# Patient Record
Sex: FEMALE | Race: WHITE | NOT HISPANIC OR LATINO | Employment: OTHER | URBAN - METROPOLITAN AREA
[De-identification: names, ages, dates, MRNs, and addresses within clinical notes are randomized per-mention and may not be internally consistent; named-entity substitution may affect disease eponyms.]

---

## 2016-12-30 PROCEDURE — 82274 ASSAY TEST FOR BLOOD FECAL: CPT | Performed by: INTERNAL MEDICINE

## 2016-12-30 PROCEDURE — 99000 SPECIMEN HANDLING OFFICE-LAB: CPT | Performed by: INTERNAL MEDICINE

## 2017-01-03 DIAGNOSIS — R19.5 FECAL OCCULT BLOOD TEST POSITIVE: Primary | ICD-10-CM

## 2017-01-03 DIAGNOSIS — Z12.11 COLON CANCER SCREENING: ICD-10-CM

## 2017-01-03 LAB — HEMOCCULT STL QL IA: POSITIVE

## 2017-01-11 ENCOUNTER — MYC MEDICAL ADVICE (OUTPATIENT)
Dept: INTERNAL MEDICINE | Facility: CLINIC | Age: 72
End: 2017-01-11

## 2017-01-13 ENCOUNTER — MYC MEDICAL ADVICE (OUTPATIENT)
Dept: INTERNAL MEDICINE | Facility: CLINIC | Age: 72
End: 2017-01-13

## 2017-01-14 ENCOUNTER — MYC MEDICAL ADVICE (OUTPATIENT)
Dept: INTERNAL MEDICINE | Facility: CLINIC | Age: 72
End: 2017-01-14

## 2017-01-18 ENCOUNTER — MYC MEDICAL ADVICE (OUTPATIENT)
Dept: INTERNAL MEDICINE | Facility: CLINIC | Age: 72
End: 2017-01-18

## 2017-01-18 NOTE — TELEPHONE ENCOUNTER
I spoke with Saravanan (Reference Call # [id number]) @ 's [facility] insurance, who indicates that no prior authorization is required for CPT Code 82149/97371  for this procedure since this is a Medicare replacement insurance Product.

## 2017-02-01 ENCOUNTER — MYC MEDICAL ADVICE (OUTPATIENT)
Dept: INTERNAL MEDICINE | Facility: CLINIC | Age: 72
End: 2017-02-01

## 2017-02-10 ENCOUNTER — TRANSFERRED RECORDS (OUTPATIENT)
Dept: HEALTH INFORMATION MANAGEMENT | Facility: CLINIC | Age: 72
End: 2017-02-10

## 2017-02-10 ENCOUNTER — TELEPHONE (OUTPATIENT)
Dept: INTERNAL MEDICINE | Facility: CLINIC | Age: 72
End: 2017-02-10

## 2017-02-10 NOTE — TELEPHONE ENCOUNTER
Dr. Finney---  [facility], Dr. Peters is calling to give you an update on this pt. A colonoscopy was done this morning , and there was a mass in the colon, most likely cancerous. Still waiting for biopsy results.  Pt is being set up with  cancer service to help triage this pt, and help her to get to right place when needed.  Please call Dr. Peters with any questions or concerns, phone [phone].    Marleni Cardona RN

## 2017-03-01 PROCEDURE — 00000346 ZZHCL STATISTIC REVIEW OUTSIDE SLIDES TC 88321: Performed by: INTERNAL MEDICINE

## 2017-03-02 ENCOUNTER — HOSPITAL ENCOUNTER (OUTPATIENT)
Dept: CT IMAGING | Facility: CLINIC | Age: 72
Discharge: HOME | End: 2017-03-02
Attending: INTERNAL MEDICINE | Admitting: INTERNAL MEDICINE
Payer: MEDICARE

## 2017-03-02 DIAGNOSIS — C18.5 MALIGNANT NEOPLASM OF SPLENIC FLEXURE (H): ICD-10-CM

## 2017-03-02 LAB
COPATH REPORT: NORMAL
CREAT BLD-MCNC: 0.7 MG/DL (ref 0.52–1.04)
GFR SERPL CREATININE-BSD FRML MDRD: 83 ML/MIN/1.7M2

## 2017-03-02 PROCEDURE — 25500064 ZZH RX 255 OP 636: Performed by: INTERNAL MEDICINE

## 2017-03-02 PROCEDURE — 82565 ASSAY OF CREATININE: CPT

## 2017-03-02 PROCEDURE — 71260 CT THORAX DX C+: CPT

## 2017-03-02 PROCEDURE — 74177 CT ABD & PELVIS W/CONTRAST: CPT

## 2017-03-02 PROCEDURE — 25000125 ZZHC RX 250: Performed by: INTERNAL MEDICINE

## 2017-03-02 RX ORDER — IOPAMIDOL 755 MG/ML
62 INJECTION, SOLUTION INTRAVASCULAR ONCE
Status: COMPLETED | OUTPATIENT
Start: 2017-03-02 | End: 2017-03-02

## 2017-03-02 RX ADMIN — SODIUM CHLORIDE 58 ML: 9 INJECTION, SOLUTION INTRAVENOUS at 14:52

## 2017-03-02 RX ADMIN — IOPAMIDOL 62 ML: 755 INJECTION, SOLUTION INTRAVENOUS at 14:51

## 2017-03-06 ENCOUNTER — OFFICE VISIT (OUTPATIENT)
Dept: SURGERY | Facility: CLINIC | Age: 72
End: 2017-03-06
Payer: COMMERCIAL

## 2017-03-06 VITALS
DIASTOLIC BLOOD PRESSURE: 88 MMHG | HEART RATE: 96 BPM | BODY MASS INDEX: 23.6 KG/M2 | SYSTOLIC BLOOD PRESSURE: 155 MMHG | WEIGHT: 125 LBS | HEIGHT: 61 IN

## 2017-03-06 DIAGNOSIS — C18.6 MALIGNANT NEOPLASM OF DESCENDING COLON (H): ICD-10-CM

## 2017-03-06 DIAGNOSIS — R16.0 LIVER MASS: Primary | ICD-10-CM

## 2017-03-06 PROCEDURE — 99203 OFFICE O/P NEW LOW 30 MIN: CPT | Performed by: SURGERY

## 2017-03-06 NOTE — LETTER
"  2017    [facility]  Surgery Consultation    RE:  Anna Dyer-:  [birth year]     Primary care physician: Tello Finney [phone]  Consultation requested by: Valdo Peters M.D.     HPI: This patient is a 71-year-old female who is referred for evaluation of a newly diagnosed colon cancer. After many years and urging by primary care she ultimately recently underwent screening colonoscopy. She was found to have a mass just below the splenic flexure that was biopsied and came back as invasive adenocarcinoma. She has subsequently undergone a CT of chest abdomen and pelvis for staging purposes. This unfortunately shows multiple lesions within her liver consistent with metastatic disease. The patient otherwise reports that she is asymptomatic. She denies abdominal pain. She denies weight loss. She denies any significant difficulties with her stools. She denies melena or hematochezia.     PMH:  has no past medical history on file.  PSH:  has no past surgical history on file.  Social History:  reports that she has been smoking Cigarettes. She has never used smokeless tobacco. She reports that she does not drink alcohol or use illicit drugs.  Family History: family history includes Family History Negative in her father, mother, and sister; Hypothyroidism in her son; Psoriasis in her brother.  Medications/Allergies: Home medications and allergies reviewed.     ROS: The 10 point Review of Systems is negative other than noted in the HPI.     Physical Exam:  /88 Pulse 96 Ht 5' 1\" (1.549 m) Wt 125 lb (56.7 kg) BMI 23.62 kg/m2  GENERAL: Generally appears well.  Psych: Alert and Oriented. Normal affect  Eyes: Sclera clear  Respiratory: Lungs clear to ausculation bilaterally with good air excursion  Cardiovascular: Regular Rate and Rhythm with no murmurs gallops or rubs, normal peripheral pulses  GI: Abdomen Non Distended Non-Tender No hernias palpated..  Lymphatic/Hematologic/Immune: " No femoral or cervical lymphadenopathy.  Integumentary: No rashes  Neurological: grossly intact         All new lab and imaging data was reviewed.      Impression and Plan:  Patient is a 71 year old female with new diagnosis of colon cancer likely with liver metastases     PLAN: I discussed with her the need to establish a diagnosis of the masses in her liver. As such she is being sent for CT-guided biopsy of these.. I'm also going to have her meet with oncology to discuss these findings. She likely will at some point require colon resection for removal of the mass. I believe that she would be a good candidate for laparoscopic surgery. This was discussed in detail. I'm going to have her come back to see me in the next several weeks after she has undergone these additional tests and had her oncology visit. All of her questions were answered thoroughly.     Salas Calderon M.D.

## 2017-03-06 NOTE — MR AVS SNAPSHOT
After Visit Summary   3/6/2017    Anna Dyer    MRN: [id number]           Patient Information     Date Of Birth          1945        Visit Information        Provider Department      3/6/2017 1:45 PM Salas Calderon MD [facility] General Surgery      Today's Diagnoses     Liver mass    -  1    Colon cancer (H)           Follow-ups after your visit        Your next 10 appointments already scheduled     Mar 07, 2017  1:40 PM CST   Pre-Op physical with Ashtyn Donis PA-C   [facility] ([facility]    [address] [address]   [phone]            Mar 08, 2017 12:45 PM CST   US BIOPSY LIVER with SHUS4   [facility] ([facility])    [address] [place] [id number] [phone]           Tell us in advance if there s any chance you may be pregnant.  Bring a list of your medicines to the exam. Include vitamins, minerals and over-the-counter drugs.  No eating or drinking for 4 hours prior to exam.  If you take blood thinners, you may need to stop taking them a few days before treatment. Talk to your doctor before stopping these medicines. You will need a blood test the morning of your exam.   Stop taking Coumadin (warfarin) 3 days before your exam. Restart the day after your exam.   If you take aspirin, you may need to stop taking it 3 days before your scan.   If you take Plavix, Ticlid, Pletal or Persantine, you may need to stop taking them 5 days before your scan. Please talk to your doctor before stopping these medicines.  If you will receive sedation for this test (medicine to help you relax):   See your family doctor for an exam within 30 days of treatment.   Plan for an adult to drive you home and stay with you for at least 6 hours.   Follow the eating and drinking guidelines checked below:   No eating or  drinking for 4 hours before your test. You may take medicine with small sips of water.   If you have diabetes:If you take insulin, call your diabetes care team. Do not take diabetes pills on the morning of your test. If you take metformin (Avandamet, Glucophage, Glucovance, Metaglip) and received contrast, wait 48 hours before re-starting this medicine.  Please call the Imaging Department at your exam site with any questions.            Mar 08, 2017  1:00 PM CST   CT LIVER BIOPSY with SHCT1, SH BODY RAD   [facility] ([facility])    [address] [place] [id number] [phone]           Plan for an adult to drive you home and stay with you until morning.  Tell your doctor in advance:   If you have any allergies.   If you are breastfeeding or there s any chance you are pregnant.  Please bring any scans or X-rays taken at other hospitals, if they may be helpful. Also bring a list of your medicines, including vitamins, minerals and over-the-counter drugs. It is safest to leave valuables at home.  If you take blood thinners, you may need to stop taking them a few days before treatment. Talk to your doctor before stopping these medicines. You will need a blood test the morning of your exam.   Stop taking Coumadin (warfarin) 5 days before treatment. Restart the day after treatment.   If you take aspirin, you may need to stop taking it 3 days before treatment.   If you take Plavix, Ticlid, Pletal or Persantine, you may need to stop taking them 5 days before your scan.  If you have diabetes:   If you take insulin, call your diabetes care team. Ask if you should adjust your insulin before this test.   If your kidney function is normal, continue taking your metformin (Avandamet, Glucophage, Glucovance, Metaglip) on the day of your exam.   If your kidney function is abnormal, wait 48 hours before restarting this medicine.  If you have any questions, please call the imaging department  "where you will have your exam.  The day before your exam: Drink extra fluids at least six 8-ounce glasses (unless your doctor tells you to restrict fluids). The day of your exam: No eating or drinking for 4 hours before your test. You may take medicine with small sips of water.              Future tests that were ordered for you today     Open Future Orders        Priority Expected Expires Ordered    US Biopsy Liver Routine  3/6/2018 3/6/2017    CT Liver Biopsy Routine 3/6/2017 3/6/2018 3/6/2017            Who to contact     If you have questions or need follow up information about today's clinic visit or your schedule please contact [facility] directly at [phone].  Normal or non-critical lab and imaging results will be communicated to you by PublicBetahart, letter or phone within 4 business days after the clinic has received the results. If you do not hear from us within 7 days, please contact the clinic through [facility] or phone. If you have a critical or abnormal lab result, we will notify you by phone as soon as possible.  Submit refill requests through My[employer]mik or call your pharmacy and they will forward the refill request to us. Please allow 3 business days for your refill to be completed.          Additional Information About Your Visit        My[employer] Information     [employer] gives you secure access to your electronic health record. If you see a primary care provider, you can also send messages to your care team and make appointments. If you have questions, please call your primary care clinic.  If you do not have a primary care provider, please call [phone] and they will assist you.        Care EveryWhere ID     This is your Care EveryWhere ID. This could be used by other organizations to access your [facility] medical records  [id number]        Your Vitals Were     Pulse Height BMI (Body Mass Index)             96 5' 1\" (1.549 m) 23.62 kg/m2          Blood Pressure from Last 3 Encounters: "   03/06/17 155/88   12/29/16 118/76   12/07/16 130/90    Weight from Last 3 Encounters:   03/06/17 125 lb (56.7 kg)   12/29/16 125 lb 6.4 oz (56.9 kg)   12/07/16 131 lb (59.4 kg)               Primary Care Provider Office Phone # Fax #    Tello Finney [phone]       [facility] [address] [place] [phone]        Thank you!     Thank you for choosing [facility]  for your care. Our goal is always to provide you with excellent care. Hearing back from our patients is one way we can continue to improve our services. Please take a few minutes to complete the written survey that you may receive in the mail after your visit with us. Thank you!             Your Updated Medication List - Protect others around you: Learn how to safely use, store and throw away your medicines at www.disposemymeds.org.          This list is accurate as of: 3/6/17  2:46 PM.  Always use your most recent med list.                   Brand Name Dispense Instructions for use    gabapentin 400 MG capsule    NEURONTIN    270 capsule    Take 2-3 capsules (800-1,200 mg) by mouth At Bedtime       hydrochlorothiazide 25 MG tablet    HYDRODIURIL    90 tablet    Take 1 tablet (25 mg) by mouth daily       losartan 100 MG tablet    COZAAR    90 tablet    Take 1 tablet (100 mg) by mouth daily

## 2017-03-07 ENCOUNTER — OFFICE VISIT (OUTPATIENT)
Dept: INTERNAL MEDICINE | Facility: CLINIC | Age: 72
End: 2017-03-07
Payer: COMMERCIAL

## 2017-03-07 VITALS
TEMPERATURE: 98.4 F | HEIGHT: 61 IN | DIASTOLIC BLOOD PRESSURE: 84 MMHG | SYSTOLIC BLOOD PRESSURE: 126 MMHG | HEART RATE: 93 BPM | OXYGEN SATURATION: 92 % | WEIGHT: 127.2 LBS | BODY MASS INDEX: 24.02 KG/M2

## 2017-03-07 DIAGNOSIS — F17.210 CIGARETTE NICOTINE DEPENDENCE WITHOUT COMPLICATION: ICD-10-CM

## 2017-03-07 DIAGNOSIS — I10 HYPERTENSION GOAL BP (BLOOD PRESSURE) < 140/90: ICD-10-CM

## 2017-03-07 DIAGNOSIS — C18.5 MALIGNANT NEOPLASM OF SPLENIC FLEXURE (H): ICD-10-CM

## 2017-03-07 DIAGNOSIS — R16.0 LIVER MASS: ICD-10-CM

## 2017-03-07 DIAGNOSIS — Z01.818 PREOP GENERAL PHYSICAL EXAM: Primary | ICD-10-CM

## 2017-03-07 DIAGNOSIS — K76.89 OTHER SPECIFIED DISEASES OF LIVER: ICD-10-CM

## 2017-03-07 LAB
ALBUMIN SERPL-MCNC: 3.6 G/DL (ref 3.4–5)
ALP SERPL-CCNC: 122 U/L (ref 40–150)
ALT SERPL W P-5'-P-CCNC: 16 U/L (ref 0–50)
ANION GAP SERPL CALCULATED.3IONS-SCNC: 8 MMOL/L (ref 3–14)
APTT PPP: 28 SEC (ref 22–37)
AST SERPL W P-5'-P-CCNC: 19 U/L (ref 0–45)
BILIRUB SERPL-MCNC: 0.4 MG/DL (ref 0.2–1.3)
BUN SERPL-MCNC: 20 MG/DL (ref 7–30)
CALCIUM SERPL-MCNC: 10 MG/DL (ref 8.5–10.1)
CHLORIDE SERPL-SCNC: 101 MMOL/L (ref 94–109)
CO2 SERPL-SCNC: 27 MMOL/L (ref 20–32)
CREAT SERPL-MCNC: 0.81 MG/DL (ref 0.52–1.04)
GFR SERPL CREATININE-BSD FRML MDRD: 70 ML/MIN/1.7M2
GLUCOSE SERPL-MCNC: 93 MG/DL (ref 70–99)
HGB BLD-MCNC: 13 G/DL (ref 11.7–15.7)
INR PPP: 0.98 (ref 0.86–1.14)
POTASSIUM SERPL-SCNC: 4 MMOL/L (ref 3.4–5.3)
PROT SERPL-MCNC: 8.3 G/DL (ref 6.8–8.8)
SODIUM SERPL-SCNC: 136 MMOL/L (ref 133–144)

## 2017-03-07 PROCEDURE — 99214 OFFICE O/P EST MOD 30 MIN: CPT | Performed by: PHYSICIAN ASSISTANT

## 2017-03-07 PROCEDURE — 85730 THROMBOPLASTIN TIME PARTIAL: CPT | Performed by: PHYSICIAN ASSISTANT

## 2017-03-07 PROCEDURE — 36415 COLL VENOUS BLD VENIPUNCTURE: CPT | Performed by: PHYSICIAN ASSISTANT

## 2017-03-07 PROCEDURE — 80053 COMPREHEN METABOLIC PANEL: CPT | Performed by: PHYSICIAN ASSISTANT

## 2017-03-07 PROCEDURE — 85018 HEMOGLOBIN: CPT | Performed by: PHYSICIAN ASSISTANT

## 2017-03-07 PROCEDURE — 85610 PROTHROMBIN TIME: CPT | Performed by: PHYSICIAN ASSISTANT

## 2017-03-07 NOTE — NURSING NOTE
"Chief Complaint   Patient presents with     Pre-Op Exam     colon-[facility]       Initial /86 (BP Location: Left arm, Patient Position: Chair, Cuff Size: Adult Regular)  Pulse 93  Temp 98.4  F (36.9  C) (Oral)  Ht 5' 1\" (1.549 m)  Wt 127 lb 3.2 oz (57.7 kg)  SpO2 92%  BMI 24.03 kg/m2 Estimated body mass index is 24.03 kg/(m^2) as calculated from the following:    Height as of this encounter: 5' 1\" (1.549 m).    Weight as of this encounter: 127 lb 3.2 oz (57.7 kg).  Medication Reconciliation: complete    "

## 2017-03-07 NOTE — PROGRESS NOTES
[address] [address]  [phone]  Dept: [phone]    PRE-OP EVALUATION:  Today's date: 3/7/2017    Anna Dyer (: 1945) presents for pre-operative evaluation assessment as requested by Dr. Calderon.  She requires evaluation and anesthesia risk assessment prior to undergoing surgery/procedure for diagnosis of liver mass .  Proposed procedure: CT and ultrasound guided bx     Date of Surgery/ Procedure: 3/8/2017  Time of Surgery/ Procedure:Unknown  Hospital[facility]Surgical [facility]    Primary Physician: Tello Finney  Type of Anesthesia Anticipated: local/MAC    Patient has a Health Care Directive or Living Will:  YES     1. NO - Do you have a history of heart attack, stroke, stent, bypass or surgery on an artery in the head, neck, heart or legs?  2. NO - Do you ever have any pain or discomfort in your chest?  3. NO - Do you have a history of  Heart Failure?  4. NO - Are you troubled by shortness of breath when: walking on the level, up a slight hill or at night?  5. NO - Do you currently have a cold, bronchitis or other respiratory infection?  6. NO - Do you have a cough, shortness of breath or wheezing?  7. NO - Do you sometimes get pains in the calves of your legs when you walk?  8. NO - Do you or anyone in your family have previous history of blood clots?  9. NO - Do you or does anyone in your family have a serious bleeding problem such as prolonged bleeding following surgeries or cuts?  10. NO - Have you ever had problems with anemia or been told to take iron pills?  11. NO - Have you had any abnormal blood loss such as black, tarry or bloody stools, or abnormal vaginal bleeding?  12. NO - Have you ever had a blood transfusion?  13. NO - Have you or any of your relatives ever had problems with anesthesia?  14. NO - Do you have sleep apnea, excessive snoring or daytime drowsiness?  15. NO - Do you have any prosthetic heart  valves?  16. NO - Do you have prosthetic joints?  17. NO - Is there any chance that you may be pregnant?      HPI:                                                      Brief HPI related to upcoming procedure: patient recently has + guiac FIT testing and then referred for colonoscopy.  Colonoscopy revealed adenocarcinoma at the splenic flexure. Further imaging notes liver mass      See problem list for active medical problems.  Problems all longstanding and stable, except as noted/documented.  See ROS for pertinent symptoms related to these conditions.                                                                                                  .  HYPERTENSION - Patient has longstanding history of mod-severe HTN , currently denies any symptoms referable to elevated blood pressure. Specifically denies chest pain, palpitations, dyspnea, orthopnea, PND or peripheral edema. Blood pressure readings have been in normal range. Current medication regimen is as listed below. Patient denies any side effects of medication.                                                                                                                                                                                          .    MEDICAL HISTORY:                                                      Patient Active Problem List    Diagnosis Date Noted     Malignant neoplasm of splenic flexure (H) 03/07/2017     Priority: Medium     CARDIOVASCULAR SCREENING; LDL GOAL LESS THAN 130 02/24/2014     Priority: Medium     Advanced directives, counseling/discussion 09/15/2014     Advance Care Planning:   Receipt of ACP document:  Received: Health Care Directive which was witnessed or notarized on 08/20/2013.  Document not previously scanned.  Validation form completed and sent with document to be scanned.  .  Confirmed/documented designated decision maker(s). See permanent comments section of demographics in clinical tab. View document(s) and details by  "clicking on code status.   Added by Rach Olson on 1/19/2015.               Nicotine dependence 02/25/2014     Hypertension goal BP (blood pressure) < 140/90 02/24/2014     Restless leg syndrome 02/24/2014      No past medical history on file.  No past surgical history on file.  Current Outpatient Prescriptions   Medication Sig Dispense Refill     hydrochlorothiazide (HYDRODIURIL) 25 MG tablet Take 1 tablet (25 mg) by mouth daily 90 tablet 1     losartan (COZAAR) 100 MG tablet Take 1 tablet (100 mg) by mouth daily 90 tablet 1     gabapentin (NEURONTIN) 400 MG capsule Take 2-3 capsules (800-1,200 mg) by mouth At Bedtime 270 capsule 3     OTC products: no recent use of OTC ASA, NSAIDS or Steroids    Allergies   Allergen Reactions     Sulfa Drugs Hives      Latex Allergy: NO    Social History   Substance Use Topics     Smoking status: Current Every Day Smoker     Types: Cigarettes     Smokeless tobacco: Never Used     Alcohol use No     History   Drug Use No       REVIEW OF SYSTEMS:                                                    C: NEGATIVE for fever, chills, change in weight  INTEGUMENTARY/SKIN: NEGATIVE for worrisome rashes, moles or lesions  E/M: NEGATIVE for ear, mouth and throat problems  R: NEGATIVE for significant cough or SOB  CV: NEGATIVE for chest pain, palpitations or peripheral edema  GI: NEGATIVE for nausea, abdominal pain, heartburn, or change in bowel habits  MUSCULOSKELETAL: NEGATIVE for significant arthralgias or myalgia  NEURO: NEGATIVE for weakness, dizziness or paresthesias  ENDOCRINE: NEGATIVE for temperature intolerance, skin/hair changes  HEME/ALLERGY/IMMUNE: NEGATIVE for bleeding problems  PSYCHIATRIC: NEGATIVE for changes in mood or affect    EXAM:                                                    /84  Pulse 93  Temp 98.4  F (36.9  C) (Oral)  Ht 5' 1\" (1.549 m)  Wt 127 lb 3.2 oz (57.7 kg)  SpO2 92%  BMI 24.03 kg/m2  GENERAL APPEARANCE: healthy, alert and no distress  HENT: ear " canals and TM's normal and nose and mouth without ulcers or lesions  RESP: lungs clear to auscultation - no rales, rhonchi or wheezes  CV: regular rate and rhythm, normal S1 S2, no S3 or S4 and no murmur, click or rub   ABDOMEN: soft, nontender, no HSM or masses and bowel sounds normal  MS: extremities normal- no gross deformities noted  SKIN: no suspicious lesions or rashes  NEURO: Normal strength and tone, sensory exam grossly normal, mentation intact and speech normal  PSYCH: mentation appears normal and affect normal/bright    DIAGNOSTICS:                                                      Labs Drawn and in Process:   Unresulted Labs Ordered in the Past 30 Days of this Admission     No orders found from 1/6/2017 to 3/8/2017.          Recent Labs   Lab Test  12/29/16   1546  02/15/16   1542   02/24/14   1505   05/08/09   2137   HGB   --    --    --   16.6*   --   13.4   PLT   --    --    --    --    --   92*   NA  131*  137   < >  139   < >  137   POTASSIUM  4.0  4.2   < >  4.4   < >  3.8   CR  0.91  0.80   < >  1.01   < >  2.50*    < > = values in this interval not displayed.        IMPRESSION:                                                    Reason for surgery/procedure: liver mass, colon cancer  Diagnosis/reason for consult: Hypertension, obacco abuse     The proposed surgical procedure is considered LOW risk.    REVISED CARDIAC RISK INDEX  The patient has the following serious cardiovascular risks for perioperative complications such as (MI, PE, VFib and 3  AV Block):  No serious cardiac risks  INTERPRETATION: 0 risks: Class I (very low risk - 0.4% complication rate)    The patient has the following additional risks for perioperative complications:  No identified additional risks      ICD-10-CM    1. Preop general physical exam Z01.818 Hemoglobin     INR     Partial thromboplastin time     Comprehensive metabolic panel (BMP + Alb, Alk Phos, ALT, AST, Total. Bili, TP)   2. Liver mass R16.0 Hemoglobin      INR     Partial thromboplastin time     Comprehensive metabolic panel (BMP + Alb, Alk Phos, ALT, AST, Total. Bili, TP)   3. Malignant neoplasm of splenic flexure (H) C18.5 Hemoglobin     INR     Partial thromboplastin time     Comprehensive metabolic panel (BMP + Alb, Alk Phos, ALT, AST, Total. Bili, TP)   4. Hypertension goal BP (blood pressure) < 140/90 I10 Comprehensive metabolic panel (BMP + Alb, Alk Phos, ALT, AST, Total. Bili, TP)   5. Other specified diseases of liver  K76.89 Partial thromboplastin time   6. Cigarette nicotine dependence without complication F17.210        RECOMMENDATIONS:                                                          --Patient is to take all scheduled medications on the day of surgery     APPROVAL GIVEN to proceed with proposed procedure, without further diagnostic evaluation       Signed Electronically by: Ashtyn Donis PA-C    Copy of this evaluation report is provided to requesting physician.    [facility] Preop Guidelines

## 2017-03-07 NOTE — MR AVS SNAPSHOT
After Visit Summary   3/7/2017    Anna Dyer    MRN: [id number]           Patient Information     Date Of Birth          1945        Visit Information        Provider Department      3/7/2017 1:40 PM Ashtyn Donis PA-C [facility]        Today's Diagnoses     Preop general physical exam    -  1    Liver mass        Malignant neoplasm of splenic flexure (H)        Hypertension goal BP (blood pressure) < 140/90        Other specified diseases of liver         Cigarette nicotine dependence without complication          Care Instructions      Before Your Surgery      Call your surgeon if there is any change in your health. This includes signs of a cold or flu (such as a sore throat, runny nose, cough, rash or fever).    Do not smoke, drink alcohol or take over the counter medicine (unless your surgeon or primary care doctor tells you to) for the 24 hours before and after surgery.    If you take prescribed drugs: Follow your doctor s orders about which medicines to take and which to stop until after surgery.    Eating and drinking prior to surgery: follow the instructions from your surgeon    Take a shower or bath the night before surgery. Use the soap your surgeon gave you to gently clean your skin. If you do not have soap from your surgeon, use your regular soap. Do not shave or scrub the surgery site.  Wear clean pajamas and have clean sheets on your bed.         Follow-ups after your visit        Your next 10 appointments already scheduled     Mar 08, 2017 12:45 PM CST   US BIOPSY LIVER with SHUS4   [facility] Ultrasound ([facility])    [address] [place] [id number] [phone]           Tell us in advance if there s any chance you may be pregnant.  Bring a list of your medicines to the exam. Include vitamins, minerals and over-the-counter drugs.  No eating or drinking for 4 hours prior to exam.  If you take blood  thinners, you may need to stop taking them a few days before treatment. Talk to your doctor before stopping these medicines. You will need a blood test the morning of your exam.   Stop taking Coumadin (warfarin) 3 days before your exam. Restart the day after your exam.   If you take aspirin, you may need to stop taking it 3 days before your scan.   If you take Plavix, Ticlid, Pletal or Persantine, you may need to stop taking them 5 days before your scan. Please talk to your doctor before stopping these medicines.  If you will receive sedation for this test (medicine to help you relax):   See your family doctor for an exam within 30 days of treatment.   Plan for an adult to drive you home and stay with you for at least 6 hours.   Follow the eating and drinking guidelines checked below:   No eating or drinking for 4 hours before your test. You may take medicine with small sips of water.   If you have diabetes:If you take insulin, call your diabetes care team. Do not take diabetes pills on the morning of your test. If you take metformin (Avandamet, Glucophage, Glucovance, Metaglip) and received contrast, wait 48 hours before re-starting this medicine.  Please call the Imaging Department at your exam site with any questions.            Mar 08, 2017  1:00 PM CST   CT LIVER BIOPSY with SHCT1, SH BODY RAD   [facility] CT ([facility])    [address] [place] [id number] [phone]           Plan for an adult to drive you home and stay with you until morning.  Tell your doctor in advance:   If you have any allergies.   If you are breastfeeding or there s any chance you are pregnant.  Please bring any scans or X-rays taken at other hospitals, if they may be helpful. Also bring a list of your medicines, including vitamins, minerals and over-the-counter drugs. It is safest to leave valuables at home.  If you take blood thinners, you may need to stop taking them a few days before  treatment. Talk to your doctor before stopping these medicines. You will need a blood test the morning of your exam.   Stop taking Coumadin (warfarin) 5 days before treatment. Restart the day after treatment.   If you take aspirin, you may need to stop taking it 3 days before treatment.   If you take Plavix, Ticlid, Pletal or Persantine, you may need to stop taking them 5 days before your scan.  If you have diabetes:   If you take insulin, call your diabetes care team. Ask if you should adjust your insulin before this test.   If your kidney function is normal, continue taking your metformin (Avandamet, Glucophage, Glucovance, Metaglip) on the day of your exam.   If your kidney function is abnormal, wait 48 hours before restarting this medicine.  If you have any questions, please call the imaging department where you will have your exam.  The day before your exam: Drink extra fluids at least six 8-ounce glasses (unless your doctor tells you to restrict fluids). The day of your exam: No eating or drinking for 4 hours before your test. You may take medicine with small sips of water.              Future tests that were ordered for you today     Open Future Orders        Priority Expected Expires Ordered    US Biopsy Liver Routine  3/6/2018 3/6/2017    CT Liver Biopsy Routine 3/6/2017 3/6/2018 3/6/2017            Who to contact     If you have questions or need follow up information about today's clinic visit or your schedule please contact [facility] directly at [phone].  Normal or non-critical lab and imaging results will be communicated to you by MyChart, letter or phone within 4 business days after the clinic has received the results. If you do not hear from us within 7 days, please contact the clinic through MyChart or phone. If you have a critical or abnormal lab result, we will notify you by phone as soon as possible.  Submit refill requests through [employer] or call your pharmacy and  "they will forward the refill request to us. Please allow 3 business days for your refill to be completed.          Additional Information About Your Visit        [employer] Information     [employer] gives you secure access to your electronic health record. If you see a primary care provider, you can also send messages to your care team and make appointments. If you have questions, please call your primary care clinic.  If you do not have a primary care provider, please call [phone] and they will assist you.        Care EveryWhere ID     This is your Care EveryWhere ID. This could be used by other organizations to access your [place] [facility] medical records  [id number]        Your Vitals Were     Pulse Temperature Height Pulse Oximetry BMI (Body Mass Index)       93 98.4  F (36.9  C) (Oral) 5' 1\" (1.549 m) 92% 24.03 kg/m2        Blood Pressure from Last 3 Encounters:   03/07/17 126/84   03/06/17 155/88   12/29/16 118/76    Weight from Last 3 Encounters:   03/07/17 127 lb 3.2 oz (57.7 kg)   03/06/17 125 lb (56.7 kg)   12/29/16 125 lb 6.4 oz (56.9 kg)              We Performed the Following     Comprehensive metabolic panel (BMP + Alb, Alk Phos, ALT, AST, Total. Bili, TP)     Hemoglobin     INR     Partial thromboplastin time        Primary Care Provider Office Phone # Fax #    Tello Finney [phone]       [facility] [address] [facility] [address]        Thank you!     Thank you for choosing [facility]  for your care. Our goal is always to provide you with excellent care. Hearing back from our patients is one way we can continue to improve our services. Please take a few minutes to complete the written survey that you may receive in the mail after your visit with us. Thank you!             Your Updated Medication List - Protect others around you: Learn how to safely use, store and throw away your medicines at www.disposemymeds.org.          This list is " accurate as of: 3/7/17  2:22 PM.  Always use your most recent med list.                   Brand Name Dispense Instructions for use    gabapentin 400 MG capsule    NEURONTIN    270 capsule    Take 2-3 capsules (800-1,200 mg) by mouth At Bedtime       hydrochlorothiazide 25 MG tablet    HYDRODIURIL    90 tablet    Take 1 tablet (25 mg) by mouth daily       losartan 100 MG tablet    COZAAR    90 tablet    Take 1 tablet (100 mg) by mouth daily

## 2017-03-08 ENCOUNTER — HOSPITAL ENCOUNTER (OUTPATIENT)
Dept: ULTRASOUND IMAGING | Facility: CLINIC | Age: 72
End: 2017-03-08
Attending: SURGERY | Admitting: SURGERY
Payer: MEDICARE

## 2017-03-08 ENCOUNTER — HOSPITAL ENCOUNTER (OUTPATIENT)
Facility: CLINIC | Age: 72
Discharge: HOME | End: 2017-03-08
Attending: SURGERY | Admitting: SURGERY
Payer: MEDICARE

## 2017-03-08 VITALS
HEART RATE: 84 BPM | OXYGEN SATURATION: 94 % | TEMPERATURE: 96 F | DIASTOLIC BLOOD PRESSURE: 56 MMHG | SYSTOLIC BLOOD PRESSURE: 110 MMHG | RESPIRATION RATE: 16 BRPM

## 2017-03-08 DIAGNOSIS — C18.9 COLON CANCER (H): ICD-10-CM

## 2017-03-08 DIAGNOSIS — C19 COLORECTAL CANCER (H): Primary | ICD-10-CM

## 2017-03-08 DIAGNOSIS — R16.0 LIVER MASS: ICD-10-CM

## 2017-03-08 LAB — PLATELET # BLD AUTO: 323 10E9/L (ref 150–450)

## 2017-03-08 PROCEDURE — 25000125 ZZHC RX 250: Performed by: RADIOLOGY

## 2017-03-08 PROCEDURE — 88307 TISSUE EXAM BY PATHOLOGIST: CPT | Mod: 26 | Performed by: RADIOLOGY

## 2017-03-08 PROCEDURE — 88173 CYTOPATH EVAL FNA REPORT: CPT | Mod: 26 | Performed by: RADIOLOGY

## 2017-03-08 PROCEDURE — 88173 CYTOPATH EVAL FNA REPORT: CPT | Performed by: RADIOLOGY

## 2017-03-08 PROCEDURE — 88172 CYTP DX EVAL FNA 1ST EA SITE: CPT | Performed by: RADIOLOGY

## 2017-03-08 PROCEDURE — 81445 SO NEO GSAP 5-50DNA/DNA&RNA: CPT | Performed by: PATHOLOGY

## 2017-03-08 PROCEDURE — 88342 IMHCHEM/IMCYTCHM 1ST ANTB: CPT | Mod: 26 | Performed by: RADIOLOGY

## 2017-03-08 PROCEDURE — 85049 AUTOMATED PLATELET COUNT: CPT | Performed by: RADIOLOGY

## 2017-03-08 PROCEDURE — 88342 IMHCHEM/IMCYTCHM 1ST ANTB: CPT | Performed by: RADIOLOGY

## 2017-03-08 PROCEDURE — 00000159 ZZHCL STATISTIC H-SEND OUTS PREP: Performed by: RADIOLOGY

## 2017-03-08 PROCEDURE — 40000863 ZZH STATISTIC RADIOLOGY XRAY, US, CT, MAR, NM

## 2017-03-08 PROCEDURE — 88341 IMHCHEM/IMCYTCHM EA ADD ANTB: CPT | Performed by: RADIOLOGY

## 2017-03-08 PROCEDURE — 88341 IMHCHEM/IMCYTCHM EA ADD ANTB: CPT | Mod: 26 | Performed by: RADIOLOGY

## 2017-03-08 PROCEDURE — 76942 ECHO GUIDE FOR BIOPSY: CPT

## 2017-03-08 PROCEDURE — 88172 CYTP DX EVAL FNA 1ST EA SITE: CPT | Mod: 26 | Performed by: RADIOLOGY

## 2017-03-08 PROCEDURE — 88307 TISSUE EXAM BY PATHOLOGIST: CPT | Performed by: RADIOLOGY

## 2017-03-08 PROCEDURE — 25000128 H RX IP 250 OP 636: Performed by: RADIOLOGY

## 2017-03-08 RX ORDER — LIDOCAINE HYDROCHLORIDE 10 MG/ML
7 INJECTION, SOLUTION EPIDURAL; INFILTRATION; INTRACAUDAL; PERINEURAL ONCE
Status: COMPLETED | OUTPATIENT
Start: 2017-03-08 | End: 2017-03-08

## 2017-03-08 RX ORDER — FENTANYL CITRATE 50 UG/ML
25-50 INJECTION, SOLUTION INTRAMUSCULAR; INTRAVENOUS EVERY 5 MIN PRN
Status: DISCONTINUED | OUTPATIENT
Start: 2017-03-08 | End: 2017-03-08 | Stop reason: HOSPADM

## 2017-03-08 RX ORDER — NALOXONE HYDROCHLORIDE 0.4 MG/ML
.1-.4 INJECTION, SOLUTION INTRAMUSCULAR; INTRAVENOUS; SUBCUTANEOUS
Status: DISCONTINUED | OUTPATIENT
Start: 2017-03-08 | End: 2017-03-08 | Stop reason: HOSPADM

## 2017-03-08 RX ORDER — LIDOCAINE 40 MG/G
CREAM TOPICAL
Status: DISCONTINUED | OUTPATIENT
Start: 2017-03-08 | End: 2017-03-08 | Stop reason: HOSPADM

## 2017-03-08 RX ORDER — FLUMAZENIL 0.1 MG/ML
0.2 INJECTION, SOLUTION INTRAVENOUS
Status: DISCONTINUED | OUTPATIENT
Start: 2017-03-08 | End: 2017-03-08 | Stop reason: HOSPADM

## 2017-03-08 RX ADMIN — FENTANYL CITRATE 50 MCG: 50 INJECTION, SOLUTION INTRAMUSCULAR; INTRAVENOUS at 13:59

## 2017-03-08 RX ADMIN — LIDOCAINE HYDROCHLORIDE 70 MG: 10 INJECTION, SOLUTION EPIDURAL; INFILTRATION; INTRACAUDAL; PERINEURAL at 14:10

## 2017-03-08 RX ADMIN — MIDAZOLAM HYDROCHLORIDE 1 MG: 1 INJECTION, SOLUTION INTRAMUSCULAR; INTRAVENOUS at 13:59

## 2017-03-08 NOTE — PROGRESS NOTES
"[facility]  Surgery Consultation    Primary care physician:  Tello Finney YONY [phone]  Consultation requested by: Valdo Peters M.D.    HPI: This patient is a 71-year-old female who is referred for evaluation of a newly diagnosed colon cancer.  After many years and urging by primary care she ultimately recently underwent screening colonoscopy.  She was found to have a mass just below the splenic flexure that was biopsied and came back as invasive adenocarcinoma.   She has subsequently undergone a CT of chest abdomen and pelvis for staging purposes.  This unfortunately shows multiple lesions within her liver consistent with metastatic disease.  The patient otherwise reports that she is asymptomatic.  She denies abdominal pain.  She denies weight loss.  She denies any significant difficulties with her stools.  She denies melena or hematochezia.    PMH:   has no past medical history on file.  PSH:    has no past surgical history on file.  Social History:   reports that she has been smoking Cigarettes.  She has never used smokeless tobacco. She reports that she does not drink alcohol or use illicit drugs.  Family History:   family history includes Family History Negative in her father, mother, and sister; Hypothyroidism in her son; Psoriasis in her brother.  Medications/Allergies: Home medications and allergies reviewed.    ROS:  The 10 point Review of Systems is negative other than noted in the HPI.    Physical Exam:  /88  Pulse 96  Ht 5' 1\" (1.549 m)  Wt 125 lb (56.7 kg)  BMI 23.62 kg/m2  GENERAL: Generally appears well.  Psych: Alert and Oriented.  Normal affect  Eyes: Sclera clear  Respiratory:  Lungs clear to ausculation bilaterally with good air excursion  Cardiovascular:  Regular Rate and Rhythm with no murmurs gallops or rubs, normal peripheral pulses  GI: Abdomen Non Distended Non-Tender  No hernias palpated..  Lymphatic/Hematologic/Immune:  No femoral or cervical " lymphadenopathy.  Integumentary:  No rashes  Neurological: grossly intact       All new lab and imaging data was reviewed.     Impression and Plan:  Patient is a 71 year old female with new diagnosis of colon cancer likely with liver metastases    PLAN: I discussed with her the need to establish a diagnosis of the masses in her liver.  As such she is being sent for CT-guided biopsy of these..  I'm also going to have her meet with oncology to discuss these findings.   She likely will at some point require colon resection for removal of the mass.  I believe that she would be a good candidate for laparoscopic surgery.  This was discussed in detail.  I'm going to have her come back to see me in the next several weeks after she has undergone these additional tests and had her oncology visit. All of her questions were answered thoroughly.    Salas Calderon M.D.    Please route or send letter to:  Primary Care Provider (PCP) and Referring Provider

## 2017-03-08 NOTE — PROGRESS NOTES
RADIOLOGY PROCEDURE NOTE  Patient name: Anna Dyer  MRN: [id number]  : 1945    Pre-procedure diagnosis: Liver lesion  Post-procedure diagnosis: Same    Procedure Date/Time: 2017  2:07 PM  Procedure: Biopsy.  Estimated blood loss: None  Specimen(s) collected with description: Core biopsy samples.  The patient tolerated the procedure well with no immediate complications.    See imaging dictation for procedural details and findings.    Provider name: Kemal Spicer  Assistant(s):None

## 2017-03-08 NOTE — IP AVS SNAPSHOT
[place] [address]    [place] [address]    Phone:  [phone]                                       After Visit Summary   3/8/2017    Anna Dyer    MRN: [id number]           After Visit Summary Signature Page     I have received my discharge instructions, and my questions have been answered. I have discussed any challenges I see with this plan with the nurse or doctor.    ..........................................................................................................................................  Patient/Patient Representative Signature      ..........................................................................................................................................  Patient Representative Print Name and Relationship to Patient    ..................................................               ................................................  Date                                            Time    ..........................................................................................................................................  Reviewed by Signature/Title    ...................................................              ..............................................  Date                                                            Time

## 2017-03-08 NOTE — PROGRESS NOTES
Care Suites Arrival    Reason for Visit: Liver Biopsy    A/O. Denies pain. D/C instructions reviewed with pt with verbal understanding received. Copy given to pt.  All questions & concerns addressed. Pt resting on cart, denies additional needs at this time, call light within reach. No family present at this time. Friend will pick pt up at discharge. Will cont to monitor.    Pt was instructed by clinic nurse that she could eat/drink up until 1000. Pt reports that she drank a milkshake but was done by 1000. Dr Spicer informed. Will delay procedure until 1330. Pt also was not informed that she needed someone to stay with her post sedation. Friend, Linnea, called - she is able to stay with pt for 5 hrs once pt is home.

## 2017-03-08 NOTE — IP AVS SNAPSHOT
MRN:[id number]                      After Visit Summary   3/8/2017    Anna Dyer    MRN: [id number]           Visit Information        Department      3/8/2017 11:05 AM [facility]          Review of your medicines      UNREVIEWED medicines. Ask your doctor about these medicines        Dose / Directions    gabapentin 400 MG capsule   Commonly known as:  NEURONTIN   Used for:  Restless leg syndrome        Dose:  800-1200 mg   Take 2-3 capsules (800-1,200 mg) by mouth At Bedtime   Quantity:  270 capsule   Refills:  3       hydrochlorothiazide 25 MG tablet   Commonly known as:  HYDRODIURIL   Used for:  Essential hypertension with goal blood pressure less than 140/90        Dose:  25 mg   Take 1 tablet (25 mg) by mouth daily   Quantity:  90 tablet   Refills:  1       losartan 100 MG tablet   Commonly known as:  COZAAR   Used for:  Essential hypertension with goal blood pressure less than 140/90        Dose:  100 mg   Take 1 tablet (100 mg) by mouth daily   Quantity:  90 tablet   Refills:  1                Protect others around you: Learn how to safely use, store and throw away your medicines at www.disposemymeds.org.         Follow-ups after your visit        Your next 10 appointments already scheduled     Mar 08, 2017 12:45 PM CST   US BIOPSY LIVER with SHUS4   [facility] Ultrasound ([facility])    [address] [place] [id number] [phone]           Tell us in advance if there s any chance you may be pregnant.  Bring a list of your medicines to the exam. Include vitamins, minerals and over-the-counter drugs.  No eating or drinking for 4 hours prior to exam.  If you take blood thinners, you may need to stop taking them a few days before treatment. Talk to your doctor before stopping these medicines. You will need a blood test the morning of your exam.   Stop taking Coumadin (warfarin) 3 days before your exam. Restart the day after  your exam.   If you take aspirin, you may need to stop taking it 3 days before your scan.   If you take Plavix, Ticlid, Pletal or Persantine, you may need to stop taking them 5 days before your scan. Please talk to your doctor before stopping these medicines.  If you will receive sedation for this test (medicine to help you relax):   See your family doctor for an exam within 30 days of treatment.   Plan for an adult to drive you home and stay with you for at least 6 hours.   Follow the eating and drinking guidelines checked below:   No eating or drinking for 4 hours before your test. You may take medicine with small sips of water.   If you have diabetes:If you take insulin, call your diabetes care team. Do not take diabetes pills on the morning of your test. If you take metformin (Avandamet, Glucophage, Glucovance, Metaglip) and received contrast, wait 48 hours before re-starting this medicine.  Please call the Imaging Department at your exam site with any questions.               Care Instructions        Further instructions from your care team       Liver Biopsy Discharge Instructions     After you go home:      You may resume your normal diet    Have an adult stay with you for 6 hours if you received sedation       For 24 hours - due to the sedation you received:    Relax and take it easy    Do NOT make any important or legal decisions    Do NOT drive or operate machines at home or at work    Do NOT drink alcohol    Care of Puncture Site:      For the first 48 hrs, check your puncture site every couple hours while you are awake     You may remove/change the bandaid tomorrow    You may shower tomorrow    No tub baths, whirlpools or swimming until your puncture site has fully healed    Activity       You may go back to normal activity in 24 hours     Wait 48 hours before lifting, straining, exercise or other strenuous activity    Medicines:      You may resume all medications    Resume your Warfarin/Coumadin at  your regular dose today. Follow up with your provider to have your INR rechecked    Resume your Platelet Inhibitors and Aspirin tomorrow at your regular dose    For minor pain, you may take Acetaminophen (Tylenol) or Ibuprofen (Advil)            Call the provider who ordered this test if:      Increased pain or a large or growing hard lump around the site    Blood or fluid is draining from the site    The site is red, swollen, hot or tender    Chills or a fever greater than 101 F (38 C)    Pain that is getting worse    Any questions or concerns    Call  911 or go to the Emergency Room if:      Severe pain or trouble breathing    Bleeding that you cannot control        If you have questions call:          [facility] Radiology Dept @ [phone]      The provider who performed your procedure was _________________.             Additional Information About Your Visit        MyChart Information     [employer] gives you secure access to your electronic health record. If you see a primary care provider, you can also send messages to your care team and make appointments. If you have questions, please call your primary care clinic.  If you do not have a primary care provider, please call [phone] and they will assist you.        Care EveryWhere ID     This is your Care EveryWhere ID. This could be used by other organizations to access your [place] medical records  [id number]        Your Vitals Were     Blood Pressure Pulse Temperature Respirations Pulse Oximetry       167/71 (BP Location: Right arm) 101 96  F (35.6  C) (Oral) 18 98%        Primary Care Provider Office Phone # Fax #    Tello Finney [phone]      Thank you!     Thank you for choosing [facility] for your care. Our goal is always to provide you with excellent care. Hearing back from our patients is one way we can continue to improve our services. Please take a few minutes to complete the written survey that you may receive in  the mail after you visit with us. Thank you!             Medication List: This is a list of all your medications and when to take them. Check marks below indicate your daily home schedule. Keep this list as a reference.      Medications           Morning Afternoon Evening Bedtime As Needed    gabapentin 400 MG capsule   Commonly known as:  NEURONTIN   Take 2-3 capsules (800-1,200 mg) by mouth At Bedtime                                hydrochlorothiazide 25 MG tablet   Commonly known as:  HYDRODIURIL   Take 1 tablet (25 mg) by mouth daily                                losartan 100 MG tablet   Commonly known as:  COZAAR   Take 1 tablet (100 mg) by mouth daily

## 2017-03-08 NOTE — PROGRESS NOTES
1330 Pt transported via cart accompanied by writer to ultrasound department. Pt scanned et liver lesions seen. Dr. Spicer notified et he will come et assess ultrasound picture.   1356 Dr. Spicer in room et assessed pt et explained the procedure et risks of procedure. Consent signed. Time Out taken.  1359 Sedation initiated; Versed 1 mg et Fentanyl 50 mcg given with relief of anxiety. 5 core biopsies taken et are adequate.   1415 Band-aide on liver site CDI; no bleeding or hematoma noted.  1445 Ate sandwich et drank coffee et tolerated. Denies pain. Live biopsy site CDI.  1520 Pt taken via wheelchair to car accompanied by CNA et neighbor will drive her home. Liver biopsy site CDI; no bleeding or hematoma noted.

## 2017-03-09 LAB — COPATH REPORT: NORMAL

## 2017-03-13 ENCOUNTER — MYC MEDICAL ADVICE (OUTPATIENT)
Dept: INTERNAL MEDICINE | Facility: CLINIC | Age: 72
End: 2017-03-13

## 2017-03-13 DIAGNOSIS — C78.7 COLON CANCER METASTASIZED TO LIVER (H): Primary | ICD-10-CM

## 2017-03-13 DIAGNOSIS — I10 ESSENTIAL HYPERTENSION WITH GOAL BLOOD PRESSURE LESS THAN 140/90: ICD-10-CM

## 2017-03-13 DIAGNOSIS — C18.9 COLON CANCER METASTASIZED TO LIVER (H): Primary | ICD-10-CM

## 2017-03-14 RX ORDER — HYDROCHLOROTHIAZIDE 25 MG/1
25 TABLET ORAL DAILY
Qty: 90 TABLET | Refills: 1 | Status: CANCELLED | OUTPATIENT
Start: 2017-03-14

## 2017-03-14 RX ORDER — LOSARTAN POTASSIUM 100 MG/1
100 TABLET ORAL DAILY
Qty: 90 TABLET | Refills: 1 | Status: CANCELLED | OUTPATIENT
Start: 2017-03-14

## 2017-03-15 ENCOUNTER — TRANSFERRED RECORDS (OUTPATIENT)
Dept: HEALTH INFORMATION MANAGEMENT | Facility: CLINIC | Age: 72
End: 2017-03-15

## 2017-03-21 RX ORDER — LOSARTAN POTASSIUM AND HYDROCHLOROTHIAZIDE 25; 100 MG/1; MG/1
1 TABLET ORAL DAILY
Qty: 90 TABLET | Refills: 1 | Status: SHIPPED | OUTPATIENT
Start: 2017-03-21 | End: 2017-10-16

## 2017-03-22 LAB — COPATH REPORT: NORMAL

## 2017-03-24 ENCOUNTER — ANESTHESIA EVENT (OUTPATIENT)
Dept: SURGERY | Facility: CLINIC | Age: 72
End: 2017-03-24
Payer: MEDICARE

## 2017-03-24 ENCOUNTER — APPOINTMENT (OUTPATIENT)
Dept: GENERAL RADIOLOGY | Facility: CLINIC | Age: 72
End: 2017-03-24
Attending: THORACIC SURGERY (CARDIOTHORACIC VASCULAR SURGERY)
Payer: MEDICARE

## 2017-03-24 ENCOUNTER — HOSPITAL ENCOUNTER (OUTPATIENT)
Facility: CLINIC | Age: 72
Discharge: HOME | End: 2017-03-24
Attending: THORACIC SURGERY (CARDIOTHORACIC VASCULAR SURGERY) | Admitting: THORACIC SURGERY (CARDIOTHORACIC VASCULAR SURGERY)
Payer: MEDICARE

## 2017-03-24 ENCOUNTER — ANESTHESIA (OUTPATIENT)
Dept: SURGERY | Facility: CLINIC | Age: 72
End: 2017-03-24
Payer: MEDICARE

## 2017-03-24 ENCOUNTER — SURGERY (OUTPATIENT)
Age: 72
End: 2017-03-24

## 2017-03-24 VITALS
OXYGEN SATURATION: 97 % | HEIGHT: 61 IN | RESPIRATION RATE: 18 BRPM | SYSTOLIC BLOOD PRESSURE: 115 MMHG | TEMPERATURE: 96.8 F | BODY MASS INDEX: 23.79 KG/M2 | WEIGHT: 126 LBS | HEART RATE: 95 BPM | DIASTOLIC BLOOD PRESSURE: 64 MMHG

## 2017-03-24 DIAGNOSIS — C18.9 COLON CANCER METASTASIZED TO LIVER (H): Primary | ICD-10-CM

## 2017-03-24 DIAGNOSIS — C78.7 COLON CANCER METASTASIZED TO LIVER (H): Primary | ICD-10-CM

## 2017-03-24 PROCEDURE — C1788 PORT, INDWELLING, IMP: HCPCS | Performed by: THORACIC SURGERY (CARDIOTHORACIC VASCULAR SURGERY)

## 2017-03-24 PROCEDURE — 27210995 ZZH RX 272: Performed by: THORACIC SURGERY (CARDIOTHORACIC VASCULAR SURGERY)

## 2017-03-24 PROCEDURE — 36000052 ZZH SURGERY LEVEL 2 EA 15 ADDTL MIN: Performed by: THORACIC SURGERY (CARDIOTHORACIC VASCULAR SURGERY)

## 2017-03-24 PROCEDURE — 25000128 H RX IP 250 OP 636: Performed by: NURSE ANESTHETIST, CERTIFIED REGISTERED

## 2017-03-24 PROCEDURE — 25800025 ZZH RX 258: Performed by: NURSE ANESTHETIST, CERTIFIED REGISTERED

## 2017-03-24 PROCEDURE — 71000012 ZZH RECOVERY PHASE 1 LEVEL 1 FIRST HR: Performed by: THORACIC SURGERY (CARDIOTHORACIC VASCULAR SURGERY)

## 2017-03-24 PROCEDURE — 40000170 ZZH STATISTIC PRE-PROCEDURE ASSESSMENT II: Performed by: THORACIC SURGERY (CARDIOTHORACIC VASCULAR SURGERY)

## 2017-03-24 PROCEDURE — 25000128 H RX IP 250 OP 636: Performed by: THORACIC SURGERY (CARDIOTHORACIC VASCULAR SURGERY)

## 2017-03-24 PROCEDURE — 40000940 XR CHEST PORT 1 VW

## 2017-03-24 PROCEDURE — 25000125 ZZHC RX 250: Performed by: NURSE ANESTHETIST, CERTIFIED REGISTERED

## 2017-03-24 PROCEDURE — 25000132 ZZH RX MED GY IP 250 OP 250 PS 637: Mod: GY | Performed by: PHYSICIAN ASSISTANT

## 2017-03-24 PROCEDURE — 71000027 ZZH RECOVERY PHASE 2 EACH 15 MINS: Performed by: THORACIC SURGERY (CARDIOTHORACIC VASCULAR SURGERY)

## 2017-03-24 PROCEDURE — 37000008 ZZH ANESTHESIA TECHNICAL FEE, 1ST 30 MIN: Performed by: THORACIC SURGERY (CARDIOTHORACIC VASCULAR SURGERY)

## 2017-03-24 PROCEDURE — 25000125 ZZHC RX 250: Performed by: THORACIC SURGERY (CARDIOTHORACIC VASCULAR SURGERY)

## 2017-03-24 PROCEDURE — 36000050 ZZH SURGERY LEVEL 2 1ST 30 MIN: Performed by: THORACIC SURGERY (CARDIOTHORACIC VASCULAR SURGERY)

## 2017-03-24 PROCEDURE — 27210794 ZZH OR GENERAL SUPPLY STERILE: Performed by: THORACIC SURGERY (CARDIOTHORACIC VASCULAR SURGERY)

## 2017-03-24 PROCEDURE — A9270 NON-COVERED ITEM OR SERVICE: HCPCS | Mod: GY | Performed by: PHYSICIAN ASSISTANT

## 2017-03-24 PROCEDURE — 37000009 ZZH ANESTHESIA TECHNICAL FEE, EACH ADDTL 15 MIN: Performed by: THORACIC SURGERY (CARDIOTHORACIC VASCULAR SURGERY)

## 2017-03-24 DEVICE — CATH PORT POWERPORT CLEARVUE ISP 8FR 5608062: Type: IMPLANTABLE DEVICE | Site: SUBCLAVIAN | Status: FUNCTIONAL

## 2017-03-24 RX ORDER — CEFAZOLIN SODIUM 1 G/3ML
INJECTION, POWDER, FOR SOLUTION INTRAMUSCULAR; INTRAVENOUS PRN
Status: DISCONTINUED | OUTPATIENT
Start: 2017-03-24 | End: 2017-03-24

## 2017-03-24 RX ORDER — FENTANYL CITRATE 50 UG/ML
INJECTION, SOLUTION INTRAMUSCULAR; INTRAVENOUS PRN
Status: DISCONTINUED | OUTPATIENT
Start: 2017-03-24 | End: 2017-03-24

## 2017-03-24 RX ORDER — PROPOFOL 10 MG/ML
INJECTION, EMULSION INTRAVENOUS CONTINUOUS PRN
Status: DISCONTINUED | OUTPATIENT
Start: 2017-03-24 | End: 2017-03-24

## 2017-03-24 RX ORDER — IBUPROFEN 600 MG/1
600 TABLET, FILM COATED ORAL
Status: DISCONTINUED | OUTPATIENT
Start: 2017-03-24 | End: 2017-03-24 | Stop reason: HOSPADM

## 2017-03-24 RX ORDER — HYDROCODONE BITARTRATE AND ACETAMINOPHEN 5; 325 MG/1; MG/1
1-2 TABLET ORAL
Status: COMPLETED | OUTPATIENT
Start: 2017-03-24 | End: 2017-03-24

## 2017-03-24 RX ORDER — LIDOCAINE HYDROCHLORIDE 10 MG/ML
INJECTION, SOLUTION INFILTRATION; PERINEURAL PRN
Status: DISCONTINUED | OUTPATIENT
Start: 2017-03-24 | End: 2017-03-24 | Stop reason: HOSPADM

## 2017-03-24 RX ORDER — SODIUM CHLORIDE, SODIUM LACTATE, POTASSIUM CHLORIDE, CALCIUM CHLORIDE 600; 310; 30; 20 MG/100ML; MG/100ML; MG/100ML; MG/100ML
INJECTION, SOLUTION INTRAVENOUS CONTINUOUS PRN
Status: DISCONTINUED | OUTPATIENT
Start: 2017-03-24 | End: 2017-03-24

## 2017-03-24 RX ORDER — HEPARIN SODIUM (PORCINE) LOCK FLUSH IV SOLN 100 UNIT/ML 100 UNIT/ML
SOLUTION INTRAVENOUS PRN
Status: DISCONTINUED | OUTPATIENT
Start: 2017-03-24 | End: 2017-03-24 | Stop reason: HOSPADM

## 2017-03-24 RX ORDER — HYDROCODONE BITARTRATE AND ACETAMINOPHEN 5; 325 MG/1; MG/1
1 TABLET ORAL EVERY 6 HOURS PRN
Qty: 8 TABLET | Refills: 0 | Status: SHIPPED | OUTPATIENT
Start: 2017-03-24 | End: 2018-01-03

## 2017-03-24 RX ORDER — ONDANSETRON 2 MG/ML
INJECTION INTRAMUSCULAR; INTRAVENOUS PRN
Status: DISCONTINUED | OUTPATIENT
Start: 2017-03-24 | End: 2017-03-24

## 2017-03-24 RX ADMIN — HYDROCODONE BITARTRATE AND ACETAMINOPHEN 1 TABLET: 5; 325 TABLET ORAL at 13:14

## 2017-03-24 RX ADMIN — MIDAZOLAM HYDROCHLORIDE 2 MG: 1 INJECTION, SOLUTION INTRAMUSCULAR; INTRAVENOUS at 11:00

## 2017-03-24 RX ADMIN — HEPARIN SODIUM 20 ML: 1000 INJECTION, SOLUTION INTRAVENOUS; SUBCUTANEOUS at 11:16

## 2017-03-24 RX ADMIN — HEPARIN SODIUM (PORCINE) LOCK FLUSH IV SOLN 100 UNIT/ML 10 ML: 100 SOLUTION at 11:16

## 2017-03-24 RX ADMIN — PROPOFOL 100 MCG/KG/MIN: 10 INJECTION, EMULSION INTRAVENOUS at 11:00

## 2017-03-24 RX ADMIN — ONDANSETRON 4 MG: 2 INJECTION INTRAMUSCULAR; INTRAVENOUS at 11:23

## 2017-03-24 RX ADMIN — CEFAZOLIN 2 G: 1 INJECTION, POWDER, FOR SOLUTION INTRAMUSCULAR; INTRAVENOUS at 11:05

## 2017-03-24 RX ADMIN — LIDOCAINE HYDROCHLORIDE 10 ML: 10 INJECTION, SOLUTION INFILTRATION; PERINEURAL at 11:20

## 2017-03-24 RX ADMIN — Medication 1 APPLICATOR: at 11:24

## 2017-03-24 RX ADMIN — FENTANYL CITRATE 50 MCG: 50 INJECTION, SOLUTION INTRAMUSCULAR; INTRAVENOUS at 11:00

## 2017-03-24 RX ADMIN — SODIUM CHLORIDE, POTASSIUM CHLORIDE, SODIUM LACTATE AND CALCIUM CHLORIDE: 600; 310; 30; 20 INJECTION, SOLUTION INTRAVENOUS at 11:03

## 2017-03-24 ASSESSMENT — LIFESTYLE VARIABLES: TOBACCO_USE: 1

## 2017-03-24 NOTE — ANESTHESIA PREPROCEDURE EVALUATION
Anesthesia Evaluation     .             ROS/MED HX    ENT/Pulmonary:     (+)tobacco use, Current use , . .   (-) sleep apnea   Neurologic:     (+)other neuro RLS    Cardiovascular:     (+) hypertension----. : . . . :. .       METS/Exercise Tolerance:     Hematologic:         Musculoskeletal:         GI/Hepatic:     (+) Other GI/Hepatic Metastatic COlon CA     (-) GERD   Renal/Genitourinary:  - ROS Renal section negative       Endo:  - neg endo ROS       Psychiatric:  - neg psychiatric ROS       Infectious Disease:         Malignancy:   (+) Malignancy History of GI  GI CA Active status post,         Other:                     Physical Exam  Normal systems: cardiovascular, pulmonary and dental    Airway   Mallampati: II  TM distance: >3 FB  Neck ROM: full    Dental     Cardiovascular       Pulmonary                     Anesthesia Plan      History & Physical Review  History and physical reviewed and following examination; no interval change.    ASA Status:  2 .    NPO Status:  > 8 hours    Plan for MAC with Intravenous induction. Reason for MAC:  Deep or markedly invasive procedure (G8) and Procedure to face, neck, head or breast  PONV prophylaxis:  Ondansetron (or other 5HT-3)       Postoperative Care  Postoperative pain management:  IV analgesics.      Consents  Anesthetic plan, risks, benefits and alternatives discussed with:  Patient..                Procedure: Procedure(s):  INSERT PORT VASCULAR ACCESS  Preop diagnosis: COLON CANCER     Allergies   Allergen Reactions     Sulfa Drugs Hives     Past Medical History:   Diagnosis Date     Cancer (H)     metastatic colorectal adenocarcinoma     RLS (restless legs syndrome)      Past Surgical History:   Procedure Laterality Date     BIOPSY      liver     BREAST SURGERY      breast implants     COSMETIC SURGERY      facelift and tummy tuck     GYN SURGERY      tubal     Prior to Admission medications    Medication Sig Start Date End Date Taking? Authorizing Provider    hydrochlorothiazide (HYDRODIURIL) 25 MG tablet Take 1 tablet (25 mg) by mouth daily 12/29/16  Yes Tello Finney MD   losartan (COZAAR) 100 MG tablet Take 1 tablet (100 mg) by mouth daily 12/29/16  Yes Tello Finney MD   gabapentin (NEURONTIN) 400 MG capsule Take 2-3 capsules (800-1,200 mg) by mouth At Bedtime 12/29/16  Yes Tello Finney MD   losartan-hydrochlorothiazide (HYZAAR) 100-25 MG per tablet Take 1 tablet by mouth daily 3/21/17   Tello Finney MD     No current Epic-ordered facility-administered medications on file.      No current [facility]-ordered outpatient prescriptions on file.     Wt Readings from Last 1 Encounters:   03/24/17 57.2 kg (126 lb)     Temp Readings from Last 1 Encounters:   03/24/17 35.6  C (96  F) (Oral)     BP Readings from Last 6 Encounters:   03/24/17 150/64   03/08/17 110/56   03/07/17 126/84   03/06/17 155/88   12/29/16 118/76   12/07/16 130/90     Pulse Readings from Last 4 Encounters:   03/24/17 95   03/08/17 84   03/07/17 93   03/06/17 96     Resp Readings from Last 1 Encounters:   03/24/17 18     SpO2 Readings from Last 1 Encounters:   03/24/17 95%     Recent Labs   Lab Test  03/07/17   1419  12/29/16   1546   NA  136  131*   POTASSIUM  4.0  4.0   CHLORIDE  101  96   CO2  27  24   ANIONGAP  8  11   GLC  93  83   BUN  20  24   CR  0.81  0.91   CAITY  10.0  9.5     Recent Labs   Lab Test  03/08/17   1135  03/07/17   1419  02/24/14   1505  05/08/09   2137   WBC   --    --    --   6.2   HGB   --   13.0  16.6*  13.4   PLT  323   --    --   92*     Recent Labs   Lab Test  03/07/17   1419   INR  0.98      RECENT LABS:   ECG:   ECHO:   CXR:              .

## 2017-03-24 NOTE — DISCHARGE INSTRUCTIONS
Same Day Surgery Discharge Instructions for  Sedation and General Anesthesia       It's not unusual to feel dizzy, light-headed or faint for up to 24 hours after surgery or while taking pain medication.  If you have these symptoms: sit for a few minutes before standing and have someone assist you when you get up to walk or use the bathroom.      You should rest and relax for the next 24 hours. We recommend you make arrangements to have an adult stay with you for at least 24 hours after your discharge.  Avoid hazardous and strenuous activity.      DO NOT DRIVE any vehicle or operate mechanical equipment for 24 hours following the end of your surgery.  Even though you may feel normal, your reactions may be affected by the medication you have received.      Do not drink alcoholic beverages for 24 hours following surgery.       Slowly progress to your regular diet as you feel able. It's not unusual to feel nauseated and/or vomit after receiving anesthesia.  If you develop these symptoms, drink clear liquids (apple juice, ginger ale, broth, 7-up, etc. ) until you feel better.  If your nausea and vomiting persists for 24 hours, please notify your surgeon.        All narcotic pain medications, along with inactivity and anesthesia, can cause constipation. Drinking plenty of liquids and increasing fiber intake will help.      For any questions of a medical nature, call your surgeon.      Do not make important decisions for 24 hours.      If you had general anesthesia, you may have a sore throat for a couple of days related to the breathing tube used during surgery.  You may use Cepacol lozenges to help with this discomfort.  If it worsens or if you develop a fever, contact your surgeon.       If you feel your pain is not well managed with the pain medications prescribed by your surgeon, please contact your surgeon's office to let them know so they can address your concerns.       Discharge Instructions for Power Port  Placement      General Instructions:    You will be given a card with information about your port.  You should carry this with you in your wallet/billfold. It provides information to clinicians about your port.  You should also carry the card in case your port triggers any security devices.     Activity:    Limit your activity for the first few days after your port is placed    Incision Care:     Unless otherwise directed by your surgeon, the dressing may removed tomorrow.      If a topical skin adhesive was used to close incision, you may shower tomorrow. Do not use soaps, lotions, or ointments on the wound area. Do not scrub the wound. After bathing, pat the wound dry with a soft towel.  Do not scratch, rub, or pick at the strips or film. Do not place tape directly over the strips or film.  Do not apply liquids (such as peroxide), ointments, or creams to the wound while the strips or film are in place.    Call your surgeon if you have:     Redness, swelling or drainage from incision     A fever of 101 F or greater.      Nausea or vomiting.     Pain that is not controlled by medications and/or rest.     Questions or concerns.

## 2017-03-24 NOTE — IP AVS SNAPSHOT
MRN:[id number]                      After Visit Summary   3/24/2017    Anna Dyer    MRN: [id number]           Thank you!     Thank you for choosing [facility] for your care. Our goal is always to provide you with excellent care. Hearing back from our patients is one way we can continue to improve our services. Please take a few minutes to complete the written survey that you may receive in the mail after you visit with us. Thank you!        Patient Information     Date Of Birth          1945        About your hospital stay     You were admitted on:  March 24, 2017 You last received care in the:  [facility] PACU    You were discharged on:  March 24, 2017       Who to Call     For medical emergencies, please call 911.  For non-urgent questions about your medical care, please call your primary care provider or clinic, [phone]  For questions related to your surgery, please call your surgery clinic        Attending Provider     Provider Specialty    Yemi Ordaz MD Thoracic Diseases       Primary Care Provider Office Phone # Fax #    Tello Finney [phone]       [facility] [address] [place] [id number]        After Care Instructions     Diet Instructions       Resume pre-procedure diet            Discharge Instructions       Follow up appointment as instructed by Surgeon and or RN            Do not - immerse incision in water until sutures removed       Do not immerse incision in water/swim for one week.            Dressing       Keep clear Dermabond glue in place.  It will peel off on its own after about 7-10 days.            Shower       No shower for 24 hours post procedure. May shower Postoperative Day (POD)  one                  Further instructions from your care team       Same Day Surgery Discharge Instructions for  Sedation and General Anesthesia       It's not unusual to feel dizzy, light-headed or faint for up  to 24 hours after surgery or while taking pain medication.  If you have these symptoms: sit for a few minutes before standing and have someone assist you when you get up to walk or use the bathroom.      You should rest and relax for the next 24 hours. We recommend you make arrangements to have an adult stay with you for at least 24 hours after your discharge.  Avoid hazardous and strenuous activity.      DO NOT DRIVE any vehicle or operate mechanical equipment for 24 hours following the end of your surgery.  Even though you may feel normal, your reactions may be affected by the medication you have received.      Do not drink alcoholic beverages for 24 hours following surgery.       Slowly progress to your regular diet as you feel able. It's not unusual to feel nauseated and/or vomit after receiving anesthesia.  If you develop these symptoms, drink clear liquids (apple juice, ginger ale, broth, 7-up, etc. ) until you feel better.  If your nausea and vomiting persists for 24 hours, please notify your surgeon.        All narcotic pain medications, along with inactivity and anesthesia, can cause constipation. Drinking plenty of liquids and increasing fiber intake will help.      For any questions of a medical nature, call your surgeon.      Do not make important decisions for 24 hours.      If you had general anesthesia, you may have a sore throat for a couple of days related to the breathing tube used during surgery.  You may use Cepacol lozenges to help with this discomfort.  If it worsens or if you develop a fever, contact your surgeon.       If you feel your pain is not well managed with the pain medications prescribed by your surgeon, please contact your surgeon's office to let them know so they can address your concerns.       Discharge Instructions for Power Port Placement      General Instructions:    You will be given a card with information about your port.  You should carry this with you in your  "wallet/billfold. It provides information to clinicians about your port.  You should also carry the card in case your port triggers any security devices.     Activity:    Limit your activity for the first few days after your port is placed    Incision Care:     Unless otherwise directed by your surgeon, the dressing may removed tomorrow.      If a topical skin adhesive was used to close incision, you may shower tomorrow. Do not use soaps, lotions, or ointments on the wound area. Do not scrub the wound. After bathing, pat the wound dry with a soft towel.  Do not scratch, rub, or pick at the strips or film. Do not place tape directly over the strips or film.  Do not apply liquids (such as peroxide), ointments, or creams to the wound while the strips or film are in place.    Call your surgeon if you have:     Redness, swelling or drainage from incision     A fever of 101 F or greater.      Nausea or vomiting.     Pain that is not controlled by medications and/or rest.     Questions or concerns.                Pending Results     Date and Time Order Name Status Description    3/24/2017 1138 XR Chest Port 1 View Preliminary             Admission Information     Date & Time Provider Department Dept. Phone    3/24/2017 Yemi Ordaz MD [facility] PACU [phone]      Your Vitals Were     Blood Pressure Pulse Temperature Respirations Height Weight    103/54 95 97  F (36.1  C) (Temporal) 16 1.549 m (5' 1\") 57.2 kg (126 lb)    Pulse Oximetry BMI (Body Mass Index)                93% 23.81 kg/m2          MyChart Information     [employer] gives you secure access to your electronic health record. If you see a primary care provider, you can also send messages to your care team and make appointments. If you have questions, please call your primary care clinic.  If you do not have a primary care provider, please call [phone] and they will assist you.        Care EveryWhere ID     This is your Care EveryWhere " ID. This could be used by other organizations to access your [employer] medical records  [id number]           Review of your medicines      START taking        Dose / Directions    HYDROcodone-acetaminophen 5-325 MG per tablet   Commonly known as:  NORCO   Used for:  Colon cancer metastasized to liver (H)        Dose:  1 tablet   Take 1 tablet by mouth every 6 hours as needed for other (Moderate to Severe Pain)   Quantity:  8 tablet   Refills:  0         CONTINUE these medicines which have NOT CHANGED        Dose / Directions    gabapentin 400 MG capsule   Commonly known as:  NEURONTIN   Used for:  Restless leg syndrome        Dose:  800-1200 mg   Take 2-3 capsules (800-1,200 mg) by mouth At Bedtime   Quantity:  270 capsule   Refills:  3       hydrochlorothiazide 25 MG tablet   Commonly known as:  HYDRODIURIL   Used for:  Essential hypertension with goal blood pressure less than 140/90        Dose:  25 mg   Take 1 tablet (25 mg) by mouth daily   Quantity:  90 tablet   Refills:  1       losartan 100 MG tablet   Commonly known as:  COZAAR   Used for:  Essential hypertension with goal blood pressure less than 140/90        Dose:  100 mg   Take 1 tablet (100 mg) by mouth daily   Quantity:  90 tablet   Refills:  1       losartan-hydrochlorothiazide 100-25 MG per tablet   Commonly known as:  HYZAAR   Used for:  Essential hypertension with goal blood pressure less than 140/90        Dose:  1 tablet   Take 1 tablet by mouth daily   Quantity:  90 tablet   Refills:  1            Where to get your medicines      Some of these will need a paper prescription and others can be bought over the counter. Ask your nurse if you have questions.     Bring a paper prescription for each of these medications     HYDROcodone-acetaminophen 5-325 MG per tablet                Protect others around you: Learn how to safely use, store and throw away your medicines at www.disposemymeds.org.             Medication List: This is a list of all your  medications and when to take them. Check marks below indicate your daily home schedule. Keep this list as a reference.      Medications           Morning Afternoon Evening Bedtime As Needed    gabapentin 400 MG capsule   Commonly known as:  NEURONTIN   Take 2-3 capsules (800-1,200 mg) by mouth At Bedtime                                hydrochlorothiazide 25 MG tablet   Commonly known as:  HYDRODIURIL   Take 1 tablet (25 mg) by mouth daily                                HYDROcodone-acetaminophen 5-325 MG per tablet   Commonly known as:  NORCO   Take 1 tablet by mouth every 6 hours as needed for other (Moderate to Severe Pain)                                losartan 100 MG tablet   Commonly known as:  COZAAR   Take 1 tablet (100 mg) by mouth daily                                losartan-hydrochlorothiazide 100-25 MG per tablet   Commonly known as:  HYZAAR   Take 1 tablet by mouth daily

## 2017-03-24 NOTE — ANESTHESIA POSTPROCEDURE EVALUATION
Patient: Anna Dyer    Procedure(s):  POWER PORT PLACEMENT LEFT SUBCLAVIAN VEIN - Wound Class: I-Clean    Diagnosis:COLON CANCER   Diagnosis Additional Information: No value filed.    Anesthesia Type:  MAC    Note:  Anesthesia Post Evaluation    Patient location during evaluation: bedside  Patient participation: Able to fully participate in evaluation  Level of consciousness: awake  Pain management: adequate  Airway patency: patent  Cardiovascular status: acceptable  Respiratory status: acceptable  Hydration status: acceptable  PONV: none     Anesthetic complications: None          Last vitals:  Vitals:    03/24/17 1200 03/24/17 1215 03/24/17 1310   BP: 101/52 103/54 115/64   Pulse:      Resp: 19 16 18   Temp:  36  C (96.8  F)    SpO2: 93% 93% 97%         Electronically Signed By: Olaf Wesley DO, DO  March 24, 2017  4:23 PM

## 2017-03-24 NOTE — BRIEF OP NOTE
[facility] Brief Operative Note    Pre-operative diagnosis: COLON CANCER    Post-operative diagnosis Colon cancer   Procedure: Procedure(s):  POWER PORT PLACEMENT - Wound Class: I-Clean   Surgeon(s): Surgeon(s) and Role:     * Yemi Ordaz MD - Primary     * Ofelia Lockwood PA-C - Assisting   Estimated blood loss: 1 mL    Specimens: * No specimens in log *   Findings: NA

## 2017-03-24 NOTE — ANESTHESIA CARE TRANSFER NOTE
Patient: Anna Dyer    Procedure(s):  POWER PORT PLACEMENT - Wound Class: I-Clean    Diagnosis: COLON CANCER   Diagnosis Additional Information: No value filed.    Anesthesia Type:   MAC     Note:  Airway :Room Air  Patient transferred to:PACU        Vitals: (Last set prior to Anesthesia Care Transfer)    CRNA VITALS  3/24/2017 1106 - 3/24/2017 1141      3/24/2017             Pulse: 82    SpO2: 100 %    Resp Rate (set): 10                Electronically Signed By: ILENE Mireles CRNA  March 24, 2017  11:41 AM

## 2017-03-24 NOTE — IP AVS SNAPSHOT
[address]    [place] [address]    Phone:  [phone]                                       After Visit Summary   3/24/2017    Anna Dyer    MRN: [id number]           After Visit Summary Signature Page     I have received my discharge instructions, and my questions have been answered. I have discussed any challenges I see with this plan with the nurse or doctor.    ..........................................................................................................................................  Patient/Patient Representative Signature      ..........................................................................................................................................  Patient Representative Print Name and Relationship to Patient    ..................................................               ................................................  Date                                            Time    ..........................................................................................................................................  Reviewed by Signature/Title    ...................................................              ..............................................  Date                                                            Time

## 2017-03-24 NOTE — OR NURSING
Post Power Port placement Chest X-ray done.  Results reviewed per Dr Ordaz.  Proceed with discharge when criteria met.

## 2017-03-25 NOTE — OP NOTE
DATE OF PROCEDURE:  03/24/2017      SURGEON:  Yemi Ordaz MD      ASSISTANT:  Ofelia Lockwood PA-C      PREOPERATIVE DIAGNOSIS:  Colon  cancer.      POSTOPERATIVE DIAGNOSIS:  Colon cancer.      PROCEDURE:  Placement of a [place] PowerPort implantable port, left subclavian vein, with catheter  system device.      ANESTHESIA:  Local with lidocaine 1% without epinephrine and sedation.      INDICATIONS:  Anna Dyer is a 71-year-old woman with colon cancer.  She will undergo chemotherapy and a PowerPort is needed for venous access.        PROCEDURE:  The patient was brought to the operating room and placed in a supine position.  IV sedation was given.  The patient was placed in a Trendelenburg position.  The neck and upper chest were prepared and draped in the usual fashion using DuraPrep.  Local anesthetic was done to the left infraclavicular area using lidocaine 1% without epinephrine.  Using a #16 gauge needle, the left subclavian vein was punctured.  There was excellent blood return.  A guide wire was advanced without any resistance.  The needle was removed.  An incision was made transversely including the guide wire.  Then the subcutaneous pocket was made inferior to this incision.  Hemostasis was carefully done with electrocautery and was excellent.       An introducer with a peel-away sheath was introduced into the vein over the wire.  The wire and introducer were removed.  The catheter was advanced without any resistance.  The peel-away sheath was removed.  With the Cath-Track, the tip was located midsternum pointing downwards to the level of the third rib which corresponded to the distal SVC.  The Sensor stylet was removed.  There was excellent blood return and the catheter could be flushed easily.  The catheter was cut to the appropriate length and connected to the port which had been previously filled up with heparinized saline.  The port was placed in the subcutaneous pocket.  The  subcutaneous tissue was approximated with interrupted inverted 3-0 Vicryl suture.  The port was accessed with a non-coring needle.  There was excellent blood return.  The port could be flushed easily with heparinized saline and finally flushed 6 cc of a solution of heparin at 100 units per cc.  The subcutaneous tissue was then closed with subcuticular 3-0 Vicryl suture.  A dressing was applied.  A chest x-ray will be performed in the recovery room.  The patient tolerated the procedure well.      Chest x-ray showed the catheter was in excellent position and PowerPort is ready to be used.         KHARI GUERRA MD             D: 2017 11:39   T: 2017 21:21   MT: [id number]      Name:     ANTONY HALEY   MRN:      [id number]        Account:        [id number]   :      1945           Procedure Date: 2017      Document: [id number]

## 2017-03-27 ENCOUNTER — TRANSFERRED RECORDS (OUTPATIENT)
Dept: HEALTH INFORMATION MANAGEMENT | Facility: CLINIC | Age: 72
End: 2017-03-27

## 2017-04-18 ENCOUNTER — TRANSFERRED RECORDS (OUTPATIENT)
Dept: HEALTH INFORMATION MANAGEMENT | Facility: CLINIC | Age: 72
End: 2017-04-18

## 2017-05-02 ENCOUNTER — TRANSFERRED RECORDS (OUTPATIENT)
Dept: HEALTH INFORMATION MANAGEMENT | Facility: CLINIC | Age: 72
End: 2017-05-02

## 2017-05-16 ENCOUNTER — TRANSFERRED RECORDS (OUTPATIENT)
Dept: HEALTH INFORMATION MANAGEMENT | Facility: CLINIC | Age: 72
End: 2017-05-16

## 2017-05-30 ENCOUNTER — TRANSFERRED RECORDS (OUTPATIENT)
Dept: HEALTH INFORMATION MANAGEMENT | Facility: CLINIC | Age: 72
End: 2017-05-30

## 2017-06-13 ENCOUNTER — TRANSFERRED RECORDS (OUTPATIENT)
Dept: HEALTH INFORMATION MANAGEMENT | Facility: CLINIC | Age: 72
End: 2017-06-13

## 2017-06-21 ENCOUNTER — TRANSFERRED RECORDS (OUTPATIENT)
Dept: HEALTH INFORMATION MANAGEMENT | Facility: CLINIC | Age: 72
End: 2017-06-21

## 2017-06-22 ENCOUNTER — HOSPITAL ENCOUNTER (EMERGENCY)
Facility: CLINIC | Age: 72
Discharge: HOME | End: 2017-06-22
Attending: EMERGENCY MEDICINE | Admitting: EMERGENCY MEDICINE
Payer: MEDICARE

## 2017-06-22 VITALS
WEIGHT: 125 LBS | OXYGEN SATURATION: 96 % | DIASTOLIC BLOOD PRESSURE: 73 MMHG | SYSTOLIC BLOOD PRESSURE: 171 MMHG | BODY MASS INDEX: 23.6 KG/M2 | HEIGHT: 61 IN | TEMPERATURE: 97.5 F

## 2017-06-22 DIAGNOSIS — T82.9XXA CENTRAL LINE COMPLICATION, INITIAL ENCOUNTER: ICD-10-CM

## 2017-06-22 PROCEDURE — 25000128 H RX IP 250 OP 636: Performed by: EMERGENCY MEDICINE

## 2017-06-22 PROCEDURE — 99282 EMERGENCY DEPT VISIT SF MDM: CPT

## 2017-06-22 RX ORDER — HEPARIN SODIUM (PORCINE) LOCK FLUSH IV SOLN 100 UNIT/ML 100 UNIT/ML
100 SOLUTION INTRAVENOUS ONCE
Status: COMPLETED | OUTPATIENT
Start: 2017-06-22 | End: 2017-06-22

## 2017-06-22 RX ADMIN — SODIUM CHLORIDE, PRESERVATIVE FREE 500 UNITS: 5 INJECTION INTRAVENOUS at 22:14

## 2017-06-22 NOTE — ED AVS SNAPSHOT
Emergency Department    [address] [facility] [address]    Phone:  [phone]    Fax:  [phone]                                       Anna Dyer   MRN: [id number]    Department:   Emergency Department   Date of Visit:  6/22/2017           After Visit Summary Signature Page     I have received my discharge instructions, and my questions have been answered. I have discussed any challenges I see with this plan with the nurse or doctor.    ..........................................................................................................................................  Patient/Patient Representative Signature      ..........................................................................................................................................  Patient Representative Print Name and Relationship to Patient    ..................................................               ................................................  Date                                            Time    ..........................................................................................................................................  Reviewed by Signature/Title    ...................................................              ..............................................  Date                                                            Time

## 2017-06-22 NOTE — ED AVS SNAPSHOT
Emergency Department    [address] [facility] [address]    Phone:  [phone]    Fax:  [phone]                                       Anna Dyer   MRN: [id number]    Department:   Emergency Department   Date of Visit:  6/22/2017           Patient Information     Date Of Birth          1945        Your diagnoses for this visit were:     Central line complication, initial encounter        You were seen by Stanley Morgan MD.      Follow-up Information     Follow up with Lesa Tim MD In 1 day.    Specialty:  Hematology & Oncology    Contact information:    [facility]  [address] [facility] [address]  [facility] [id number] [phone]          Discharge Instructions       Call Oncology in morning to discuss next chemotherapy dose    Future Appointments        Provider Department Dept Phone Center    6/23/2017  2:00 PM [facility] ROOM 1 [facility] [phone] [facility]      24 Hour Appointment Hotline       To make an appointment at any [facility], call [phone] ([phone]). If you don't have a family doctor or clinic, we will help you find one. [facility] clinics are conveniently located to serve the needs of you and your family.             Review of your medicines      Our records show that you are taking the medicines listed below. If these are incorrect, please call your family doctor or clinic.        Dose / Directions Last dose taken    gabapentin 400 MG capsule   Commonly known as:  NEURONTIN   Dose:  800-1200 mg   Quantity:  270 capsule        Take 2-3 capsules (800-1,200 mg) by mouth At Bedtime   Refills:  3        hydrochlorothiazide 25 MG tablet   Commonly known as:  HYDRODIURIL   Dose:  25 mg   Quantity:  90 tablet        Take 1 tablet (25 mg) by mouth daily   Refills:  1        HYDROcodone-acetaminophen 5-325 MG per tablet   Commonly known as:  NORCO   Dose:  1 tablet   Quantity:  8 tablet        Take 1 tablet by mouth  every 6 hours as needed for other (Moderate to Severe Pain)   Refills:  0        losartan 100 MG tablet   Commonly known as:  COZAAR   Dose:  100 mg   Quantity:  90 tablet        Take 1 tablet (100 mg) by mouth daily   Refills:  1        losartan-hydrochlorothiazide 100-25 MG per tablet   Commonly known as:  HYZAAR   Dose:  1 tablet   Quantity:  90 tablet        Take 1 tablet by mouth daily   Refills:  1                Orders Needing Specimen Collection     None      Pending Results     No orders found from 6/20/2017 to 6/23/2017.            Pending Culture Results     No orders found from 6/20/2017 to 6/23/2017.            Pending Results Instructions     If you had any lab results that were not finalized at the time of your Discharge, you can call the ED Lab Result RN at [phone]. You will be contacted by this team for any positive Lab results or changes in treatment. The nurses are available 7 days a week from 10A to 6:30P.  You can leave a message 24 hours per day and they will return your call.        Test Results From Your Hospital Stay               Clinical Quality Measure: Blood Pressure Screening     Your blood pressure was checked while you were in the emergency department today. The last reading we obtained was  BP: 171/73 . Please read the guidelines below about what these numbers mean and what you should do about them.  If your systolic blood pressure (the top number) is less than 120 and your diastolic blood pressure (the bottom number) is less than 80, then your blood pressure is normal. There is nothing more that you need to do about it.  If your systolic blood pressure (the top number) is 120-139 or your diastolic blood pressure (the bottom number) is 80-89, your blood pressure may be higher than it should be. You should have your blood pressure rechecked within a year by a primary care provider.  If your systolic blood pressure (the top number) is 140 or greater or your diastolic blood  pressure (the bottom number) is 90 or greater, you may have high blood pressure. High blood pressure is treatable, but if left untreated over time it can put you at risk for heart attack, stroke, or kidney failure. You should have your blood pressure rechecked by a primary care provider within the next 4 weeks.  If your provider in the emergency department today gave you specific instructions to follow-up with your doctor or provider even sooner than that, you should follow that instruction and not wait for up to 4 weeks for your follow-up visit.        Thank you for choosing [facility]       Thank you for choosing [facility] for your care. Our goal is always to provide you with excellent care. Hearing back from our patients is one way we can continue to improve our services. Please take a few minutes to complete the written survey that you may receive in the mail after you visit with us. Thank you!        MedAwarehart Information     [id number] gives you secure access to your electronic health record. If you see a primary care provider, you can also send messages to your care team and make appointments. If you have questions, please call your primary care clinic.  If you do not have a primary care provider, please call [phone] and they will assist you.        Care EveryWhere ID     This is your Care EveryWhere ID. This could be used by other organizations to access your [facility] medical records  [id number]        Equal Access to Services     ALPHONSO AGRAWAL : Emi Torres, waaxda luqadaha, qaybta kaalmada ademaury, johanna york. So [facility] [phone].    ATENCIÓN: Si habla español, tiene a curtis disposición servicios gratuitos de asistencia lingüística. Llame al [phone].    We comply with applicable federal civil rights laws and [place] laws. We do not discriminate on the basis of race, color, national origin, age, disability sex, sexual orientation or gender  identity.            After Visit Summary       This is your record. Keep this with you and show to your community pharmacist(s) and doctor(s) at your next visit.

## 2017-06-23 ENCOUNTER — HOSPITAL ENCOUNTER (OUTPATIENT)
Dept: CT IMAGING | Facility: CLINIC | Age: 72
End: 2017-06-23
Attending: INTERNAL MEDICINE | Admitting: INTERNAL MEDICINE
Payer: MEDICARE

## 2017-06-23 ENCOUNTER — HOSPITAL ENCOUNTER (OUTPATIENT)
Facility: CLINIC | Age: 72
Discharge: HOME | End: 2017-06-23
Attending: INTERNAL MEDICINE | Admitting: INTERNAL MEDICINE
Payer: MEDICARE

## 2017-06-23 DIAGNOSIS — C18.9 MALIGNANT NEOPLASM OF COLON, UNSPECIFIED PART OF COLON (H): ICD-10-CM

## 2017-06-23 LAB
CREAT BLD-MCNC: 0.8 MG/DL (ref 0.52–1.04)
GFR SERPL CREATININE-BSD FRML MDRD: 71 ML/MIN/1.7M2

## 2017-06-23 PROCEDURE — 25000128 H RX IP 250 OP 636: Performed by: INTERNAL MEDICINE

## 2017-06-23 PROCEDURE — 82565 ASSAY OF CREATININE: CPT

## 2017-06-23 PROCEDURE — 25000125 ZZHC RX 250: Performed by: INTERNAL MEDICINE

## 2017-06-23 PROCEDURE — 74177 CT ABD & PELVIS W/CONTRAST: CPT

## 2017-06-23 PROCEDURE — 71260 CT THORAX DX C+: CPT

## 2017-06-23 PROCEDURE — 40000863 ZZH STATISTIC RADIOLOGY XRAY, US, CT, MAR, NM

## 2017-06-23 RX ORDER — IOPAMIDOL 755 MG/ML
62 INJECTION, SOLUTION INTRAVASCULAR ONCE
Status: COMPLETED | OUTPATIENT
Start: 2017-06-23 | End: 2017-06-23

## 2017-06-23 RX ORDER — HEPARIN SODIUM (PORCINE) LOCK FLUSH IV SOLN 100 UNIT/ML 100 UNIT/ML
5 SOLUTION INTRAVENOUS
Status: DISCONTINUED | OUTPATIENT
Start: 2017-06-23 | End: 2017-06-23 | Stop reason: HOSPADM

## 2017-06-23 RX ADMIN — IOPAMIDOL 62 ML: 755 INJECTION, SOLUTION INTRAVENOUS at 14:35

## 2017-06-23 RX ADMIN — SODIUM CHLORIDE 58 ML: 9 INJECTION, SOLUTION INTRAVENOUS at 14:33

## 2017-06-23 RX ADMIN — SODIUM CHLORIDE, PRESERVATIVE FREE 5 ML: 5 INJECTION INTRAVENOUS at 14:33

## 2017-06-23 NOTE — PROGRESS NOTES
Called to CT to access power port for CT scan. Confirmed that it was a power port. Accessed site with 20 gauge 3/4 inch power port needle with good blood return. Creatinine was done. Port was flushed with normal saline via the CT scanner both before and after the CT dye was injected. Then power port was flushed with heparin per protocol and de-accessed. Pt experienced no problems and was discharged to home.

## 2017-06-23 NOTE — ED PROVIDER NOTES
"  History     Chief Complaint:  Central Line complication    HPI   Anna Dyer is a 72 year old female with a history of metastatic colon cancer who presents with a complication with her chemotherapy bag. The patient has a port in the left upper chest and is supposed to have an infusion running of her chemotherapy medication 5FU. The patient states that her machine started beeping at her and she noticed some leakage from the bag while she was ironing. She notes that the infusion is not supposed to finish until tomorrow around 1:00 PM.  She has no other complaints.      Allergies:  Sulfa Drugs-Hives     Medications:    Norco   Hyzaar  Hydrodiuril  Cozaar  Neurontin     Past Medical History:    Colon cancer metastasized to liver  Hypertension    Past Surgical History:    Liver biopsy   Breast implants  Facelift and tummy tuck   Tubal ligation    Family History:    Hypothyroidism-Son    Social History:  Marital Status:   Presents to the ED alone  Tobacco Use: Current daily smoker  Alcohol Use: No  PCP: Tello Finney      Review of Systems   All other systems reviewed and are negative.      Physical Exam   First Vitals:  BP: 171/73  Heart Rate: 80  Temp: 97.5  F (36.4  C)  Height: 154.9 cm (5' 1\")  Weight: 56.7 kg (125 lb)  SpO2: 96 %    Physical Exam  Constitutional: The patient is oriented to person, place, and time.   HENT:   Head: Atraumatic  Right Ear: Normal  Left Ear: Normal  Nose: Nose normal.   Mouth/Throat: Oropharynx is clear and moist. No erythema or exudate.   Eyes: Conjunctivae and EOM are normal. Pupils are equal, round, and reactive to light. No discharge  Neck: Normal range of motion. Neck supple.   Cardiovascular: Normal rate, regular rhythm, no murmur gallops or rubs. Intact distal pulses.    Pulmonary/Chest: CTA bilaterally. No wheezes rale or rhonchi. Powerport in left chest, no inflammation  Abdominal: Soft. Non tender.  No masses   Musculoskeletal: No edema. No bony deformity. " Normal range of motion  Lymphadenopathy:     The patient has no cervical adenopathy.   Neurological: The patient is alert and oriented to person, place, and time. The patient has normal strength and normal reflexes. No cranial nerve deficit. Coordination normal.   Skin: Skin is warm and dry. No rash noted. The patient is not diaphoretic.   Psychiatric: The patient has a normal mood and affect.      Emergency Department Course   Interventions:  (7850)  Heparin, 500 units, Intracatheter    Emergency Department Course:  Nursing notes and vitals reviewed.  I performed an exam of the patient as documented above.   Patient port was deaccessed.   Findings and plan explained to the patient. Patient discharged home with instructions regarding supportive care, medications, and reasons to return. The importance of close follow-up was reviewed.     Impression & Plan      Medical Decision Making:  Anna Dyer is a 72 year old female who presents with concerns about her central line which she is currently using for chemotherapy. On examination, the port itself appears fine but when I examined the bag of chemotherapy and the pump the tubing appears to have been broken and has leaked inside the bag. Unclear how much of the dose she got. Her port was deaccessed. The chemotherapy bag and pump were handled per chemotherapy precautions. She will follow up with her oncology office tomorrow to consider further dosing.     Diagnosis:    ICD-10-CM    1. Central line complication, initial encounter T82.9XXA        Disposition:  discharged to home        Gregg AGUIRRE, am serving as a scribe on 6/22/2017 at 11:49 PM to personally document services performed by Dr. Morgan based on my observations and the provider's statements to me.     EMERGENCY DEPARTMENT       Stanley Morgan MD  06/23/17 0035

## 2017-07-05 ENCOUNTER — TRANSFERRED RECORDS (OUTPATIENT)
Dept: HEALTH INFORMATION MANAGEMENT | Facility: CLINIC | Age: 72
End: 2017-07-05

## 2017-07-19 ENCOUNTER — TRANSFERRED RECORDS (OUTPATIENT)
Dept: HEALTH INFORMATION MANAGEMENT | Facility: CLINIC | Age: 72
End: 2017-07-19

## 2017-08-16 ENCOUNTER — TRANSFERRED RECORDS (OUTPATIENT)
Dept: HEALTH INFORMATION MANAGEMENT | Facility: CLINIC | Age: 72
End: 2017-08-16

## 2017-09-19 ENCOUNTER — HOSPITAL ENCOUNTER (OUTPATIENT)
Dept: GENERAL RADIOLOGY | Facility: CLINIC | Age: 72
Discharge: HOME | End: 2017-09-19
Attending: INTERNAL MEDICINE | Admitting: INTERNAL MEDICINE
Payer: MEDICARE

## 2017-09-19 DIAGNOSIS — C18.9 COLON CANCER (H): ICD-10-CM

## 2017-09-19 DIAGNOSIS — R14.0 ABDOMINAL BLOATING: ICD-10-CM

## 2017-09-19 PROCEDURE — 74020 XR ABDOMEN 2 VW: CPT

## 2017-09-20 ENCOUNTER — TRANSFERRED RECORDS (OUTPATIENT)
Dept: HEALTH INFORMATION MANAGEMENT | Facility: CLINIC | Age: 72
End: 2017-09-20

## 2017-10-02 ENCOUNTER — HOSPITAL ENCOUNTER (OUTPATIENT)
Facility: CLINIC | Age: 72
Discharge: HOME | End: 2017-10-02
Attending: INTERNAL MEDICINE | Admitting: INTERNAL MEDICINE
Payer: MEDICARE

## 2017-10-02 ENCOUNTER — HOSPITAL ENCOUNTER (OUTPATIENT)
Dept: CT IMAGING | Facility: CLINIC | Age: 72
End: 2017-10-02
Attending: INTERNAL MEDICINE | Admitting: INTERNAL MEDICINE
Payer: MEDICARE

## 2017-10-02 DIAGNOSIS — C18.9 METASTATIC COLON CANCER IN FEMALE: ICD-10-CM

## 2017-10-02 LAB
CREAT BLD-MCNC: 0.7 MG/DL (ref 0.52–1.04)
GFR SERPL CREATININE-BSD FRML MDRD: 82 ML/MIN/1.7M2

## 2017-10-02 PROCEDURE — 74177 CT ABD & PELVIS W/CONTRAST: CPT

## 2017-10-02 PROCEDURE — 25000128 H RX IP 250 OP 636: Performed by: RADIOLOGY

## 2017-10-02 PROCEDURE — 71260 CT THORAX DX C+: CPT

## 2017-10-02 PROCEDURE — 25000128 H RX IP 250 OP 636: Performed by: INTERNAL MEDICINE

## 2017-10-02 PROCEDURE — 25000125 ZZHC RX 250: Performed by: INTERNAL MEDICINE

## 2017-10-02 PROCEDURE — 82565 ASSAY OF CREATININE: CPT

## 2017-10-02 RX ORDER — IOPAMIDOL 755 MG/ML
62 INJECTION, SOLUTION INTRAVASCULAR ONCE
Status: COMPLETED | OUTPATIENT
Start: 2017-10-02 | End: 2017-10-02

## 2017-10-02 RX ORDER — HEPARIN SODIUM (PORCINE) LOCK FLUSH IV SOLN 100 UNIT/ML 100 UNIT/ML
5 SOLUTION INTRAVENOUS
Status: DISCONTINUED | OUTPATIENT
Start: 2017-10-02 | End: 2017-10-02 | Stop reason: HOSPADM

## 2017-10-02 RX ORDER — LIDOCAINE 40 MG/G
CREAM TOPICAL
Status: DISCONTINUED | OUTPATIENT
Start: 2017-10-02 | End: 2017-10-02 | Stop reason: HOSPADM

## 2017-10-02 RX ADMIN — SODIUM CHLORIDE 58 ML: 9 INJECTION, SOLUTION INTRAVENOUS at 13:13

## 2017-10-02 RX ADMIN — SODIUM CHLORIDE, PRESERVATIVE FREE 5 ML: 5 INJECTION INTRAVENOUS at 13:16

## 2017-10-02 RX ADMIN — IOPAMIDOL 62 ML: 755 INJECTION, SOLUTION INTRAVENOUS at 13:13

## 2017-10-04 ENCOUNTER — TRANSFERRED RECORDS (OUTPATIENT)
Dept: HEALTH INFORMATION MANAGEMENT | Facility: CLINIC | Age: 72
End: 2017-10-04

## 2017-10-16 DIAGNOSIS — I10 ESSENTIAL HYPERTENSION WITH GOAL BLOOD PRESSURE LESS THAN 140/90: ICD-10-CM

## 2017-10-18 RX ORDER — LOSARTAN POTASSIUM AND HYDROCHLOROTHIAZIDE 25; 100 MG/1; MG/1
TABLET ORAL
Qty: 90 TABLET | Refills: 0 | Status: SHIPPED | OUTPATIENT
Start: 2017-10-18 | End: 2018-01-14

## 2017-11-08 ENCOUNTER — TRANSFERRED RECORDS (OUTPATIENT)
Dept: HEALTH INFORMATION MANAGEMENT | Facility: CLINIC | Age: 72
End: 2017-11-08

## 2017-11-22 ENCOUNTER — TRANSFERRED RECORDS (OUTPATIENT)
Dept: HEALTH INFORMATION MANAGEMENT | Facility: CLINIC | Age: 72
End: 2017-11-22

## 2017-12-01 ENCOUNTER — NURSE TRIAGE (OUTPATIENT)
Dept: NURSING | Facility: CLINIC | Age: 72
End: 2017-12-01

## 2017-12-01 NOTE — TELEPHONE ENCOUNTER
Pt states she is a cancer pt and has an IV port that requires changing every 2 wks. Asks if she can come to [facility] if she misses an appt to get port changed and have them change it there. She is not currently in this situation and denies any sx. She states she is just trying to formulate a plan in case of emergency.  Advised pt it is best for her to discuss this w/ her Oncology caregivers when clinic is open. They know the most about her and the port she has. Advised pt in general, we would direct a pt to ED, not UC, if a port needs emergent attention when clinic is closed. Pt voiced understanding.   Nataly Rendon RN/FNA

## 2017-12-06 ENCOUNTER — TRANSFERRED RECORDS (OUTPATIENT)
Dept: HEALTH INFORMATION MANAGEMENT | Facility: CLINIC | Age: 72
End: 2017-12-06

## 2017-12-20 ENCOUNTER — TRANSFERRED RECORDS (OUTPATIENT)
Dept: HEALTH INFORMATION MANAGEMENT | Facility: CLINIC | Age: 72
End: 2017-12-20

## 2018-01-02 ENCOUNTER — MYC MEDICAL ADVICE (OUTPATIENT)
Dept: INTERNAL MEDICINE | Facility: CLINIC | Age: 73
End: 2018-01-02

## 2018-01-03 ENCOUNTER — OFFICE VISIT (OUTPATIENT)
Dept: INTERNAL MEDICINE | Facility: CLINIC | Age: 73
End: 2018-01-03
Payer: COMMERCIAL

## 2018-01-03 ENCOUNTER — TRANSFERRED RECORDS (OUTPATIENT)
Dept: HEALTH INFORMATION MANAGEMENT | Facility: CLINIC | Age: 73
End: 2018-01-03

## 2018-01-03 VITALS
HEART RATE: 91 BPM | OXYGEN SATURATION: 95 % | DIASTOLIC BLOOD PRESSURE: 90 MMHG | SYSTOLIC BLOOD PRESSURE: 148 MMHG | TEMPERATURE: 97.3 F

## 2018-01-03 DIAGNOSIS — H61.23 BILATERAL IMPACTED CERUMEN: Primary | ICD-10-CM

## 2018-01-03 LAB
ALT SERPL-CCNC: 7 IU/L (ref 7–52)
AST SERPL-CCNC: 21 U/L (ref 13–39)
CREAT SERPL-MCNC: 0.76 MG/DL (ref 0.6–1.3)
GFR SERPL CREATININE-BSD FRML MDRD: 78.5 ML/MIN/1.73M2
GLUCOSE SERPL-MCNC: 79 MG/DL (ref 70–110)
POTASSIUM SERPL-SCNC: 3.5 MMOL/L (ref 3.5–5.1)

## 2018-01-03 PROCEDURE — 69209 REMOVE IMPACTED EAR WAX UNI: CPT | Mod: 50 | Performed by: PHYSICIAN ASSISTANT

## 2018-01-03 NOTE — MR AVS SNAPSHOT
After Visit Summary   1/3/2018    Anna Dyer    MRN: [id number]           Patient Information     Date Of Birth          1945        Visit Information        Provider Department      1/3/2018 11:40 AM Annita Peña PA-C [facility]        Today's Diagnoses     Bilateral impacted cerumen    -  1       Follow-ups after your visit        Who to contact     If you have questions or need follow up information about today's clinic visit or your schedule please contact [facility] directly at [phone].  Normal or non-critical lab and imaging results will be communicated to you by Wintegrahart, letter or phone within 4 business days after the clinic has received the results. If you do not hear from us within 7 days, please contact the clinic through Wintegrahart or phone. If you have a critical or abnormal lab result, we will notify you by phone as soon as possible.  Submit refill requests through [employer] or call your pharmacy and they will forward the refill request to us. Please allow 3 business days for your refill to be completed.          Additional Information About Your Visit        MyChart Information     [employer] gives you secure access to your electronic health record. If you see a primary care provider, you can also send messages to your care team and make appointments. If you have questions, please call your primary care clinic.  If you do not have a primary care provider, please call [phone] and they will assist you.        Care EveryWhere ID     This is your Care EveryWhere ID. This could be used by other organizations to access your [facility] medical records  [id number]        Your Vitals Were     Pulse Temperature Pulse Oximetry             91 97.3  F (36.3  C) (Oral) 95%          Blood Pressure from Last 3 Encounters:   01/03/18 148/90   06/22/17 171/73   03/24/17 115/64    Weight from Last 3 Encounters:   06/22/17 125 lb (56.7 kg)    03/24/17 126 lb (57.2 kg)   03/07/17 127 lb 3.2 oz (57.7 kg)              We Performed the Following     REMOVE IMPACTED LELA        Primary Care Provider Office Phone # Fax #    Tello Finney [phone]       [address] [facility] [address]        Equal Access to Services     [facility] Hospital[facility]TE : Hadii aad ku hadasho Soomaali, waaxda luqadaha, qaybta kaalmada adeegyada, waxay idiin hayaan adeeg kharash la'aan ah. So [facility] [phone].    ATENCIÓN: Si habla español, tiene a curtis disposición servicios gratuitos de asistencia lingüística. Llame al [phone].    We comply with applicable federal civil rights laws and [place] laws. We do not discriminate on the basis of race, color, national origin, age, disability, sex, sexual orientation, or gender identity.            Thank you!     Thank you for choosing [facility]  for your care. Our goal is always to provide you with excellent care. Hearing back from our patients is one way we can continue to improve our services. Please take a few minutes to complete the written survey that you may receive in the mail after your visit with us. Thank you!             Your Updated Medication List - Protect others around you: Learn how to safely use, store and throw away your medicines at www.disposemymeds.org.          This list is accurate as of: 1/3/18 12:55 PM.  Always use your most recent med list.                   Brand Name Dispense Instructions for use Diagnosis    gabapentin 400 MG capsule    NEURONTIN    270 capsule    Take 2-3 capsules (800-1,200 mg) by mouth At Bedtime    Restless leg syndrome       hydrochlorothiazide 25 MG tablet    HYDRODIURIL    90 tablet    Take 1 tablet (25 mg) by mouth daily    Essential hypertension with goal blood pressure less than 140/90       losartan 100 MG tablet    COZAAR    90 tablet    Take 1 tablet (100 mg) by mouth daily    Essential hypertension with goal blood pressure less  than 140/90       losartan-hydrochlorothiazide 100-25 MG per tablet    HYZAAR    90 tablet    TAKE 1 TABLET DAILY    Essential hypertension with goal blood pressure less than 140/90

## 2018-01-03 NOTE — PROGRESS NOTES
SUBJECTIVE:   Anna Dyer is a 72 year old female who presents to clinic today for the following health issues:    Ear Problem      Duration: 8 months    Description (location/character/radiation): L ear feels plugged.    Intensity:  mild    Accompanying signs and symptoms: muffed hearing in L ear    History (similar episodes/previous evaluation): similar problem in the past    Precipitating or alleviating factors: None    Therapies tried and outcome: hot water and q tips but no rleief     Patient denies pain.         Problem list and histories reviewed & adjusted, as indicated.  Additional history: as documented      Reviewed and updated as needed this visit by clinical staffTobacco  Allergies  Meds  Problems       Reviewed and updated as needed this visit by Provider  Meds  Problems           OBJECTIVE:     /90  Pulse 91  Temp 97.3  F (36.3  C) (Oral)  SpO2 95%  There is no height or weight on file to calculate BMI.  GENERAL: healthy, alert and no distress  HENT: both ears: occluded with wax    Diagnostic Test Results:  none     ASSESSMENT/PLAN:       1. Bilateral impacted cerumen  - REMOVE IMPACTED CERUMEN  - ear lavage bilaterally   - left canal and TM normal after procedure, right side has some residual wax, pt notes it is not bothersome and does not want further removal  - follow up if symptoms return or worsen       Annita Peña PA-C  [facility]

## 2018-01-08 ENCOUNTER — TRANSFERRED RECORDS (OUTPATIENT)
Dept: HEALTH INFORMATION MANAGEMENT | Facility: CLINIC | Age: 73
End: 2018-01-08

## 2018-01-14 DIAGNOSIS — I10 ESSENTIAL HYPERTENSION WITH GOAL BLOOD PRESSURE LESS THAN 140/90: ICD-10-CM

## 2018-01-14 NOTE — LETTER
[facility]  [address] [address] [phone]            Anna Dyer  [address] [facility] [address]        January 17, 2018    Dear Anna,    While refilling your prescription today, we noticed that you are due for an appointment with your provider.  We will refill your prescription for 30 days, but a follow-up appointment must be made before any additional refills can be approved.     Taking care of your health is important to us and we look forward to seeing you in the near future.  Please call us at [phone] or [phone] (or use [employer]) to schedule an appointment.     Please disregard this notice if you have already made an appointment.    Sincerely,        [facility]

## 2018-01-15 ENCOUNTER — HOSPITAL ENCOUNTER (OUTPATIENT)
Dept: GENERAL RADIOLOGY | Facility: CLINIC | Age: 73
Discharge: HOME | End: 2018-01-15
Attending: COLON & RECTAL SURGERY | Admitting: COLON & RECTAL SURGERY
Payer: MEDICARE

## 2018-01-15 DIAGNOSIS — C18.9 MALIGNANT NEOPLASM OF COLON, UNSPECIFIED PART OF COLON (H): ICD-10-CM

## 2018-01-15 PROCEDURE — 74283 THER NMA RDCTJ INTUS/OBSTRCJ: CPT

## 2018-01-15 RX ADMIN — DIATRIZOATE MEGLUMINE AND DIATRIZOATE SODIUM 480 ML: 660; 100 SOLUTION ORAL; RECTAL at 14:47

## 2018-01-15 NOTE — TELEPHONE ENCOUNTER
"Requested Prescriptions   Pending Prescriptions Disp Refills     losartan-hydrochlorothiazide (HYZAAR) 100-25 MG per tablet [Pharmacy Med Name: LOSARTAN/HCT -25]  Last Written Prescription Date:  10/18/2017  Last Fill Quantity: 90,  # refills: 0   Last Office Visit with [facility], [facility] or [facility] prescribing provider:  1/03/2018   Future Office Visit:      90 tablet 0     Sig: TAKE 1 TABLET DAILY    Angiotensin-II Receptors Failed    1/14/2018  9:11 PM       Failed - Blood pressure under 140/90 in past 12 months.    BP Readings from Last 3 Encounters:   01/03/18 148/90   06/22/17 171/73   03/24/17 115/64                Passed - Recent or future visit with authorizing provider's specialty    Patient had office visit in the last year or has a visit in the next 30 days with authorizing provider.  See \"Patient Info\" tab in inbasket, or \"Choose Columns\" in Meds & Orders section of the refill encounter.              Passed - Patient is age 18 or older       Passed - No active pregnancy on record       Passed - Normal serum creatinine on file in past 12 months    Recent Labs   Lab Test  03/07/17   1419   CR  0.81            Passed - Normal serum potassium on file in past 12 months    Recent Labs   Lab Test  03/07/17   1419   POTASSIUM  4.0                   Passed - No positive pregnancy test in past 12 months          "

## 2018-01-16 RX ORDER — LOSARTAN POTASSIUM AND HYDROCHLOROTHIAZIDE 25; 100 MG/1; MG/1
TABLET ORAL
Qty: 30 TABLET | Refills: 0 | Status: SHIPPED | OUTPATIENT
Start: 2018-01-16 | End: 2018-02-22

## 2018-01-18 RX ORDER — NAPROXEN SODIUM 220 MG
220 TABLET ORAL 2 TIMES DAILY WITH MEALS
COMMUNITY
End: 2018-01-19

## 2018-01-19 ENCOUNTER — OFFICE VISIT (OUTPATIENT)
Dept: INTERNAL MEDICINE | Facility: CLINIC | Age: 73
End: 2018-01-19
Payer: COMMERCIAL

## 2018-01-19 VITALS
WEIGHT: 118.3 LBS | DIASTOLIC BLOOD PRESSURE: 70 MMHG | HEART RATE: 78 BPM | TEMPERATURE: 97.7 F | SYSTOLIC BLOOD PRESSURE: 118 MMHG | OXYGEN SATURATION: 96 % | BODY MASS INDEX: 22.34 KG/M2 | HEIGHT: 61 IN

## 2018-01-19 DIAGNOSIS — R06.2 WHEEZING: ICD-10-CM

## 2018-01-19 DIAGNOSIS — C78.7 COLON CANCER METASTASIZED TO LIVER (H): ICD-10-CM

## 2018-01-19 DIAGNOSIS — C18.9 COLON CANCER METASTASIZED TO LIVER (H): ICD-10-CM

## 2018-01-19 DIAGNOSIS — Z01.818 PRE-OP EXAM: Primary | ICD-10-CM

## 2018-01-19 DIAGNOSIS — I10 HYPERTENSION GOAL BP (BLOOD PRESSURE) < 140/90: ICD-10-CM

## 2018-01-19 DIAGNOSIS — J43.9 PULMONARY EMPHYSEMA, UNSPECIFIED EMPHYSEMA TYPE (H): ICD-10-CM

## 2018-01-19 DIAGNOSIS — Z72.0 TOBACCO ABUSE: ICD-10-CM

## 2018-01-19 LAB
FEF 25/75: NORMAL
FEV-1: NORMAL
FEV1/FVC: NORMAL
FVC: NORMAL

## 2018-01-19 PROCEDURE — 99215 OFFICE O/P EST HI 40 MIN: CPT | Mod: 25 | Performed by: INTERNAL MEDICINE

## 2018-01-19 PROCEDURE — 90732 PPSV23 VACC 2 YRS+ SUBQ/IM: CPT | Performed by: INTERNAL MEDICINE

## 2018-01-19 PROCEDURE — 93000 ELECTROCARDIOGRAM COMPLETE: CPT | Performed by: INTERNAL MEDICINE

## 2018-01-19 PROCEDURE — 94010 BREATHING CAPACITY TEST: CPT | Mod: 59 | Performed by: INTERNAL MEDICINE

## 2018-01-19 PROCEDURE — G0009 ADMIN PNEUMOCOCCAL VACCINE: HCPCS | Performed by: INTERNAL MEDICINE

## 2018-01-19 RX ORDER — METRONIDAZOLE 500 MG/1
500 TABLET ORAL DAILY
COMMUNITY
Start: 2018-01-18 | End: 2018-01-19

## 2018-01-19 RX ORDER — ALBUTEROL SULFATE 90 UG/1
2 AEROSOL, METERED RESPIRATORY (INHALATION) EVERY 6 HOURS PRN
Qty: 1 INHALER | Refills: 1 | Status: SHIPPED | OUTPATIENT
Start: 2018-01-19 | End: 2018-01-19

## 2018-01-19 RX ORDER — ALBUTEROL SULFATE 90 UG/1
2 AEROSOL, METERED RESPIRATORY (INHALATION) 2 TIMES DAILY
Qty: 1 INHALER | Refills: 1 | Status: SHIPPED | OUTPATIENT
Start: 2018-01-19 | End: 2019-03-11

## 2018-01-19 NOTE — PATIENT INSTRUCTIONS
Before Your Surgery      Call your surgeon if there is any change in your health. This includes signs of a cold or flu (such as a sore throat, runny nose, cough, rash or fever).    Do not smoke, drink alcohol or take over the counter medicine (unless your surgeon or primary care doctor tells you to) for the 24 hours before and after surgery.    If you take prescribed drugs: Follow your doctor s orders about which medicines to take and which to stop until after surgery.    Eating and drinking prior to surgery: follow the instructions from your surgeon  Take a shower or bath the night before surgery. Use the soap your surgeon gave you to gently clean your skin. If you do not have soap from your surgeon, use your regular soap. Do not shave or scrub the surgery site.  Wear clean pajamas and have clean sheets on your bed.     - use the inhaler twice per day up until your surgery  - hold your losartan-hctz 24 hours prior to surgery  Inhaler Use  The inhaler that you were prescribed contains a potent medicine. It should only be used as directed. The medicine in your inhaler must be breathed deeply into your lungs for it to work. It will not work at all if it only reaches your mouth and throat. Follow the instructions below for best results. And remember to follow your asthma action plan as given to you by your doctor.  Keep your inhaler at room temperature.  Hold the inhaler so that the part that goes into your mouth is at the bottom.  Shake the inhaler well and remove the cap.  Breathe out through your mouth to fully empty your lungs.  Place the inhaler in your mouth and close your lips tightly around it. (Or hold the inhaler 1 to 2 inches from your open mouth if told to do so by your healthcare provider.)  Squeeze the inhaler as you breathe in slowly through your mouth until your lungs are full of air, drawing the medicine deep into your lungs.  Hold your breath for 10 seconds, or as long as you can comfortably hold  your breath. Then breathe out slowly.  If you have been advised to take 2 puffs, wait 5 minutes, then repeat steps 3 to 7 above. Waiting 5 minutes between puffs will allow the medicine to open up your lungs so the second puff can get deeper into the lungs. Replace the cap when done.  If you were prescribed both a steroid inhaler and a bronchodilator inhaler, use the bronchodilator first to open the air passages. Wait 5 minutes, then use the steroid inhaler.  Rinse your mouth with water and spit it out (especially after using a steroid inhaler). This is very important if you are using a steroid inhaler to prevent thrush, a mild yeast infection of the mouth and back of the throat.  A special chamber ( spacer ) may be prescribed that attaches to your inhaler. This increases the amount of medicine that goes to your lungs. It also improves how well each treatment works. Ask your doctor about this if you did not receive one.    Keep it clean  Remove the metal canister and do not immerse it in water. Then clean the plastic mouthpiece, cap, and spacer if you have one, by rinsing them well in warm running water for 30 to 60 seconds. Shake off excess water and allow the mouthpiece to dry completely (overnight is recommended). This should be done once a week. If you need the inhaler before the mouthpiece is dry, shake off excess water, replace the canister, and test spray 2 times (away from the face).  Warning  A steroid inhaler is used to prevent an asthma attack. Do not use this to treat an acute wheezing episode. Use only bronchodilator inhalers (quick relief) to treat an acute asthma attack.  If you find that your medicine is not working and you need to use it more often than prescribed, this could be a sign that your asthma is getting worse. Go to the emergency room or urgent care right away. An asthma attack is easiest to treat in the early stages before it becomes severe.  When to seek medical advice  Get prompt medical  attention if any of the following occur:  Increased wheezing or shortness of breath  Need to use your inhalers more often than usual without relief  Fever of 100.4 F (38 C) or higher, or as directed by your healthcare provider  Coughing up lots of dark-colored or bloody sputum (mucus)  Chest pain with each breath  Blue lips or fingernails  Peak flow reading less than 50% of your normal best  Date Last Reviewed: 5/1/2017 2000-2017 The "[employer] [address] [place] [address]. All rights reserved. This information is not intended as a substitute for professional medical care. Always follow your healthcare professional's instructions.

## 2018-01-19 NOTE — PROGRESS NOTES
[address] [address]  [phone]  Dept: [phone]    PRE-OP EVALUATION:  Today's date: 2018    Anna Dyer (: 1945) presents for pre-operative evaluation assessment as requested by Maddie Aviles MD.  She requires evaluation and anesthesia risk assessment prior to undergoing surgery/procedure for treatment of obstruction from Colon cancer .  Proposed procedure: LAPAROSCOPIC ASSISTED COLECTOMY LEFT (DESCENDING), POSSIBLE COLECTOMY, ILEOSTOMY/COLOSTOMY    Date of Surgery/ Procedure: 18  Time of Surgery/ Procedure: 940 am  Hospital/Surgical Facility: [facility]    Primary Physician: Tello Finney  Type of Anesthesia Anticipated: General    Patient has a Health Care Directive or Living Will:  YES     1. NO - Do you have a history of heart attack, stroke, stent, bypass or surgery on an artery in the head, neck, heart or legs?  2. NO - Do you ever have any pain or discomfort in your chest?  3. NO - Do you have a history of  Heart Failure?  4. NO - Are you troubled by shortness of breath when: walking on the level, up a slight hill or at night?  5. NO - Do you currently have a cold, bronchitis or other respiratory infection?  6. NO - Do you have a cough, shortness of breath or wheezing?  7. NO - Do you sometimes get pains in the calves of your legs when you walk?  8. NO - Do you or anyone in your family have previous history of blood clots?  9. NO - Do you or does anyone in your family have a serious bleeding problem such as prolonged bleeding following surgeries or cuts?  10. YES - HAVE YOU EVER HAD PROBLEMS WITH ANEMIA OR BEEN TOLD TO TAKE IRON PILLS? During chemo  11. NO - Have you had any abnormal blood loss such as black, tarry or bloody stools, or abnormal vaginal bleeding?  12. NO - Have you ever had a blood transfusion?  13. NO - Have you or any of your relatives ever had problems with anesthesia?  14. NO - Do you  have sleep apnea, excessive snoring or daytime drowsiness?  15. NO - Do you have any prosthetic heart valves?  16. NO - Do you have prosthetic joints?  17. NO - Is there any chance that you may be pregnant?        HPI:                                                      Pt has stage 4 metastatic colon cancer.  She has a significant obstructing tumor lesion in the descending colon that is causing significant obstruction. Due to this surgery is required to relieve the obstruction.     MEDICAL HISTORY:                                                    Patient Active Problem List    Diagnosis Date Noted     Colon cancer metastasized to liver (H) 03/17/2017     Priority: Medium     Malignant neoplasm of splenic flexure (H) 03/07/2017     Priority: Medium     Advanced directives, counseling/discussion 09/15/2014     Priority: Medium     Advance Care Planning:   Receipt of ACP document:  Received: Health Care Directive which was witnessed or notarized on 08/20/2013.  Document not previously scanned.  Validation form completed and sent with document to be scanned.  .  Confirmed/documented designated decision maker(s). See permanent comments section of demographics in clinical tab. View document(s) and details by clicking on code status.   Added by Rach Olson on 1/19/2015.               Nicotine dependence 02/25/2014     Priority: Medium     Hypertension goal BP (blood pressure) < 140/90 02/24/2014     Priority: Medium     Restless leg syndrome 02/24/2014     Priority: Medium     CARDIOVASCULAR SCREENING; LDL GOAL LESS THAN 130 02/24/2014     Priority: Medium      Past Medical History:   Diagnosis Date     Cancer (H)     metastatic colorectal adenocarcinoma     Hypertension     No cardiologist     RLS (restless legs syndrome)      Past Surgical History:   Procedure Laterality Date     BIOPSY      liver     BREAST SURGERY      breast implants     COSMETIC SURGERY      facelift and tummy tuck     GYN SURGERY      tubal  "    INSERT PORT VASCULAR ACCESS N/A 3/24/2017    Procedure: INSERT PORT VASCULAR ACCESS;  Surgeon: Yemi Ordaz MD;  Location:  OR     Current Outpatient Prescriptions   Medication Sig Dispense Refill     GABAPENTIN PO Take 800 mg by mouth daily AM       GABAPENTIN PO Take 400 mg by mouth daily PM       AMLODIPINE BESYLATE PO Take 5 mg by mouth daily PM       DiphenhydrAMINE HCl (ANTI-HIST ALLERGY PO) Take 50 mg by mouth daily as needed       naproxen sodium (ALEVE) 220 MG tablet Take 220 mg by mouth 2 times daily (with meals)       losartan-hydrochlorothiazide (HYZAAR) 100-25 MG per tablet TAKE 1 TABLET DAILY 30 tablet 0     metroNIDAZOLE (FLAGYL) 500 MG tablet Take 500 mg by mouth daily       OTC products: None, except as noted above    Allergies   Allergen Reactions     Lactose GI Disturbance     Sulfa Drugs Hives      Latex Allergy: NO    Social History   Substance Use Topics     Smoking status: Current Every Day Smoker     Packs/day: 1.00     Years: 50.00     Types: Cigarettes     Smokeless tobacco: Never Used     Alcohol use No     History   Drug Use No       REVIEW OF SYSTEMS:                                                    Constitutional, neuro, ENT, endocrine, pulmonary, cardiac, gastrointestinal, genitourinary, musculoskeletal, integument and psychiatric systems are negative, except as otherwise noted.      EXAM:                                                    /70 (BP Location: Right arm, Patient Position: Chair, Cuff Size: Adult Regular)  Pulse 78  Temp 97.7  F (36.5  C) (Oral)  Ht 5' 1.1\" (1.552 m)  Wt 118 lb 4.8 oz (53.7 kg)  SpO2 96%  BMI 22.28 kg/m2    GENERAL APPEARANCE: alert, active and no distress     EYES: EOMI, PERRL     HENT: nose and mouth without ulcers or lesions     NECK: no adenopathy, no asymmetry, masses, or scars and thyroid normal to palpation     RESP: decreased breath sounds bilat and intermittent wheezing noted superior fields     CV: regular rates " and rhythm, normal S1 S2, no S3 or S4 and no murmur, click or rub     ABDOMEN:  soft, nontender, no HSM or masses and bowel sounds normal     MS: extremities normal- no gross deformities noted, no evidence of inflammation in joints, FROM in all extremities.     SKIN: no suspicious lesions or rashes     NEURO: Normal strength and tone, sensory exam grossly normal, mentation intact and speech normal     PSYCH: mentation appears normal. and affect normal/bright     LYMPHATICS: No axillary, cervical, or supraclavicular nodes    DIAGNOSTICS:                                                    EKG: Normal Sinus Rhythm, normal axis, normal intervals, no acute ST/T changes c/w ischemia, no LVH by voltage criteria, there are no prior tracings available    Labs (1/3/18):    Hgb 13.5  Cr 0.76  BUN 21  GFR 78    Spirometry:  FEV1/FVC 71%- FEV1 61%- borderline obstructive physiology    IMPRESSION:                                                    Reason for surgery/procedure: Obstructive colon cancer  Diagnosis/reason for consult: HTN,   Borderline COPD, ongoing tobacco abuse    The proposed surgical procedure is considered INTERMEDIATE risk.    REVISED CARDIAC RISK INDEX  The patient has the following serious cardiovascular risks for perioperative complications such as (MI, PE, VFib and 3  AV Block):  No serious cardiac risks  INTERPRETATION: 0 risks: Class I (very low risk - 0.4% complication rate)    The patient has the following additional risks for perioperative complications:  No identified additional risks      ICD-10-CM    1. Pre-op exam Z01.818    2. Hypertension goal BP (blood pressure) < 140/90 I10 EKG 12-lead complete w/read - Clinics       RECOMMENDATIONS:                                                      --Consult hospital rounder / IM to assist post-op medical management    Pulmonary Risk  Advised smoking cessation.   Albuterol 2 puffs bid until surgery , post op prn     --Patient is to take all scheduled  medications on the day of surgery EXCEPT for modifications listed below.    ACE Inhibitor or Angiotensin Receptor Blocker (ARB) Use  Ace inhibitor or Angiotensin Receptor Blocker (ARB) and should HOLD this medication for the 24 hours prior to surgery.      APPROVAL GIVEN to proceed with proposed procedure, without further diagnostic evaluation       Signed Electronically by: Tello Finney MD    Copy of this evaluation report is provided to requesting physician.    [facility] Preop Guidelines

## 2018-01-19 NOTE — MR AVS SNAPSHOT
After Visit Summary   1/19/2018    Anna Dyer    MRN: [id number]           Patient Information     Date Of Birth          1945        Visit Information        Provider Department      1/19/2018 2:00 PM Tello Finney MD [facility]        Today's Diagnoses     Pre-op exam    -  1    Colon cancer metastasized to liver (H)        Hypertension goal BP (blood pressure) < 140/90        Tobacco abuse        Wheezing          Care Instructions      Before Your Surgery      Call your surgeon if there is any change in your health. This includes signs of a cold or flu (such as a sore throat, runny nose, cough, rash or fever).    Do not smoke, drink alcohol or take over the counter medicine (unless your surgeon or primary care doctor tells you to) for the 24 hours before and after surgery.    If you take prescribed drugs: Follow your doctor s orders about which medicines to take and which to stop until after surgery.    Eating and drinking prior to surgery: follow the instructions from your surgeon  Take a shower or bath the night before surgery. Use the soap your surgeon gave you to gently clean your skin. If you do not have soap from your surgeon, use your regular soap. Do not shave or scrub the surgery site.  Wear clean pajamas and have clean sheets on your bed.     - use the inhaler twice per day up until your surgery  - hold your losartan-hctz 24 hours prior to surgery  Inhaler Use  The inhaler that you were prescribed contains a potent medicine. It should only be used as directed. The medicine in your inhaler must be breathed deeply into your lungs for it to work. It will not work at all if it only reaches your mouth and throat. Follow the instructions below for best results. And remember to follow your asthma action plan as given to you by your doctor.  Keep your inhaler at room temperature.  Hold the inhaler so that the part that goes into your mouth is at the  bottom.  Shake the inhaler well and remove the cap.  Breathe out through your mouth to fully empty your lungs.  Place the inhaler in your mouth and close your lips tightly around it. (Or hold the inhaler 1 to 2 inches from your open mouth if told to do so by your healthcare provider.)  Squeeze the inhaler as you breathe in slowly through your mouth until your lungs are full of air, drawing the medicine deep into your lungs.  Hold your breath for 10 seconds, or as long as you can comfortably hold your breath. Then breathe out slowly.  If you have been advised to take 2 puffs, wait 5 minutes, then repeat steps 3 to 7 above. Waiting 5 minutes between puffs will allow the medicine to open up your lungs so the second puff can get deeper into the lungs. Replace the cap when done.  If you were prescribed both a steroid inhaler and a bronchodilator inhaler, use the bronchodilator first to open the air passages. Wait 5 minutes, then use the steroid inhaler.  Rinse your mouth with water and spit it out (especially after using a steroid inhaler). This is very important if you are using a steroid inhaler to prevent thrush, a mild yeast infection of the mouth and back of the throat.  A special chamber ( spacer ) may be prescribed that attaches to your inhaler. This increases the amount of medicine that goes to your lungs. It also improves how well each treatment works. Ask your doctor about this if you did not receive one.    Keep it clean  Remove the metal canister and do not immerse it in water. Then clean the plastic mouthpiece, cap, and spacer if you have one, by rinsing them well in warm running water for 30 to 60 seconds. Shake off excess water and allow the mouthpiece to dry completely (overnight is recommended). This should be done once a week. If you need the inhaler before the mouthpiece is dry, shake off excess water, replace the canister, and test spray 2 times (away from the face).  Warning  A steroid inhaler is  used to prevent an asthma attack. Do not use this to treat an acute wheezing episode. Use only bronchodilator inhalers (quick relief) to treat an acute asthma attack.  If you find that your medicine is not working and you need to use it more often than prescribed, this could be a sign that your asthma is getting worse. Go to the emergency room or urgent care right away. An asthma attack is easiest to treat in the early stages before it becomes severe.  When to seek medical advice  Get prompt medical attention if any of the following occur:  Increased wheezing or shortness of breath  Need to use your inhalers more often than usual without relief  Fever of 100.4 F (38 C) or higher, or as directed by your healthcare provider  Coughing up lots of dark-colored or bloody sputum (mucus)  Chest pain with each breath  Blue lips or fingernails  Peak flow reading less than 50% of your normal best  Date Last Reviewed: 5/1/2017 2000-2017 [employer]. [address] [place] [address]. All rights reserved. This information is not intended as a substitute for professional medical care. Always follow your healthcare professional's instructions.                  Follow-ups after your visit        Your next 10 appointments already scheduled     Jan 25, 2018   Procedure with Maddie Baron MD   [facility] (--)    [address] [place] [address] [phone]              Who to contact     If you have questions or need follow up information about today's clinic visit or your schedule please contact [facility] directly at [phone].  Normal or non-critical lab and imaging results will be communicated to you by MyChart, letter or phone within 4 business days after the clinic has received the results. If you do not hear from us within 7 days, please contact the clinic through MyChart or phone. If you have a critical or abnormal lab result, we  "will notify you by phone as soon as possible.  Submit refill requests through [employer] or call your pharmacy and they will forward the refill request to us. Please allow 3 business days for your refill to be completed.          Additional Information About Your Visit        [facility] Information     [employer] gives you secure access to your electronic health record. If you see a primary care provider, you can also send messages to your care team and make appointments. If you have questions, please call your primary care clinic.  If you do not have a primary care provider, please call [phone] and they will assist you.        Care EveryWhere ID     This is your Care EveryWhere ID. This could be used by other organizations to access your [place] medical records  [id number]        Your Vitals Were     Pulse Temperature Height Pulse Oximetry BMI (Body Mass Index)       78 97.7  F (36.5  C) (Oral) 5' 1.1\" (1.552 m) 96% 22.28 kg/m2        Blood Pressure from Last 3 Encounters:   01/19/18 118/70   01/03/18 148/90   06/22/17 171/73    Weight from Last 3 Encounters:   01/19/18 118 lb 4.8 oz (53.7 kg)   01/18/18 120 lb (54.4 kg)   06/22/17 125 lb (56.7 kg)              We Performed the Following     EKG 12-lead complete w/read - Clinics     Spirometry, Breathing Capacity: Normal Order, Clinic Performed          Today's Medication Changes          These changes are accurate as of: 1/19/18  3:27 PM.  If you have any questions, ask your nurse or doctor.               Start taking these medicines.        Dose/Directions    albuterol 108 (90 BASE) MCG/ACT Inhaler   Commonly known as:  PROAIR HFA/PROVENTIL HFA/VENTOLIN HFA   Used for:  Wheezing   Started by:  Tello Finney MD        Dose:  2 puff   Inhale 2 puffs into the lungs every 6 hours as needed for shortness of breath / dyspnea or wheezing And 2 puffs twice daily until surgery date   Quantity:  1 Inhaler   Refills:  1         Stop taking these medicines if you " haven't already. Please contact your care team if you have questions.     ALEVE 220 MG tablet   Generic drug:  naproxen sodium   Stopped by:  Tello Finney MD           ANTI-HIST ALLERGY PO   Stopped by:  Tello Finney MD           hydrochlorothiazide 25 MG tablet   Commonly known as:  HYDRODIURIL   Stopped by:  Tello Finney MD           metroNIDAZOLE 500 MG tablet   Commonly known as:  FLAGYL   Stopped by:  Tello Finney MD                Where to get your medicines      Some of these will need a paper prescription and others can be bought over the counter.  Ask your nurse if you have questions.     Bring a paper prescription for each of these medications     albuterol 108 (90 BASE) MCG/ACT Inhaler                Primary Care Provider Office Phone # Fax #    Tello Finney [phone]       [address] [facility] [address]        Equal Access to Services     [facility]: Hadii mauricio ku hadasho Solucius, waaxda luqadaha, qaybta kaalmada adeegyada, johanna quijano haydeloresn izabel eden . So [facility] [phone].    ATENCIÓN: Si habla español, tiene a curtis disposición servicios gratuitos de asistencia lingüística. Kelly al [phone].    We comply with applicable federal civil rights laws and [place] laws. We do not discriminate on the basis of race, color, national origin, age, disability, sex, sexual orientation, or gender identity.            Thank you!     Thank you for choosing [facility]  for your care. Our goal is always to provide you with excellent care. Hearing back from our patients is one way we can continue to improve our services. Please take a few minutes to complete the written survey that you may receive in the mail after your visit with us. Thank you!             Your Updated Medication List - Protect others around you: Learn how to safely use, store and throw away your medicines at www.disposemymeds.org.          This  list is accurate as of: 1/19/18  3:27 PM.  Always use your most recent med list.                   Brand Name Dispense Instructions for use Diagnosis    albuterol 108 (90 BASE) MCG/ACT Inhaler    PROAIR HFA/PROVENTIL HFA/VENTOLIN HFA    1 Inhaler    Inhale 2 puffs into the lungs every 6 hours as needed for shortness of breath / dyspnea or wheezing And 2 puffs twice daily until surgery date    Wheezing       AMLODIPINE BESYLATE PO      Take 5 mg by mouth daily PM        * GABAPENTIN PO      Take 800 mg by mouth daily AM        * GABAPENTIN PO      Take 400 mg by mouth daily PM        losartan-hydrochlorothiazide 100-25 MG per tablet    HYZAAR    30 tablet    TAKE 1 TABLET DAILY    Essential hypertension with goal blood pressure less than 140/90       * Notice:  This list has 2 medication(s) that are the same as other medications prescribed for you. Read the directions carefully, and ask your doctor or other care provider to review them with you.

## 2018-01-22 NOTE — H&P (VIEW-ONLY)
[address] [address]  [phone]  Dept: [phone]    PRE-OP EVALUATION:  Today's date: 2018    Anna Dyer (: 1945) presents for pre-operative evaluation assessment as requested by Maddie Aviles MD.  She requires evaluation and anesthesia risk assessment prior to undergoing surgery/procedure for treatment of obstruction from Colon cancer .  Proposed procedure: LAPAROSCOPIC ASSISTED COLECTOMY LEFT (DESCENDING), POSSIBLE COLECTOMY, ILEOSTOMY/COLOSTOMY    Date of Surgery/ Procedure: 18  Time of Surgery/ Procedure: 940 am  Hospital/Surgical Facility: [facility]    Primary Physician: Tello Finney  Type of Anesthesia Anticipated: General    Patient has a Health Care Directive or Living Will:  YES     1. NO - Do you have a history of heart attack, stroke, stent, bypass or surgery on an artery in the head, neck, heart or legs?  2. NO - Do you ever have any pain or discomfort in your chest?  3. NO - Do you have a history of  Heart Failure?  4. NO - Are you troubled by shortness of breath when: walking on the level, up a slight hill or at night?  5. NO - Do you currently have a cold, bronchitis or other respiratory infection?  6. NO - Do you have a cough, shortness of breath or wheezing?  7. NO - Do you sometimes get pains in the calves of your legs when you walk?  8. NO - Do you or anyone in your family have previous history of blood clots?  9. NO - Do you or does anyone in your family have a serious bleeding problem such as prolonged bleeding following surgeries or cuts?  10. YES - HAVE YOU EVER HAD PROBLEMS WITH ANEMIA OR BEEN TOLD TO TAKE IRON PILLS? During chemo  11. NO - Have you had any abnormal blood loss such as black, tarry or bloody stools, or abnormal vaginal bleeding?  12. NO - Have you ever had a blood transfusion?  13. NO - Have you or any of your relatives ever had problems with anesthesia?  14. NO - Do you  have sleep apnea, excessive snoring or daytime drowsiness?  15. NO - Do you have any prosthetic heart valves?  16. NO - Do you have prosthetic joints?  17. NO - Is there any chance that you may be pregnant?        HPI:                                                      Pt has stage 4 metastatic colon cancer.  She has a significant obstructing tumor lesion in the descending colon that is causing significant obstruction. Due to this surgery is required to relieve the obstruction.     MEDICAL HISTORY:                                                    Patient Active Problem List    Diagnosis Date Noted     Colon cancer metastasized to liver (H) 03/17/2017     Priority: Medium     Malignant neoplasm of splenic flexure (H) 03/07/2017     Priority: Medium     Advanced directives, counseling/discussion 09/15/2014     Priority: Medium     Advance Care Planning:   Receipt of ACP document:  Received: Health Care Directive which was witnessed or notarized on 08/20/2013.  Document not previously scanned.  Validation form completed and sent with document to be scanned.  .  Confirmed/documented designated decision maker(s). See permanent comments section of demographics in clinical tab. View document(s) and details by clicking on code status.   Added by Rach Olson on 1/19/2015.               Nicotine dependence 02/25/2014     Priority: Medium     Hypertension goal BP (blood pressure) < 140/90 02/24/2014     Priority: Medium     Restless leg syndrome 02/24/2014     Priority: Medium     CARDIOVASCULAR SCREENING; LDL GOAL LESS THAN 130 02/24/2014     Priority: Medium      Past Medical History:   Diagnosis Date     Cancer (H)     metastatic colorectal adenocarcinoma     Hypertension     No cardiologist     RLS (restless legs syndrome)      Past Surgical History:   Procedure Laterality Date     BIOPSY      liver     BREAST SURGERY      breast implants     COSMETIC SURGERY      facelift and tummy tuck     GYN SURGERY      tubal  "    INSERT PORT VASCULAR ACCESS N/A 3/24/2017    Procedure: INSERT PORT VASCULAR ACCESS;  Surgeon: Yemi Ordaz MD;  Location:  OR     Current Outpatient Prescriptions   Medication Sig Dispense Refill     GABAPENTIN PO Take 800 mg by mouth daily AM       GABAPENTIN PO Take 400 mg by mouth daily PM       AMLODIPINE BESYLATE PO Take 5 mg by mouth daily PM       DiphenhydrAMINE HCl (ANTI-HIST ALLERGY PO) Take 50 mg by mouth daily as needed       naproxen sodium (ALEVE) 220 MG tablet Take 220 mg by mouth 2 times daily (with meals)       losartan-hydrochlorothiazide (HYZAAR) 100-25 MG per tablet TAKE 1 TABLET DAILY 30 tablet 0     metroNIDAZOLE (FLAGYL) 500 MG tablet Take 500 mg by mouth daily       OTC products: None, except as noted above    Allergies   Allergen Reactions     Lactose GI Disturbance     Sulfa Drugs Hives      Latex Allergy: NO    Social History   Substance Use Topics     Smoking status: Current Every Day Smoker     Packs/day: 1.00     Years: 50.00     Types: Cigarettes     Smokeless tobacco: Never Used     Alcohol use No     History   Drug Use No       REVIEW OF SYSTEMS:                                                    Constitutional, neuro, ENT, endocrine, pulmonary, cardiac, gastrointestinal, genitourinary, musculoskeletal, integument and psychiatric systems are negative, except as otherwise noted.      EXAM:                                                    /70 (BP Location: Right arm, Patient Position: Chair, Cuff Size: Adult Regular)  Pulse 78  Temp 97.7  F (36.5  C) (Oral)  Ht 5' 1.1\" (1.552 m)  Wt 118 lb 4.8 oz (53.7 kg)  SpO2 96%  BMI 22.28 kg/m2    GENERAL APPEARANCE: alert, active and no distress     EYES: EOMI, PERRL     HENT: nose and mouth without ulcers or lesions     NECK: no adenopathy, no asymmetry, masses, or scars and thyroid normal to palpation     RESP: decreased breath sounds bilat and intermittent wheezing noted superior fields     CV: regular rates " and rhythm, normal S1 S2, no S3 or S4 and no murmur, click or rub     ABDOMEN:  soft, nontender, no HSM or masses and bowel sounds normal     MS: extremities normal- no gross deformities noted, no evidence of inflammation in joints, FROM in all extremities.     SKIN: no suspicious lesions or rashes     NEURO: Normal strength and tone, sensory exam grossly normal, mentation intact and speech normal     PSYCH: mentation appears normal. and affect normal/bright     LYMPHATICS: No axillary, cervical, or supraclavicular nodes    DIAGNOSTICS:                                                    EKG: Normal Sinus Rhythm, normal axis, normal intervals, no acute ST/T changes c/w ischemia, no LVH by voltage criteria, there are no prior tracings available    Labs (1/3/18):    Hgb 13.5  Cr 0.76  BUN 21  GFR 78    Spirometry:  FEV1/FVC 71%- FEV1 61%- borderline obstructive physiology    IMPRESSION:                                                    Reason for surgery/procedure: Obstructive colon cancer  Diagnosis/reason for consult: HTN,   Borderline COPD, ongoing tobacco abuse    The proposed surgical procedure is considered INTERMEDIATE risk.    REVISED CARDIAC RISK INDEX  The patient has the following serious cardiovascular risks for perioperative complications such as (MI, PE, VFib and 3  AV Block):  No serious cardiac risks  INTERPRETATION: 0 risks: Class I (very low risk - 0.4% complication rate)    The patient has the following additional risks for perioperative complications:  No identified additional risks      ICD-10-CM    1. Pre-op exam Z01.818    2. Hypertension goal BP (blood pressure) < 140/90 I10 EKG 12-lead complete w/read - Clinics       RECOMMENDATIONS:                                                      --Consult hospital rounder / IM to assist post-op medical management    Pulmonary Risk  Advised smoking cessation.   Albuterol 2 puffs bid until surgery , post op prn     --Patient is to take all scheduled  medications on the day of surgery EXCEPT for modifications listed below.    ACE Inhibitor or Angiotensin Receptor Blocker (ARB) Use  Ace inhibitor or Angiotensin Receptor Blocker (ARB) and should HOLD this medication for the 24 hours prior to surgery.      APPROVAL GIVEN to proceed with proposed procedure, without further diagnostic evaluation       Signed Electronically by: Tello Finney MD    Copy of this evaluation report is provided to requesting physician.    [facility] Preop Guidelines

## 2018-01-23 NOTE — PHARMACY-ADMISSION MEDICATION HISTORY
Pharmacy reviewed prior to admission med list from pre-admitting rn, NEL Rivera        Prior to Admission medications    Medication Sig Last Dose Taking? Auth Provider   GABAPENTIN PO Take 800 mg by mouth every morning   Yes Reported, Patient   GABAPENTIN PO Take 400 mg by mouth every evening   Yes Reported, Patient   AMLODIPINE BESYLATE PO Take 5 mg by mouth every evening   Yes Reported, Patient   losartan-hydrochlorothiazide (HYZAAR) 100-25 MG per tablet TAKE 1 TABLET DAILY  Yes Tello Finney MD   albuterol (PROAIR HFA/PROVENTIL HFA/VENTOLIN HFA) 108 (90 BASE) MCG/ACT Inhaler Inhale 2 puffs into the lungs 2 times daily And every 4 hours as needed   Tello Finney MD

## 2018-01-25 ENCOUNTER — ANESTHESIA EVENT (OUTPATIENT)
Dept: SURGERY | Facility: CLINIC | Age: 73
DRG: 330 | End: 2018-01-25
Payer: MEDICARE

## 2018-01-25 ENCOUNTER — ANESTHESIA (OUTPATIENT)
Dept: SURGERY | Facility: CLINIC | Age: 73
DRG: 330 | End: 2018-01-25
Payer: MEDICARE

## 2018-01-25 ENCOUNTER — HOSPITAL ENCOUNTER (INPATIENT)
Facility: CLINIC | Age: 73
LOS: 3 days | Discharge: HOME HEALTH | DRG: 330 | End: 2018-01-28
Attending: COLON & RECTAL SURGERY | Admitting: COLON & RECTAL SURGERY
Payer: MEDICARE

## 2018-01-25 DIAGNOSIS — C18.9 COLON CANCER METASTASIZED TO LIVER (H): Primary | ICD-10-CM

## 2018-01-25 DIAGNOSIS — C78.7 COLON CANCER METASTASIZED TO LIVER (H): Primary | ICD-10-CM

## 2018-01-25 LAB
CREAT SERPL-MCNC: 0.63 MG/DL (ref 0.52–1.04)
GFR SERPL CREATININE-BSD FRML MDRD: >90 ML/MIN/1.7M2
PLATELET # BLD AUTO: 126 10E9/L (ref 150–450)
POTASSIUM SERPL-SCNC: 3.6 MMOL/L (ref 3.4–5.3)

## 2018-01-25 PROCEDURE — 94640 AIRWAY INHALATION TREATMENT: CPT

## 2018-01-25 PROCEDURE — 40000306 ZZH STATISTIC PRE PROC ASSESS II: Performed by: COLON & RECTAL SURGERY

## 2018-01-25 PROCEDURE — 71000012 ZZH RECOVERY PHASE 1 LEVEL 1 FIRST HR: Performed by: COLON & RECTAL SURGERY

## 2018-01-25 PROCEDURE — 25000128 H RX IP 250 OP 636: Performed by: COLON & RECTAL SURGERY

## 2018-01-25 PROCEDURE — 25000125 ZZHC RX 250: Performed by: COLON & RECTAL SURGERY

## 2018-01-25 PROCEDURE — 25000132 ZZH RX MED GY IP 250 OP 250 PS 637: Mod: GY | Performed by: PHYSICIAN ASSISTANT

## 2018-01-25 PROCEDURE — 71000013 ZZH RECOVERY PHASE 1 LEVEL 1 EA ADDTL HR: Performed by: COLON & RECTAL SURGERY

## 2018-01-25 PROCEDURE — 40000902 ZZH STATISTIC WOC PT EDUCATION, 16-30 MIN

## 2018-01-25 PROCEDURE — 25000128 H RX IP 250 OP 636: Performed by: ANESTHESIOLOGY

## 2018-01-25 PROCEDURE — 12000000 ZZH R&B MED SURG/OB

## 2018-01-25 PROCEDURE — 84132 ASSAY OF SERUM POTASSIUM: CPT | Performed by: ANESTHESIOLOGY

## 2018-01-25 PROCEDURE — 0D1L4Z4 BYPASS TRANSVERSE COLON TO CUTANEOUS, PERCUTANEOUS ENDOSCOPIC APPROACH: ICD-10-PCS | Performed by: COLON & RECTAL SURGERY

## 2018-01-25 PROCEDURE — 36000093 ZZH SURGERY LEVEL 4 1ST 30 MIN: Performed by: COLON & RECTAL SURGERY

## 2018-01-25 PROCEDURE — 36415 COLL VENOUS BLD VENIPUNCTURE: CPT | Performed by: ANESTHESIOLOGY

## 2018-01-25 PROCEDURE — 37000008 ZZH ANESTHESIA TECHNICAL FEE, 1ST 30 MIN: Performed by: COLON & RECTAL SURGERY

## 2018-01-25 PROCEDURE — 88309 TISSUE EXAM BY PATHOLOGIST: CPT | Mod: 26 | Performed by: COLON & RECTAL SURGERY

## 2018-01-25 PROCEDURE — 25000566 ZZH SEVOFLURANE, EA 15 MIN: Performed by: COLON & RECTAL SURGERY

## 2018-01-25 PROCEDURE — A9270 NON-COVERED ITEM OR SERVICE: HCPCS | Mod: GY | Performed by: PHYSICIAN ASSISTANT

## 2018-01-25 PROCEDURE — 25000125 ZZHC RX 250: Performed by: NURSE ANESTHETIST, CERTIFIED REGISTERED

## 2018-01-25 PROCEDURE — 37000009 ZZH ANESTHESIA TECHNICAL FEE, EACH ADDTL 15 MIN: Performed by: COLON & RECTAL SURGERY

## 2018-01-25 PROCEDURE — 36000063 ZZH SURGERY LEVEL 4 EA 15 ADDTL MIN: Performed by: COLON & RECTAL SURGERY

## 2018-01-25 PROCEDURE — 85049 AUTOMATED PLATELET COUNT: CPT | Performed by: COLON & RECTAL SURGERY

## 2018-01-25 PROCEDURE — 88309 TISSUE EXAM BY PATHOLOGIST: CPT | Performed by: COLON & RECTAL SURGERY

## 2018-01-25 PROCEDURE — 82565 ASSAY OF CREATININE: CPT | Performed by: COLON & RECTAL SURGERY

## 2018-01-25 PROCEDURE — 25000128 H RX IP 250 OP 636: Performed by: NURSE ANESTHETIST, CERTIFIED REGISTERED

## 2018-01-25 PROCEDURE — 0DTG4ZZ RESECTION OF LEFT LARGE INTESTINE, PERCUTANEOUS ENDOSCOPIC APPROACH: ICD-10-PCS | Performed by: COLON & RECTAL SURGERY

## 2018-01-25 PROCEDURE — 27210794 ZZH OR GENERAL SUPPLY STERILE: Performed by: COLON & RECTAL SURGERY

## 2018-01-25 PROCEDURE — 40000275 ZZH STATISTIC RCP TIME EA 10 MIN

## 2018-01-25 RX ORDER — ONDANSETRON 4 MG/1
4 TABLET, ORALLY DISINTEGRATING ORAL EVERY 30 MIN PRN
Status: DISCONTINUED | OUTPATIENT
Start: 2018-01-25 | End: 2018-01-25 | Stop reason: HOSPADM

## 2018-01-25 RX ORDER — GABAPENTIN 400 MG/1
400 CAPSULE ORAL EVERY EVENING
Status: DISCONTINUED | OUTPATIENT
Start: 2018-01-25 | End: 2018-01-28 | Stop reason: HOSPADM

## 2018-01-25 RX ORDER — FENTANYL CITRATE 50 UG/ML
25-50 INJECTION, SOLUTION INTRAMUSCULAR; INTRAVENOUS EVERY 5 MIN PRN
Status: DISCONTINUED | OUTPATIENT
Start: 2018-01-25 | End: 2018-01-25 | Stop reason: HOSPADM

## 2018-01-25 RX ORDER — MEPERIDINE HYDROCHLORIDE 25 MG/ML
12.5 INJECTION INTRAMUSCULAR; INTRAVENOUS; SUBCUTANEOUS
Status: DISCONTINUED | OUTPATIENT
Start: 2018-01-25 | End: 2018-01-25 | Stop reason: HOSPADM

## 2018-01-25 RX ORDER — CIPROFLOXACIN 2 MG/ML
400 INJECTION, SOLUTION INTRAVENOUS EVERY 12 HOURS
Status: COMPLETED | OUTPATIENT
Start: 2018-01-25 | End: 2018-01-26

## 2018-01-25 RX ORDER — GLYCOPYRROLATE 0.2 MG/ML
INJECTION, SOLUTION INTRAMUSCULAR; INTRAVENOUS PRN
Status: DISCONTINUED | OUTPATIENT
Start: 2018-01-25 | End: 2018-01-25

## 2018-01-25 RX ORDER — FENTANYL CITRATE 50 UG/ML
25-50 INJECTION, SOLUTION INTRAMUSCULAR; INTRAVENOUS
Status: DISCONTINUED | OUTPATIENT
Start: 2018-01-25 | End: 2018-01-25 | Stop reason: CLARIF

## 2018-01-25 RX ORDER — SODIUM CHLORIDE, SODIUM LACTATE, POTASSIUM CHLORIDE, CALCIUM CHLORIDE 600; 310; 30; 20 MG/100ML; MG/100ML; MG/100ML; MG/100ML
INJECTION, SOLUTION INTRAVENOUS CONTINUOUS
Status: DISCONTINUED | OUTPATIENT
Start: 2018-01-25 | End: 2018-01-26

## 2018-01-25 RX ORDER — CIPROFLOXACIN 2 MG/ML
400 INJECTION, SOLUTION INTRAVENOUS
Status: COMPLETED | OUTPATIENT
Start: 2018-01-25 | End: 2018-01-25

## 2018-01-25 RX ORDER — AMLODIPINE BESYLATE 5 MG/1
5 TABLET ORAL EVERY EVENING
Status: DISCONTINUED | OUTPATIENT
Start: 2018-01-26 | End: 2018-01-28 | Stop reason: HOSPADM

## 2018-01-25 RX ORDER — NALOXONE HYDROCHLORIDE 0.4 MG/ML
.1-.4 INJECTION, SOLUTION INTRAMUSCULAR; INTRAVENOUS; SUBCUTANEOUS
Status: DISCONTINUED | OUTPATIENT
Start: 2018-01-25 | End: 2018-01-28 | Stop reason: HOSPADM

## 2018-01-25 RX ORDER — SODIUM CHLORIDE, SODIUM LACTATE, POTASSIUM CHLORIDE, CALCIUM CHLORIDE 600; 310; 30; 20 MG/100ML; MG/100ML; MG/100ML; MG/100ML
INJECTION, SOLUTION INTRAVENOUS CONTINUOUS
Status: DISCONTINUED | OUTPATIENT
Start: 2018-01-25 | End: 2018-01-25 | Stop reason: HOSPADM

## 2018-01-25 RX ORDER — EPHEDRINE SULFATE 50 MG/ML
INJECTION, SOLUTION INTRAMUSCULAR; INTRAVENOUS; SUBCUTANEOUS PRN
Status: DISCONTINUED | OUTPATIENT
Start: 2018-01-25 | End: 2018-01-25

## 2018-01-25 RX ORDER — NALOXONE HYDROCHLORIDE 0.4 MG/ML
.1-.4 INJECTION, SOLUTION INTRAMUSCULAR; INTRAVENOUS; SUBCUTANEOUS
Status: DISCONTINUED | OUTPATIENT
Start: 2018-01-25 | End: 2018-01-25 | Stop reason: HOSPADM

## 2018-01-25 RX ORDER — LIDOCAINE 40 MG/G
CREAM TOPICAL
Status: DISCONTINUED | OUTPATIENT
Start: 2018-01-25 | End: 2018-01-25 | Stop reason: HOSPADM

## 2018-01-25 RX ORDER — HYDROMORPHONE HYDROCHLORIDE 1 MG/ML
.3-.5 INJECTION, SOLUTION INTRAMUSCULAR; INTRAVENOUS; SUBCUTANEOUS EVERY 10 MIN PRN
Status: DISCONTINUED | OUTPATIENT
Start: 2018-01-25 | End: 2018-01-25 | Stop reason: HOSPADM

## 2018-01-25 RX ORDER — GABAPENTIN 400 MG/1
800 CAPSULE ORAL EVERY MORNING
Status: DISCONTINUED | OUTPATIENT
Start: 2018-01-26 | End: 2018-01-28 | Stop reason: HOSPADM

## 2018-01-25 RX ORDER — ALBUTEROL SULFATE 0.83 MG/ML
2.5 SOLUTION RESPIRATORY (INHALATION) EVERY 4 HOURS PRN
Status: DISCONTINUED | OUTPATIENT
Start: 2018-01-25 | End: 2018-01-25 | Stop reason: HOSPADM

## 2018-01-25 RX ORDER — ONDANSETRON 2 MG/ML
4 INJECTION INTRAMUSCULAR; INTRAVENOUS EVERY 30 MIN PRN
Status: DISCONTINUED | OUTPATIENT
Start: 2018-01-25 | End: 2018-01-25 | Stop reason: HOSPADM

## 2018-01-25 RX ORDER — BUPIVACAINE HYDROCHLORIDE AND EPINEPHRINE 2.5; 5 MG/ML; UG/ML
INJECTION, SOLUTION EPIDURAL; INFILTRATION; INTRACAUDAL; PERINEURAL PRN
Status: DISCONTINUED | OUTPATIENT
Start: 2018-01-25 | End: 2018-01-25 | Stop reason: HOSPADM

## 2018-01-25 RX ORDER — OXYCODONE HYDROCHLORIDE 5 MG/1
5 TABLET ORAL EVERY 4 HOURS PRN
Status: DISCONTINUED | OUTPATIENT
Start: 2018-01-25 | End: 2018-01-25 | Stop reason: CLARIF

## 2018-01-25 RX ORDER — NEOSTIGMINE METHYLSULFATE 1 MG/ML
VIAL (ML) INJECTION PRN
Status: DISCONTINUED | OUTPATIENT
Start: 2018-01-25 | End: 2018-01-25

## 2018-01-25 RX ORDER — LIDOCAINE 40 MG/G
CREAM TOPICAL
Status: DISCONTINUED | OUTPATIENT
Start: 2018-01-25 | End: 2018-01-25

## 2018-01-25 RX ORDER — HYDRALAZINE HYDROCHLORIDE 20 MG/ML
2.5-5 INJECTION INTRAMUSCULAR; INTRAVENOUS EVERY 10 MIN PRN
Status: DISCONTINUED | OUTPATIENT
Start: 2018-01-25 | End: 2018-01-25 | Stop reason: HOSPADM

## 2018-01-25 RX ORDER — METOPROLOL TARTRATE 1 MG/ML
1-2 INJECTION, SOLUTION INTRAVENOUS EVERY 5 MIN PRN
Status: DISCONTINUED | OUTPATIENT
Start: 2018-01-25 | End: 2018-01-25 | Stop reason: HOSPADM

## 2018-01-25 RX ORDER — PROPOFOL 10 MG/ML
INJECTION, EMULSION INTRAVENOUS PRN
Status: DISCONTINUED | OUTPATIENT
Start: 2018-01-25 | End: 2018-01-25

## 2018-01-25 RX ORDER — BUPIVACAINE HYDROCHLORIDE AND EPINEPHRINE 2.5; 5 MG/ML; UG/ML
60 INJECTION, SOLUTION EPIDURAL; INFILTRATION; INTRACAUDAL; PERINEURAL ONCE
Status: DISCONTINUED | OUTPATIENT
Start: 2018-01-25 | End: 2018-01-25 | Stop reason: HOSPADM

## 2018-01-25 RX ORDER — HEPARIN SODIUM 5000 [USP'U]/.5ML
5000 INJECTION, SOLUTION INTRAVENOUS; SUBCUTANEOUS
Status: COMPLETED | OUTPATIENT
Start: 2018-01-25 | End: 2018-01-25

## 2018-01-25 RX ORDER — ONDANSETRON 2 MG/ML
4 INJECTION INTRAMUSCULAR; INTRAVENOUS EVERY 6 HOURS PRN
Status: DISCONTINUED | OUTPATIENT
Start: 2018-01-25 | End: 2018-01-28 | Stop reason: HOSPADM

## 2018-01-25 RX ORDER — NICOTINE 21 MG/24HR
1 PATCH, TRANSDERMAL 24 HOURS TRANSDERMAL EVERY 24 HOURS
Status: DISCONTINUED | OUTPATIENT
Start: 2018-01-26 | End: 2018-01-28 | Stop reason: HOSPADM

## 2018-01-25 RX ORDER — LOSARTAN POTASSIUM AND HYDROCHLOROTHIAZIDE 12.5; 5 MG/1; MG/1
2 TABLET ORAL DAILY
Status: DISCONTINUED | OUTPATIENT
Start: 2018-01-25 | End: 2018-01-28 | Stop reason: HOSPADM

## 2018-01-25 RX ORDER — LIDOCAINE HYDROCHLORIDE 10 MG/ML
INJECTION, SOLUTION INFILTRATION; PERINEURAL PRN
Status: DISCONTINUED | OUTPATIENT
Start: 2018-01-25 | End: 2018-01-25

## 2018-01-25 RX ORDER — CIPROFLOXACIN 2 MG/ML
400 INJECTION, SOLUTION INTRAVENOUS SEE ADMIN INSTRUCTIONS
Status: DISCONTINUED | OUTPATIENT
Start: 2018-01-25 | End: 2018-01-25 | Stop reason: HOSPADM

## 2018-01-25 RX ORDER — ACETAMINOPHEN 10 MG/ML
1000 INJECTION, SOLUTION INTRAVENOUS EVERY 6 HOURS
Status: DISCONTINUED | OUTPATIENT
Start: 2018-01-25 | End: 2018-01-26

## 2018-01-25 RX ORDER — DEXAMETHASONE SODIUM PHOSPHATE 4 MG/ML
INJECTION, SOLUTION INTRA-ARTICULAR; INTRALESIONAL; INTRAMUSCULAR; INTRAVENOUS; SOFT TISSUE PRN
Status: DISCONTINUED | OUTPATIENT
Start: 2018-01-25 | End: 2018-01-25

## 2018-01-25 RX ORDER — NICOTINE 21 MG/24HR
1 PATCH, TRANSDERMAL 24 HOURS TRANSDERMAL EVERY 24 HOURS
COMMUNITY
End: 2018-02-05

## 2018-01-25 RX ORDER — ALBUTEROL SULFATE 90 UG/1
2 AEROSOL, METERED RESPIRATORY (INHALATION) 2 TIMES DAILY
Status: DISCONTINUED | OUTPATIENT
Start: 2018-01-25 | End: 2018-01-26

## 2018-01-25 RX ORDER — FENTANYL CITRATE 50 UG/ML
INJECTION, SOLUTION INTRAMUSCULAR; INTRAVENOUS PRN
Status: DISCONTINUED | OUTPATIENT
Start: 2018-01-25 | End: 2018-01-25

## 2018-01-25 RX ORDER — ONDANSETRON 2 MG/ML
INJECTION INTRAMUSCULAR; INTRAVENOUS PRN
Status: DISCONTINUED | OUTPATIENT
Start: 2018-01-25 | End: 2018-01-25

## 2018-01-25 RX ADMIN — SODIUM CHLORIDE, POTASSIUM CHLORIDE, SODIUM LACTATE AND CALCIUM CHLORIDE: 600; 310; 30; 20 INJECTION, SOLUTION INTRAVENOUS at 13:20

## 2018-01-25 RX ADMIN — LIDOCAINE HYDROCHLORIDE 50 MG: 10 INJECTION, SOLUTION INFILTRATION; PERINEURAL at 10:21

## 2018-01-25 RX ADMIN — FENTANYL CITRATE 50 MCG: 50 INJECTION INTRAMUSCULAR; INTRAVENOUS at 15:10

## 2018-01-25 RX ADMIN — Medication 4 MG: at 13:24

## 2018-01-25 RX ADMIN — FENTANYL CITRATE 50 MCG: 50 INJECTION, SOLUTION INTRAMUSCULAR; INTRAVENOUS at 11:00

## 2018-01-25 RX ADMIN — FENTANYL CITRATE 50 MCG: 50 INJECTION, SOLUTION INTRAMUSCULAR; INTRAVENOUS at 11:15

## 2018-01-25 RX ADMIN — ALBUTEROL SULFATE 2 PUFF: 90 INHALANT RESPIRATORY (INHALATION) at 21:22

## 2018-01-25 RX ADMIN — HEPARIN SODIUM 5000 UNITS: 5000 INJECTION, SOLUTION INTRAVENOUS; SUBCUTANEOUS at 09:07

## 2018-01-25 RX ADMIN — HYDROMORPHONE HYDROCHLORIDE 1 MG: 1 INJECTION, SOLUTION INTRAMUSCULAR; INTRAVENOUS; SUBCUTANEOUS at 11:30

## 2018-01-25 RX ADMIN — HYDROMORPHONE HYDROCHLORIDE: 10 INJECTION, SOLUTION INTRAMUSCULAR; INTRAVENOUS; SUBCUTANEOUS at 14:54

## 2018-01-25 RX ADMIN — PHENYLEPHRINE HYDROCHLORIDE 200 MCG: 10 INJECTION, SOLUTION INTRAMUSCULAR; INTRAVENOUS; SUBCUTANEOUS at 10:38

## 2018-01-25 RX ADMIN — PHENYLEPHRINE HYDROCHLORIDE 150 MCG: 10 INJECTION, SOLUTION INTRAMUSCULAR; INTRAVENOUS; SUBCUTANEOUS at 10:24

## 2018-01-25 RX ADMIN — ROCURONIUM BROMIDE 10 MG: 10 INJECTION INTRAVENOUS at 12:32

## 2018-01-25 RX ADMIN — SODIUM CHLORIDE, POTASSIUM CHLORIDE, SODIUM LACTATE AND CALCIUM CHLORIDE: 600; 310; 30; 20 INJECTION, SOLUTION INTRAVENOUS at 17:52

## 2018-01-25 RX ADMIN — FENTANYL CITRATE 50 MCG: 50 INJECTION INTRAMUSCULAR; INTRAVENOUS at 14:36

## 2018-01-25 RX ADMIN — METRONIDAZOLE 500 MG: 500 INJECTION, SOLUTION INTRAVENOUS at 18:58

## 2018-01-25 RX ADMIN — SODIUM CHLORIDE, POTASSIUM CHLORIDE, SODIUM LACTATE AND CALCIUM CHLORIDE: 600; 310; 30; 20 INJECTION, SOLUTION INTRAVENOUS at 11:26

## 2018-01-25 RX ADMIN — PROCHLORPERAZINE EDISYLATE 5 MG: 5 INJECTION INTRAMUSCULAR; INTRAVENOUS at 14:53

## 2018-01-25 RX ADMIN — SODIUM CHLORIDE, POTASSIUM CHLORIDE, SODIUM LACTATE AND CALCIUM CHLORIDE: 600; 310; 30; 20 INJECTION, SOLUTION INTRAVENOUS at 10:18

## 2018-01-25 RX ADMIN — ONDANSETRON 4 MG: 2 INJECTION INTRAMUSCULAR; INTRAVENOUS at 14:18

## 2018-01-25 RX ADMIN — ACETAMINOPHEN 1000 MG: 10 INJECTION, SOLUTION INTRAVENOUS at 17:53

## 2018-01-25 RX ADMIN — ROCURONIUM BROMIDE 40 MG: 10 INJECTION INTRAVENOUS at 10:21

## 2018-01-25 RX ADMIN — ROCURONIUM BROMIDE 10 MG: 10 INJECTION INTRAVENOUS at 10:53

## 2018-01-25 RX ADMIN — CIPROFLOXACIN 400 MG: 2 INJECTION, SOLUTION INTRAVENOUS at 21:04

## 2018-01-25 RX ADMIN — ONDANSETRON 4 MG: 2 INJECTION INTRAMUSCULAR; INTRAVENOUS at 13:12

## 2018-01-25 RX ADMIN — MIDAZOLAM 2 MG: 1 INJECTION INTRAMUSCULAR; INTRAVENOUS at 10:18

## 2018-01-25 RX ADMIN — GLYCOPYRROLATE 0.6 MG: 0.2 INJECTION, SOLUTION INTRAMUSCULAR; INTRAVENOUS at 13:24

## 2018-01-25 RX ADMIN — Medication 5 MG: at 10:45

## 2018-01-25 RX ADMIN — FENTANYL CITRATE 150 MCG: 50 INJECTION, SOLUTION INTRAMUSCULAR; INTRAVENOUS at 10:21

## 2018-01-25 RX ADMIN — GABAPENTIN 400 MG: 400 CAPSULE ORAL at 19:02

## 2018-01-25 RX ADMIN — PROPOFOL 150 MG: 10 INJECTION, EMULSION INTRAVENOUS at 10:21

## 2018-01-25 RX ADMIN — PHENYLEPHRINE HYDROCHLORIDE 100 MCG: 10 INJECTION, SOLUTION INTRAMUSCULAR; INTRAVENOUS; SUBCUTANEOUS at 13:12

## 2018-01-25 RX ADMIN — METRONIDAZOLE 500 MG: 500 INJECTION, SOLUTION INTRAVENOUS at 10:42

## 2018-01-25 RX ADMIN — PHENYLEPHRINE HYDROCHLORIDE 100 MCG: 10 INJECTION, SOLUTION INTRAMUSCULAR; INTRAVENOUS; SUBCUTANEOUS at 13:06

## 2018-01-25 RX ADMIN — DEXAMETHASONE SODIUM PHOSPHATE 4 MG: 4 INJECTION, SOLUTION INTRA-ARTICULAR; INTRALESIONAL; INTRAMUSCULAR; INTRAVENOUS; SOFT TISSUE at 10:21

## 2018-01-25 RX ADMIN — CIPROFLOXACIN 400 MG: 2 INJECTION, SOLUTION INTRAVENOUS at 10:18

## 2018-01-25 ASSESSMENT — LIFESTYLE VARIABLES: TOBACCO_USE: 1

## 2018-01-25 ASSESSMENT — ACTIVITIES OF DAILY LIVING (ADL): ADLS_ACUITY_SCORE: 12

## 2018-01-25 NOTE — ANESTHESIA PREPROCEDURE EVALUATION
Anesthesia Evaluation     . Pt has had prior anesthetic. Type: General    No history of anesthetic complications          ROS/MED HX    ENT/Pulmonary:     (+)tobacco use, Past use , . .    Neurologic:       Cardiovascular:     (+) hypertension----. : . . . :. .       METS/Exercise Tolerance:     Hematologic:         Musculoskeletal:         GI/Hepatic:         Renal/Genitourinary:         Endo:         Psychiatric:         Infectious Disease:         Malignancy:   (+) Malignancy History of GI  GI CA  Active status post Chemo, Stage 4 colon ca        Other:                     Physical Exam      Airway   Mallampati: II  TM distance: >3 FB  Neck ROM: full    Dental     Cardiovascular   Rhythm and rate: regular and normal      Pulmonary    breath sounds clear to auscultation                    Anesthesia Plan      History & Physical Review  History and physical reviewed and following examination; no interval change.    ASA Status:  4 .    NPO Status:  > 8 hours    Plan for General and ETT with Intravenous and Propofol induction. Maintenance will be Balanced.    PONV prophylaxis:  Ondansetron (or other 5HT-3) and Dexamethasone or Solumedrol       Postoperative Care  Postoperative pain management:  IV analgesics, Oral pain medications and Multi-modal analgesia.      Consents  Anesthetic plan, risks, benefits and alternatives discussed with:  Patient..                          .

## 2018-01-25 NOTE — IP AVS SNAPSHOT
[place] [id number] [facility]    [address]    [facility] [address]    Phone:  [phone]    Fax:  [phone]                                       After Visit Summary   1/25/2018    Anna Dyer    MRN: [id number]           After Visit Summary Signature Page     I have received my discharge instructions, and my questions have been answered. I have discussed any challenges I see with this plan with the nurse or doctor.    ..........................................................................................................................................  Patient/Patient Representative Signature      ..........................................................................................................................................  Patient Representative Print Name and Relationship to Patient    ..................................................               ................................................  Date                                            Time    ..........................................................................................................................................  Reviewed by Signature/Title    ...................................................              ..............................................  Date                                                            Time

## 2018-01-25 NOTE — ANESTHESIA CARE TRANSFER NOTE
Patient: Anna Dyer    Procedure(s):  exploratory laparoscopy, possible hemicolectomy or subtotal colectomy, possible colostomy or ileostomy - Wound Class: II-Clean Contaminated   - Wound Class: II-Clean Contaminated    Diagnosis: obstructing descending colon cancer  Diagnosis Additional Information: No value filed.    Anesthesia Type:   General, ETT     Note:    Patient transferred to:PACU  Comments: PT spont resps oral airway suctioned ETT removed to PACU VSS Report to RNHandoff Report: Identifed the Patient, Identified the Reponsible Provider, Reviewed the pertinent medical history, Discussed the surgical course, Reviewed Intra-OP anesthesia mangement and issues during anesthesia, Set expectations for post-procedure period and Allowed opportunity for questions and acknowledgement of understanding      Vitals: (Last set prior to Anesthesia Care Transfer)    CRNA VITALS  1/25/2018 1311 - 1/25/2018 1346      1/25/2018             Pulse: 91    SpO2: 100 %                Electronically Signed By: ILENE Roach CRNA  January 25, 2018  1:46 PM

## 2018-01-25 NOTE — IP AVS SNAPSHOT
MRN:[id number]                      After Visit Summary   1/25/2018    Anna Dyer    MRN: [id number]           Thank you!     Thank you for choosing [facility] for your care. Our goal is always to provide you with excellent care. Hearing back from our patients is one way we can continue to improve our services. Please take a few minutes to complete the written survey that you may receive in the mail after you visit. If you would like to speak to someone directly about your visit please contact Patient Relations at [phone]. Thank you!          Patient Information     Date Of Birth          1945        Designated Caregiver       Most Recent Value    Caregiver    Will someone help with your care after discharge? no      About your hospital stay     You were admitted on:  January 25, 2018 You last received care in the:  [place] [address] [facility]    You were discharged on:  January 28, 2018        Reason for your hospital stay       You were in the hospital to have surgery                  Who to Call     For medical emergencies, please call 911.  For non-urgent questions about your medical care, please call your primary care provider or clinic, [phone]  For questions related to your surgery, please call your surgery clinic        Attending Provider     Provider Specialty    Maddie Baron MD Colon and Rectal Surgery       Primary Care Provider Office Phone # Fax #    Tello Finney [phone]      After Care Instructions     Activity       Your activity upon discharge: No heavy lifting or straining over 15-20 lbs for 6 weeks. No driving while taking narcotic pain medications.            Diet       Follow this diet upon discharge:   Continue a low residue diet for 6 weeks or at least until your follow up clinic visit.  Avoid nuts, seeds, popcorn, raw fruits and veggies with peels.            Monitor and record       Measure and  record stool output from stoma.  If more than 1200 mL in a 24 hour period, please contact our office at [phone].                  Follow-up Appointments     Follow-up and recommended labs and tests        Follow up in the office in 4 weeks with Dr. Baron or at your already scheduled post op visit. Call [phone] to schedule your appointment. Call the office at [phone] if you develop fever >101, uncontrolled pain, bleeding, nausea, vomiting, or constipation (no stool for 4-5 days).            Follow-up and recommended labs and tests        Follow up with primary care provider, Tello Finney, within 3-5 days for a voiding trial and bladder scan.  This may be able to just be a nurse-only visit.                  Additional Services     Home care nursing referral       Home WOC referral for ostomy cares.                  Further instructions from your care team       Documentation of Face to Face and Certification for Home Health Services    I certify that patient: Anna Dyer is under my care and that I, or a nurse practitioner or physician's assistant working with me, had a face-to-face encounter that meets the physician face-to-face encounter requirements with this patient on: 1/28/2018.    This encounter with the patient was in whole, or in part, for the following medical condition, which is the primary reason for home health care: new colostomy.    I certify that, based on my findings, the following services are medically necessary home health services: Nursing.    My clinical findings support the need for the above services because: Nurse is needed: To provide assessment and oversight required in the home to assure adherence to the medical plan due to: new colostomy care. and To provide caregiver training to assist with: new colostomy care..    Based on the above findings. I certify that this patient is confined to the home and needs intermittent skilled nursing care, physical therapy and/or  "speech therapy.  The patient is under my care, and I have initiated the establishment of the plan of care.  This patient will be followed by a physician who will periodically review the plan of care.  Physician/Provider to provide follow up care: Tello Finney    Attending hospital physician (the Medicare certified PECOS provider): Maddie Baron MD  Physician Signature: See electronic signature associated with these discharge orders.  Date: 1/28/2018    Pending Results     No orders found from 1/23/2018 to 1/26/2018.            Statement of Approval     Ordered          01/28/18 0757  I have reviewed and agree with all the recommendations and orders detailed in this document.  EFFECTIVE NOW     Approved and electronically signed by:  Adryan Martin MD             Admission Information     Date & Time Provider Department Dept. Phone    1/25/2018 aMddie Baron MD [place] [address] [facility] [phone]      Your Vitals Were     Blood Pressure Pulse Temperature Respirations Height Weight    143/74 (BP Location: Left arm) 66 97.7  F (36.5  C) (Oral) 16 1.549 m (5' 1\") 52.6 kg (116 lb)    Pulse Oximetry BMI (Body Mass Index)                91% 21.92 kg/m2          MyChart Information     [employer] gives you secure access to your electronic health record. If you see a primary care provider, you can also send messages to your care team and make appointments. If you have questions, please call your primary care clinic.  If you do not have a primary care provider, please call [phone] and they will assist you.        Care EveryWhere ID     This is your Care EveryWhere ID. This could be used by other organizations to access your [facility] medical records  [id number]        Equal Access to Services     [facility] : Emi Torres, nona lebron, qajohanna givens. So [facility] [phone].    ATENCIÓN: Si laureen sewell a curtis " disposición servicios gratuitos de asistencia lingüística. Kelly bailey [phone].    We comply with applicable federal civil rights laws and [place] laws. We do not discriminate on the basis of race, color, national origin, age, disability, sex, sexual orientation, or gender identity.               Review of your medicines      START taking        Dose / Directions    enoxaparin 40 MG/0.4ML injection   Commonly known as:  LOVENOX        Dose:  40 mg   Inject 0.4 mLs (40 mg) Subcutaneous every 24 hours   Quantity:  25 Syringe   Refills:  0       ondansetron 4 MG tablet   Commonly known as:  ZOFRAN        Dose:  4 mg   Take 1 tablet (4 mg) by mouth every 8 hours as needed for nausea   Quantity:  18 tablet   Refills:  0       oxyCODONE IR 5 MG tablet   Commonly known as:  ROXICODONE        Dose:  5-10 mg   Take 1-2 tablets (5-10 mg) by mouth every 4 hours as needed for moderate to severe pain   Quantity:  18 tablet   Refills:  0         CONTINUE these medicines which have NOT CHANGED        Dose / Directions    albuterol 108 (90 BASE) MCG/ACT Inhaler   Commonly known as:  PROAIR HFA/PROVENTIL HFA/VENTOLIN HFA   Used for:  Wheezing        Dose:  2 puff   Inhale 2 puffs into the lungs 2 times daily And every 4 hours as needed   Quantity:  1 Inhaler   Refills:  1       AMLODIPINE BESYLATE PO        Dose:  5 mg   Take 5 mg by mouth every evening   Refills:  0       * GABAPENTIN PO        Dose:  800 mg   Take 800 mg by mouth every morning   Refills:  0       * GABAPENTIN PO        Dose:  400 mg   Take 400 mg by mouth every evening   Refills:  0       losartan-hydrochlorothiazide 100-25 MG per tablet   Commonly known as:  HYZAAR   Used for:  Essential hypertension with goal blood pressure less than 140/90        TAKE 1 TABLET DAILY   Quantity:  30 tablet   Refills:  0       nicotine 21 MG/24HR 24 hr patch   Commonly known as:  NICODERM CQ        Dose:  1 patch   Place 1 patch onto the skin every 24 hours   Refills:  0        * Notice:  This list has 2 medication(s) that are the same as other medications prescribed for you. Read the directions carefully, and ask your doctor or other care provider to review them with you.         Where to get your medicines      These medications were sent to [place], [place] - [address] [facility]  [address] [facility] [address]     Phone:  [phone]     enoxaparin 40 MG/0.4ML injection    ondansetron 4 MG tablet         Some of these will need a paper prescription and others can be bought over the counter. Ask your nurse if you have questions.     Bring a paper prescription for each of these medications     oxyCODONE IR 5 MG tablet                Protect others around you: Learn how to safely use, store and throw away your medicines at www.disposemymeds.org.        Information about OPIOIDS     PRESCRIPTION OPIOIDS: WHAT YOU NEED TO KNOW    Prescription opioids can be used to help relieve moderate to severe pain and are often prescribed following a surgery or injury, or for certain health conditions. These medications can be an important part of treatment but also come with serious risks. It is important to work with your health care provider to make sure you are getting the safest, most effective care.    WHAT ARE THE RISKS AND SIDE EFFECTS OF OPIOID USE?  Prescription opioids carry serious risks of addiction and overdose, especially with prolonged use. An opioid overdose, often marked by slowed breathing can cause sudden death. The use of prescription opioids can have a number of side effects as well, even when taken as directed:      Tolerance - meaning you might need to take more of a medication for the same pain relief    Physical dependence - meaning you have symptoms of withdrawal when a medication is stopped    Increased sensitivity to pain    Constipation    Nausea, vomiting, and dry mouth    Sleepiness and  dizziness    Confusion    Depression    Low levels of testosterone that can result in lower sex drive, energy, and strength    Itching and sweating    RISKS ARE GREATER WITH:    History of drug misuse, substance use disorder, or overdose    Mental health conditions (such as depression or anxiety)    Sleep apnea    Older age (65 years or older)    Pregnancy    Avoid alcohol while taking prescription opioids.   Also, unless specifically advised by your health care provider, medications to avoid include:    Benzodiazepines (such as Xanax or Valium)    Muscle relaxants (such as Soma or Flexeril)    Hypnotics (such as Ambien or Lunesta)    Other prescription opioids    KNOW YOUR OPTIONS:  Talk to your health care provider about ways to manage your pain that do not involve prescription opioids. Some of these options may actually work better and have fewer risks and side effects:    Pain relievers such as acetaminophen, ibuprofen, and naproxen    Some medications that are also used for depression or seizures    Physical therapy and exercise    Cognitive behavioral therapy, a psychological, goal-directed approach, in which patients learn how to modify physical, behavioral, and emotional triggers of pain and stress    IF YOU ARE PRESCRIBED OPIOIDS FOR PAIN:    Never take opioids in greater amounts or more often than prescribed    Follow up with your primary health care provider and work together to create a plan on how to manage your pain.    Talk about ways to help manage your pain that do not involve prescription opioids    Talk about all concerns and side effects    Help prevent misuse and abuse    Never sell or share prescription opioids    Never use another person's prescription opioids    Store prescription opioids in a secure place and out of reach of others (this may include visitors, children, friends, and family)    Visit www.cdc.gov/drugoverdose to learn about risks of opioid abuse and overdose    If you believe  you may be struggling with addiction, tell your health care provider and ask for guidance or call [facility]'s National Helpline at 2-034-401-HELP    LEARN MORE / www.cdc.gov/drugoverdose/prescribing/guideline.html    Safely dispose of unused prescription opioids: Find your local drug take-back programs and more information about the importance of safe disposal at www.doseofreality.[place].gov             Medication List: This is a list of all your medications and when to take them. Check marks below indicate your daily home schedule. Keep this list as a reference.      Medications           Morning Afternoon Evening Bedtime As Needed    albuterol 108 (90 BASE) MCG/ACT Inhaler   Commonly known as:  PROAIR HFA/PROVENTIL HFA/VENTOLIN HFA   Inhale 2 puffs into the lungs 2 times daily And every 4 hours as needed   Last time this was given:  2 puffs on 1/28/2018  8:43 AM                                   AMLODIPINE BESYLATE PO   Take 5 mg by mouth every evening   Last time this was given:  5 mg on 1/27/2018  7:59 PM                                   enoxaparin 40 MG/0.4ML injection   Commonly known as:  LOVENOX   Inject 0.4 mLs (40 mg) Subcutaneous every 24 hours   Last time this was given:  40 mg on 1/28/2018  8:49 AM                                   * GABAPENTIN PO   Take 800 mg by mouth every morning   Last time this was given:  800 mg on 1/28/2018  8:44 AM                                   * GABAPENTIN PO   Take 400 mg by mouth every evening   Last time this was given:  800 mg on 1/28/2018  8:44 AM                                   losartan-hydrochlorothiazide 100-25 MG per tablet   Commonly known as:  HYZAAR   TAKE 1 TABLET DAILY                                   nicotine 21 MG/24HR 24 hr patch   Commonly known as:  NICODERM CQ   Place 1 patch onto the skin every 24 hours   Last time this was given:  1 patch on 1/28/2018  8:44 AM                                   ondansetron 4 MG tablet   Commonly known as:  ZOFRAN   Take 1  tablet (4 mg) by mouth every 8 hours as needed for nausea                                   oxyCODONE IR 5 MG tablet   Commonly known as:  ROXICODONE   Take 1-2 tablets (5-10 mg) by mouth every 4 hours as needed for moderate to severe pain   Last time this was given:  5 mg on 1/27/2018  2:04 PM                                   * Notice:  This list has 2 medication(s) that are the same as other medications prescribed for you. Read the directions carefully, and ask your doctor or other care provider to review them with you.              More Information        Staging Colorectal Cancer  Colorectal cancer can grow beyond the colon or rectum. In time, it can grow into nearby organs or spread to nearby called lymph nodes. Cancer cells can also travel to other parts of the body. This is known as metastasis. Staging cancer determines whether it has spread, and if so, how far. Knowing the cancer stage helps the healthcare provider make the best treatment plan. Colorectal cancer has four main stages, based on the location of the tumor. Staging may be done before or after surgery.       Stage I: Cancer found only in the colon or rectum lining.      Stage II: Cancer has spread outside the colon or rectum (sometimes to nearby tissue or organs), but not to lymph nodes.        Stage III: Cancer has spread to nearby lymph nodes, but not to other parts of the body.      Stage IV: Cancer cells have traveled to distant organs such as the lungs and liver.   Date Last Reviewed: 11/1/2015 2000-2017 [employer]. [address] [place] [address]. All rights reserved. This information is not intended as a substitute for professional medical care. Always follow your healthcare professional's instructions.                Enoxaparin injection  Brand Name: Lovenox  What is this medicine?  ENOXAPARIN (ee nox a PA rin) is used after knee, hip, or abdominal surgeries to prevent blood clotting. It is also used to treat  existing blood clots in the lungs or in the veins.  How should I use this medicine?  This medicine is for injection under the skin. It is usually given by a health-care professional. You or a family member may be trained on how to give the injections. If you are to give yourself injections, make sure you understand how to use the syringe, measure the dose if necessary, and give the injection. To avoid bruising, do not rub the site where this medicine has been injected. Do not take your medicine more often than directed. Do not stop taking except on the advice of your doctor or health care professional.  Make sure you receive a puncture-resistant container to dispose of the needles and syringes once you have finished with them. Do not reuse these items. Return the container to your doctor or health care professional for proper disposal.  Talk to your pediatrician regarding the use of this medicine in children. Special care may be needed.  What side effects may I notice from receiving this medicine?  Side effects that you should report to your doctor or health care professional as soon as possible:    allergic reactions like skin rash, itching or hives, swelling of the face, lips, or tongue    feeling faint or lightheaded, falls    signs and symptoms of bleeding such as bloody or black, tarry stools; red or dark-brown urine; spitting up blood or brown material that looks like coffee grounds; red spots on the skin; unusual bruising or bleeding from the eye, gums, or nose  Side effects that usually do not require medical attention (report to your doctor or health care professional if they continue or are bothersome):    pain, redness, or irritation at site where injected  What may interact with this medicine?    aspirin and aspirin-like medicines    certain medicines that treat or prevent blood clots    dipyridamole    NSAIDs, medicines for pain and inflammation, like ibuprofen or naproxen  What if I miss a dose?  If  you miss a dose, take it as soon as you can. If it is almost time for your next dose, take only that dose. Do not take double or extra doses.  Where should I keep my medicine?  Keep out of the reach of children.  Store at room temperature between 15 and 30 degrees C (59 and 86 degrees F). Do not freeze. If your injections have been specially prepared, you may need to store them in the refrigerator. Ask your pharmacist. Throw away any unused medicine after the expiration date.  What should I tell my health care provider before I take this medicine?  They need to know if you have any of these conditions:    bleeding disorders, hemorrhage, or hemophilia    infection of the heart or heart valves    kidney or liver disease    previous stroke    prosthetic heart valve    recent surgery or delivery of a baby    ulcer in the stomach or intestine, diverticulitis, or other bowel disease    an unusual or allergic reaction to enoxaparin, heparin, pork or pork products, other medicines, foods, dyes, or preservatives    pregnant or trying to get pregnant    breast-feeding  What should I watch for while using this medicine?  Visit your doctor or health care professional for regular checks on your progress. Your condition will be monitored carefully while you are receiving this medicine.  Notify your doctor or health care professional and seek emergency treatment if you develop breathing problems; changes in vision; chest pain; severe, sudden headache; pain, swelling, warmth in the leg; trouble speaking; sudden numbness or weakness of the face, arm, or leg. These can be signs that your condition has gotten worse.  If you are going to have surgery, tell your doctor or health care professional that you are taking this medicine.  Do not stop taking this medicine without first talking to your doctor. Be sure to refill your prescription before you run out of medicine.  Avoid sports and activities that might cause injury while you are  using this medicine. Severe falls or injuries can cause unseen bleeding. Be careful when using sharp tools or knives. Consider using an electric razor. Take special care brushing or flossing your teeth. Report any injuries, bruising, or red spots on the skin to your doctor or health care professional.  NOTE:This sheet is a summary. It may not cover all possible information. If you have questions about this medicine, talk to your doctor, pharmacist, or health care provider. Copyright  2017 [employer]                Ondansetron oral dissolving tablet  Brand Name: Zofran ODT  What is this medicine?  ONDANSETRON (on DAN se beata) is used to treat nausea and vomiting caused by chemotherapy. It is also used to prevent or treat nausea and vomiting after surgery.  How should I use this medicine?  These tablets are made to dissolve in the mouth. Do not try to push the tablet through the foil backing. With dry hands, peel away the foil backing and gently remove the tablet. Place the tablet in the mouth and allow it to dissolve, then swallow. While you may take these tablets with water, it is not necessary to do so.  Talk to your pediatrician regarding the use of this medicine in children. Special care may be needed.  What side effects may I notice from receiving this medicine?  Side effects that you should report to your doctor or health care professional as soon as possible:    allergic reactions like skin rash, itching or hives, swelling of the face, lips, or tongue    breathing problems    confusion    dizziness    fast or irregular heartbeat    feeling faint or lightheaded, falls    fever and chills    loss of balance or coordination    seizures    sweating    swelling of the hands and feet    tightness in the chest    tremors    unusually weak or tired  Side effects that usually do not require medical attention (report to your doctor or health care professional if they continue or are bothersome):    constipation or  diarrhea    headache  What may interact with this medicine?  Do not take this medicine with any of the following medications:    apomorphine    certain medicines for fungal infections like fluconazole, itraconazole, ketoconazole, posaconazole, voriconazole    cisapride    dofetilide    dronedarone    pimozide    thioridazine    ziprasidone  This medicine may also interact with the following medications:    carbamazepine    certain medicines for depression, anxiety, or psychotic disturbances    fentanyl    linezolid    MAOIs like Carbex, Eldepryl, Marplan, Nardil, and Parnate    methylene blue (injected into a vein)    other medicines that prolong the QT interval (cause an abnormal heart rhythm)    phenytoin    rifampicin    tramadol  What if I miss a dose?  If you miss a dose, take it as soon as you can. If it is almost time for your next dose, take only that dose. Do not take double or extra doses.  Where should I keep my medicine?  Keep out of the reach of children.  Store between 2 and 30 degrees C (36 and 86 degrees F). Throw away any unused medicine after the expiration date.  What should I tell my health care provider before I take this medicine?  They need to know if you have any of these conditions:    heart disease    history of irregular heartbeat    liver disease    low levels of magnesium or potassium in the blood    an unusual or allergic reaction to ondansetron, granisetron, other medicines, foods, dyes, or preservatives    pregnant or trying to get pregnant    breast-feeding  What should I watch for while using this medicine?  Check with your doctor or health care professional as soon as you can if you have any sign of an allergic reaction.  NOTE:This sheet is a summary. It may not cover all possible information. If you have questions about this medicine, talk to your doctor, pharmacist, or health care provider. Copyright  2017 [employer]                Oxycodone tablets or capsules  Brand Names:  Dazidox, Endocodone, Oxaydo, OxyIR, Percolone, Roxicodone, ROXYBOND  What is this medicine?  OXYCODONE (ox i KOMICHELLE done) is a pain reliever. It is used to treat moderate to severe pain.  How should I use this medicine?  Take this medicine by mouth with a glass of water. Follow the directions on the prescription label. You can take it with or without food. If it upsets your stomach, take it with food. Take your medicine at regular intervals. Do not take it more often than directed. Do not stop taking except on your doctor's advice.  Some brands of this medicine, like Oxecta, have special instructions. Ask your doctor or pharmacist if these directions are for you: Do not cut, crush or chew this medicine. Swallow only one tablet at a time. Do not wet, soak, or lick the tablet before you take it.  A special MedGuide will be given to you by the pharmacist with each prescription and refill. Be sure to read this information carefully each time.  Talk to your pediatrician regarding the use of this medicine in children. Special care may be needed.  What side effects may I notice from receiving this medicine?  Side effects that you should report to your doctor or health care professional as soon as possible:    allergic reactions like skin rash, itching or hives, swelling of the face, lips, or tongue    breathing problems    confusion    signs and symptoms of low blood pressure like dizziness; feeling faint or lightheaded, falls; unusually weak or tired    trouble passing urine or change in the amount of urine    trouble swallowing  Side effects that usually do not require medical attention (report to your doctor or health care professional if they continue or are bothersome):    constipation    dry mouth    nausea, vomiting    tiredness  What may interact with this medicine?  This medicine may interact with the following medications:    alcohol    antihistamines for allergy, cough and cold    antiviral medicines for HIV or  AIDS    atropine    certain antibiotics like clarithromycin, erythromycin, linezolid, rifampin    certain medicines for anxiety or sleep    certain medicines for bladder problems like oxybutynin, tolterodine    certain medicines for depression like amitriptyline, fluoxetine, sertraline    certain medicines for fungal infections like ketoconazole, itraconazole, voriconazole    certain medicines for migraine headache like almotriptan, eletriptan, frovatriptan, naratriptan, rizatriptan, sumatriptan, zolmitriptan    certain medicines for nausea or vomiting like dolasetron, ondansetron, palonosetron    certain medicines for Parkinson's disease like benztropine, trihexyphenidyl    certain medicines for seizures like phenobarbital, phenytoin, primidone    certain medicines for stomach problems like dicyclomine, hyoscyamine    certain medicines for travel sickness like scopolamine    diuretics    general anesthetics like halothane, isoflurane, methoxyflurane, propofol    ipratropium    local anesthetics like lidocaine, pramoxine, tetracaine    MAOIs like Carbex, Eldepryl, Marplan, Nardil, and Parnate    medicines that relax muscles for surgery    methylene blue    nilotinib    other narcotic medicines for pain or cough    phenothiazines like chlorpromazine, mesoridazine, prochlorperazine, thioridazine  What if I miss a dose?  If you miss a dose, take it as soon as you can. If it is almost time for your next dose, take only that dose. Do not take double or extra doses.  Where should I keep my medicine?  Keep out of the reach of children. This medicine can be abused. Keep your medicine in a safe place to protect it from theft. Do not share this medicine with anyone. Selling or giving away this medicine is dangerous and against the law.  Store at room temperature between 15 and 30 degrees C (59 and 86 degrees F). Protect from light. Keep container tightly closed.  This medicine may cause accidental overdose and death if it is  taken by other adults, children, or pets. Flush any unused medicine down the toilet to reduce the chance of harm. Do not use the medicine after the expiration date.  What should I tell my health care provider before I take this medicine?  They need to know if you have any of these conditions:    Carlos's disease    brain tumor    head injury    heart disease    history of drug or alcohol abuse problem    if you often drink alcohol    kidney disease    liver disease    lung or breathing disease, like asthma    mental illness    pancreatic disease    seizures    thyroid disease    an unusual or allergic reaction to oxycodone, codeine, hydrocodone, morphine, other medicines, foods, dyes, or preservatives    pregnant or trying to get pregnant    breast-feeding  What should I watch for while using this medicine?  Tell your doctor or health care professional if your pain does not go away, if it gets worse, or if you have new or a different type of pain. You may develop tolerance to the medicine. Tolerance means that you will need a higher dose of the medicine for pain relief. Tolerance is normal and is expected if you take this medicine for a long time.  Do not suddenly stop taking your medicine because you may develop a severe reaction. Your body becomes used to the medicine. This does NOT mean you are addicted. Addiction is a behavior related to getting and using a drug for a non-medical reason. If you have pain, you have a medical reason to take pain medicine. Your doctor will tell you how much medicine to take. If your doctor wants you to stop the medicine, the dose will be slowly lowered over time to avoid any side effects.  There are different types of narcotic medicines (opiates). If you take more than one type at the same time or if you are taking another medicine that also causes drowsiness, you may have more side effects. Give your health care provider a list of all medicines you use. Your doctor will tell you  how much medicine to take. Do not take more medicine than directed. Call emergency for help if you have problems breathing or unusual sleepiness.  You may get drowsy or dizzy. Do not drive, use machinery, or do anything that needs mental alertness until you know how the medicine affects you. Do not stand or sit up quickly, especially if you are an older patient. This reduces the risk of dizzy or fainting spells. Alcohol may interfere with the effect of this medicine. Avoid alcoholic drinks.  This medicine will cause constipation. Try to have a bowel movement at least every 2 to 3 days. If you do not have a bowel movement for 3 days, call your doctor or health care professional.  Your mouth may get dry. Chewing sugarless gum or sucking hard candy, and drinking plenty of water may help. Contact your doctor if the problem does not go away or is severe.  NOTE:This sheet is a summary. It may not cover all possible information. If you have questions about this medicine, talk to your doctor, pharmacist, or health care provider. Copyright  2017 [employer]

## 2018-01-25 NOTE — PROGRESS NOTES
"[facility] Nurse Ostomy Marking and Education         Data:   History:    Pt has stage 4 metastatic colon cancer.  She has a significant obstructing tumor lesion in the descending colon that is causing significant obstruction. Due to this surgery is required to relieve the obstruction.      Pt referred by Dr. Baron  Who is present for marking and education: pt and son  Type of ostomy surgery:  Likely ileostomy vs colostomy  Abdomen:  Small, flat, no scars, lower half covered in stretch marks, skin is wrinkly and saggy.  Pt states she wears belt line just under her ribs, there is an interrupted small crease here, 1-2\" above umbilicus.           Intervention:     Patient's chart evaluated.      Assessments done today:  Pt observed lying, sitting and standing.     Education: Reviewed upcoming surgery, stoma construction,post-op expectations, general ostomy cares/concerns including differences between colostomy/ileostomy.        Patient marked with \"x\" using surgical marker and sealed with 3M No Sting Skin Prep. Pt marked bilaterally.           Assessment:       Learning needs/ comprehension: Pt calm and states she has basic understanding of an ostomy.  Pt and son deny further questions at this time.           Plan:     Plan:   ? Surgery scheduled for:  This morning    Will plan to follow patient post operatively if ostomy created.               "

## 2018-01-25 NOTE — BRIEF OP NOTE
[facility] Brief Operative Note    Pre-operative diagnosis: obstructing descending colon cancer   Post-operative diagnosis splenic flexure cancer     Procedure: Procedure(s):  exploratory laparoscopy,  Left hemicolectomy and end colostomy  Wound Class: II-Clean Contaminated   - Wound Class: II-Clean Contaminated   Surgeon(s): Surgeon(s) and Role:     * Maddie Baron MD - Primary  Dr. Martin, [facility] CRS Fellow   Estimated blood loss: 27 mL    Specimens:   ID Type Source Tests Collected by Time Destination   A : Left colon Tissue Large Intestine, Left/Descending SURGICAL PATHOLOGY EXAM Maddie Baron MD 1/25/2018  1:12 PM       Findings: Invasion of the colon tumor to the left upper quadrant. Small liver lesion in the right lateral liver.     Condition on discharge from OR: Satisfactory    Yeimy Benavides PA-C   [facility]   [phone].        ADDENDUM:    PATIENT DATA  Indicate Y or N:  Home O2 No  Hemodialysis  No  Transplant patient  No  Cirrhosis  No  Steroids in last 30 days  No  Immunomodulators in last 30 days  Yes  Anticoagulation at time of surgery  No   List medication   Prior abdominal surgery  Yes  Pelvic irradiation  No    Hgb within 30 days if known    Hemoglobin   Date Value Ref Range Status   01/26/2018 10.8 (L) 11.7 - 15.7 g/dL Final   ]  Cr within 30 days if known    Creatinine   Date Value Ref Range Status   01/26/2018 0.70 0.52 - 1.04 mg/dL Final   ]  Body mass index is 21.92 kg/(m^2).      OR DATA  Emergent  No   <24 hours  No   <1 week  No  Bowel Prep Yes  Antibiotics  Yes  DVT prophylaxis    Heparin     SCD  Yes   None  No  Drain  No  ASA (1,2,3,4) 4  OR time (min) 183  Stents  No  Transfuse >/= 2U  No  Anastomosis   Stapled  No   Handsewn  No  Leak Test    Positive  No   Negative  No   Not done  No    FOR CANCER ALSO COMPLETE:  Preoperative treatment (Y or N)   Chemo  Yes   Radiation No   Diversion  No  If rectal cancer   Distance from anal  verge (cm)    Location    Anterior     Posterior     Lateral     Circumferential   Preoperative Stage   CT  Yes   MRI No   US  No   Clinical  Yes   Unknown  Yes   T stage (1,2,3,4)    N stage (0,1,2)    M stage (0,1)   CEA   Metastatic disease at time of operation  No

## 2018-01-25 NOTE — ANESTHESIA POSTPROCEDURE EVALUATION
Patient: Anna Dyer    Procedure(s):  exploratory laparoscopy, left hemicolectomy, transverse colostomy - Wound Class: II-Clean Contaminated   - Wound Class: II-Clean Contaminated    Diagnosis:obstructing descending colon cancer  Diagnosis Additional Information: Pre-operative diagnosis: obstructing descending colon cancer  Post-operative diagnosis splenic flexure cancer    Procedure: Procedure(s):  exploratory laparoscopy,  Left hemicolectomy and end colostomy  Wound Class: II-Clean Contaminated        Anesthesia Type:  General, ETT    Note:  Anesthesia Post Evaluation    Patient location during evaluation: PACU  Patient participation: Able to fully participate in evaluation  Level of consciousness: awake and alert  Pain management: adequate  Airway patency: patent  Cardiovascular status: acceptable  Respiratory status: acceptable  Hydration status: acceptable  PONV: none     Anesthetic complications: None          Last vitals:  Vitals:    01/25/18 1515 01/25/18 1530 01/25/18 1545   BP: 104/56 104/55 110/57   Pulse:      Resp: (!) 7 9 12   Temp:   96.9  F (36.1  C)   SpO2: 97% 99% 100%         Electronically Signed By: Tello Cronin MD  January 25, 2018  4:15 PM

## 2018-01-26 LAB
ANION GAP SERPL CALCULATED.3IONS-SCNC: 6 MMOL/L (ref 3–14)
BUN SERPL-MCNC: 7 MG/DL (ref 7–30)
CALCIUM SERPL-MCNC: 8.4 MG/DL (ref 8.5–10.1)
CHLORIDE SERPL-SCNC: 102 MMOL/L (ref 94–109)
CO2 SERPL-SCNC: 27 MMOL/L (ref 20–32)
COPATH REPORT: NORMAL
CREAT SERPL-MCNC: 0.7 MG/DL (ref 0.52–1.04)
GFR SERPL CREATININE-BSD FRML MDRD: 83 ML/MIN/1.7M2
GLUCOSE SERPL-MCNC: 100 MG/DL (ref 70–99)
HGB BLD-MCNC: 10.8 G/DL (ref 11.7–15.7)
POTASSIUM SERPL-SCNC: 3.4 MMOL/L (ref 3.4–5.3)
SODIUM SERPL-SCNC: 135 MMOL/L (ref 133–144)

## 2018-01-26 PROCEDURE — 99207 ZZC CONSULT E&M CHANGED TO INITIAL LEVEL: CPT | Performed by: INTERNAL MEDICINE

## 2018-01-26 PROCEDURE — 40000275 ZZH STATISTIC RCP TIME EA 10 MIN

## 2018-01-26 PROCEDURE — A9270 NON-COVERED ITEM OR SERVICE: HCPCS | Mod: GY | Performed by: COLON & RECTAL SURGERY

## 2018-01-26 PROCEDURE — 99222 1ST HOSP IP/OBS MODERATE 55: CPT | Performed by: INTERNAL MEDICINE

## 2018-01-26 PROCEDURE — 25000125 ZZHC RX 250: Performed by: COLON & RECTAL SURGERY

## 2018-01-26 PROCEDURE — 25000128 H RX IP 250 OP 636: Performed by: INTERNAL MEDICINE

## 2018-01-26 PROCEDURE — 25000132 ZZH RX MED GY IP 250 OP 250 PS 637: Mod: GY | Performed by: PHYSICIAN ASSISTANT

## 2018-01-26 PROCEDURE — 94640 AIRWAY INHALATION TREATMENT: CPT

## 2018-01-26 PROCEDURE — 25000128 H RX IP 250 OP 636: Performed by: COLON & RECTAL SURGERY

## 2018-01-26 PROCEDURE — 25000132 ZZH RX MED GY IP 250 OP 250 PS 637: Mod: GY | Performed by: INTERNAL MEDICINE

## 2018-01-26 PROCEDURE — 12000000 ZZH R&B MED SURG/OB

## 2018-01-26 PROCEDURE — G0463 HOSPITAL OUTPT CLINIC VISIT: HCPCS

## 2018-01-26 PROCEDURE — 25000132 ZZH RX MED GY IP 250 OP 250 PS 637: Mod: GY | Performed by: COLON & RECTAL SURGERY

## 2018-01-26 PROCEDURE — 25000128 H RX IP 250 OP 636: Performed by: PHYSICIAN ASSISTANT

## 2018-01-26 PROCEDURE — A9270 NON-COVERED ITEM OR SERVICE: HCPCS | Mod: GY | Performed by: PHYSICIAN ASSISTANT

## 2018-01-26 PROCEDURE — 80048 BASIC METABOLIC PNL TOTAL CA: CPT | Performed by: COLON & RECTAL SURGERY

## 2018-01-26 PROCEDURE — 94640 AIRWAY INHALATION TREATMENT: CPT | Mod: 76

## 2018-01-26 PROCEDURE — 85018 HEMOGLOBIN: CPT | Performed by: COLON & RECTAL SURGERY

## 2018-01-26 RX ORDER — HEPARIN SODIUM,PORCINE 10 UNIT/ML
5-10 VIAL (ML) INTRAVENOUS
Status: DISCONTINUED | OUTPATIENT
Start: 2018-01-26 | End: 2018-01-28 | Stop reason: HOSPADM

## 2018-01-26 RX ORDER — ACETAMINOPHEN 500 MG
1000 TABLET ORAL EVERY 6 HOURS
Status: DISCONTINUED | OUTPATIENT
Start: 2018-01-26 | End: 2018-01-28 | Stop reason: HOSPADM

## 2018-01-26 RX ORDER — ALBUTEROL SULFATE 90 UG/1
2 AEROSOL, METERED RESPIRATORY (INHALATION) EVERY 4 HOURS PRN
Status: DISCONTINUED | OUTPATIENT
Start: 2018-01-26 | End: 2018-01-28 | Stop reason: HOSPADM

## 2018-01-26 RX ORDER — HEPARIN SODIUM,PORCINE 10 UNIT/ML
5-10 VIAL (ML) INTRAVENOUS EVERY 24 HOURS
Status: DISCONTINUED | OUTPATIENT
Start: 2018-01-26 | End: 2018-01-28 | Stop reason: HOSPADM

## 2018-01-26 RX ORDER — SODIUM CHLORIDE, SODIUM LACTATE, POTASSIUM CHLORIDE, CALCIUM CHLORIDE 600; 310; 30; 20 MG/100ML; MG/100ML; MG/100ML; MG/100ML
INJECTION, SOLUTION INTRAVENOUS CONTINUOUS
Status: DISCONTINUED | OUTPATIENT
Start: 2018-01-26 | End: 2018-01-28 | Stop reason: HOSPADM

## 2018-01-26 RX ORDER — HEPARIN SODIUM (PORCINE) LOCK FLUSH IV SOLN 100 UNIT/ML 100 UNIT/ML
5 SOLUTION INTRAVENOUS
Status: DISCONTINUED | OUTPATIENT
Start: 2018-01-26 | End: 2018-01-28 | Stop reason: HOSPADM

## 2018-01-26 RX ORDER — OXYCODONE HYDROCHLORIDE 5 MG/1
5-10 TABLET ORAL EVERY 4 HOURS PRN
Status: DISCONTINUED | OUTPATIENT
Start: 2018-01-26 | End: 2018-01-28 | Stop reason: HOSPADM

## 2018-01-26 RX ORDER — HYDROMORPHONE HYDROCHLORIDE 1 MG/ML
.3-.5 INJECTION, SOLUTION INTRAMUSCULAR; INTRAVENOUS; SUBCUTANEOUS EVERY 4 HOURS PRN
Status: DISCONTINUED | OUTPATIENT
Start: 2018-01-26 | End: 2018-01-28 | Stop reason: HOSPADM

## 2018-01-26 RX ADMIN — ENOXAPARIN SODIUM 40 MG: 40 INJECTION SUBCUTANEOUS at 11:06

## 2018-01-26 RX ADMIN — ALBUTEROL SULFATE 2 PUFF: 90 INHALANT RESPIRATORY (INHALATION) at 07:20

## 2018-01-26 RX ADMIN — SODIUM CHLORIDE, PRESERVATIVE FREE 5 ML: 5 INJECTION INTRAVENOUS at 18:06

## 2018-01-26 RX ADMIN — GABAPENTIN 400 MG: 400 CAPSULE ORAL at 20:30

## 2018-01-26 RX ADMIN — ACETAMINOPHEN 1000 MG: 10 INJECTION, SOLUTION INTRAVENOUS at 00:26

## 2018-01-26 RX ADMIN — CIPROFLOXACIN 400 MG: 2 INJECTION, SOLUTION INTRAVENOUS at 11:00

## 2018-01-26 RX ADMIN — ACETAMINOPHEN 1000 MG: 500 TABLET, FILM COATED ORAL at 20:30

## 2018-01-26 RX ADMIN — AMLODIPINE BESYLATE 5 MG: 5 TABLET ORAL at 20:30

## 2018-01-26 RX ADMIN — METRONIDAZOLE 500 MG: 500 INJECTION, SOLUTION INTRAVENOUS at 12:09

## 2018-01-26 RX ADMIN — ACETAMINOPHEN 1000 MG: 10 INJECTION, SOLUTION INTRAVENOUS at 05:34

## 2018-01-26 RX ADMIN — OXYCODONE HYDROCHLORIDE 10 MG: 5 TABLET ORAL at 23:18

## 2018-01-26 RX ADMIN — GABAPENTIN 800 MG: 400 CAPSULE ORAL at 08:05

## 2018-01-26 RX ADMIN — Medication 1 LOZENGE: at 16:15

## 2018-01-26 RX ADMIN — NICOTINE 1 PATCH: 21 PATCH, EXTENDED RELEASE TRANSDERMAL at 08:02

## 2018-01-26 RX ADMIN — ACETAMINOPHEN 1000 MG: 10 INJECTION, SOLUTION INTRAVENOUS at 13:23

## 2018-01-26 RX ADMIN — ALBUTEROL SULFATE 2 PUFF: 90 INHALANT RESPIRATORY (INHALATION) at 20:32

## 2018-01-26 RX ADMIN — SODIUM CHLORIDE, POTASSIUM CHLORIDE, SODIUM LACTATE AND CALCIUM CHLORIDE: 600; 310; 30; 20 INJECTION, SOLUTION INTRAVENOUS at 10:55

## 2018-01-26 RX ADMIN — METRONIDAZOLE 500 MG: 500 INJECTION, SOLUTION INTRAVENOUS at 02:09

## 2018-01-26 RX ADMIN — SODIUM CHLORIDE, POTASSIUM CHLORIDE, SODIUM LACTATE AND CALCIUM CHLORIDE: 600; 310; 30; 20 INJECTION, SOLUTION INTRAVENOUS at 23:13

## 2018-01-26 ASSESSMENT — ACTIVITIES OF DAILY LIVING (ADL)
ADLS_ACUITY_SCORE: 10
ADLS_ACUITY_SCORE: 12

## 2018-01-26 NOTE — PLAN OF CARE
Problem: Patient Care Overview  Goal: Plan of Care/Patient Progress Review  Outcome: No Change  VSS, on 2L O2.  Reports mild pain with movement, used 0.1mg of dilaudid PCA and scheduled ofirmev x2.  A/O, forgetful.  Bowel sounds hypoactive.  Lap sites closed with dermabond.  Stoma large, red, and protruding, red robbin in place.  150mL brown liquid emptied from ostomy.  750mL clear yellow urine from cota.  Tolerating clears, denies nausea.  On Iv cipro and flagyl.  Up with A-1.  Will continue to monitor.

## 2018-01-26 NOTE — CONSULTS
Hospitalist Consultation      Anna Dyer MRN# [id number]   YOB: 1945 Age: 72 year old   Date of Admission: 1/25/2018     Requesting Physician: Dr. Baron  Reason for consult:  Post operative management            Assessment and Plan:   This patient is a 72 year old female with a PMH significant for stage 4 colon cancer with metastases to liver, tobacco dependence, HTN, and RLS who is POD 1 s/p exploratory laparoscopy, laparoscopic left hemicolectomy and end transverse colostomy.     1. Obstructing splenic flexure cancer s/p exploratory laparoscopy, laparoscopic left hemicolectomy and end transverse colostomy: doing well, continue pain control, advancement of diet, and IV Ciprofloxacin and IV Flagyl as per primary     2. Stage 4 colon cancer with metastases to liver: initially diagnosed in 02/2017 after undergoing colonoscopy with biopsies showing adenocarcinoma. CT scan in 03/2017 showed concern for metastases to liver with confirmation after fine needle biopsy. Treated with Folfox and Bevacizumab that showed improvement of liver metastases and decreased size of primary malignancy. Follows with Dr. Tim of oncology who at patient's most recent f/u in 12/2017 decided to hold Oxaliplatin due to inducing peripheral neuropathy and neutropenia. Per patient chemotherapy is currently on hold until follow up appointment in early February where current plan will be discussed.     3. HTN: stable, resume PTA Amlodipine and Losartan-HCTZ with parameters    4. Tobacco dependence: current 1 ppd smoker, discussed cessation with patient and she has no desire to quit.  - Nicotine patch ordered     5. RLS: resume Gabapentin     6. COPD: spirometry in clinic showed borderline obstructive physiology which would be consistent with patient's smoking history. Would likely benefit from outpatient PFTs.  - resume PTA PRN albuterol inhaler for dyspnea     DVT Prophylaxis: on Lovenox and PCDs as per primary  D/C planning:  To home             History of Present Illness:   This patient is a 72 year old female who is POD 1 s/p exploratory laparoscopy, laparoscopic left hemicolectomy and end transverse colostomy. Intra-op report reviewed and showed no intra-op complications. I/o's reviewed, currently net +3.0 L with good UOP since OR. Hgb stable this am at 10.8.    Overnight did pretty well, no complaints, VSS. Currently, today tolerating clear liquid diet, pain controlled, denies N/V, chest pain, shortness of breath, urinary symptoms, or lightheadedness/dizziness. O/w other medical problems have been stable, with no recent c/o illness.               Past Medical History:     Past Medical History:   Diagnosis Date     Cancer (H)     metastatic colorectal adenocarcinoma     Hypertension     No cardiologist     RLS (restless legs syndrome)           Past Surgical History:     Past Surgical History:   Procedure Laterality Date     BIOPSY      liver     BREAST SURGERY      breast implants     COSMETIC SURGERY      facelift and tummy tuck     GYN SURGERY      tubal     INSERT PORT VASCULAR ACCESS N/A 3/24/2017    Procedure: INSERT PORT VASCULAR ACCESS;  Surgeon: Yemi Ordaz MD;  Location:  OR          Social History:     Social History   Substance Use Topics     Smoking status: Current Every Day Smoker     Packs/day: 1.00     Years: 50.00     Types: Cigarettes     Smokeless tobacco: Never Used     Alcohol use No          Family History:   Family history fully reviewed with patient and noncontributory.           Allergies:     Allergies   Allergen Reactions     Lactose GI Disturbance     Sulfa Drugs Hives          Medications:     Prior to Admission medications    Medication Sig Last Dose Taking? Auth Provider   nicotine (NICODERM CQ) 21 MG/24HR 24 hr patch Place 1 patch onto the skin every 24 hours 1/25/2018 at Unknown time Yes Reported, Patient   albuterol (PROAIR HFA/PROVENTIL HFA/VENTOLIN HFA) 108 (90 BASE) MCG/ACT Inhaler  "Inhale 2 puffs into the lungs 2 times daily And every 4 hours as needed 1/25/2018 at Unknown time Yes Tello Finney MD   GABAPENTIN PO Take 800 mg by mouth every morning  1/25/2018 at Unknown time Yes Reported, Patient   GABAPENTIN PO Take 400 mg by mouth every evening  1/24/2018 at Unknown time Yes Reported, Patient   AMLODIPINE BESYLATE PO Take 5 mg by mouth every evening  1/24/2018 at Unknown time Yes Reported, Patient   losartan-hydrochlorothiazide (HYZAAR) 100-25 MG per tablet TAKE 1 TABLET DAILY 1/24/2018 at Unknown time Yes Tello Finney MD          Review of Systems:   A comprehensive greater than 10 system review of systems was carried out.  Pertinent positives and negatives are noted above.  Otherwise negative for contributory info.            Physical Exam:   Vitals were reviewed  Blood pressure 112/49, pulse 98, temperature 97.7  F (36.5  C), temperature source Oral, resp. rate 15, height 1.549 m (5' 1\"), weight 52.6 kg (116 lb), SpO2 99 %.  Exam:    GENERAL:  Comfortable.  PSYCH: pleasant, oriented, No acute distress.  HEENT:  PERRLA. Normal conjunctiva, normal hearing, nasal mucosa and oropharynx are normal.  NECK:  Supple, no neck vein distention, adenopathy or bruits, normal thyroid.  HEART:  Normal S1, S2 with no murmur, no pericardial rub, S3 or S4.  LUNGS:  Clear to auscultation, normal respiratory effort.  ABDOMEN:  soft, no hepatosplenomegaly, normal bowel sounds. Stoma appears pink, incision sites c/d/i   EXTREMITIES:  No pedal edema, +2 radial and posterior tibial pulses bilateral and equal.  SKIN:  Dry to touch, No rash, wound or ulcerations.  NEUROLOGIC:  Nonfocal with normal cranial nerve and motor power and sensation.          Data:   Past 24 hours labs, studies, and imaging were reviewed.    Results for orders placed or performed during the hospital encounter of 01/25/18   Potassium   Result Value Ref Range    Potassium 3.6 3.4 - 5.3 mmol/L   Creatinine   Result Value Ref " Range    Creatinine 0.63 0.52 - 1.04 mg/dL    GFR Estimate >90 >60 mL/min/1.7m2    GFR Estimate If Black >90 >60 mL/min/1.7m2   Platelet count   Result Value Ref Range    Platelet Count 126 (L) 150 - 450 10e9/L   Basic metabolic panel   Result Value Ref Range    Sodium 135 133 - 144 mmol/L    Potassium 3.4 3.4 - 5.3 mmol/L    Chloride 102 94 - 109 mmol/L    Carbon Dioxide 27 20 - 32 mmol/L    Anion Gap 6 3 - 14 mmol/L    Glucose 100 (H) 70 - 99 mg/dL    Urea Nitrogen 7 7 - 30 mg/dL    Creatinine 0.70 0.52 - 1.04 mg/dL    GFR Estimate 83 >60 mL/min/1.7m2    GFR Estimate If Black >90 >60 mL/min/1.7m2    Calcium 8.4 (L) 8.5 - 10.1 mg/dL   Hemoglobin   Result Value Ref Range    Hemoglobin 10.8 (L) 11.7 - 15.7 g/dL       Chandni Taylor PA-C    Pt discussed with Dr. Cortez who agrees with the care as discussed above.

## 2018-01-26 NOTE — PLAN OF CARE
Problem: Bowel Resection (Adult)  Goal: Signs and Symptoms of Listed Potential Problems Will be Absent, Minimized or Managed (Bowel Resection)  Signs and symptoms of listed potential problems will be absent, minimized or managed by discharge/transition of care (reference Bowel Resection (Adult) CPG).   Outcome: No Change  Pt POD 1 from left hemicolectomy.  Ostomy large, pink, protruding.  Red yamil in place, sanguinous drainage out.  Bowel sounds hypoactive. Minimal gas through ostomy.  On full liquid diet tolerating. No nausea, rates pain 2/10.  Used 0.1 mg  of PCA.  Continue cipro and flagyl.  Started lovenox teaching.

## 2018-01-26 NOTE — PLAN OF CARE
Problem: Bowel Resection (Adult)  Goal: Signs and Symptoms of Listed Potential Problems Will be Absent, Minimized or Managed (Bowel Resection)  Signs and symptoms of listed potential problems will be absent, minimized or managed by discharge/transition of care (reference Bowel Resection (Adult) CPG).   Outcome: Improving  Arrived to floor at 1700 from PACU. POD #0 from lap L hemicolectomy and transverse colostomy placement  Ambulatory Status:  Pt up with assist of 2 and GB. Ambulated in hallway x1.  VSS, afebrile.   Pain: PCA pump set up but pt did not use this shift; scheduled ofirmev given   Resp: LS diminished; 2L NC. Capnography in place. Nicotine patch in place   GI:  Denies nausea.  Good appetite and on clear liquid diet.  BS tinkling. NOT yet passing flatus.  Last BM- prior to surgery. Ostomy with very small amount serosanguinous drainage. Stoma pink/protruding with red yamil in place.   :  Montesinos with 475cc urine; clear yellow  Skin:  Lap sites dermabond, ice applied.  Tx:  IV cipro and flagyl   Labs:  Plt 126  Consults:  Surgery, WOC, nutrition   Disposition:  TBD

## 2018-01-26 NOTE — PROGRESS NOTES
COLON & RECTAL [facility]GERY  PROGRESS NOTE    January 26, 2018  Post-op Day # 1 ex lap, left hemicolectomy, end colostomy    SUBJECTIVE:  Patient reports she is feeling very well today.  States abdominal pain is minimal.  Has started to have liquid stool output.  Tolerated clears.  Denies N/V.    OBJECTIVE:  Temp:  [96.8  F (36  C)-98.5  F (36.9  C)] 98.5  F (36.9  C)  Pulse:  [71-98] 71  Heart Rate:  [66-99] 69  Resp:  [7-18] 17  BP: (104-141)/(47-80) 114/47  FiO2 (%):  [100 %] 100 %  SpO2:  [93 %-100 %] 97 %    Intake/Output Summary (Last 24 hours) at 01/26/18 0816  Last data filed at 01/26/18 0526   Gross per 24 hour   Intake             4658 ml   Output             1677 ml   Net             2981 ml       GENERAL:  Awake, alert, no acute distress,   HEAD: Normocephalic atraumatic  SCLERA: Anicteric  EXTREMITIES: Warm and well perfused  ABDOMEN:  Soft, appropriately tender, non-distended. No guarding, rigidity, or peritoneal signs. Stoma large/swollen, but pink and viable with RRC in place and liquid brown stool in bag.  INCISION:  C/d/i,     LABS:  Lab Results   Component Value Date    WBC 6.2 05/08/2009     Lab Results   Component Value Date    HGB 10.8 01/26/2018     Lab Results   Component Value Date    HCT 38.2 05/08/2009     Lab Results   Component Value Date     01/25/2018     Last Basic Metabolic Panel:  Lab Results   Component Value Date     01/26/2018      Lab Results   Component Value Date    POTASSIUM 3.4 01/26/2018     Lab Results   Component Value Date    CHLORIDE 102 01/26/2018     Lab Results   Component Value Date    CAITY 8.4 01/26/2018     Lab Results   Component Value Date    CO2 27 01/26/2018     Lab Results   Component Value Date    BUN 7 01/26/2018     Lab Results   Component Value Date    CR 0.70 01/26/2018     Lab Results   Component Value Date     01/26/2018       ASSESSMENT/PLAN: POD1 ex lap, left hemicolectomy, end colostomy.  Doing well thus far. Tolerating clears.    1.  Diet: Advance to full liquids  2. Continue PCA, IV  Tylenol. Will transition to PO once tolerating more PO intake  3. Decrease IVF to 50  4. OOB, ambulate  5. Monitor for further bowel function  6. Lovenox for ppx  7. Await path    Patient will be discussed with Dr. Caba.    Anh Manzo PA-C  [facility]  Phone: [phone]    CRS Staff  Seen and examined.  Agree with above.  TKO fluids and d/c cota.    Jesus Caba MD  Colorectal Surgery  [phone] (office)  [phone] (pager)  [email or web address]

## 2018-01-26 NOTE — OP NOTE
Procedure Date: 01/25/2018      DATE OF PROCEDURE:  01/25/2018      PREOPERATIVE DIAGNOSIS:  Obstructing splenic flexure cancer.      POSTOPERATIVE DIAGNOSIS:  Obstructing splenic flexure cancer.      PROCEDURE:  Exploratory laparoscopy, laparoscopic left hemicolectomy and end transverse colostomy.      SURGEON:  Maddie Baron MD      ASSISTANT:  Adryan Martin MD, [facility] Colorectal Surgery fellow.       ANESTHESIA:  General endotracheal anesthesia plus a TAP block with 20 mL of Marcaine in 4 spots in the upper, lower right and left laterally in the pre-transverse plane.      INDICATIONS:  Anna is a 72-year-old woman who has had a diagnosis of metastatic descending colon cancer.  She was on chemotherapy including FOLFOX and Avastin and then subsequently just 5-FU and Avastin.  She began having significant obstructive symptoms with crampy abdominal pain and a CT scan for surveillance demonstrated a colon that was thickened and dilated on the right up to roughly 6 or 7 cm chronically. Given her symptoms and the progressive CT scan findings and the need to be on maintenance chemotherapy she was recommended to have a surgical resection of her primary or diversion depending on the operative findings. We discussed that this would not be a curative surgery in general, as she has had a nice response to her chemotherapy within the liver lesions, however, this would not be expected to be curative per se.      We discussed the possibility of a laparoscopic or open extended subtotal colectomy versus a left colectomy versus just a transverse colostomy or ileostomy.  She was briefed on the risks, benefits and alternatives to each and understood and wished to proceed.      DESCRIPTION OF PROCEDURE:   She was marked in the preop area both on the right and left for potential stoma.      FINDINGS:  Upon entering the abdomen, there was superficial scarring consistent with a prior abdominoplasty. Within the abdomen,  there were minimal adhesions. There was a roughly 4 or 5 mm lesion on the lateral aspect of the right dome of the liver.  No other evidence of peritoneal implants.  The mass was just on the descending side of the splenic flexure and was densely adherent, if not invading into the abdominal sidewall at this level. The transverse colon and ascending colon looked thickened and patulous with some prominent changes along the tinea, however, there was no clear dilatation today.      DESCRIPTION OF PROCEDURE:  After informed consent was obtained, the patient was taken to the operating room, positioned on the operating room table in supine position.  She was intubated and a Montesinos catheter was placed.  She was then positioned in modified lithotomy position with both arms padded and tucked at her side.  The abdomen was prepped and draped in the usual fashion and after an appropriate timeout, I began by making a 1 cm incision in the infraumbilical fold, carrying the dissection down through the fascia and entered the peritoneal cavity.  This was somewhat tedious as she had had a prior abdominoplasty and the planes were a bit distorted but we were able to enter into the peritoneal cavity without difficulty. An 0 Vicryl suture was placed at the level of the anterior fascia and the Herminia port was inserted.  Abdomen was insufflated to a pressure of 15 without difficulty.  Inspection around did not reveal a whitish plaque on the right lateral aspect of the liver, no evidence of ascites or peritoneal implants.  We could see that at the splenic flexure the colon was densely adherent to the sidewall.  We placed 2 additional 5 mm ports in the right abdomen in anticipation of potentially mobilizing the left colon for resection of the primary as this other than being tethered laterally did not appear to have any other unexpected findings.  We then placed a third port in the epigastric region.  We began by elevating the omentum off of the  colon. It was somewhat tedious, but we were able to get into this plane carrying our dissection over towards the splenic flexure from the transverse side.  We then went laterally and incised the line of Toldt coming up the descending colon and were able to elevate the mesentery up off of the retroperitoneum and make a window underneath where the tumor was adherent, leaving just a disk of the colon adherent to the sidewall.  We then used a combination of electrocautery and the LigaSure to remove a disk of the peritoneum and underlying fatty soft tissue with what appeared to be a grossly negative margin.  Once we had done that, we had excellent mobilization and could medialize the entire splenic flexure and then assessed whether we thought it would be suitable for anastomosis. Given her recent Avastin, as well as the sort of boggy, patulous, thick-walled transverse colon, we felt that this would not be suitable for a primary anastomosis.  For that reason, we planned for an end transverse colostomy.  We divided the mesentery of the left colon, entering into the lesser sac medially and dividing the left colic vessels.  We then divided the remaining mesentery up towards where we intended site of the division at the descending sigmoid junction.  This proceeded without difficulty.  We then switched to a 5-30 scope and placed the stapling device through our Herminia port and we were able to comfortably divide the left colon. We then turned our attention to our proximal extent of division and we elected to leave the middle colics in place and just come up through the mid portion of the transverse colon.  We divided the mesentery here.  Given that she was quite thin we planned to extract through our stoma site.  We then excised a disk of tissue at the previously marked stoma site which turned out to be where our third 5 mm port had been placed.  The dissection was carried down through the fascia, which was incised in a cruciate  fashion, the muscle was split and the peritoneal cavity was entered.  A small Morgan was placed through here and we were able to guide the specimen out through this, although it was snug, we were able to comfortably remove this safely.  During this time we were carefully watching internally and found this to progress without difficulty.  We were then able to bring up the entire specimen out to where we had planned to divide.       We then removed the remaining two 5 mm ports and the Herminia and reapproximated the fascia with an 0 Vicryl suture from our umbilical site.  We had good hemostasis on each of the other sites and the skin edges reapproximated with 4-0 Monocryl and covered with Dermabond.  We then divided the bowel and matured the stoma with 3-0 Vicryl in a brooking fashion. We did at some point before removing the ports carefully assess that we had proper orientation of the stoma with the mesentery coming in from the midline and slightly inferior which seemed to have a comfortable lie and no angulation of the colon.  We also removed the Morgan that we had placed.      After maturing the stoma the red rubber catheter was placed in there without difficulty and a stoma appliance was placed.  She was returned to the supine position, extubated and taken to recovery in stable condition.      Sponge and needle counts were correct at the end of the case.      No immediate complications.      ESTIMATED BLOOD LOSS:  27 mL.      SPECIMENS:  Left colon.         ALICIA BARON MD             D: 2018   T: 2018   MT: CHRIS      Name:     ANTONY HALEY   MRN:      [id number]        Account:        [id number]   :      1945           Procedure Date: 2018      Document: [id number]       cc: Lesa DOMINIQUE APRN, CNP       Tello Baron MD

## 2018-01-26 NOTE — CONSULTS
Consult received - Standard post-op orders for Low Residue diet education.  Chart reviewed, patient had left hemicolectomy w/ end colostomy.    Patient is starting Clear Liquids. Suspect patient will ADAT to Low Residue    Will plan to provide low fiber diet education once patient tolerating diet advancement.    Chula Guzman RD, LD  3rd floor/ICU: [phone]  All other floors: [phone]  Weekend/holiday: [phone]  Office: [phone]

## 2018-01-26 NOTE — PROGRESS NOTES
"[facility] Nurse Inpatient Ostomy Assessment      Assessment of ostomy and needs for:  Transverse End  Colostomy      Data:   History:      Per MD note(s): 72 year old female with a PMH significant for stage 4 colon cancer with metastases to liver, tobacco dependence, HTN, and RLS;   1/25:  s/p exploratory laparoscopy, laparoscopic left hemicolectomy and end transverse colostomy.     Type of ostomy:  Colostomy  Stoma assessment:   ? Size of stoma:  2 1/4\",  pink-red, round, moist, edematous and protruberant; RRC in Os, flatus and liquid effluent  Mucocutaneous Junction (MCJ):  intact with sutures  Peristomal skin:  intact  Abdominal assessment: flat, intact lap sites  ? N/G still in place?:  no  Output:  small, liquid and brown, 100cc,     I/O last 3 completed shifts:  In: 4658 [P.O.:770; I.V.:3888]  Out: 1677 [Urine:1500; Stool:150; Blood:27]  Current pouching system:  elian,  one piece, flat and placed in OR, pt reports that she is allergic to 'band aid tape\" which is the Elian edge.   Pain:  No complaints   ? Is pt still on a PCA? Yes and not using often    Diet:             Active Diet Order      Clear Liquid Diet  Labs:   Recent Labs   Lab Test  01/26/18   0539  03/07/17   1419   ALBUMIN   --   3.6   HGB  10.8*  13.0   INR   --   0.98           Intervention:     Patient's chart evaluated.      Assessments done today:  Stoma and pouch change    Education: begun today    Prepared for discharge by: Supplies ordered, Completed supply list, Registered for samples from , Prescriptions or note left on chart for MD to sign/complete, Discussed when to follow up with a [facility] Nurse in the future, Discussed how/ where to order supplies and answered all questions.     Pt registered for ostomy supply samples? On discharge         Assessment:     Stoma assessment: viable, round, moist, edematous and sweating, RRC intubated in Os, small amount of brown effluent and flatus thru " [facility].    Learning needs/ comprehension: pt very intuitive, has excellent attitude about stoma, was able to observe pouch change to day.     Effectiveness of current pouching/ supply plan: TBD         Plan:     Plan:   ? Current pouching system: Coloplast with ring    Education:  ? Education today:  Pouch Emptying and Pouch Replacement, How to cuff and clean pouch, How to empty pouch, Removal of pouch, Preparation of new pouch, Cutting out or evaluating a pattern, Applying paste or rings, Applying appliance to abdomen, Peristomal skin care, Diet and hydration / fluid balance, Odor / flatus management and Lifestyle Adjustments   o Supply company: TBD- pt knows of a DME in [place] near her home  o Do [facility] Nurse recommend home care ? Yes, pt will need support and education on ostomy care when discharged

## 2018-01-27 LAB — GLUCOSE BLDC GLUCOMTR-MCNC: 85 MG/DL (ref 70–99)

## 2018-01-27 PROCEDURE — 25000132 ZZH RX MED GY IP 250 OP 250 PS 637: Mod: GY | Performed by: PHYSICIAN ASSISTANT

## 2018-01-27 PROCEDURE — 25000128 H RX IP 250 OP 636: Performed by: INTERNAL MEDICINE

## 2018-01-27 PROCEDURE — 00000146 ZZHCL STATISTIC GLUCOSE BY METER IP

## 2018-01-27 PROCEDURE — 25000132 ZZH RX MED GY IP 250 OP 250 PS 637: Mod: GY | Performed by: COLON & RECTAL SURGERY

## 2018-01-27 PROCEDURE — 99232 SBSQ HOSP IP/OBS MODERATE 35: CPT | Performed by: INTERNAL MEDICINE

## 2018-01-27 PROCEDURE — A9270 NON-COVERED ITEM OR SERVICE: HCPCS | Mod: GY | Performed by: COLON & RECTAL SURGERY

## 2018-01-27 PROCEDURE — 99207 ZZC CDG-MDM COMPONENT: MEETS LOW - DOWN CODED: CPT | Performed by: INTERNAL MEDICINE

## 2018-01-27 PROCEDURE — A9270 NON-COVERED ITEM OR SERVICE: HCPCS | Mod: GY | Performed by: PHYSICIAN ASSISTANT

## 2018-01-27 PROCEDURE — 12000000 ZZH R&B MED SURG/OB

## 2018-01-27 PROCEDURE — 25000128 H RX IP 250 OP 636: Performed by: COLON & RECTAL SURGERY

## 2018-01-27 RX ADMIN — ONDANSETRON 4 MG: 2 INJECTION INTRAMUSCULAR; INTRAVENOUS at 16:04

## 2018-01-27 RX ADMIN — OXYCODONE HYDROCHLORIDE 5 MG: 5 TABLET ORAL at 14:04

## 2018-01-27 RX ADMIN — NICOTINE 1 PATCH: 21 PATCH, EXTENDED RELEASE TRANSDERMAL at 08:11

## 2018-01-27 RX ADMIN — ACETAMINOPHEN 1000 MG: 500 TABLET, FILM COATED ORAL at 08:07

## 2018-01-27 RX ADMIN — AMLODIPINE BESYLATE 5 MG: 5 TABLET ORAL at 19:59

## 2018-01-27 RX ADMIN — SODIUM CHLORIDE, PRESERVATIVE FREE 5 ML: 5 INJECTION INTRAVENOUS at 16:05

## 2018-01-27 RX ADMIN — OXYCODONE HYDROCHLORIDE 5 MG: 5 TABLET ORAL at 06:38

## 2018-01-27 RX ADMIN — ENOXAPARIN SODIUM 40 MG: 40 INJECTION SUBCUTANEOUS at 08:09

## 2018-01-27 RX ADMIN — ACETAMINOPHEN 1000 MG: 500 TABLET, FILM COATED ORAL at 14:04

## 2018-01-27 RX ADMIN — GABAPENTIN 400 MG: 400 CAPSULE ORAL at 19:59

## 2018-01-27 RX ADMIN — SODIUM CHLORIDE, PRESERVATIVE FREE 5 ML: 5 INJECTION INTRAVENOUS at 09:19

## 2018-01-27 RX ADMIN — ACETAMINOPHEN 1000 MG: 500 TABLET, FILM COATED ORAL at 02:31

## 2018-01-27 RX ADMIN — GABAPENTIN 800 MG: 400 CAPSULE ORAL at 08:07

## 2018-01-27 ASSESSMENT — ACTIVITIES OF DAILY LIVING (ADL)
ADLS_ACUITY_SCORE: 10

## 2018-01-27 ASSESSMENT — PAIN DESCRIPTION - DESCRIPTORS: DESCRIPTORS: ACHING

## 2018-01-27 NOTE — PLAN OF CARE
Problem: Patient Care Overview  Goal: Plan of Care/Patient Progress Review  Outcome: No Change  Patient up SBA, ambulated in halls. Alert and oriented. Denies nausea and pain. PCA discontinued. Patient having difficulty voiding this shift, bladder scanned for 447 at 1800, voided 75cc after bladder scan, scanned for 517 at 2039, was able to void 200cc and bladder scanned for 382 post void. Patient is refusing to be straight cathed. Will continue to monitor voiding. Tolerating full liquid diet. Stoma red and protruding.

## 2018-01-27 NOTE — PROGRESS NOTES
COLON & RECTAL SURGERY PROGRESS NOTE    Post-op Day 2 from lap left hemicolectomy, end colostomy     SUBJECTIVE:  No complaints.  Pain controlled. Ambulating.  Tolerating full liquid diet, no nausea.  Voiding well after cota dc.  Stoma working, red rubber catheter still in place, met with WOC yesterday.     OBJECTIVE:  Temp:  [98  F (36.7  C)-99.2  F (37.3  C)] 98.3  F (36.8  C)  Pulse:  [66] 66  Heart Rate:  [68-73] 71  Resp:  [12-18] 18  BP: (119-135)/(48-61) 127/52  SpO2:  [90 %-94 %] 90 %    Intake/Output Summary (Last 24 hours) at 01/27/18 1235  Last data filed at 01/27/18 0930   Gross per 24 hour   Intake             1062 ml   Output             1450 ml   Net             -388 ml     Physical Exam:  NAD  Unlabored breathing on RA  Abdomen soft, not distended, not tender to palpation.  Incisions c/d/i with skin glue.    RUQ stoma, decreasing edema, RR almost passed completely, liquid stool and gas in bag.    ASSESSMENT/PLAN: Progressing well  - advance to low fiber diet  - HLIV  - if RR passes all the way out into bag, OK to leave out  - continue lovenox VTE ppx  - review stoma cares with patient  - possible DC home tomorrow if doing well     Patient discussed with Dr. Potter.     --  Adryan Martin MD MPH  Fellow, Colon and Rectal Surgery  Pgr: [phone]

## 2018-01-27 NOTE — PLAN OF CARE
Problem: Patient Care Overview  Goal: Plan of Care/Patient Progress Review  POD 2 from exploratory lap, left hemicolectomy with colostomy    Ambulatory Status:  Pt up SBA.  VS: vss  Pain:  Denies pain this shift, took scheduled tylenol  Resp: LS expiratory wheezes  GI:  Denies nausea.  Full liquid diet.  BS active.  Passing flatus.  Last BM 1/27.  Ostomy putting out gas and liquid stool.  :  Having retention since cota removal yesterday.  Bladder scanned post void for 360, patient refusing straight cath at this time  Consults:  WOC, Colorectal, and nutrition  Disposition:  tbd

## 2018-01-27 NOTE — CONSULTS
"NUTRITION ASSESSMENT & EDUCATION NOTE    REASON FOR ASSESSMENT  Anna Dyer is a 72 year old female seen by Registered Dietitian for Provider Order - Nutrition Education - Low residue diet teaching      Nutrition History  Nutrition History:  - Unable to obtain nutrition history pt resting during visit, did not wish to provide history.     CURRENT DIET ORDER  Diet:  Low Fiber  Intake/Tolerance:  Pt has not yet tried a low fiber tray, tolerated low fiber for breakfast.     Anthropometrics  Height: 5' 1\"  Weight: 52.6 kg  BMI Calculated:  21.92 kg/m2  IBW:  47.7 kg  Weight Status:  Normal BMI  % IBW:  110%  Weight History: up to a 9# loss in the past 7 months (7.2%). Does not meet criteria for diagnosing malnutrition.  Wt Readings from Last 10 Encounters:   01/25/18 52.6 kg (116 lb)   01/19/18 53.7 kg (118 lb 4.8 oz)   06/22/17 56.7 kg (125 lb)   03/24/17 57.2 kg (126 lb)   03/07/17 57.7 kg (127 lb 3.2 oz)   03/06/17 56.7 kg (125 lb)   12/29/16 56.9 kg (125 lb 6.4 oz)   12/07/16 59.4 kg (131 lb)   02/15/16 59.1 kg (130 lb 4 oz)   06/09/15 59.4 kg (131 lb)      Dosing Weight: 52.6 kg    LABS  Reviewed    MEDICATIONS  Reviewed    ASSESSED NUTRITION NEEDS PER APPROVED PRACTICE GUIDELINES:  Estimated Energy Needs: 5513-4841 kcals/day (25-30 Kcal/Kg)  Justification:  maintenance      Estimated Protein Needs: 63-79 grams protein/day (1.2-1.5 g pro/Kg)  Justification:  maintenance         Estimated Fluid Needs: 2733-1362 mL/day (1 mL/Kcal)   Justification:  maintenance      MALNUTRITION:  Patient does not meet two of the following criteria necessary for diagnosing malnutrition: significant weight loss, reduced intake, subcutaneous fat loss, muscle loss or fluid retention    NUTRITION DIAGNOSIS    Food- and nutrition- related knowledge deficit R/t low fiber diet AEB no documented previous diet ed.       INTERVENTIONS    Nutrition Prescription:  Low fiber diet per MD     Implementation    Assessed learning needs, learning " preferences, and willingness to learn.    Nutrition Education (Content):  a) Provided handout  a. Low fiber nutrition therapy  b) Discussed  a. pt refused at this time, although expresses appreciation for handout    Nutrition Education (Application):  a) Refused     Anticipate fair-good compliance.     Diet Education - refer to Education Flowsheet    Goals    Patient verbalizes understanding of diet by expressing appreciation for handout.      Evaluation/Monitoring     Provided RD contact information for future questions    Recommend Out-Patient Nutrition Referral if further diet instructions are needed    Will re-evaluate in 7 days, on sooner, if nutrition status changes    Chula Guzman RD, LD  3rd floor/ICU: [phone]  All other floors: [phone]  Weekend/holiday: [phone]  Office: [phone]

## 2018-01-27 NOTE — PLAN OF CARE
Problem: Patient Care Overview  Goal: Plan of Care/Patient Progress Review  Outcome: Improving  Up in room and castillo-----  Voided 100  straight cathed for 400-- pt states she has no sensation  To void Dr aware states to continue to monitor ----- tolerated liquids denies nausea -----  Ostomy  Small flatus and serous  Fluid   Red yamil in place ----continue to encourage

## 2018-01-27 NOTE — PROGRESS NOTES
[facility]  Hospitalist Progress Note  Tello Cortez MD 01/27/18    Reason for Stay (Diagnosis):          Assessment and Plan:      Summary of Stay: This patient is a 72 year old female with a PMH significant for stage 4 colon cancer with metastases to liver, tobacco dependence, HTN, and RLS who is POD 2 s/p exploratory laparoscopy, laparoscopic left hemicolectomy and end transverse colostomy.  She is doing well post-operatively.      1. Obstructing splenic flexure cancer s/p exploratory laparoscopy, laparoscopic left hemicolectomy and end transverse colostomy: doing well, continue pain control, advancement of diet, and IV Ciprofloxacin and IV Flagyl as per primary team.      2. Stage 4 colon cancer with metastases to liver:  Follows with Dr. Guerrero of [facility]. Per patient chemotherapy is currently on hold until follow up appointment in early February where current plan will be discussed.      3. HTN: stable, resume PTA Amlodipine and Losartan-HCTZ with parameters     4. Tobacco dependence: current 1 ppd smoker, discussed cessation with patient and she has no desire to quit.  - Nicotine patch ordered      5. RLS: resume Gabapentin      6. COPD: spirometry in clinic showed borderline obstructive physiology which would be consistent with patient's smoking history. Would likely benefit from outpatient PFTs.  - resume PTA PRN albuterol inhaler for dyspnea     7.  Urinary retention: seems mild at this time in the 300-360 cc range. Monitor cr, so far renal function not compromised.  Likely somewhat worse due to narcotics.  Would monitor w/ bladder scans, if not worsening may just need outpatient recheck.  If worsening would need cath.     DVT Prophylaxis: on Lovenox and PCDs as per primary  D/C planning: To home        Interval History (Subjective):      No acute issues overnight  Mildly elevated PVRs, discussed plan including outpatient bladder scan/recheck cr vs placement of cota here  "if this worsens.  Pain controlled  Advancing diet                  Physical Exam:      Last Vital Signs:  /52  Pulse 66  Temp 98.3  F (36.8  C) (Oral)  Resp 18  Ht 1.549 m (5' 1\")  Wt 52.6 kg (116 lb)  SpO2 90%  BMI 21.92 kg/m2      Intake/Output Summary (Last 24 hours) at 01/27/18 1305  Last data filed at 01/27/18 0930   Gross per 24 hour   Intake             1062 ml   Output             1450 ml   Net             -388 ml       General: Alert, awake, no acute distress.  HEENT: NC/AT, eyes anicteric, external occular movements intact, face symmetric.  Dentition WNL, MM moist.  Cardiac: RRR, S1, S2.  No murmurs appreciated.  Pulmonary: Normal chest rise, normal work of breathing.  Lungs CTA BL  Abdomen: stoma noted, some dark liquid in bag.  soft, non-tender, non-distended.  Bowel Sounds Present.    Extremities: no deformities.  Warm, well perfused.  Skin: no rashes or lesions noted.  Warm and Dry.  Neuro: No focal deficits noted.  Speech clear.  Coordination and strength grossly normal.  Psych: Appropriate affect.         Medications:      All current medications were reviewed with changes reflected in problem list.         Data:      All new lab and imaging data was reviewed.   Labs:    Recent Labs  Lab 01/26/18  0539      POTASSIUM 3.4   CHLORIDE 102   CO2 27   ANIONGAP 6   *   BUN 7   CR 0.70   GFRESTIMATED 83   GFRESTBLACK >90   CAITY 8.4*       Recent Labs  Lab 01/26/18  0539 01/25/18  2035   HGB 10.8*  --    PLT  --  126*      Imaging:   No results found for this or any previous visit (from the past 48 hour(s)).      Tello Cortez MD.      "

## 2018-01-28 VITALS
WEIGHT: 116 LBS | TEMPERATURE: 97.7 F | SYSTOLIC BLOOD PRESSURE: 143 MMHG | HEART RATE: 66 BPM | RESPIRATION RATE: 16 BRPM | BODY MASS INDEX: 21.9 KG/M2 | HEIGHT: 61 IN | OXYGEN SATURATION: 91 % | DIASTOLIC BLOOD PRESSURE: 74 MMHG

## 2018-01-28 PROBLEM — C18.5 MALIGNANT NEOPLASM OF SPLENIC FLEXURE (H): Status: RESOLVED | Noted: 2017-03-07 | Resolved: 2018-01-28

## 2018-01-28 LAB — PLATELET # BLD AUTO: 132 10E9/L (ref 150–450)

## 2018-01-28 PROCEDURE — A9270 NON-COVERED ITEM OR SERVICE: HCPCS | Mod: GY | Performed by: COLON & RECTAL SURGERY

## 2018-01-28 PROCEDURE — 85049 AUTOMATED PLATELET COUNT: CPT | Performed by: COLON & RECTAL SURGERY

## 2018-01-28 PROCEDURE — 25000128 H RX IP 250 OP 636: Performed by: INTERNAL MEDICINE

## 2018-01-28 PROCEDURE — 25000132 ZZH RX MED GY IP 250 OP 250 PS 637: Mod: GY | Performed by: PHYSICIAN ASSISTANT

## 2018-01-28 PROCEDURE — A9270 NON-COVERED ITEM OR SERVICE: HCPCS | Mod: GY | Performed by: PHYSICIAN ASSISTANT

## 2018-01-28 PROCEDURE — 25000128 H RX IP 250 OP 636: Performed by: COLON & RECTAL SURGERY

## 2018-01-28 PROCEDURE — 99232 SBSQ HOSP IP/OBS MODERATE 35: CPT | Performed by: INTERNAL MEDICINE

## 2018-01-28 PROCEDURE — 25000132 ZZH RX MED GY IP 250 OP 250 PS 637: Mod: GY | Performed by: COLON & RECTAL SURGERY

## 2018-01-28 RX ORDER — ONDANSETRON 4 MG/1
4 TABLET, FILM COATED ORAL EVERY 8 HOURS PRN
Qty: 18 TABLET | Refills: 0 | Status: SHIPPED | OUTPATIENT
Start: 2018-01-28 | End: 2018-04-06

## 2018-01-28 RX ORDER — OXYCODONE HYDROCHLORIDE 5 MG/1
5-10 TABLET ORAL EVERY 4 HOURS PRN
Qty: 18 TABLET | Refills: 0 | Status: SHIPPED | OUTPATIENT
Start: 2018-01-28 | End: 2018-04-06

## 2018-01-28 RX ADMIN — GABAPENTIN 800 MG: 400 CAPSULE ORAL at 08:44

## 2018-01-28 RX ADMIN — LOSARTAN POTASSIUM AND HYDROCHLOROTHIAZIDE 2 TABLET: 50; 12.5 TABLET, FILM COATED ORAL at 08:44

## 2018-01-28 RX ADMIN — SODIUM CHLORIDE, PRESERVATIVE FREE 5 ML: 5 INJECTION INTRAVENOUS at 05:36

## 2018-01-28 RX ADMIN — ALBUTEROL SULFATE 2 PUFF: 90 INHALANT RESPIRATORY (INHALATION) at 08:43

## 2018-01-28 RX ADMIN — NICOTINE 1 PATCH: 21 PATCH, EXTENDED RELEASE TRANSDERMAL at 08:44

## 2018-01-28 RX ADMIN — SODIUM CHLORIDE, PRESERVATIVE FREE 5 ML: 5 INJECTION INTRAVENOUS at 13:34

## 2018-01-28 RX ADMIN — ACETAMINOPHEN 1000 MG: 500 TABLET, FILM COATED ORAL at 02:33

## 2018-01-28 RX ADMIN — ENOXAPARIN SODIUM 40 MG: 40 INJECTION SUBCUTANEOUS at 08:49

## 2018-01-28 RX ADMIN — ACETAMINOPHEN 1000 MG: 500 TABLET, FILM COATED ORAL at 08:44

## 2018-01-28 ASSESSMENT — ACTIVITIES OF DAILY LIVING (ADL)
ADLS_ACUITY_SCORE: 10

## 2018-01-28 NOTE — PROGRESS NOTES
[facility]  Hospitalist Consult Progress Note  Tello Cortez MD 01/28/2018      Reason for Stay (Diagnosis):          Assessment and Plan:      Summary of Stay: This patient is a 72 year old female with a PMH significant for stage 4 colon cancer with metastases to liver, tobacco dependence, HTN, and RLS who is POD 2 s/p exploratory laparoscopy, laparoscopic left hemicolectomy and end transverse colostomy.  She is doing well post-operatively. She has had some borderline urinary retention without renal dysfunction and after discussing options including home cota vs short term follow up for a bladder scan/voiding trial she elects for that route.     1. Obstructing splenic flexure cancer s/p exploratory laparoscopy, laparoscopic left hemicolectomy and end transverse colostomy: doing well, continue pain control, advancement of diet, and abx as per primary team.      2. Stage 4 colon cancer with metastases to liver:  Follows with Dr. Guerrero of [facility]. Per patient chemotherapy is currently on hold until follow up appointment in early February where current plan will be discussed.      3. HTN: stable, resume PTA Amlodipine and Losartan-HCTZ with parameters     4. Tobacco dependence: current 1 ppd smoker, discussed cessation with patient and she has no desire to quit.  - Nicotine patch ordered      5. RLS: resume Gabapentin      6. COPD: spirometry in clinic showed borderline obstructive physiology which would be consistent with patient's smoking history. Would likely benefit from outpatient PFTs.  - resume PTA PRN albuterol inhaler for dyspnea     7.  Urinary retention: seems mild at this time in the 300-400 cc range. Monitor cr, so far renal function not compromised.  Likely somewhat worse due to narcotics.  Would monitor w/ bladder scans, she elected for an outpatient recheck as opposed to cota placement which seems reasonable in this case and given the infection risk associated with a  "cota.       DVT Prophylaxis: on Lovenox and PCDs as per primary  D/C planning: To home        Interval History (Subjective):      Still has mildly elevated PVRs but voiding ok  Discussed options as above - she doesn't want cota, elects for short term follow up.  Overall doing quite well, she plans to dc home today                  Physical Exam:      Last Vital Signs:  /74 (BP Location: Left arm)  Pulse 66  Temp 97.7  F (36.5  C) (Oral)  Resp 16  Ht 1.549 m (5' 1\")  Wt 52.6 kg (116 lb)  SpO2 91%  BMI 21.92 kg/m2      Intake/Output Summary (Last 24 hours) at 01/27/18 1305  Last data filed at 01/27/18 0930   Gross per 24 hour   Intake             1062 ml   Output             1450 ml   Net             -388 ml       General: Alert, awake, no acute distress.  HEENT: NC/AT, eyes anicteric, external occular movements intact, face symmetric.  Dentition WNL, MM moist.  Cardiac: RRR, S1, S2.  No murmurs appreciated.  Pulmonary: Normal chest rise, normal work of breathing.  Lungs CTA BL  Abdomen:  soft, non-tender, non-distended.    Skin: no rashes or lesions noted.  Warm and Dry.  Neuro: No focal deficits noted.  Speech clear.  Coordination and strength grossly normal.  Psych: Appropriate affect.         Medications:      All current medications were reviewed with changes reflected in problem list.         Data:      All new lab and imaging data was reviewed.   Labs:    Recent Labs  Lab 01/26/18  0539      POTASSIUM 3.4   CHLORIDE 102   CO2 27   ANIONGAP 6   *   BUN 7   CR 0.70   GFRESTIMATED 83   GFRESTBLACK >90   CAITY 8.4*       Recent Labs  Lab 01/28/18  0540 01/26/18  0539   HGB  --  10.8*   *  --       Imaging:   No results found for this or any previous visit (from the past 48 hour(s)).      Tello Cortez MD.      "

## 2018-01-28 NOTE — PROGRESS NOTES
COLON & RECTAL SURGERY PROGRESS NOTE    Post-op Day 3 from left hemicolectomy, end colostomy     SUBJECTIVE:  Tolerating low fiber diet, nauseated after lunch, but thinks related to dairy, had dinner without difficulty last night  Ambulating well, pain controlled, has used minimal oxycodone  Montesinos dc'd yesterday, I&o x2 for high PVR, but voiding better with low PVR this AM  Stoma working, RR catheter passed.  Saw stoma RN on Friday.     OBJECTIVE:  Temp:  [98.2  F (36.8  C)-98.3  F (36.8  C)] 98.3  F (36.8  C)  Heart Rate:  [82] 82  Resp:  [16] 16  BP: (140-151)/(56-67) 151/67  SpO2:  [91 %-95 %] 91 %    Intake/Output Summary (Last 24 hours) at 01/28/18 0845  Last data filed at 01/28/18 0600   Gross per 24 hour   Intake              760 ml   Output             1300 ml   Net             -540 ml     Physical Exam:  NAD  Unlabored breathing on RA  Abdomen soft, not distended, not tender to palpation.  Incisions c/d/i with skin glue.    RUQ stoma, RR passed completely into bag, liquid stool and gas in bag.    ASSESSMENT/PLAN: Doing well. Medically ready for Discharge  -- pathology reviewed: T4N1c (tumor invades thru colon into peritoneal tissues, negative margins, 1/18 nodes with completely necrotic tumor)  -- will need review with lovenox injections, plan to DC with total 28day course  -- will see if WOC can review bag changes with her today. She states she is independent with this, but has not done since Friday. Will have to be comfortable with this prior to leaving  -- continue low fiber diet  -- discharge orders placed if all of above can happen today.     Patient discussed with Dr. Potter.     --  Adryan Martin MD MPH  Fellow, Colon and Rectal Surgery  Pgr: [phone]

## 2018-01-28 NOTE — PLAN OF CARE
Problem: Patient Care Overview  Goal: Plan of Care/Patient Progress Review  Outcome: No Change  POD 2, patient alert and oriented. Up SBA. Nausea after eating soup, patient said it was from the dairy. IV zofran given, patient reported relief. Ate eggs for dinner meal, no nausea reported. Denies pain. Voided 100cc, bladder scanned for 440cc, straight cathed for 500cc at 2020. Ostomy red and protruding. Will continue to monitor.

## 2018-01-28 NOTE — PROGRESS NOTES
Patient discharged home via private car, ambulated with family and nursing staff.  Patient verbalized and received copy of discharge instructions.  Patient received medications filled by discharge Pharmacy.  Personal belongings gathered and sent with patient.  SLICK العراقي deaccessed prior to discharge.

## 2018-01-28 NOTE — PLAN OF CARE
Problem: Patient Care Overview  Goal: Plan of Care/Patient Progress Review  POD #3 from exploratory lap, left hemicolectomy with colostomy   A&O  Ambulatory Status:  Pt up SBA.  VS: vss  Pain:  Denies pain this shift, took scheduled tylenol  Resp: LS expiratory wheezes  GI:  Denies nausea.  Low fiber diet.  BS active.  Passing flatus.  Last BM 1/28.  Ostomy putting out gas and liquid stool.  :  , no straight cath necessary this shift.   Consults:  WOC, Colorectal, and nutrition  Disposition:  Today or tomorrow?

## 2018-01-28 NOTE — DISCHARGE INSTRUCTIONS
Documentation of Face to Face and Certification for Home Health Services    I certify that patient: Anna Dyer is under my care and that I, or a nurse practitioner or physician's assistant working with me, had a face-to-face encounter that meets the physician face-to-face encounter requirements with this patient on: 1/28/2018.    This encounter with the patient was in whole, or in part, for the following medical condition, which is the primary reason for home health care: new colostomy.    I certify that, based on my findings, the following services are medically necessary home health services: Nursing.    My clinical findings support the need for the above services because: Nurse is needed: To provide assessment and oversight required in the home to assure adherence to the medical plan due to: new colostomy care. and To provide caregiver training to assist with: new colostomy care..    Based on the above findings. I certify that this patient is confined to the home and needs intermittent skilled nursing care, physical therapy and/or speech therapy.  The patient is under my care, and I have initiated the establishment of the plan of care.  This patient will be followed by a physician who will periodically review the plan of care.  Physician/Provider to provide follow up care: Tello Finney    Attending hospital physician (the Medicare certified P[facility] provider): Maddie Baron MD  Physician Signature: See electronic signature associated with these discharge orders.  Date: 1/28/2018

## 2018-01-28 NOTE — PROGRESS NOTES
ARIELLA spoke with RN, WOC RN Home Care should be ordered at [facility]. Pt would like to use [facility] as long as it is covered by her insurance.  Paged MD to include HC WOC RN in orders.  Referral sent to [facility].

## 2018-01-29 ENCOUNTER — TELEPHONE (OUTPATIENT)
Dept: INTERNAL MEDICINE | Facility: CLINIC | Age: 73
End: 2018-01-29

## 2018-01-29 NOTE — TELEPHONE ENCOUNTER
We do not have a bladder scan to my knowledge unless gyn dept has it- maybe they do given Dr. Gabriel does a lot of urogyn.  If they do maybe they do nurse only checks? I have no idea on that.   otherwise she needs to monitor urinary outpt and if unable to go - may need to go to ER acutely.

## 2018-01-29 NOTE — TELEPHONE ENCOUNTER
Patient states that she was told to come to [facility] for bladder scan and to set up nurse only appointment.  She has been having some difficulty urinating.  Patient did void this morning, however states that it was a small amount.  PCP please advise.  Is this something we can do here for patient?  She is looking to have this done today or Wednesday if possible.

## 2018-01-29 NOTE — TELEPHONE ENCOUNTER
Informed pt of msg below    Pt refuses appt with  and suggested appt with Dr.Noonan mccormick/ ELOY

## 2018-01-29 NOTE — DISCHARGE SUMMARY
[facility] Discharge Summary      Anna Dyer MRN# [id number]   Age: 72 year old YOB: 1945     Date of Admission:  1/25/2018  Date of Discharge::  1/28/2018  1:50 PM  Admitting Physician:  Maddie Baron MD  Discharge Physician:  Maddie Baron MD     PCP:  Tello Finney    Disposition: Patient discharged from [facility] to home in stable condition.        Primary Diagnosis:   Obstructing splenic flexure adenocarcinoma           Discharge Medications:   Discharge Medication List as of 1/28/2018  1:26 PM      START taking these medications    Details   oxyCODONE IR (ROXICODONE) 5 MG tablet Take 1-2 tablets (5-10 mg) by mouth every 4 hours as needed for moderate to severe pain, Disp-18 tablet, R-0, Local Print      enoxaparin (LOVENOX) 40 MG/0.4ML injection Inject 0.4 mLs (40 mg) Subcutaneous every 24 hours, Disp-25 Syringe, R-0, E-Prescribe      ondansetron (ZOFRAN) 4 MG tablet Take 1 tablet (4 mg) by mouth every 8 hours as needed for nausea, Disp-18 tablet, R-0, E-Prescribe         CONTINUE these medications which have NOT CHANGED    Details   nicotine (NICODERM CQ) 21 MG/24HR 24 hr patch Place 1 patch onto the skin every 24 hours, Historical      albuterol (PROAIR HFA/PROVENTIL HFA/VENTOLIN HFA) 108 (90 BASE) MCG/ACT Inhaler Inhale 2 puffs into the lungs 2 times daily And every 4 hours as needed, Disp-1 Inhaler, R-1, E-Prescribe      !! GABAPENTIN PO Take 800 mg by mouth every morning , Historical      !! GABAPENTIN PO Take 400 mg by mouth every evening , Historical      AMLODIPINE BESYLATE PO Take 5 mg by mouth every evening , Historical      losartan-hydrochlorothiazide (HYZAAR) 100-25 MG per tablet TAKE 1 TABLET DAILY, Disp-30 tablet, R-0, E-Prescribe       !! - Potential duplicate medications found. Please discuss with provider.                 Follow Up, Special Instructions:   Discharge diet: Low residue   Discharge activity: No straining, lifting  greater than 15-20lbs, or strenuous exercise for 6 weeks.    Discharge follow-up: Follow up with Dr. Baron in 3-4 weeks   Wound care: Keep wound clean and dry              Procedures:   Procedure(s): Exploratory laparoscopy, laparoscopic left hemicolectomy and end transverse colostomy       No other procedures performed during this admission            Consultations:   Nutrition, hospitalist          Brief Hospital Summary:   Patient is a 72 year old woman whom underwent exploratory laparoscopy, laparoscopic left hemicolectomy and end transverse colostomy on 1/25/18 by Dr. Baron.   There were no immediate complications during this procedure.    Please refer to the full operative summary for details.  The patient's hospital course was unremarkable. Diet was advanced with return of bowel function. Pain medications were transitioned from IV to oral eventually. At the time of discharge, she was voiding freely, tolerating diet, and pain was well controlled with oral pain medications.          Attestation:  I have reviewed today's vital signs, notes, medications, labs and imaging.    Yeimy Benavides PA-C  [facility]          ADDENDUM:  Length of stay: 3 days  Indicate Y or N for the following:  UTI  No  C diff  No  PNA  No  SSI No  DVT No  PE  No  CVA No  MI No  Enterocutaneous fistula  No  Peripheral nerve injury  No  Abscess (not adjacent to anastomosis)  No  Leak No    Treated with:   Antibiotics N/A   Drain  N/A   Reoperation  N/A  Death within 30 days No  Reintubation  No  Reoperation  No   Procedure     FOR CANCER CASES: n/a

## 2018-01-29 NOTE — TELEPHONE ENCOUNTER
"Hospital/TCU/ED for chronic condition Discharge Protocol    \"Hi, my name is Tomapantera Hall, a registered nurse, and I am calling from [facility].  I am calling to follow up and see how things are going for you after your recent emergency visit/hospital/TCU stay.\"    Tell me how you are doing now that you are home?\" I am doing well thanks.  I haven't even had to take any pain medications yet today.      Discharge Instructions    \"Let's review your discharge instructions.  What is/are the follow-up recommendations?  Pt. Response: I am going to follow up with Dr. Baron in about a month.  I am also supposed to have my bladder scanned at your office.    \"Has an appointment with your primary care provider been scheduled?\"   No (schedule appointment)--Patient does not feel need to see PCP at this time.    \"When you see the provider, I would recommend that you bring your medications with you.\"    Medications    \"Tell me what changed about your medicines when you discharged?\"    Changes to chronic meds?    0-1    \"What questions do you have about your medications?\"    None     New diagnoses of heart failure, COPD, diabetes, or MI?    No              Medication reconciliation completed? Yes  Was MTM referral placed (*Make sure to put transitions as reason for referral)?   No    Call Summary    \"What questions or concerns do you have about your recent visit and your follow-up care?\"     Can I get my bladder scanned there at your office?. Advice given: I will check with Dr. Finney regarding this and get back to you.    \"If you have questions or things don't continue to improve, we encourage you contact us through the main clinic number (give number).  Even if the clinic is not open, triage nurses are available 24/7 to help you.     We would like you to know that our clinic has extended hours (provide information).  We also have urgent care (provide details on closest location and hours/contact info)\"      \"Thank you for your " "time and take care!\"             "

## 2018-01-30 ENCOUNTER — MEDICAL CORRESPONDENCE (OUTPATIENT)
Dept: HEALTH INFORMATION MANAGEMENT | Facility: CLINIC | Age: 73
End: 2018-01-30

## 2018-02-01 ENCOUNTER — TELEPHONE (OUTPATIENT)
Dept: INTERNAL MEDICINE | Facility: CLINIC | Age: 73
End: 2018-02-01

## 2018-02-01 NOTE — TELEPHONE ENCOUNTER
The Rx sent is not specific to a brand of albuterol- it is for any brand. See rx. Call pharmacy. They should look at the rx again .

## 2018-02-01 NOTE — TELEPHONE ENCOUNTER
Prior Authorization Request    1. Prior Authorization for the medication proair        Requesting Provider: Tello Finney          Pt name: Anna CHÁVEZ Manisha        Pt : 1945        Pt MRN: [id number]        Last Office Visit: 2018           Insurance: Payor: [employer] / Plan: BCBS PLATINUM BLUE / Product Type: PPO /         Insurance ID Number: [unfilled]        Prior Auth Contact Phone number:            To be completed by provider:     2.   Refuse or Start Prior Auth:        If requesting prior auth initiation:     Reasons why other medications are not adequate substitutions:    ,      ,

## 2018-02-02 DIAGNOSIS — R33.9 URINARY RETENTION: Primary | ICD-10-CM

## 2018-02-05 ENCOUNTER — OFFICE VISIT (OUTPATIENT)
Dept: UROLOGY | Facility: CLINIC | Age: 73
End: 2018-02-05
Payer: COMMERCIAL

## 2018-02-05 VITALS
HEIGHT: 61 IN | WEIGHT: 115 LBS | DIASTOLIC BLOOD PRESSURE: 80 MMHG | SYSTOLIC BLOOD PRESSURE: 130 MMHG | BODY MASS INDEX: 21.71 KG/M2

## 2018-02-05 DIAGNOSIS — R33.9 URINARY RETENTION: ICD-10-CM

## 2018-02-05 LAB
ALBUMIN UR-MCNC: NEGATIVE MG/DL
APPEARANCE UR: CLEAR
BILIRUB UR QL STRIP: NEGATIVE
COLOR UR AUTO: YELLOW
GLUCOSE UR STRIP-MCNC: NEGATIVE MG/DL
HGB UR QL STRIP: ABNORMAL
KETONES UR STRIP-MCNC: NEGATIVE MG/DL
LEUKOCYTE ESTERASE UR QL STRIP: ABNORMAL
NITRATE UR QL: NEGATIVE
PH UR STRIP: 6.5 PH (ref 5–7)
RBC #/AREA URNS AUTO: 0 /HPF (ref 0–2)
RESIDUAL VOLUME (RV) (EXTERNAL): 87
SOURCE: ABNORMAL
SP GR UR STRIP: 1.01 (ref 1–1.03)
UROBILINOGEN UR STRIP-ACNC: 0.2 EU/DL (ref 0.2–1)
WBC #/AREA URNS AUTO: <1 /HPF (ref 0–2)

## 2018-02-05 PROCEDURE — 99202 OFFICE O/P NEW SF 15 MIN: CPT | Mod: 25 | Performed by: PHYSICIAN ASSISTANT

## 2018-02-05 PROCEDURE — 81001 URINALYSIS AUTO W/SCOPE: CPT | Performed by: PHYSICIAN ASSISTANT

## 2018-02-05 PROCEDURE — 51798 US URINE CAPACITY MEASURE: CPT | Performed by: PHYSICIAN ASSISTANT

## 2018-02-05 ASSESSMENT — PAIN SCALES - GENERAL: PAINLEVEL: NO PAIN (0)

## 2018-02-05 NOTE — LETTER
2/5/2018       RE: Anna Dyer  [id number] [facility] [address]     Dear Colleague,    Thank you for referring your patient, Anna Dyer, to the [facility] [place] at [facility]. Please see a copy of my visit note below.    February 5, 2018      CC: Check bladder scan    HPI:  Anna Dyer is a 72 year old female who presents via referral from Dr. Baron for evaluation of post op urinary issues. She underwent an exploratory laparoscopy, laparoscopic left hemicolectomy and end transverse colostomy on 1/25/18 for an obstructing colon cancer.     She required SIC x 2 post op. She is here for follow up and to check if she is emptying completely. She feels well today and feels that she is emptying her bladder. No need to strain. Nocturia x 0. No longer on narcotics. Long standing stress incontinence with coughing, sneezing and laughing.     Past Medical History:   Diagnosis Date     Cancer (H)     metastatic colorectal adenocarcinoma     Hypertension     No cardiologist     RLS (restless legs syndrome)        Past Surgical History:   Procedure Laterality Date     BIOPSY      liver     BREAST SURGERY      breast implants     COSMETIC SURGERY      facelift and tummy tuck     GYN SURGERY      tubal     INSERT PORT VASCULAR ACCESS N/A 3/24/2017    Procedure: INSERT PORT VASCULAR ACCESS;  Surgeon: Yemi Ordaz MD;  Location: [facility] OR     LAPAROSCOPIC ASSISTED COLECTOMY LEFT (DESCENDING) N/A 1/25/2018    Procedure: LAPAROSCOPIC ASSISTED COLECTOMY LEFT (DESCENDING);;  Surgeon: Maddie Baron MD;  Location: [facility] OR     LAPAROTOMY EXPLORATORY N/A 1/25/2018    Procedure: LAPAROTOMY EXPLORATORY;  exploratory laparoscopy, left hemicolectomy, transverse colostomy;  Surgeon: Maddie Baron MD;  Location: [facility] OR       Social History     Social History     Marital status:      Spouse name: N/A     Number of children: N/A      Years of education: N/A     Occupational History           family       Social History Main Topics     Smoking status: Current Every Day Smoker     Packs/day: 1.00     Years: 50.00     Types: Cigarettes     Smokeless tobacco: Never Used     Alcohol use No     Drug use: No     Sexual activity: Not Currently     Other Topics Concern     Parent/Sibling W/ Cabg, Mi Or Angioplasty Before 65f 55m? No     Social History Narrative       Family History   Problem Relation Age of Onset     Family History Negative Father      Family History Negative Mother      Psoriasis Brother      half-siblings     Family History Negative Sister      Hypothyroidism Son        ROS:14 point ROS neg other than the symptoms noted above in the HPI.    Allergies   Allergen Reactions     Lactose GI Disturbance     Sulfa Drugs Hives       Current Outpatient Prescriptions   Medication     enoxaparin (LOVENOX) 40 MG/0.4ML injection     albuterol (PROAIR HFA/PROVENTIL HFA/VENTOLIN HFA) 108 (90 BASE) MCG/ACT Inhaler     GABAPENTIN PO     GABAPENTIN PO     AMLODIPINE BESYLATE PO     losartan-hydrochlorothiazide (HYZAAR) 100-25 MG per tablet     oxyCODONE IR (ROXICODONE) 5 MG tablet     ondansetron (ZOFRAN) 4 MG tablet     No current facility-administered medications for this visit.          PEx:   There were no vitals taken for this visit.  Data Unavailable, There is no height or weight on file to calculate BMI., 0 lbs 0 oz  Gen appearance:  Well groomed,: age-appropriate appearing female in NAD.   HEENT:  EOMI, AT NC, CN grossly normal  Psych:  alert , comfortable in no acute distress  Neuro:  A/O X 3  Skin:  Warm to touch, clear of rashes, ecchymoses  Resp:  No increased respiratory effort  lymph:  No LE edema  Abd:  Soft/NT, ND, no palpable masses, no CVAT  Back: bony spine is non-tender, flanks are nontender    Urine: small blood  PVR: 87cc    A/P: Anna Dyer is a 72 year old female with post op urinary dysfunction, now  resolved, stress incontinence.   PVR reviewed with her.   Discussed pelvic floor PT with pt. She would like to think about this and will call me if she wishes to have a consult.   Carol Carrington PA-C  [facility]    10 minutes were spent with the patient today, > 50% in counseling and coordination of care        Again, thank you for allowing me to participate in the care of your patient.      Sincerely,    Carol Carrington PA-C, [facility]

## 2018-02-05 NOTE — MR AVS SNAPSHOT
"              After Visit Summary   2/5/2018    Anna Dyer    MRN: [id number]           Patient Information     Date Of Birth          1945        Visit Information        Provider Department      2/5/2018 11:00 AM Carol Carrington PA-C [facility] [place]        Today's Diagnoses     Urinary retention           Follow-ups after your visit        Who to contact     If you have questions or need follow up information about today's clinic visit or your schedule please contact [facility] [place] directly at [phone].  Normal or non-critical lab and imaging results will be communicated to you by Mapboxhart, letter or phone within 4 business days after the clinic has received the results. If you do not hear from us within 7 days, please contact the clinic through Blushrt or phone. If you have a critical or abnormal lab result, we will notify you by phone as soon as possible.  Submit refill requests through Hstry or call your pharmacy and they will forward the refill request to us. Please allow 3 business days for your refill to be completed.          Additional Information About Your Visit        MyChart Information     Hstry gives you secure access to your electronic health record. If you see a primary care provider, you can also send messages to your care team and make appointments. If you have questions, please call your primary care clinic.  If you do not have a primary care provider, please call [phone] and they will assist you.        Care EveryWhere ID     This is your Care EveryWhere ID. This could be used by other organizations to access your [place] medical records  [id number]        Your Vitals Were     Height BMI (Body Mass Index)                1.549 m (5' 1\") 21.73 kg/m2           Blood Pressure from Last 3 Encounters:   02/05/18 130/80   01/28/18 143/74   01/19/18 118/70    Weight from Last 3 Encounters:   02/05/18 " 52.2 kg (115 lb)   01/25/18 52.6 kg (116 lb)   01/19/18 53.7 kg (118 lb 4.8 oz)              We Performed the Following     MEASURE POST-VOID RESIDUAL URINE/BLADDER CAPACITY, US NON-IMAGING     UA without Microscopic     Urine Micro Urologic Phys        Primary Care Provider Office Phone # Fax #    Tello Finney [phone]       [address] [facility] [address]        Equal Access to Services     ALPHONSO AGRAWAL : Hadii aad ku hadasho Soomaali, waaxda luqadaha, qaybta kaalmada adeegyada, waxay idiin hayaan adeeg claytonarasylvain eden . So [facility] [phone].    ATENCIÓN: Si habla español, tiene a curtis disposición servicios gratuitos de asistencia lingüística. Ll[facility] [phone].    We comply with applicable federal civil rights laws and [place] laws. We do not discriminate on the basis of race, color, national origin, age, disability, sex, sexual orientation, or gender identity.            Thank you!     Thank you for choosing [facility] [place]  for your care. Our goal is always to provide you with excellent care. Hearing back from our patients is one way we can continue to improve our services. Please take a few minutes to complete the written survey that you may receive in the mail after your visit with us. Thank you!             Your Updated Medication List - Protect others around you: Learn how to safely use, store and throw away your medicines at www.disposemymeds.org.          This list is accurate as of 2/5/18 11:15 AM.  Always use your most recent med list.                   Brand Name Dispense Instructions for use Diagnosis    albuterol 108 (90 BASE) MCG/ACT Inhaler    PROAIR HFA/PROVENTIL HFA/VENTOLIN HFA    1 Inhaler    Inhale 2 puffs into the lungs 2 times daily And every 4 hours as needed    Wheezing       AMLODIPINE BESYLATE PO      Take 5 mg by mouth every evening        enoxaparin 40 MG/0.4ML injection    LOVENOX    25 Syringe    Inject 0.4  mLs (40 mg) Subcutaneous every 24 hours    Colon cancer metastasized to liver (H)       * GABAPENTIN PO      Take 800 mg by mouth every morning        * GABAPENTIN PO      Take 400 mg by mouth every evening        losartan-hydrochlorothiazide 100-25 MG per tablet    HYZAAR    30 tablet    TAKE 1 TABLET DAILY    Essential hypertension with goal blood pressure less than 140/90       ondansetron 4 MG tablet    ZOFRAN    18 tablet    Take 1 tablet (4 mg) by mouth every 8 hours as needed for nausea    Colon cancer metastasized to liver (H)       oxyCODONE IR 5 MG tablet    ROXICODONE    18 tablet    Take 1-2 tablets (5-10 mg) by mouth every 4 hours as needed for moderate to severe pain    Colon cancer metastasized to liver (H)       * Notice:  This list has 2 medication(s) that are the same as other medications prescribed for you. Read the directions carefully, and ask your doctor or other care provider to review them with you.

## 2018-02-05 NOTE — PROGRESS NOTES
February 5, 2018      CC: Check bladder scan    HPI:  Anna Dyer is a 72 year old female who presents via referral from Dr. Baron for evaluation of post op urinary issues. She underwent an exploratory laparoscopy, laparoscopic left hemicolectomy and end transverse colostomy on 1/25/18 for an obstructing colon cancer.     She required SIC x 2 post op. She is here for follow up and to check if she is emptying completely. She feels well today and feels that she is emptying her bladder. No need to strain. Nocturia x 0. No longer on narcotics. Long standing stress incontinence with coughing, sneezing and laughing.     Past Medical History:   Diagnosis Date     Cancer (H)     metastatic colorectal adenocarcinoma     Hypertension     No cardiologist     RLS (restless legs syndrome)        Past Surgical History:   Procedure Laterality Date     BIOPSY      liver     BREAST SURGERY      breast implants     COSMETIC SURGERY      facelift and tummy tuck     GYN SURGERY      tubal     INSERT PORT VASCULAR ACCESS N/A 3/24/2017    Procedure: INSERT PORT VASCULAR ACCESS;  Surgeon: Yemi Ordaz MD;  Location: [facility] OR     LAPAROSCOPIC ASSISTED COLECTOMY LEFT (DESCENDING) N/A 1/25/2018    Procedure: LAPAROSCOPIC ASSISTED COLECTOMY LEFT (DESCENDING);;  Surgeon: Maddie Baron MD;  Location: [facility] OR     LAPAROTOMY EXPLORATORY N/A 1/25/2018    Procedure: LAPAROTOMY EXPLORATORY;  exploratory laparoscopy, left hemicolectomy, transverse colostomy;  Surgeon: Maddie Baron MD;  Location: [facility] OR       Social History     Social History     Marital status:      Spouse name: N/A     Number of children: N/A     Years of education: N/A     Occupational History           family       Social History Main Topics     Smoking status: Current Every Day Smoker     Packs/day: 1.00     Years: 50.00     Types: Cigarettes     Smokeless tobacco: Never Used     Alcohol use No     Drug use: No     Sexual activity:  Not Currently     Other Topics Concern     Parent/Sibling W/ Cabg, Mi Or Angioplasty Before 65f 55m? No     Social History Narrative       Family History   Problem Relation Age of Onset     Family History Negative Father      Family History Negative Mother      Psoriasis Brother      half-siblings     Family History Negative Sister      Hypothyroidism Son        ROS:14 point ROS neg other than the symptoms noted above in the HPI.    Allergies   Allergen Reactions     Lactose GI Disturbance     Sulfa Drugs Hives       Current Outpatient Prescriptions   Medication     enoxaparin (LOVENOX) 40 MG/0.4ML injection     albuterol (PROAIR HFA/PROVENTIL HFA/VENTOLIN HFA) 108 (90 BASE) MCG/ACT Inhaler     GABAPENTIN PO     GABAPENTIN PO     AMLODIPINE BESYLATE PO     losartan-hydrochlorothiazide (HYZAAR) 100-25 MG per tablet     oxyCODONE IR (ROXICODONE) 5 MG tablet     ondansetron (ZOFRAN) 4 MG tablet     No current facility-administered medications for this visit.          PEx:   There were no vitals taken for this visit.  Data Unavailable, There is no height or weight on file to calculate BMI., 0 lbs 0 oz  Gen appearance:  Well groomed,: age-appropriate appearing female in NAD.   HEENT:  EOMI, AT NC, CN grossly normal  Psych:  alert , comfortable in no acute distress  Neuro:  A/O X 3  Skin:  Warm to touch, clear of rashes, ecchymoses  Resp:  No increased respiratory effort  lymph:  No LE edema  Abd:  Soft/NT, ND, no palpable masses, no CVAT  Back: bony spine is non-tender, flanks are nontender    Urine: small blood  PVR: 87cc    A/P: Anna Dyer is a 72 year old female with post op urinary dysfunction, now resolved, stress incontinence.   PVR reviewed with her.   Discussed pelvic floor PT with pt. She would like to think about this and will call me if she wishes to have a consult.   Carol Carrington PA-C  [facility]    10 minutes were spent with the patient today, > 50% in counseling and coordination of  care

## 2018-02-05 NOTE — LETTER
2/5/2018      RE: Anna Dyer  [address] [facility] [address]       February 5, 2018      CC: Check bladder scan    HPI:  Anna Dyer is a 72 year old female who presents via referral from Dr. Baron for evaluation of post op urinary issues. She underwent an exploratory laparoscopy, laparoscopic left hemicolectomy and end transverse colostomy on 1/25/18 for an obstructing colon cancer.     She required SIC x 2 post op. She is here for follow up and to check if she is emptying completely. She feels well today and feels that she is emptying her bladder. No need to strain. Nocturia x 0. No longer on narcotics. Long standing stress incontinence with coughing, sneezing and laughing.     Past Medical History:   Diagnosis Date     Cancer (H)     metastatic colorectal adenocarcinoma     Hypertension     No cardiologist     RLS (restless legs syndrome)        Past Surgical History:   Procedure Laterality Date     BIOPSY      liver     BREAST SURGERY      breast implants     COSMETIC SURGERY      facelift and tummy tuck     GYN SURGERY      tubal     INSERT PORT VASCULAR ACCESS N/A 3/24/2017    Procedure: INSERT PORT VASCULAR ACCESS;  Surgeon: Yemi Ordaz MD;  Location: [facility]     LAPAROSCOPIC ASSISTED COLECTOMY LEFT (DESCENDING) N/A 1/25/2018    Procedure: LAPAROSCOPIC ASSISTED COLECTOMY LEFT (DESCENDING);;  Surgeon: Maddie Baron MD;  Location: [facility]     LAPAROTOMY EXPLORATORY N/A 1/25/2018    Procedure: LAPAROTOMY EXPLORATORY;  exploratory laparoscopy, left hemicolectomy, transverse colostomy;  Surgeon: Maddie Baron MD;  Location: [facility] OR       Social History     Social History     Marital status:      Spouse name: N/A     Number of children: N/A     Years of education: N/A     Occupational History           family       Social History Main Topics     Smoking status: Current Every Day Smoker     Packs/day: 1.00     Years: 50.00     Types: Cigarettes      Smokeless tobacco: Never Used     Alcohol use No     Drug use: No     Sexual activity: Not Currently     Other Topics Concern     Parent/Sibling W/ Cabg, Mi Or Angioplasty Before 65f 55m? No     Social History Narrative       Family History   Problem Relation Age of Onset     Family History Negative Father      Family History Negative Mother      Psoriasis Brother      half-siblings     Family History Negative Sister      Hypothyroidism Son        ROS:14 point ROS neg other than the symptoms noted above in the HPI.    Allergies   Allergen Reactions     Lactose GI Disturbance     Sulfa Drugs Hives       Current Outpatient Prescriptions   Medication     enoxaparin (LOVENOX) 40 MG/0.4ML injection     albuterol (PROAIR HFA/PROVENTIL HFA/VENTOLIN HFA) 108 (90 BASE) MCG/ACT Inhaler     GABAPENTIN PO     GABAPENTIN PO     AMLODIPINE BESYLATE PO     losartan-hydrochlorothiazide (HYZAAR) 100-25 MG per tablet     oxyCODONE IR (ROXICODONE) 5 MG tablet     ondansetron (ZOFRAN) 4 MG tablet     No current facility-administered medications for this visit.          PEx:   There were no vitals taken for this visit.  Data Unavailable, There is no height or weight on file to calculate BMI., 0 lbs 0 oz  Gen appearance:  Well groomed,: age-appropriate appearing female in NAD.   HEENT:  EOMI, AT NC, CN grossly normal  Psych:  alert , comfortable in no acute distress  Neuro:  A/O X 3  Skin:  Warm to touch, clear of rashes, ecchymoses  Resp:  No increased respiratory effort  lymph:  No LE edema  Abd:  Soft/NT, ND, no palpable masses, no CVAT  Back: bony spine is non-tender, flanks are nontender    Urine: small blood  PVR: 87cc    A/P: Anna Dyer is a 72 year old female with post op urinary dysfunction, now resolved, stress incontinence.   PVR reviewed with her.   Discussed pelvic floor PT with pt. She would like to think about this and will call me if she wishes to have a consult.   Carol Carrington PA-C  [facility]    10 minutes  were spent with the patient today, > 50% in counseling and coordination of care      Carol Carrington PA-C, PA-C

## 2018-02-06 ENCOUNTER — TRANSFERRED RECORDS (OUTPATIENT)
Dept: HEALTH INFORMATION MANAGEMENT | Facility: CLINIC | Age: 73
End: 2018-02-06

## 2018-02-20 ENCOUNTER — TRANSFERRED RECORDS (OUTPATIENT)
Dept: HEALTH INFORMATION MANAGEMENT | Facility: CLINIC | Age: 73
End: 2018-02-20

## 2018-02-21 ENCOUNTER — TELEPHONE (OUTPATIENT)
Dept: WOUND CARE | Facility: CLINIC | Age: 73
End: 2018-02-21

## 2018-02-21 NOTE — TELEPHONE ENCOUNTER
WOCNurses at [facility] received a phone call from [facility] , Idania, indicating they had an order for treatment of ostomy issue.  This morning I discussed with Idania tilley order to us to handle.  Pt complaining of internal pain with coughing and sitting    I:  Long discussion with patient to try to understand her concerns:  After a long discussion she finally said she had been in touch with Dorita Saldaña at [facility] who had recommended an abdominal binder when pt was in the hospital.  Pt said she, herself, had only ordered the product yesterday.  P:  I directed pt to follow up with her surgeon as this was an internal issue vs a superficial skin issue and to continue to talk with Dorita Saldaña, the ostomy nurse at [facility] for pouching issues.    15minute phone call

## 2018-02-22 DIAGNOSIS — I10 ESSENTIAL HYPERTENSION WITH GOAL BLOOD PRESSURE LESS THAN 140/90: ICD-10-CM

## 2018-02-22 NOTE — TELEPHONE ENCOUNTER
"Requested Prescriptions   Pending Prescriptions Disp Refills     losartan-hydrochlorothiazide (HYZAAR) 100-25 MG per tablet  Last Written Prescription Date:  01/16/2018  Last Fill Quantity: 30,  # refills: 0   Last office visit: 1/19/2018 with prescribing provider:     Future Office Visit:     30 tablet 0     Sig: Take 1 tablet by mouth daily    Angiotensin-II Receptors Passed    2/22/2018  4:01 PM       Passed - Blood pressure under 140/90 in past 12 months    BP Readings from Last 3 Encounters:   02/05/18 130/80   01/28/18 143/74   01/19/18 118/70                Passed - Recent or future visit with authorizing provider's specialty    Patient had office visit in the last year or has a visit in the next 30 days with authorizing provider.  See \"Patient Info\" tab in inbasket, or \"Choose Columns\" in Meds & Orders section of the refill encounter.            Passed - Patient is age 18 or older       Passed - No active pregnancy on record       Passed - Normal serum creatinine on file in past 12 months    Recent Labs   Lab Test  01/26/18   0539   CR  0.70            Passed - Normal serum potassium on file in past 12 months    Recent Labs   Lab Test  01/26/18   0539   POTASSIUM  3.4                   Passed - No positive pregnancy test in past 12 months          "

## 2018-02-23 RX ORDER — LOSARTAN POTASSIUM AND HYDROCHLOROTHIAZIDE 25; 100 MG/1; MG/1
1 TABLET ORAL DAILY
Qty: 30 TABLET | Refills: 5 | Status: SHIPPED | OUTPATIENT
Start: 2018-02-23 | End: 2018-03-05

## 2018-03-01 ENCOUNTER — TRANSFERRED RECORDS (OUTPATIENT)
Dept: HEALTH INFORMATION MANAGEMENT | Facility: CLINIC | Age: 73
End: 2018-03-01

## 2018-03-05 ENCOUNTER — TELEPHONE (OUTPATIENT)
Dept: INTERNAL MEDICINE | Facility: CLINIC | Age: 73
End: 2018-03-05

## 2018-03-05 DIAGNOSIS — I10 ESSENTIAL HYPERTENSION WITH GOAL BLOOD PRESSURE LESS THAN 140/90: ICD-10-CM

## 2018-03-05 RX ORDER — LOSARTAN POTASSIUM AND HYDROCHLOROTHIAZIDE 25; 100 MG/1; MG/1
1 TABLET ORAL DAILY
Qty: 90 TABLET | Refills: 2 | Status: SHIPPED | OUTPATIENT
Start: 2018-03-05 | End: 2019-01-03

## 2018-03-05 NOTE — TELEPHONE ENCOUNTER
[facility] called. Patient is requesting 90 day prescription of losartan-hydrochlorothiazide instead of 30 days. Has been requested several times and nothing has been done. Please update the profile on the chart from 30 to 90 day supply before the script is sent to mail order.  Please send a new prescription for March for 90 day supply, for future fill, to [facility] mail order. Fax [phone]. Call patient if you have questions.[phone]  [facility] number is [phone] option 2

## 2018-03-06 ENCOUNTER — TRANSFERRED RECORDS (OUTPATIENT)
Dept: HEALTH INFORMATION MANAGEMENT | Facility: CLINIC | Age: 73
End: 2018-03-06

## 2018-03-08 ENCOUNTER — TRANSFERRED RECORDS (OUTPATIENT)
Dept: HEALTH INFORMATION MANAGEMENT | Facility: CLINIC | Age: 73
End: 2018-03-08

## 2018-03-20 ENCOUNTER — TRANSFERRED RECORDS (OUTPATIENT)
Dept: HEALTH INFORMATION MANAGEMENT | Facility: CLINIC | Age: 73
End: 2018-03-20

## 2018-04-03 ENCOUNTER — TRANSFERRED RECORDS (OUTPATIENT)
Dept: HEALTH INFORMATION MANAGEMENT | Facility: CLINIC | Age: 73
End: 2018-04-03

## 2018-04-06 ENCOUNTER — OFFICE VISIT (OUTPATIENT)
Dept: INTERNAL MEDICINE | Facility: CLINIC | Age: 73
End: 2018-04-06
Payer: COMMERCIAL

## 2018-04-06 VITALS
HEART RATE: 84 BPM | OXYGEN SATURATION: 97 % | RESPIRATION RATE: 16 BRPM | DIASTOLIC BLOOD PRESSURE: 80 MMHG | WEIGHT: 116.2 LBS | TEMPERATURE: 97.7 F | SYSTOLIC BLOOD PRESSURE: 114 MMHG | BODY MASS INDEX: 21.96 KG/M2

## 2018-04-06 DIAGNOSIS — C78.7 COLON CANCER METASTASIZED TO LIVER (H): Primary | ICD-10-CM

## 2018-04-06 DIAGNOSIS — C18.9 COLON CANCER METASTASIZED TO LIVER (H): Primary | ICD-10-CM

## 2018-04-06 DIAGNOSIS — J43.9 PULMONARY EMPHYSEMA, UNSPECIFIED EMPHYSEMA TYPE (H): ICD-10-CM

## 2018-04-06 DIAGNOSIS — I10 HYPERTENSION GOAL BP (BLOOD PRESSURE) < 140/90: ICD-10-CM

## 2018-04-06 DIAGNOSIS — Z93.3 STATUS POST COLOSTOMY (H): ICD-10-CM

## 2018-04-06 DIAGNOSIS — Z72.0 TOBACCO ABUSE: ICD-10-CM

## 2018-04-06 PROCEDURE — 99214 OFFICE O/P EST MOD 30 MIN: CPT | Performed by: INTERNAL MEDICINE

## 2018-04-06 RX ORDER — LORAZEPAM 0.5 MG/1
TABLET ORAL
Refills: 0 | COMMUNITY
Start: 2018-02-21 | End: 2019-01-02

## 2018-04-06 ASSESSMENT — PAIN SCALES - GENERAL: PAINLEVEL: NO PAIN (0)

## 2018-04-06 NOTE — PROGRESS NOTES
SUBJECTIVE:   Anna Deyr is a 73 year old female who presents to clinic today for the following health issues:    Hospital Follow-up Visit:    Hospital/Nursing Home/IP Rehab Facility: [facility]  Date of Admission: 1-25-18  Date of Discharge: 1-28-18  Reason(s) for Admission: Colostomy Surgery            Problems taking medications regularly:  None       Medication changes since discharge: None       Problems adhering to non-medication therapy:  None       Had colostomy- due to scar tissue and associated ventral hernia she have to change stoma bag 3x /day.    She feels well. Is back undergoing chemo.  Continues to smoke- denies any SOB, HENRY.     Problem list and histories reviewed & adjusted, as indicated.  Additional history: as documented    Labs reviewed in EPIC    Reviewed and updated as needed this visit by clinical staff  Allergies  Meds       Reviewed and updated as needed this visit by Provider         ROS:  Constitutional, HEENT, cardiovascular, pulmonary, gi and gu systems are negative, except as otherwise noted.    OBJECTIVE:                                                    /80  Pulse 84  Temp 97.7  F (36.5  C) (Oral)  Resp 16  Wt 116 lb 3.2 oz (52.7 kg)  SpO2 97%  BMI 21.96 kg/m2  Body mass index is 21.96 kg/(m^2).  GENERAL APPEARANCE: alert and no distress  HENT: nose and mouth without ulcers or lesions  NECK: no adenopathy, no asymmetry, masses, or scars and thyroid normal to palpation  RESP: expiratory wheezes throughout, inspiratory wheezes throughout  CV: regular rates and rhythm, normal S1 S2, no S3 or S4 and no murmur, click or rub  Abd: moderate sized ventral hernia R lower abd with colostomy bag placed over. Stoma healthy, pink.   MS: extremities normal- no gross deformities noted  SKIN: no suspicious lesions or rashes    Diagnostic test results:  none      ASSESSMENT/PLAN:                                                    1. Colon cancer metastasized to  liver (H)  Per onc    2. Hypertension goal BP (blood pressure) < 140/90  BP looks good. onc added amlodipine 6 mo ago she states. Cont using this + losartan/hctz    3. Tobacco abuse  Not interested in quitting    4. COPD- borderline obstruction on PFTs. long term smoker  Exam consistent with this, PFTs borderline. She reports no sx.despite active wheezing noted on exam. Tried to get her to use albuterol bid just to see if this helps. She is resistant to this.       Tello Finney MD  [facility]

## 2018-04-06 NOTE — MR AVS SNAPSHOT
After Visit Summary   4/6/2018    Anna Dyer    MRN: [id number]           Patient Information     Date Of Birth          1945        Visit Information        Provider Department      4/6/2018 2:00 PM Tello Finney MD [facility]        Today's Diagnoses     Colon cancer metastasized to liver (H)    -  1    Hypertension goal BP (blood pressure) < 140/90        Tobacco abuse        COPD- borderline obstruction on PFTs. long term smoker           Follow-ups after your visit        Who to contact     If you have questions or need follow up information about today's clinic visit or your schedule please contact [facility] directly at [phone].  Normal or non-critical lab and imaging results will be communicated to you by MyChart, letter or phone within 4 business days after the clinic has received the results. If you do not hear from us within 7 days, please contact the clinic through [email or web address] or phone. If you have a critical or abnormal lab result, we will notify you by phone as soon as possible.  Submit refill requests through [employer] or call your pharmacy and they will forward the refill request to us. Please allow 3 business days for your refill to be completed.          Additional Information About Your Visit        MyChart Information     [employer] gives you secure access to your electronic health record. If you see a primary care provider, you can also send messages to your care team and make appointments. If you have questions, please call your primary care clinic.  If you do not have a primary care provider, please call [phone] and they will assist you.        Care EveryWhere ID     This is your Care EveryWhere ID. This could be used by other organizations to access your [facility] medical records  [id number]        Your Vitals Were     Pulse Temperature Respirations Pulse Oximetry BMI (Body Mass Index)       84 97.7  F (36.5  C)  (Oral) 16 97% 21.96 kg/m2        Blood Pressure from Last 3 Encounters:   04/06/18 114/80   02/05/18 130/80   01/28/18 143/74    Weight from Last 3 Encounters:   04/06/18 116 lb 3.2 oz (52.7 kg)   02/05/18 115 lb (52.2 kg)   01/25/18 116 lb (52.6 kg)              Today, you had the following     No orders found for display       Primary Care Provider Office Phone # Fax #    Tello Finney [phone]       [address] [facility] [address]        Equal Access to Services     OSITO AGRAWAL : Hadii mauricio Torres, waaxda lusherifadaha, qaybta kaalmada dion, johanna eden . So [facility] [phone].    ATENCIÓN: Si habla español, tiene a curtis disposición servicios gratuitos de asistencia lingüística. Llame al [phone].    We comply with applicable federal civil rights laws and [place] laws. We do not discriminate on the basis of race, color, national origin, age, disability, sex, sexual orientation, or gender identity.            Thank you!     Thank you for choosing [facility]  for your care. Our goal is always to provide you with excellent care. Hearing back from our patients is one way we can continue to improve our services. Please take a few minutes to complete the written survey that you may receive in the mail after your visit with us. Thank you!             Your Updated Medication List - Protect others around you: Learn how to safely use, store and throw away your medicines at www.disposemymeds.org.          This list is accurate as of 4/6/18  2:48 PM.  Always use your most recent med list.                   Brand Name Dispense Instructions for use Diagnosis    albuterol 108 (90 BASE) MCG/ACT Inhaler    PROAIR HFA/PROVENTIL HFA/VENTOLIN HFA    1 Inhaler    Inhale 2 puffs into the lungs 2 times daily And every 4 hours as needed    Wheezing       AMLODIPINE BESYLATE PO      Take 5 mg by mouth every evening        * GABAPENTIN  PO      Take 800 mg by mouth every morning        * GABAPENTIN PO      Take 400 mg by mouth every evening        LORazepam 0.5 MG tablet    ATIVAN          losartan-hydrochlorothiazide 100-25 MG per tablet    HYZAAR    90 tablet    Take 1 tablet by mouth daily    Essential hypertension with goal blood pressure less than 140/90       * Notice:  This list has 2 medication(s) that are the same as other medications prescribed for you. Read the directions carefully, and ask your doctor or other care provider to review them with you.

## 2018-04-10 DIAGNOSIS — Z53.9 DIAGNOSIS NOT YET DEFINED: Primary | ICD-10-CM

## 2018-04-10 PROCEDURE — G0180 MD CERTIFICATION HHA PATIENT: HCPCS | Performed by: INTERNAL MEDICINE

## 2018-05-01 ENCOUNTER — TRANSFERRED RECORDS (OUTPATIENT)
Dept: HEALTH INFORMATION MANAGEMENT | Facility: CLINIC | Age: 73
End: 2018-05-01

## 2018-05-01 ENCOUNTER — MEDICAL CORRESPONDENCE (OUTPATIENT)
Dept: HEALTH INFORMATION MANAGEMENT | Facility: CLINIC | Age: 73
End: 2018-05-01

## 2018-05-07 ENCOUNTER — TELEPHONE (OUTPATIENT)
Dept: INTERNAL MEDICINE | Facility: CLINIC | Age: 73
End: 2018-05-07

## 2018-05-07 NOTE — TELEPHONE ENCOUNTER
Spoke with patient and she states that she uses more then typical because she has a hernia at the same stop as the stoma was and the hernia sticks out and into the bag so it takes up a lot of the bag so this makes the bag fill faster.  She also does not have a flat surface where the bag attaches so they sometimes fall off sooner then they need to be changed.  I am not sure how to write a letter of necessity but here is the information you asked for.

## 2018-05-07 NOTE — LETTER
May 8, 2018      Anna Dyer  [address] [facility] [address]        To Whom It May Concern,        Ms. yDer  states that she uses more than the typical allotment of pouches because she has a peristomal hernia at the edge of the stoma.  This takes up a lot of the bag so this makes the bag fill faster.  She also does not have a flat surface where the bag attaches so they sometimes fall off sooner then they need to be changed.         Sincerely,        Tello Finney MD

## 2018-05-10 ENCOUNTER — TELEPHONE (OUTPATIENT)
Dept: INTERNAL MEDICINE | Facility: CLINIC | Age: 73
End: 2018-05-10

## 2018-05-10 NOTE — TELEPHONE ENCOUNTER
pt dropped off letter to sign and fax back to [employer] @ FAX: [phone] any questions call pt A [phone] letter on n station

## 2018-05-29 ENCOUNTER — TRANSFERRED RECORDS (OUTPATIENT)
Dept: HEALTH INFORMATION MANAGEMENT | Facility: CLINIC | Age: 73
End: 2018-05-29

## 2018-06-12 ENCOUNTER — TRANSFERRED RECORDS (OUTPATIENT)
Dept: HEALTH INFORMATION MANAGEMENT | Facility: CLINIC | Age: 73
End: 2018-06-12

## 2018-06-26 ENCOUNTER — TRANSFERRED RECORDS (OUTPATIENT)
Dept: HEALTH INFORMATION MANAGEMENT | Facility: CLINIC | Age: 73
End: 2018-06-26

## 2018-07-24 ENCOUNTER — TRANSFERRED RECORDS (OUTPATIENT)
Dept: HEALTH INFORMATION MANAGEMENT | Facility: CLINIC | Age: 73
End: 2018-07-24

## 2018-07-25 ENCOUNTER — MYC MEDICAL ADVICE (OUTPATIENT)
Dept: INTERNAL MEDICINE | Facility: CLINIC | Age: 73
End: 2018-07-25

## 2018-08-21 ENCOUNTER — TRANSFERRED RECORDS (OUTPATIENT)
Dept: HEALTH INFORMATION MANAGEMENT | Facility: CLINIC | Age: 73
End: 2018-08-21

## 2018-09-04 ENCOUNTER — TRANSFERRED RECORDS (OUTPATIENT)
Dept: HEALTH INFORMATION MANAGEMENT | Facility: CLINIC | Age: 73
End: 2018-09-04

## 2018-09-18 ENCOUNTER — TRANSFERRED RECORDS (OUTPATIENT)
Dept: HEALTH INFORMATION MANAGEMENT | Facility: CLINIC | Age: 73
End: 2018-09-18

## 2018-09-20 ENCOUNTER — TELEPHONE (OUTPATIENT)
Dept: SURGERY | Facility: CLINIC | Age: 73
End: 2018-09-20

## 2018-09-20 NOTE — TELEPHONE ENCOUNTER
Referral received from  ALICIA BEAULIEU for PARASTOMAL HERNIA.   Dr Gallardo will see pt.     Attempt #1:    Called patient at [phone] (M) .  No answer - left message for patient to return call to clinic at [phone]. Ashtyn

## 2018-09-25 ENCOUNTER — OFFICE VISIT (OUTPATIENT)
Dept: INTERNAL MEDICINE | Facility: CLINIC | Age: 73
End: 2018-09-25
Payer: COMMERCIAL

## 2018-09-25 VITALS
TEMPERATURE: 97.6 F | OXYGEN SATURATION: 96 % | SYSTOLIC BLOOD PRESSURE: 122 MMHG | WEIGHT: 118.9 LBS | DIASTOLIC BLOOD PRESSURE: 70 MMHG | BODY MASS INDEX: 22.47 KG/M2 | RESPIRATION RATE: 16 BRPM | HEART RATE: 93 BPM

## 2018-09-25 DIAGNOSIS — Z23 NEED FOR PROPHYLACTIC VACCINATION AND INOCULATION AGAINST INFLUENZA: ICD-10-CM

## 2018-09-25 DIAGNOSIS — Z11.59 NEED FOR HEPATITIS C SCREENING TEST: ICD-10-CM

## 2018-09-25 DIAGNOSIS — H61.23 BILATERAL IMPACTED CERUMEN: Primary | ICD-10-CM

## 2018-09-25 DIAGNOSIS — Z12.31 VISIT FOR SCREENING MAMMOGRAM: ICD-10-CM

## 2018-09-25 DIAGNOSIS — Z12.11 SCREEN FOR COLON CANCER: ICD-10-CM

## 2018-09-25 PROCEDURE — 69210 REMOVE IMPACTED EAR WAX UNI: CPT | Mod: 50 | Performed by: INTERNAL MEDICINE

## 2018-09-25 PROCEDURE — 90662 IIV NO PRSV INCREASED AG IM: CPT | Performed by: INTERNAL MEDICINE

## 2018-09-25 PROCEDURE — G0008 ADMIN INFLUENZA VIRUS VAC: HCPCS | Performed by: INTERNAL MEDICINE

## 2018-09-25 NOTE — MR AVS SNAPSHOT
After Visit Summary   9/25/2018    Anna Dyer    MRN: [id number]           Patient Information     Date Of Birth          1945        Visit Information        Provider Department      9/25/2018 1:20 PM Tello Finney MD [facility]        Today's Diagnoses     Bilateral impacted cerumen    -  1    Screen for colon cancer        Visit for screening mammogram        Need for hepatitis C screening test        Need for prophylactic vaccination and inoculation against influenza           Follow-ups after your visit        Who to contact     If you have questions or need follow up information about today's clinic visit or your schedule please contact [facility] directly at [phone].  Normal or non-critical lab and imaging results will be communicated to you by MyChart, letter or phone within 4 business days after the clinic has received the results. If you do not hear from us within 7 days, please contact the clinic through [email or web address] or phone. If you have a critical or abnormal lab result, we will notify you by phone as soon as possible.  Submit refill requests through [employer] or call your pharmacy and they will forward the refill request to us. Please allow 3 business days for your refill to be completed.          Additional Information About Your Visit        MyChart Information     [employer] gives you secure access to your electronic health record. If you see a primary care provider, you can also send messages to your care team and make appointments. If you have questions, please call your primary care clinic.  If you do not have a primary care provider, please call [phone] and they will assist you.        Care EveryWhere ID     This is your Care EveryWhere ID. This could be used by other organizations to access your [place] medical records  [id number]        Your Vitals Were     Pulse Temperature Respirations Pulse Oximetry BMI (Body  Mass Index)       93 97.6  F (36.4  C) (Oral) 16 96% 22.47 kg/m2        Blood Pressure from Last 3 Encounters:   09/25/18 122/70   04/06/18 114/80   02/05/18 130/80    Weight from Last 3 Encounters:   09/25/18 118 lb 14.4 oz (53.9 kg)   04/06/18 116 lb 3.2 oz (52.7 kg)   02/05/18 115 lb (52.2 kg)              We Performed the Following     ADMIN INFLUENZA (For MEDICARE Patients ONLY) []     FLU VACCINE, INCREASED ANTIGEN, PRESV FREE, AGE 65+ [95917]     REMOVE IMPACTED CERUMEN        Primary Care Provider Office Phone # Fax #    Tello Finney [phone] [address] [place] [address]        Equal Access to Services     ALPHONSO AGRAWAL : Hadii aad ku hadasho Soomaali, waaxda luqadaha, qaybta kaalmada izabelyazara, johanna eden . So [facility] [phone].    ATENCIÓN: Si habla español, tiene a curtis disposición servicios gratuitos de asistencia lingüística. Llame al [phone].    We comply with applicable federal civil rights laws and [place] laws. We do not discriminate on the basis of race, color, national origin, age, disability, sex, sexual orientation, or gender identity.            Thank you!     Thank you for choosing [facility]  for your care. Our goal is always to provide you with excellent care. Hearing back from our patients is one way we can continue to improve our services. Please take a few minutes to complete the written survey that you may receive in the mail after your visit with us. Thank you!             Your Updated Medication List - Protect others around you: Learn how to safely use, store and throw away your medicines at www.disposemymeds.org.          This list is accurate as of 9/25/18 11:59 PM.  Always use your most recent med list.                   Brand Name Dispense Instructions for use Diagnosis    albuterol 108 (90 Base) MCG/ACT inhaler    PROAIR HFA/PROVENTIL HFA/VENTOLIN HFA    1 Inhaler    Inhale  2 puffs into the lungs 2 times daily And every 4 hours as needed    Wheezing       AMLODIPINE BESYLATE PO      Take 5 mg by mouth every evening        * GABAPENTIN PO      Take 800 mg by mouth every morning        * GABAPENTIN PO      Take 400 mg by mouth every evening        LORazepam 0.5 MG tablet    ATIVAN          losartan-hydrochlorothiazide 100-25 MG per tablet    HYZAAR    90 tablet    Take 1 tablet by mouth daily    Essential hypertension with goal blood pressure less than 140/90       * Notice:  This list has 2 medication(s) that are the same as other medications prescribed for you. Read the directions carefully, and ask your doctor or other care provider to review them with you.

## 2018-10-02 ENCOUNTER — TRANSFERRED RECORDS (OUTPATIENT)
Dept: HEALTH INFORMATION MANAGEMENT | Facility: CLINIC | Age: 73
End: 2018-10-02

## 2018-10-16 ENCOUNTER — TRANSFERRED RECORDS (OUTPATIENT)
Dept: HEALTH INFORMATION MANAGEMENT | Facility: CLINIC | Age: 73
End: 2018-10-16

## 2018-10-22 ENCOUNTER — OFFICE VISIT (OUTPATIENT)
Dept: SURGERY | Facility: CLINIC | Age: 73
End: 2018-10-22
Payer: COMMERCIAL

## 2018-10-22 VITALS
HEART RATE: 94 BPM | BODY MASS INDEX: 22.3 KG/M2 | DIASTOLIC BLOOD PRESSURE: 62 MMHG | WEIGHT: 118 LBS | SYSTOLIC BLOOD PRESSURE: 102 MMHG

## 2018-10-22 DIAGNOSIS — K43.5 PARASTOMAL HERNIA WITHOUT OBSTRUCTION OR GANGRENE: Primary | ICD-10-CM

## 2018-10-22 PROCEDURE — 99203 OFFICE O/P NEW LOW 30 MIN: CPT | Performed by: SURGERY

## 2018-10-22 NOTE — MR AVS SNAPSHOT
After Visit Summary   10/22/2018    Anna Dyer    MRN: [id number]           Patient Information     Date Of Birth          1945        Visit Information        Provider Department      10/22/2018 11:00 AM Negrito Mata MD [facility] General Surgery       Follow-ups after your visit        Who to contact     If you have questions or need follow up information about today's clinic visit or your schedule please contact [facility] directly at [phone].  Normal or non-critical lab and imaging results will be communicated to you by [email or web address], letter or phone within 4 business days after the clinic has received the results. If you do not hear from us within 7 days, please contact the clinic through [employer] or phone. If you have a critical or abnormal lab result, we will notify you by phone as soon as possible.  Submit refill requests through [employer] or call your pharmacy and they will forward the refill request to us. Please allow 3 business days for your refill to be completed.          Additional Information About Your Visit        MyChart Information     [employer] gives you secure access to your electronic health record. If you see a primary care provider, you can also send messages to your care team and make appointments. If you have questions, please call your primary care clinic.  If you do not have a primary care provider, please call [phone] and they will assist you.        Care EveryWhere ID     This is your Care EveryWhere ID. This could be used by other organizations to access your [facility] medical records  [id number]        Your Vitals Were     Pulse BMI (Body Mass Index)                94 22.3 kg/m2           Blood Pressure from Last 3 Encounters:   10/22/18 102/62   09/25/18 122/70   04/06/18 114/80    Weight from Last 3 Encounters:   10/22/18 118 lb (53.5 kg)   09/25/18 118 lb 14.4 oz (53.9 kg)   04/06/18 116 lb  3.2 oz (52.7 kg)              Today, you had the following     No orders found for display       Primary Care Provider Office Phone # Fax #    Tello Finney [phone] [address] [place] [phone]        Equal Access to Services     ALPHONSO AGRAWAL : Hadii aad ku hadasho Soomaali, waaxda luqadaha, qaybta kaalmada adeegyada, waxay pedro pabloin hayaan ademarni shanicesylvain [place]. So [facility] [phone].    ATENCIÓN: Si habla español, tiene a curtis disposición servicios gratuitos de asistencia lingüística. Kelly al [phone].    We comply with applicable federal civil rights laws and [place] laws. We do not discriminate on the basis of race, color, national origin, age, disability, sex, sexual orientation, or gender identity.            Thank you!     Thank you for choosing [facility]  for your care. Our goal is always to provide you with excellent care. Hearing back from our patients is one way we can continue to improve our services. Please take a few minutes to complete the written survey that you may receive in the mail after your visit with us. Thank you!             Your Updated Medication List - Protect others around you: Learn how to safely use, store and throw away your medicines at www.disposemymeds.org.          This list is accurate as of 10/22/18 11:46 AM.  Always use your most recent med list.                   Brand Name Dispense Instructions for use Diagnosis    albuterol 108 (90 Base) MCG/ACT inhaler    PROAIR HFA/PROVENTIL HFA/VENTOLIN HFA    1 Inhaler    Inhale 2 puffs into the lungs 2 times daily And every 4 hours as needed    Wheezing       AMLODIPINE BESYLATE PO      Take 5 mg by mouth every evening        * GABAPENTIN PO      Take 800 mg by mouth every morning        * GABAPENTIN PO      Take 400 mg by mouth every evening        LORazepam 0.5 MG tablet    ATIVAN          losartan-hydrochlorothiazide 100-25 MG per tablet    HYZAAR    90 tablet    Take 1  tablet by mouth daily    Essential hypertension with goal blood pressure less than 140/90       * Notice:  This list has 2 medication(s) that are the same as other medications prescribed for you. Read the directions carefully, and ask your doctor or other care provider to review them with you.

## 2018-10-24 NOTE — PROGRESS NOTES
[facility] General Surgery Clinic Consultation    CHIEF COMPLAINT:  Parastomal hernia.    HISTORY OF PRESENT ILLNESS:  Anna Dyer is a 73 year old female who is seen in consultation at the request of Dr. Finney for evaluation of parastomal hernia.  She suffered from obstructing metastatic colon cancer.  She was given preoperative chemotherapy, followed by colectomy and end colostomy.  She ws told the colostomy would be temporary.  Over the last several months she has been experiencing enlargement of the parastomal hernia.  When the bulge is significant she has trouble passing stools, but this does not last long.    REVIEW OF SYSTEMS:  Constitutional:  Negative for chills, fatigue, fever and weight change.  Neuro: No extremity, nor facial weakness  Psych:  No unexpected changes in mood  Eyes:  Negative for new vision problems.  ENT:  Negative for ENT pain.  Cardiovascular:  Negative for chest pain, palpitations.  Respiratory:  Negative for cough, dyspnea.  Gastrointestinal:  Peristomal pain and bulging.  Musculoskeletal:  Negative for new arthralgias or myalgias.  Integumentary:  No new rashes nor lesions.    Past Medical History:   Diagnosis Date     Cancer (H)     metastatic colorectal adenocarcinoma     Hypertension     No cardiologist     RLS (restless legs syndrome)        Past Surgical History:   Procedure Laterality Date     BIOPSY      liver     BREAST SURGERY      breast implants     COSMETIC SURGERY      facelift and tummy tuck     GYN SURGERY      tubal     INSERT PORT VASCULAR ACCESS N/A 3/24/2017    Procedure: INSERT PORT VASCULAR ACCESS;  Surgeon: Yemi Ordaz MD;  Location: [facility] OR     LAPAROSCOPIC ASSISTED COLECTOMY LEFT (DESCENDING) N/A 1/25/2018    Procedure: LAPAROSCOPIC ASSISTED COLECTOMY LEFT (DESCENDING);;  Surgeon: Maddie Baron MD;  Location:  OR     LAPAROTOMY EXPLORATORY N/A 1/25/2018    Procedure: LAPAROTOMY EXPLORATORY;  exploratory laparoscopy, left hemicolectomy,  transverse colostomy;  Surgeon: Maddie Baron MD;  Location: [facility] OR       Family History has been reviewed.    Social History   Substance Use Topics     Smoking status: Current Every Day Smoker     Packs/day: 1.00     Years: 50.00     Types: Cigarettes     Smokeless tobacco: Never Used     Alcohol use No       Patient Active Problem List   Diagnosis     Hypertension goal BP (blood pressure) < 140/90     Restless leg syndrome     CARDIOVASCULAR SCREENING; LDL GOAL LESS THAN 130     Nicotine dependence     Advanced directives, counseling/discussion     Colon cancer metastasized to liver (H)     COPD- borderline obstruction on PFTs. long term smoker     Tobacco abuse     Status post colostomy (H)       Allergies   Allergen Reactions     Lactose GI Disturbance     Sulfa Drugs Hives       Current Outpatient Prescriptions   Medication Sig Dispense Refill     albuterol (PROAIR HFA/PROVENTIL HFA/VENTOLIN HFA) 108 (90 BASE) MCG/ACT Inhaler Inhale 2 puffs into the lungs 2 times daily And every 4 hours as needed (Patient not taking: Reported on 9/25/2018) 1 Inhaler 1     AMLODIPINE BESYLATE PO Take 5 mg by mouth every evening        GABAPENTIN PO Take 800 mg by mouth every morning        GABAPENTIN PO Take 400 mg by mouth every evening        LORazepam (ATIVAN) 0.5 MG tablet   0     losartan-hydrochlorothiazide (HYZAAR) 100-25 MG per tablet Take 1 tablet by mouth daily 90 tablet 2       Vitals: /62  Pulse 94  Wt 118 lb (53.5 kg)  BMI 22.3 kg/m2  BMI= Body mass index is 22.3 kg/(m^2).    EXAM:  GENERAL: smoker, alert and no distress     HEENT: moist mucus membranes, no scleral icterus,   CARDIOVASCULAR:  RRR, No JVD  NEURO:  Alert;  well oriented to time, place and person.  RESPIRATORY: non labored breathing  NECK: Neck supple. No noticeable masses.  No lymphadenopathy noted.  ABDOMEN/GI: soft, nontender, at least partially reducible parastomal hernia.  EXTREMITIES: warm and well perfused, no edema  SKIN: No  suspicious lesions or rashes    LABS/Imaging: latest CT scan reviewed.    ASSESSMENT:  Anna Dyer suffers from parastomal hernia.     PLAN:  She weill meet with her surgeon to discuss reversal of stoma and hernia repair while doing so.  She now feel comfortable reducing the hernia if it come out.    It is my pleasure to participate in the care of Anna Dyer. Thank you for this consultation.     If you have any questions please give me a call.    Best regards,  Negrito Mata MD    Please route or send letter to:  Primary Care Provider (PCP), Referring Provider and Include Progress Note    Total time with patient visit: 30 minutes more than half spent in counseling, explanation of procedures and coordination of care.

## 2018-11-13 ENCOUNTER — TRANSFERRED RECORDS (OUTPATIENT)
Dept: HEALTH INFORMATION MANAGEMENT | Facility: CLINIC | Age: 73
End: 2018-11-13

## 2018-11-27 ENCOUNTER — TRANSFERRED RECORDS (OUTPATIENT)
Dept: HEALTH INFORMATION MANAGEMENT | Facility: CLINIC | Age: 73
End: 2018-11-27

## 2018-11-30 ENCOUNTER — TRANSFERRED RECORDS (OUTPATIENT)
Dept: HEALTH INFORMATION MANAGEMENT | Facility: CLINIC | Age: 73
End: 2018-11-30

## 2018-12-11 ENCOUNTER — TRANSFERRED RECORDS (OUTPATIENT)
Dept: HEALTH INFORMATION MANAGEMENT | Facility: CLINIC | Age: 73
End: 2018-12-11

## 2018-12-20 ENCOUNTER — TRANSFERRED RECORDS (OUTPATIENT)
Dept: HEALTH INFORMATION MANAGEMENT | Facility: CLINIC | Age: 73
End: 2018-12-20

## 2019-01-02 ENCOUNTER — OFFICE VISIT (OUTPATIENT)
Dept: SURGERY | Facility: CLINIC | Age: 74
End: 2019-01-02
Payer: MEDICARE

## 2019-01-02 VITALS
HEIGHT: 61 IN | HEART RATE: 86 BPM | SYSTOLIC BLOOD PRESSURE: 130 MMHG | WEIGHT: 118 LBS | DIASTOLIC BLOOD PRESSURE: 82 MMHG | OXYGEN SATURATION: 97 % | BODY MASS INDEX: 22.28 KG/M2

## 2019-01-02 DIAGNOSIS — K43.5 PARASTOMAL HERNIA WITHOUT OBSTRUCTION OR GANGRENE: Primary | ICD-10-CM

## 2019-01-02 PROCEDURE — 99214 OFFICE O/P EST MOD 30 MIN: CPT | Performed by: SURGERY

## 2019-01-02 RX ORDER — BUPROPION HYDROCHLORIDE 150 MG/1
150 TABLET, EXTENDED RELEASE ORAL 2 TIMES DAILY
COMMUNITY
End: 2020-02-27

## 2019-01-02 ASSESSMENT — MIFFLIN-ST. JEOR: SCORE: 982.62

## 2019-01-02 NOTE — NURSING NOTE
Pain Location: right side by stoma    Pain type: stoma is prolapsed, hernia is large in size if not wearing binder, sharp pain    Bulge: Yes    Bulge reducible: Yes, when wearing binder    Symptoms: None    Time Frame of Hernia: 11 month(s) ago    Janine Kumar RN

## 2019-01-03 DIAGNOSIS — I10 ESSENTIAL HYPERTENSION WITH GOAL BLOOD PRESSURE LESS THAN 140/90: ICD-10-CM

## 2019-01-03 RX ORDER — LOSARTAN POTASSIUM AND HYDROCHLOROTHIAZIDE 25; 100 MG/1; MG/1
1 TABLET ORAL DAILY
Qty: 90 TABLET | Refills: 0 | Status: SHIPPED | OUTPATIENT
Start: 2019-01-03 | End: 2019-03-27

## 2019-01-03 NOTE — TELEPHONE ENCOUNTER
Requested Prescriptions   Pending Prescriptions Disp Refills     losartan-hydrochlorothiazide (HYZAAR) 100-25 MG tablet  Last Written Prescription Date:  03/05/2018  Last Fill Quantity: 90,  # refills: 02   Last Office Visit: 9/25/2018   Future Office Visit:      90 tablet 2     Sig: Take 1 tablet by mouth daily    There is no refill protocol information for this order

## 2019-01-03 NOTE — PROGRESS NOTES
[facility]  Surgery Consultation    Primary CARE provider:  Tello Finney [phone]  Consultation requested by: Prince Peters MD    HPI: Patient is a 73-year-old female with a past history significant for stage IV metastatic colon cancer who underwent colon resection with colostomy in January 2018.  I had originally seen her for consultation regarding her colon cancer in March 2017.  At that time CT scan had shown lesions in the liver consistent with static disease.  I had sent her for CT-guided biopsy and from there her care was taken care of by oncology and ultimately had surgery with the colon and rectal surgery service.  She has since developed a parastomal hernia.  She reports no pain with this hernia.  She does have some difficulty with fitment of her ostomy appliance.  She feels that the hernia is getting larger over time.  Subsequent imaging studies and after ongoing care have shown no significant evidence of residual disease.  She came in today to discuss takedown of her colostomy with hernia repair.    PMH:   has a past medical history of Cancer (H), Hypertension, and RLS (restless legs syndrome).  PSH:    has a past surgical history that includes biopsy; Cosmetic surgery; GYN surgery; Breast surgery; Insert port vascular access (N/A, 3/24/2017); Laparotomy exploratory (N/A, 1/25/2018); and Laparoscopic assisted colectomy left (descending) (N/A, 1/25/2018).  Social History:   reports that she has been smoking cigarettes.  She has a 50.00 pack-year smoking history. she has never used smokeless tobacco. She reports that she does not drink alcohol or use drugs.  Family History:  family history includes Family History Negative in her father, mother, and sister; Hypothyroidism in her son; Psoriasis in her brother.  Medications/Allergies: Home medications and allergies reviewed.    ROS:  The 10 point Review of Systems is negative other than noted in the HPI.    Physical Exam:  BP  "130/82   Pulse 86   Ht 1.549 m (5' 1\")   Wt 53.5 kg (118 lb)   SpO2 97%   BMI 22.30 kg/m    GENERAL: Generally appears well.  Psych: Alert and Oriented.  Normal affect  Eyes: Sclera clear  Respiratory:  Lungs clear to ausculation bilaterally with good air excursion  Cardiovascular:  Regular Rate and Rhythm with no murmurs gallops or rubs, normal peripheral pulses  GI: Abdomen Non Distended Non-Tender stoma is present within the right upper abdomen with palpable reducible parastomal hernia.  Lymphatic/Hematologic/Immune:  No femoral or cervical lymphadenopathy.  Integumentary:  No rashes  Neurological: grossly intact     All new lab and imaging data was reviewed.     Impression and Plan:  Patient is a 73 year old female with stage IV metastatic colon cancer with prior history of colon resection and colostomy now with parastomal hernia and wishing to undergo colostomy reversal    PLAN: I discussed her options with her.  At this time I believe if she is truly interested in ostomy reversal she needs to have a more significant discussion with her colorectal surgery staff.  She states that she has discussed this with her and the surgeon she is been seeing has been somewhat noncommittal to proceeding as such.  I have recommended that she seek a second opinion with a different surgeon regarding ostomy reversal.  As to the performance of parastomal hernia repair I discussed with her that repairing the defect would be part of an ostomy reversal.  I told her that I would be happy to assist with management of her abdominal wall issue should it come to surgery.  Her questions were all answered to her satisfaction.  We will await the CRS evaluation.    Thank you very much for this consult.    Salas Calderon M.D.  [facility]  [phone]    Please route or send letter to:  Primary Care Provider (PCP) and Referring Provider  "

## 2019-01-03 NOTE — TELEPHONE ENCOUNTER
"Requested Prescriptions   Pending Prescriptions Disp Refills     losartan-hydrochlorothiazide (HYZAAR) 100-25 MG tablet 90 tablet 2     Sig: Take 1 tablet by mouth daily    Angiotensin-II Receptors Passed - 1/3/2019  9:38 AM       Passed - Blood pressure under 140/90 in past 12 months    BP Readings from Last 3 Encounters:   01/02/19 130/82   10/22/18 102/62   09/25/18 122/70                Passed - Recent (12 mo) or future (30 days) visit within the authorizing provider's specialty    Patient had office visit in the last 12 months or has a visit in the next 30 days with authorizing provider or within the authorizing provider's specialty.  See \"Patient Info\" tab in inbasket, or \"Choose Columns\" in Meds & Orders section of the refill encounter.             Passed - Patient is age 18 or older       Passed - No active pregnancy on record       Passed - Normal serum creatinine on file in past 12 months    Recent Labs   Lab Test 01/26/18  0539  10/02/17  1308   CR 0.70   < >  --    CREAT  --   --  0.7    < > = values in this interval not displayed.            Passed - Normal serum potassium on file in past 12 months    Recent Labs   Lab Test 01/26/18  0539   POTASSIUM 3.4                   Passed - No positive pregnancy test in past 12 months          "

## 2019-01-09 ENCOUNTER — TRANSFERRED RECORDS (OUTPATIENT)
Dept: HEALTH INFORMATION MANAGEMENT | Facility: CLINIC | Age: 74
End: 2019-01-09

## 2019-01-23 ENCOUNTER — TRANSFERRED RECORDS (OUTPATIENT)
Dept: HEALTH INFORMATION MANAGEMENT | Facility: CLINIC | Age: 74
End: 2019-01-23

## 2019-01-28 ENCOUNTER — TRANSFERRED RECORDS (OUTPATIENT)
Dept: HEALTH INFORMATION MANAGEMENT | Facility: CLINIC | Age: 74
End: 2019-01-28

## 2019-02-12 ENCOUNTER — TRANSFERRED RECORDS (OUTPATIENT)
Dept: HEALTH INFORMATION MANAGEMENT | Facility: CLINIC | Age: 74
End: 2019-02-12

## 2019-02-26 ENCOUNTER — TRANSFERRED RECORDS (OUTPATIENT)
Dept: HEALTH INFORMATION MANAGEMENT | Facility: CLINIC | Age: 74
End: 2019-02-26

## 2019-03-11 ENCOUNTER — OFFICE VISIT (OUTPATIENT)
Dept: INTERNAL MEDICINE | Facility: CLINIC | Age: 74
End: 2019-03-11
Payer: MEDICARE

## 2019-03-11 VITALS
WEIGHT: 123.9 LBS | SYSTOLIC BLOOD PRESSURE: 144 MMHG | TEMPERATURE: 98 F | BODY MASS INDEX: 23.41 KG/M2 | HEART RATE: 88 BPM | DIASTOLIC BLOOD PRESSURE: 76 MMHG | OXYGEN SATURATION: 96 % | RESPIRATION RATE: 16 BRPM

## 2019-03-11 DIAGNOSIS — Z93.3 STATUS POST COLOSTOMY (H): ICD-10-CM

## 2019-03-11 DIAGNOSIS — R06.2 WHEEZING: ICD-10-CM

## 2019-03-11 DIAGNOSIS — C78.7 COLON CANCER METASTASIZED TO LIVER (H): ICD-10-CM

## 2019-03-11 DIAGNOSIS — Z72.0 TOBACCO USE: ICD-10-CM

## 2019-03-11 DIAGNOSIS — Z01.818 PREOP GENERAL PHYSICAL EXAM: Primary | ICD-10-CM

## 2019-03-11 DIAGNOSIS — C18.9 COLON CANCER METASTASIZED TO LIVER (H): ICD-10-CM

## 2019-03-11 DIAGNOSIS — J43.9 PULMONARY EMPHYSEMA, UNSPECIFIED EMPHYSEMA TYPE (H): ICD-10-CM

## 2019-03-11 PROCEDURE — 93000 ELECTROCARDIOGRAM COMPLETE: CPT | Performed by: INTERNAL MEDICINE

## 2019-03-11 PROCEDURE — 99215 OFFICE O/P EST HI 40 MIN: CPT | Performed by: INTERNAL MEDICINE

## 2019-03-11 RX ORDER — LORAZEPAM 0.5 MG/1
0.5 TABLET ORAL AT BEDTIME
Refills: 1 | COMMUNITY
Start: 2019-01-07

## 2019-03-11 RX ORDER — LIDOCAINE/PRILOCAINE 2.5 %-2.5%
CREAM (GRAM) TOPICAL DAILY PRN
COMMUNITY
Start: 2018-11-29

## 2019-03-11 RX ORDER — AMLODIPINE BESYLATE 5 MG/1
5 TABLET ORAL DAILY
COMMUNITY
Start: 2019-03-11 | End: 2019-09-09

## 2019-03-11 RX ORDER — ALBUTEROL SULFATE 90 UG/1
2 AEROSOL, METERED RESPIRATORY (INHALATION) 2 TIMES DAILY
Qty: 1 INHALER | Refills: 1 | Status: SHIPPED | OUTPATIENT
Start: 2019-03-11 | End: 2020-08-11

## 2019-03-11 NOTE — PATIENT INSTRUCTIONS
Before Your Surgery      Call your surgeon if there is any change in your health. This includes signs of a cold or flu (such as a sore throat, runny nose, cough, rash or fever).    Do not smoke, drink alcohol or take over the counter medicine (unless your surgeon or primary care doctor tells you to) for the 24 hours before and after surgery.    If you take prescribed drugs: Follow your doctor s orders about which medicines to take and which to stop until after surgery.    Eating and drinking prior to surgery: follow the instructions from your surgeon    Take a shower or bath the night before surgery. Use the soap your surgeon gave you to gently clean your skin. If you do not have soap from your surgeon, use your regular soap. Do not shave or scrub the surgery site.  Wear clean pajamas and have clean sheets on your bed.     Hold your losartan hydrochlorothiazide the morning of your surgery.     Please request that [facility] fax your labs ASAP to [phone]

## 2019-03-11 NOTE — PROGRESS NOTES
[address] [address]  [phone]  Dept: [phone]    PRE-OP EVALUATION:  Today's date: 3/11/2019    Anna Dyer (: 1945) presents for pre-operative evaluation assessment as requested by Dr. Caba.  She requires evaluation and anesthesia risk assessment prior to undergoing dx lap w/colostomy takedown and reanastomosis of the colon for treatment of takedown/reversal of colostomy    Proposed Surgery/ Procedure: INTER OP COLONOSCOPY,LAPAROSCOPY DIAGNOSTIC (GENERAL), LAPAROSCOPY POSSIBLE OPEN COLOSTOMY TAKEDOWN  Date of Surgery/ Procedure: 3/28/19  Time of Surgery/ Procedure: 1:00 PM  Hospital/Surgical Facility: [facility]  Primary Physician: Tello Finney  Type of Anesthesia Anticipated: General    Patient has a Health Care Directive or Living Will:  YES     1. NO - Do you have a history of heart attack, stroke, stent, bypass or surgery on an artery in the head, neck, heart or legs?  2. NO - Do you ever have any pain or discomfort in your chest?  3. NO - Do you have a history of  Heart Failure?  4. NO - Are you troubled by shortness of breath when: walking on the level, up a slight hill or at night?  5. NO - Do you currently have a cold, bronchitis or other respiratory infection?  6. YES - DO YOU HAVE A COUGH, SHORTNESS OF BREATH OR WHEEZING? Smokers cough  7. YES - DO YOU SOMETIMES GET PAINS IN THE CALVES OF YOUR LEGS WHEN YOU WALK? Left leg   8. NO - Do you or anyone in your family have previous history of blood clots?  9. NO - Do you or does anyone in your family have a serious bleeding problem such as prolonged bleeding following surgeries or cuts?  10. NO - Have you ever had problems with anemia or been told to take iron pills?  11. NO - Have you had any abnormal blood loss such as black, tarry or bloody stools, or abnormal vaginal bleeding?  12. NO - Have you ever had a blood transfusion?  13. NO - Have you or any of  your relatives ever had problems with anesthesia?  14. NO - Do you have sleep apnea, excessive snoring or daytime drowsiness?  15. NO - Do you have any prosthetic heart valves?  16. NO - Do you have prosthetic joints?  17. NO - Is there any chance that you may be pregnant?      HPI:     Pt has stage 4 colon cancer with a stable disease burden on continued chemotherapy.   She has a colostomy that she would like reversed if possible.     Other hx is significant for continued tobacco abuse -she states she is cutting back and is already using a nicotine patch.  She does have both COPD by PFTs though she is not using any inhalers despite our recommendations in the past.  She feels that she does not have symptoms which require the use of any inhalers at present    MEDICAL HISTORY:     Patient Active Problem List    Diagnosis Date Noted     Status post colostomy (H) 04/06/2018     Priority: Medium     COPD- borderline obstruction on PFTs. long term smoker 01/19/2018     Priority: Medium     Tobacco abuse 01/19/2018     Priority: Medium     Colon cancer metastasized to liver (H) 03/17/2017     Priority: Medium     Advanced directives, counseling/discussion 09/15/2014     Priority: Medium     Advance Care Planning:   Receipt of ACP document:  Received: Health Care Directive which was witnessed or notarized on 08/20/2013.  Document not previously scanned.  Validation form completed and sent with document to be scanned.  .  Confirmed/documented designated decision maker(s). See permanent comments section of demographics in clinical tab. View document(s) and details by clicking on code status.   Added by Rach Olson on 1/19/2015.               Nicotine dependence 02/25/2014     Priority: Medium     Hypertension goal BP (blood pressure) < 140/90 02/24/2014     Priority: Medium     Restless leg syndrome 02/24/2014     Priority: Medium     CARDIOVASCULAR SCREENING; LDL GOAL LESS THAN 130 02/24/2014     Priority: Medium       Past Medical History:   Diagnosis Date     Cancer (H)     metastatic colorectal adenocarcinoma     Hypertension     No cardiologist     RLS (restless legs syndrome)      Past Surgical History:   Procedure Laterality Date     BIOPSY      liver     BREAST SURGERY      breast implants     COSMETIC SURGERY      facelift and tummy tuck     GYN SURGERY      tubal     INSERT PORT VASCULAR ACCESS N/A 3/24/2017    Procedure: INSERT PORT VASCULAR ACCESS;  Surgeon: Yemi Ordaz MD;  Location: [facility] OR     LAPAROSCOPIC ASSISTED COLECTOMY LEFT (DESCENDING) N/A 1/25/2018    Procedure: LAPAROSCOPIC ASSISTED COLECTOMY LEFT (DESCENDING);;  Surgeon: Maddie Baron MD;  Location: [facility] OR     LAPAROTOMY EXPLORATORY N/A 1/25/2018    Procedure: LAPAROTOMY EXPLORATORY;  exploratory laparoscopy, left hemicolectomy, transverse colostomy;  Surgeon: Maddie Baron MD;  Location: [facility] OR     Current Outpatient Medications   Medication Sig Dispense Refill     albuterol (PROAIR HFA/PROVENTIL HFA/VENTOLIN HFA) 108 (90 Base) MCG/ACT inhaler Inhale 2 puffs into the lungs 2 times daily And every 4 hours as needed 1 Inhaler 1     amLODIPine (NORVASC) 5 MG tablet Take 1 tablet (5 mg) by mouth daily       buPROPion (WELLBUTRIN SR) 150 MG 12 hr tablet Take 150 mg by mouth 2 times daily       GABAPENTIN PO Take 800 mg by mouth every morning        GABAPENTIN PO Take 400 mg by mouth every evening        lidocaine-prilocaine (EMLA) 2.5-2.5 % external cream        LORazepam (ATIVAN) 0.5 MG tablet   1     losartan-hydrochlorothiazide (HYZAAR) 100-25 MG tablet Take 1 tablet by mouth daily 90 tablet 0     OTC products: None, except as noted above    Allergies   Allergen Reactions     Lactose GI Disturbance     Sulfa Drugs Hives      Latex Allergy: NO    Social History     Tobacco Use     Smoking status: Current Some Day Smoker     Packs/day: 1.00     Years: 50.00     Pack years: 50.00     Types: Cigarettes     Smokeless tobacco: Never Used      Tobacco comment: Quit yesterday   Substance Use Topics     Alcohol use: No     Alcohol/week: 0.0 oz     History   Drug Use No       REVIEW OF SYSTEMS:   Constitutional, neuro, ENT, endocrine, pulmonary, cardiac, gastrointestinal, genitourinary, musculoskeletal, integument and psychiatric systems are negative, except as otherwise noted.    EXAM:   /76   Pulse 88   Temp 98  F (36.7  C) (Oral)   Resp 16   Wt 56.2 kg (123 lb 14.4 oz)   SpO2 96%   Breastfeeding? No   BMI 23.41 kg/m      GENERAL APPEARANCE: alert, active and no distress     EYES: Eyes grossly normal to inspection     HENT: nose and mouth without ulcers or lesions and normal cephalic/atraumatic     NECK: no adenopathy, no asymmetry, masses, or scars and thyroid normal to palpation     RESP: Diminished breath sounds bilaterally with some inspiratory wheezing.       CV: regular rates and rhythm, normal S1 S2, no S3 or S4 and no murmur, click or rub     ABDOMEN: Colostomy bag and site is noted.  The abdomen is otherwise soft nontender nondistended     MS: extremities normal- no gross deformities noted, no evidence of inflammation in joints, FROM in all extremities.     SKIN: no suspicious lesions or rashes     NEURO: Normal strength and tone, sensory exam grossly normal, mentation intact and speech normal     PSYCH: mentation appears normal. and affect normal/bright     LYMPHATICS: No cervical adenopathy    DIAGNOSTICS:   EKG: Normal Sinus Rhythm, unchanged from previous tracings    Labs: from [facility] (3/12):  See attached    Recent Labs   Lab Test 01/28/18  0540 01/26/18  0539 01/25/18  2035 01/25/18  0844  03/07/17  1419   HGB  --  10.8*  --   --   --  13.0   *  --  126*  --    < >  --    INR  --   --   --   --   --  0.98   NA  --  135  --   --   --  136   POTASSIUM  --  3.4  --  3.6   < > 4.0   CR  --  0.70 0.63  --    < > 0.81    < > = values in this interval not displayed.        IMPRESSION:   Reason for surgery/procedure: Takedown  of colostomy  Diagnosis/reason for consult: Stage 4 colon Ca, COPD, continued tobacco use    The proposed surgical procedure is considered INTERMEDIATE risk.    REVISED CARDIAC RISK INDEX  The patient has the following serious cardiovascular risks for perioperative complications such as (MI, PE, VFib and 3  AV Block):  No serious cardiac risks  INTERPRETATION: 0 risks: Class I (very low risk - 0.4% complication rate)    The patient has the following additional risks for perioperative complications:  none      ICD-10-CM    1. Preop general physical exam Z01.818 EKG 12-lead complete w/read - Clinics   2. Status post colostomy (H) Z93.3    3. Colon cancer metastasized to liver (H) C18.9     C78.7    4. Pulmonary emphysema, unspecified emphysema type (H) J43.9    5. Tobacco use Z72.0    6. Wheezing R06.2 albuterol (PROAIR HFA/PROVENTIL HFA/VENTOLIN HFA) 108 (90 Base) MCG/ACT inhaler       RECOMMENDATIONS:     --Consult hospital rounder / IM to assist post-op medical management    Pulmonary Risk  Incentive spirometry post op  Respiratory Therapy (Respiratory Care IP Consult)  post op  NG tube decompression if abdominal distension or significant vomiting   Maximize COPD treatment - use albuterol bid until surgery    Advised smoking cessation.         ACE Inhibitor or Angiotensin Receptor Blocker (ARB) Use  Ace inhibitor or Angiotensin Receptor Blocker (ARB) and should HOLD this medication for the 24 hours prior to surgery.      APPROVAL GIVEN to proceed with proposed procedure, without further diagnostic evaluation       Signed Electronically by: Tello Finney MD    Copy of this evaluation report is provided to requesting physician.    [place] Preop Guidelines    Revised Cardiac Risk Index

## 2019-03-12 ENCOUNTER — TRANSFERRED RECORDS (OUTPATIENT)
Dept: HEALTH INFORMATION MANAGEMENT | Facility: CLINIC | Age: 74
End: 2019-03-12

## 2019-03-25 RX ORDER — GABAPENTIN 400 MG/1
400 CAPSULE ORAL EVERY MORNING
COMMUNITY
End: 2019-12-02

## 2019-03-25 RX ORDER — GABAPENTIN 400 MG/1
400 CAPSULE ORAL 3 TIMES DAILY
COMMUNITY

## 2019-03-25 RX ORDER — NAPROXEN SODIUM 220 MG
440 TABLET ORAL DAILY PRN
COMMUNITY
End: 2019-09-09

## 2019-03-25 RX ORDER — NICOTINE 21 MG/24HR
1 PATCH, TRANSDERMAL 24 HOURS TRANSDERMAL EVERY 24 HOURS
COMMUNITY
End: 2019-12-02

## 2019-03-25 RX ORDER — DIPHENHYDRAMINE HCL 25 MG
25 TABLET ORAL
Status: ON HOLD | COMMUNITY
End: 2019-04-04

## 2019-03-27 DIAGNOSIS — I10 ESSENTIAL HYPERTENSION WITH GOAL BLOOD PRESSURE LESS THAN 140/90: ICD-10-CM

## 2019-03-27 RX ORDER — LOSARTAN POTASSIUM AND HYDROCHLOROTHIAZIDE 25; 100 MG/1; MG/1
TABLET ORAL
Qty: 90 TABLET | Refills: 0 | Status: SHIPPED | OUTPATIENT
Start: 2019-03-27 | End: 2019-10-11

## 2019-03-27 NOTE — TELEPHONE ENCOUNTER
"Requested Prescriptions   Pending Prescriptions Disp Refills     losartan-hydrochlorothiazide (HYZAAR) 100-25 MG tablet [Pharmacy Med Name: LOSARTAN/HCT -25] 90 tablet 0     Sig: TAKE 1 TABLET DAILY    Angiotensin-II Receptors Failed - 3/27/2019  2:35 PM       Failed - Blood pressure under 140/90 in past 12 months    BP Readings from Last 3 Encounters:   03/11/19 144/76   01/02/19 130/82   10/22/18 102/62                Failed - Normal serum creatinine on file in past 12 months    Recent Labs   Lab Test 01/26/18  0539  10/02/17  1308   CR 0.70   < >  --    CREAT  --   --  0.7    < > = values in this interval not displayed.            Failed - Normal serum potassium on file in past 12 months    Recent Labs   Lab Test 01/26/18  0539   POTASSIUM 3.4                   Passed - Recent (12 mo) or future (30 days) visit within the authorizing provider's specialty    Patient had office visit in the last 12 months or has a visit in the next 30 days with authorizing provider or within the authorizing provider's specialty.  See \"Patient Info\" tab in inbasket, or \"Choose Columns\" in Meds & Orders section of the refill encounter.             Passed - Medication is active on med list       Passed - Patient is age 18 or older       Passed - No active pregnancy on record       Passed - No positive pregnancy test in past 12 months        Routing refill request to provider for review/approval because:  out of range:  blood pressure          "

## 2019-03-28 ENCOUNTER — ANESTHESIA (OUTPATIENT)
Dept: SURGERY | Facility: CLINIC | Age: 74
DRG: 330 | End: 2019-03-28
Payer: MEDICARE

## 2019-03-28 ENCOUNTER — HOSPITAL ENCOUNTER (INPATIENT)
Facility: CLINIC | Age: 74
LOS: 7 days | Discharge: HOME HEALTH | DRG: 330 | End: 2019-04-04
Attending: COLON & RECTAL SURGERY | Admitting: COLON & RECTAL SURGERY
Payer: MEDICARE

## 2019-03-28 ENCOUNTER — ANESTHESIA EVENT (OUTPATIENT)
Dept: SURGERY | Facility: CLINIC | Age: 74
DRG: 330 | End: 2019-03-28
Payer: MEDICARE

## 2019-03-28 DIAGNOSIS — Z93.3 STATUS POST COLOSTOMY (H): Primary | ICD-10-CM

## 2019-03-28 LAB
CREAT SERPL-MCNC: 1.31 MG/DL (ref 0.52–1.04)
GFR SERPL CREATININE-BSD FRML MDRD: 40 ML/MIN/{1.73_M2}
HGB BLD-MCNC: 14.2 G/DL (ref 11.7–15.7)
PLATELET # BLD AUTO: 131 10E9/L (ref 150–450)
POTASSIUM SERPL-SCNC: 3.5 MMOL/L (ref 3.4–5.3)

## 2019-03-28 PROCEDURE — 40000257 ZZH STATISTIC CONSULT NO CHARGE VASC ACCESS

## 2019-03-28 PROCEDURE — 88331 PATH CONSLTJ SURG 1 BLK 1SPC: CPT | Mod: 26 | Performed by: COLON & RECTAL SURGERY

## 2019-03-28 PROCEDURE — 88304 TISSUE EXAM BY PATHOLOGIST: CPT | Performed by: COLON & RECTAL SURGERY

## 2019-03-28 PROCEDURE — 0DJD8ZZ INSPECTION OF LOWER INTESTINAL TRACT, VIA NATURAL OR ARTIFICIAL OPENING ENDOSCOPIC: ICD-10-PCS | Performed by: COLON & RECTAL SURGERY

## 2019-03-28 PROCEDURE — 84132 ASSAY OF SERUM POTASSIUM: CPT | Performed by: ANESTHESIOLOGY

## 2019-03-28 PROCEDURE — 25000128 H RX IP 250 OP 636: Performed by: ANESTHESIOLOGY

## 2019-03-28 PROCEDURE — 0WQF0ZZ REPAIR ABDOMINAL WALL, OPEN APPROACH: ICD-10-PCS | Performed by: COLON & RECTAL SURGERY

## 2019-03-28 PROCEDURE — 36000093 ZZH SURGERY LEVEL 4 1ST 30 MIN: Performed by: COLON & RECTAL SURGERY

## 2019-03-28 PROCEDURE — 25000125 ZZHC RX 250: Performed by: NURSE ANESTHETIST, CERTIFIED REGISTERED

## 2019-03-28 PROCEDURE — 25800030 ZZH RX IP 258 OP 636: Performed by: COLON & RECTAL SURGERY

## 2019-03-28 PROCEDURE — 37000008 ZZH ANESTHESIA TECHNICAL FEE, 1ST 30 MIN: Performed by: COLON & RECTAL SURGERY

## 2019-03-28 PROCEDURE — 0DBM4ZX EXCISION OF DESCENDING COLON, PERCUTANEOUS ENDOSCOPIC APPROACH, DIAGNOSTIC: ICD-10-PCS | Performed by: COLON & RECTAL SURGERY

## 2019-03-28 PROCEDURE — 88331 PATH CONSLTJ SURG 1 BLK 1SPC: CPT | Performed by: COLON & RECTAL SURGERY

## 2019-03-28 PROCEDURE — 25800030 ZZH RX IP 258 OP 636: Performed by: ANESTHESIOLOGY

## 2019-03-28 PROCEDURE — 71000013 ZZH RECOVERY PHASE 1 LEVEL 1 EA ADDTL HR: Performed by: COLON & RECTAL SURGERY

## 2019-03-28 PROCEDURE — 88304 TISSUE EXAM BY PATHOLOGIST: CPT | Mod: 26 | Performed by: COLON & RECTAL SURGERY

## 2019-03-28 PROCEDURE — 85049 AUTOMATED PLATELET COUNT: CPT | Performed by: ANESTHESIOLOGY

## 2019-03-28 PROCEDURE — 25000566 ZZH SEVOFLURANE, EA 15 MIN: Performed by: COLON & RECTAL SURGERY

## 2019-03-28 PROCEDURE — A9270 NON-COVERED ITEM OR SERVICE: HCPCS | Mod: GY | Performed by: COLON & RECTAL SURGERY

## 2019-03-28 PROCEDURE — 27210794 ZZH OR GENERAL SUPPLY STERILE: Performed by: COLON & RECTAL SURGERY

## 2019-03-28 PROCEDURE — 71000012 ZZH RECOVERY PHASE 1 LEVEL 1 FIRST HR: Performed by: COLON & RECTAL SURGERY

## 2019-03-28 PROCEDURE — 25000128 H RX IP 250 OP 636: Performed by: COLON & RECTAL SURGERY

## 2019-03-28 PROCEDURE — 0DBL0ZZ EXCISION OF TRANSVERSE COLON, OPEN APPROACH: ICD-10-PCS | Performed by: COLON & RECTAL SURGERY

## 2019-03-28 PROCEDURE — 40000170 ZZH STATISTIC PRE-PROCEDURE ASSESSMENT II: Performed by: COLON & RECTAL SURGERY

## 2019-03-28 PROCEDURE — 12000000 ZZH R&B MED SURG/OB

## 2019-03-28 PROCEDURE — 25000125 ZZHC RX 250: Performed by: COLON & RECTAL SURGERY

## 2019-03-28 PROCEDURE — 25000132 ZZH RX MED GY IP 250 OP 250 PS 637: Mod: GY | Performed by: COLON & RECTAL SURGERY

## 2019-03-28 PROCEDURE — 25000128 H RX IP 250 OP 636: Performed by: NURSE ANESTHETIST, CERTIFIED REGISTERED

## 2019-03-28 PROCEDURE — 25800030 ZZH RX IP 258 OP 636: Performed by: NURSE ANESTHETIST, CERTIFIED REGISTERED

## 2019-03-28 PROCEDURE — 88305 TISSUE EXAM BY PATHOLOGIST: CPT | Performed by: COLON & RECTAL SURGERY

## 2019-03-28 PROCEDURE — 88305 TISSUE EXAM BY PATHOLOGIST: CPT | Mod: 26 | Performed by: COLON & RECTAL SURGERY

## 2019-03-28 PROCEDURE — 85018 HEMOGLOBIN: CPT | Performed by: ANESTHESIOLOGY

## 2019-03-28 PROCEDURE — 37000009 ZZH ANESTHESIA TECHNICAL FEE, EACH ADDTL 15 MIN: Performed by: COLON & RECTAL SURGERY

## 2019-03-28 PROCEDURE — 36000063 ZZH SURGERY LEVEL 4 EA 15 ADDTL MIN: Performed by: COLON & RECTAL SURGERY

## 2019-03-28 PROCEDURE — 25000125 ZZHC RX 250: Performed by: ANESTHESIOLOGY

## 2019-03-28 PROCEDURE — 82565 ASSAY OF CREATININE: CPT | Performed by: ANESTHESIOLOGY

## 2019-03-28 RX ORDER — HYDROMORPHONE HYDROCHLORIDE 1 MG/ML
.3-.5 INJECTION, SOLUTION INTRAMUSCULAR; INTRAVENOUS; SUBCUTANEOUS
Status: DISCONTINUED | OUTPATIENT
Start: 2019-03-28 | End: 2019-03-31

## 2019-03-28 RX ORDER — SCOLOPAMINE TRANSDERMAL SYSTEM 1 MG/1
1 PATCH, EXTENDED RELEASE TRANSDERMAL ONCE
Status: COMPLETED | OUTPATIENT
Start: 2019-03-28 | End: 2019-03-28

## 2019-03-28 RX ORDER — BUPROPION HYDROCHLORIDE 150 MG/1
150 TABLET, EXTENDED RELEASE ORAL 2 TIMES DAILY
Status: DISCONTINUED | OUTPATIENT
Start: 2019-03-29 | End: 2019-04-01

## 2019-03-28 RX ORDER — LORAZEPAM 0.5 MG/1
0.5 TABLET ORAL EVERY MORNING
Status: DISCONTINUED | OUTPATIENT
Start: 2019-03-29 | End: 2019-03-31

## 2019-03-28 RX ORDER — FENTANYL CITRATE 50 UG/ML
INJECTION, SOLUTION INTRAMUSCULAR; INTRAVENOUS PRN
Status: DISCONTINUED | OUTPATIENT
Start: 2019-03-28 | End: 2019-03-28

## 2019-03-28 RX ORDER — AMLODIPINE BESYLATE 5 MG/1
5 TABLET ORAL DAILY
Status: DISCONTINUED | OUTPATIENT
Start: 2019-03-29 | End: 2019-04-04 | Stop reason: HOSPADM

## 2019-03-28 RX ORDER — ALVIMOPAN 12 MG/1
12 CAPSULE ORAL 2 TIMES DAILY
Status: DISCONTINUED | OUTPATIENT
Start: 2019-03-29 | End: 2019-04-01

## 2019-03-28 RX ORDER — CIPROFLOXACIN 2 MG/ML
400 INJECTION, SOLUTION INTRAVENOUS
Status: COMPLETED | OUTPATIENT
Start: 2019-03-28 | End: 2019-03-28

## 2019-03-28 RX ORDER — ONDANSETRON 2 MG/ML
4 INJECTION INTRAMUSCULAR; INTRAVENOUS EVERY 30 MIN PRN
Status: DISCONTINUED | OUTPATIENT
Start: 2019-03-28 | End: 2019-03-28 | Stop reason: HOSPADM

## 2019-03-28 RX ORDER — CIPROFLOXACIN 2 MG/ML
400 INJECTION, SOLUTION INTRAVENOUS EVERY 12 HOURS
Status: COMPLETED | OUTPATIENT
Start: 2019-03-29 | End: 2019-03-29

## 2019-03-28 RX ORDER — LIDOCAINE 40 MG/G
CREAM TOPICAL
Status: DISCONTINUED | OUTPATIENT
Start: 2019-03-28 | End: 2019-04-04 | Stop reason: HOSPADM

## 2019-03-28 RX ORDER — ALVIMOPAN 12 MG/1
12 CAPSULE ORAL ONCE
Status: COMPLETED | OUTPATIENT
Start: 2019-03-28 | End: 2019-03-28

## 2019-03-28 RX ORDER — OXYCODONE HYDROCHLORIDE 5 MG/1
5-10 TABLET ORAL EVERY 4 HOURS PRN
Status: DISCONTINUED | OUTPATIENT
Start: 2019-03-28 | End: 2019-03-30

## 2019-03-28 RX ORDER — ONDANSETRON 4 MG/1
4 TABLET, ORALLY DISINTEGRATING ORAL EVERY 30 MIN PRN
Status: DISCONTINUED | OUTPATIENT
Start: 2019-03-28 | End: 2019-03-28 | Stop reason: HOSPADM

## 2019-03-28 RX ORDER — SODIUM CHLORIDE, SODIUM LACTATE, POTASSIUM CHLORIDE, CALCIUM CHLORIDE 600; 310; 30; 20 MG/100ML; MG/100ML; MG/100ML; MG/100ML
INJECTION, SOLUTION INTRAVENOUS CONTINUOUS
Status: DISCONTINUED | OUTPATIENT
Start: 2019-03-28 | End: 2019-03-28 | Stop reason: HOSPADM

## 2019-03-28 RX ORDER — CIPROFLOXACIN 2 MG/ML
400 INJECTION, SOLUTION INTRAVENOUS SEE ADMIN INSTRUCTIONS
Status: DISCONTINUED | OUTPATIENT
Start: 2019-03-28 | End: 2019-03-28 | Stop reason: HOSPADM

## 2019-03-28 RX ORDER — HEPARIN SODIUM 5000 [USP'U]/.5ML
5000 INJECTION, SOLUTION INTRAVENOUS; SUBCUTANEOUS
Status: COMPLETED | OUTPATIENT
Start: 2019-03-28 | End: 2019-03-28

## 2019-03-28 RX ORDER — ACETAMINOPHEN 325 MG/1
975 TABLET ORAL ONCE
Status: COMPLETED | OUTPATIENT
Start: 2019-03-28 | End: 2019-03-28

## 2019-03-28 RX ORDER — SODIUM CHLORIDE, SODIUM LACTATE, POTASSIUM CHLORIDE, CALCIUM CHLORIDE 600; 310; 30; 20 MG/100ML; MG/100ML; MG/100ML; MG/100ML
INJECTION, SOLUTION INTRAVENOUS CONTINUOUS
Status: DISCONTINUED | OUTPATIENT
Start: 2019-03-28 | End: 2019-03-30

## 2019-03-28 RX ORDER — MAGNESIUM HYDROXIDE 1200 MG/15ML
LIQUID ORAL PRN
Status: DISCONTINUED | OUTPATIENT
Start: 2019-03-28 | End: 2019-03-28 | Stop reason: HOSPADM

## 2019-03-28 RX ORDER — HYDROMORPHONE HYDROCHLORIDE 1 MG/ML
.3-.5 INJECTION, SOLUTION INTRAMUSCULAR; INTRAVENOUS; SUBCUTANEOUS EVERY 5 MIN PRN
Status: DISCONTINUED | OUTPATIENT
Start: 2019-03-28 | End: 2019-03-28 | Stop reason: HOSPADM

## 2019-03-28 RX ORDER — PROMETHAZINE HYDROCHLORIDE 25 MG/ML
12.5 INJECTION, SOLUTION INTRAMUSCULAR; INTRAVENOUS
Status: COMPLETED | OUTPATIENT
Start: 2019-03-28 | End: 2019-03-28

## 2019-03-28 RX ORDER — ONDANSETRON 2 MG/ML
INJECTION INTRAMUSCULAR; INTRAVENOUS PRN
Status: DISCONTINUED | OUTPATIENT
Start: 2019-03-28 | End: 2019-03-28

## 2019-03-28 RX ORDER — ALBUTEROL SULFATE 90 UG/1
2 AEROSOL, METERED RESPIRATORY (INHALATION) 2 TIMES DAILY
Status: DISCONTINUED | OUTPATIENT
Start: 2019-03-28 | End: 2019-04-04 | Stop reason: HOSPADM

## 2019-03-28 RX ORDER — FENTANYL CITRATE 50 UG/ML
25-50 INJECTION, SOLUTION INTRAMUSCULAR; INTRAVENOUS
Status: DISCONTINUED | OUTPATIENT
Start: 2019-03-28 | End: 2019-03-28 | Stop reason: HOSPADM

## 2019-03-28 RX ORDER — NALOXONE HYDROCHLORIDE 0.4 MG/ML
.1-.4 INJECTION, SOLUTION INTRAMUSCULAR; INTRAVENOUS; SUBCUTANEOUS
Status: DISCONTINUED | OUTPATIENT
Start: 2019-03-28 | End: 2019-03-28

## 2019-03-28 RX ORDER — EPHEDRINE SULFATE 50 MG/ML
INJECTION, SOLUTION INTRAMUSCULAR; INTRAVENOUS; SUBCUTANEOUS PRN
Status: DISCONTINUED | OUTPATIENT
Start: 2019-03-28 | End: 2019-03-28

## 2019-03-28 RX ORDER — NALOXONE HYDROCHLORIDE 0.4 MG/ML
.1-.4 INJECTION, SOLUTION INTRAMUSCULAR; INTRAVENOUS; SUBCUTANEOUS
Status: DISCONTINUED | OUTPATIENT
Start: 2019-03-28 | End: 2019-04-04 | Stop reason: HOSPADM

## 2019-03-28 RX ORDER — PROPOFOL 10 MG/ML
INJECTION, EMULSION INTRAVENOUS PRN
Status: DISCONTINUED | OUTPATIENT
Start: 2019-03-28 | End: 2019-03-28

## 2019-03-28 RX ORDER — DIPHENHYDRAMINE HYDROCHLORIDE 50 MG/ML
12.5 INJECTION INTRAMUSCULAR; INTRAVENOUS EVERY 6 HOURS PRN
Status: DISCONTINUED | OUTPATIENT
Start: 2019-03-28 | End: 2019-03-30

## 2019-03-28 RX ORDER — ONDANSETRON 2 MG/ML
4 INJECTION INTRAMUSCULAR; INTRAVENOUS EVERY 6 HOURS PRN
Status: DISCONTINUED | OUTPATIENT
Start: 2019-03-28 | End: 2019-04-04 | Stop reason: HOSPADM

## 2019-03-28 RX ORDER — ALBUTEROL SULFATE 0.83 MG/ML
2.5 SOLUTION RESPIRATORY (INHALATION) ONCE
Status: COMPLETED | OUTPATIENT
Start: 2019-03-28 | End: 2019-03-28

## 2019-03-28 RX ORDER — ACETAMINOPHEN 325 MG/1
975 TABLET ORAL EVERY 8 HOURS
Status: DISPENSED | OUTPATIENT
Start: 2019-03-28 | End: 2019-04-01

## 2019-03-28 RX ADMIN — ONDANSETRON 4 MG: 2 INJECTION INTRAMUSCULAR; INTRAVENOUS at 16:40

## 2019-03-28 RX ADMIN — PHENYLEPHRINE HYDROCHLORIDE 100 MCG: 10 INJECTION, SOLUTION INTRAMUSCULAR; INTRAVENOUS; SUBCUTANEOUS at 15:14

## 2019-03-28 RX ADMIN — PROMETHAZINE HYDROCHLORIDE 12.5 MG: 25 INJECTION INTRAMUSCULAR; INTRAVENOUS at 17:46

## 2019-03-28 RX ADMIN — Medication 5 MG: at 14:44

## 2019-03-28 RX ADMIN — ACETAMINOPHEN 975 MG: 325 TABLET, FILM COATED ORAL at 21:47

## 2019-03-28 RX ADMIN — PROCHLORPERAZINE EDISYLATE 5 MG: 5 INJECTION INTRAMUSCULAR; INTRAVENOUS at 16:54

## 2019-03-28 RX ADMIN — Medication 10 MG: at 14:39

## 2019-03-28 RX ADMIN — FENTANYL CITRATE 25 MCG: 50 INJECTION, SOLUTION INTRAMUSCULAR; INTRAVENOUS at 13:53

## 2019-03-28 RX ADMIN — METRONIDAZOLE 500 MG: 500 INJECTION, SOLUTION INTRAVENOUS at 14:02

## 2019-03-28 RX ADMIN — PHENYLEPHRINE HYDROCHLORIDE 200 MCG: 10 INJECTION, SOLUTION INTRAMUSCULAR; INTRAVENOUS; SUBCUTANEOUS at 14:27

## 2019-03-28 RX ADMIN — SCOPALAMINE 1 PATCH: 1 PATCH, EXTENDED RELEASE TRANSDERMAL at 17:25

## 2019-03-28 RX ADMIN — ONDANSETRON 4 MG: 2 INJECTION INTRAMUSCULAR; INTRAVENOUS at 15:39

## 2019-03-28 RX ADMIN — HYDROMORPHONE HYDROCHLORIDE 0.5 MG: 1 INJECTION, SOLUTION INTRAMUSCULAR; INTRAVENOUS; SUBCUTANEOUS at 21:48

## 2019-03-28 RX ADMIN — FENTANYL CITRATE 50 MCG: 50 INJECTION INTRAMUSCULAR; INTRAVENOUS at 16:39

## 2019-03-28 RX ADMIN — SUGAMMADEX 200 MG: 100 INJECTION, SOLUTION INTRAVENOUS at 16:05

## 2019-03-28 RX ADMIN — MIDAZOLAM 2 MG: 1 INJECTION INTRAMUSCULAR; INTRAVENOUS at 13:38

## 2019-03-28 RX ADMIN — PROPOFOL 50 MG: 10 INJECTION, EMULSION INTRAVENOUS at 14:03

## 2019-03-28 RX ADMIN — FENTANYL CITRATE 25 MCG: 50 INJECTION, SOLUTION INTRAMUSCULAR; INTRAVENOUS at 13:42

## 2019-03-28 RX ADMIN — FENTANYL CITRATE 50 MCG: 50 INJECTION, SOLUTION INTRAMUSCULAR; INTRAVENOUS at 14:23

## 2019-03-28 RX ADMIN — METRONIDAZOLE 500 MG: 500 INJECTION, SOLUTION INTRAVENOUS at 21:46

## 2019-03-28 RX ADMIN — Medication 10 MG: at 14:33

## 2019-03-28 RX ADMIN — Medication 0.2 MG: at 17:38

## 2019-03-28 RX ADMIN — CIPROFLOXACIN 400 MG: 2 INJECTION INTRAVENOUS at 13:50

## 2019-03-28 RX ADMIN — HEPARIN SODIUM 5000 UNITS: 10000 INJECTION, SOLUTION INTRAVENOUS; SUBCUTANEOUS at 13:37

## 2019-03-28 RX ADMIN — PHENYLEPHRINE HYDROCHLORIDE 200 MCG: 10 INJECTION, SOLUTION INTRAMUSCULAR; INTRAVENOUS; SUBCUTANEOUS at 14:32

## 2019-03-28 RX ADMIN — PHENYLEPHRINE HYDROCHLORIDE 200 MCG: 10 INJECTION, SOLUTION INTRAMUSCULAR; INTRAVENOUS; SUBCUTANEOUS at 14:36

## 2019-03-28 RX ADMIN — FENTANYL CITRATE 100 MCG: 50 INJECTION, SOLUTION INTRAMUSCULAR; INTRAVENOUS at 14:15

## 2019-03-28 RX ADMIN — SODIUM CHLORIDE, POTASSIUM CHLORIDE, SODIUM LACTATE AND CALCIUM CHLORIDE: 600; 310; 30; 20 INJECTION, SOLUTION INTRAVENOUS at 19:12

## 2019-03-28 RX ADMIN — SODIUM CHLORIDE, POTASSIUM CHLORIDE, SODIUM LACTATE AND CALCIUM CHLORIDE: 600; 310; 30; 20 INJECTION, SOLUTION INTRAVENOUS at 15:34

## 2019-03-28 RX ADMIN — HYDROMORPHONE HYDROCHLORIDE 0.5 MG: 1 INJECTION, SOLUTION INTRAMUSCULAR; INTRAVENOUS; SUBCUTANEOUS at 15:19

## 2019-03-28 RX ADMIN — CIPROFLOXACIN 400 MG: 2 INJECTION INTRAVENOUS at 14:00

## 2019-03-28 RX ADMIN — PHENYLEPHRINE HYDROCHLORIDE 100 MCG: 10 INJECTION, SOLUTION INTRAMUSCULAR; INTRAVENOUS; SUBCUTANEOUS at 14:41

## 2019-03-28 RX ADMIN — SODIUM CHLORIDE, POTASSIUM CHLORIDE, SODIUM LACTATE AND CALCIUM CHLORIDE: 600; 310; 30; 20 INJECTION, SOLUTION INTRAVENOUS at 14:29

## 2019-03-28 RX ADMIN — FENTANYL CITRATE 50 MCG: 50 INJECTION, SOLUTION INTRAMUSCULAR; INTRAVENOUS at 16:29

## 2019-03-28 RX ADMIN — PHENYLEPHRINE HYDROCHLORIDE 100 MCG: 10 INJECTION, SOLUTION INTRAMUSCULAR; INTRAVENOUS; SUBCUTANEOUS at 15:23

## 2019-03-28 RX ADMIN — PHENYLEPHRINE HYDROCHLORIDE 100 MCG: 10 INJECTION, SOLUTION INTRAMUSCULAR; INTRAVENOUS; SUBCUTANEOUS at 14:59

## 2019-03-28 RX ADMIN — Medication 0.3 MG: at 17:14

## 2019-03-28 RX ADMIN — SODIUM CHLORIDE, POTASSIUM CHLORIDE, SODIUM LACTATE AND CALCIUM CHLORIDE: 600; 310; 30; 20 INJECTION, SOLUTION INTRAVENOUS at 12:42

## 2019-03-28 RX ADMIN — ALBUTEROL SULFATE 2.5 MG: 2.5 SOLUTION RESPIRATORY (INHALATION) at 12:48

## 2019-03-28 RX ADMIN — PHENYLEPHRINE HYDROCHLORIDE 100 MCG: 10 INJECTION, SOLUTION INTRAMUSCULAR; INTRAVENOUS; SUBCUTANEOUS at 16:07

## 2019-03-28 RX ADMIN — Medication 0.5 MG: at 16:44

## 2019-03-28 RX ADMIN — PHENYLEPHRINE HYDROCHLORIDE 100 MCG: 10 INJECTION, SOLUTION INTRAMUSCULAR; INTRAVENOUS; SUBCUTANEOUS at 14:12

## 2019-03-28 RX ADMIN — GABAPENTIN 800 MG: 300 CAPSULE ORAL at 21:48

## 2019-03-28 RX ADMIN — ACETAMINOPHEN 975 MG: 325 TABLET, FILM COATED ORAL at 12:43

## 2019-03-28 RX ADMIN — PHENYLEPHRINE HYDROCHLORIDE 100 MCG: 10 INJECTION, SOLUTION INTRAMUSCULAR; INTRAVENOUS; SUBCUTANEOUS at 15:21

## 2019-03-28 RX ADMIN — ALVIMOPAN 12 MG: 12 CAPSULE ORAL at 12:43

## 2019-03-28 RX ADMIN — PHENYLEPHRINE HYDROCHLORIDE 100 MCG: 10 INJECTION, SOLUTION INTRAMUSCULAR; INTRAVENOUS; SUBCUTANEOUS at 13:56

## 2019-03-28 RX ADMIN — ROCURONIUM BROMIDE 30 MG: 10 INJECTION INTRAVENOUS at 13:42

## 2019-03-28 RX ADMIN — FENTANYL CITRATE 50 MCG: 50 INJECTION, SOLUTION INTRAMUSCULAR; INTRAVENOUS at 14:07

## 2019-03-28 RX ADMIN — PROPOFOL 100 MG: 10 INJECTION, EMULSION INTRAVENOUS at 13:42

## 2019-03-28 ASSESSMENT — ACTIVITIES OF DAILY LIVING (ADL): ADLS_ACUITY_SCORE: 14

## 2019-03-28 ASSESSMENT — COPD QUESTIONNAIRES: COPD: 1

## 2019-03-28 ASSESSMENT — LIFESTYLE VARIABLES: TOBACCO_USE: 1

## 2019-03-28 ASSESSMENT — MIFFLIN-ST. JEOR: SCORE: 1009.38

## 2019-03-28 NOTE — ANESTHESIA CARE TRANSFER NOTE
Patient: Anna Dyer    Procedure(s):  INTRAOPERATIVE COLONOSCOPY,  DIAGNOSTIC LAPAROSCOPY  LAPAROSCOPIC ASSISTED COLOSTOMY REVERSAL    Diagnosis: ATTENTION TO COLOSTOMY  Diagnosis Additional Information: No value filed.    Anesthesia Type:   General, ETT     Note:  Airway :Face Mask  Patient transferred to:PACU  Handoff Report: Identifed the Patient, Identified the Reponsible Provider, Reviewed the pertinent medical history, Discussed the surgical course, Reviewed Intra-OP anesthesia mangement and issues during anesthesia, Set expectations for post-procedure period and Allowed opportunity for questions and acknowledgement of understanding      Vitals: (Last set prior to Anesthesia Care Transfer)    CRNA VITALS  3/28/2019 1548 - 3/28/2019 1628      3/28/2019             Pulse:  78    SpO2:  100 %    Resp Rate (observed):  14                Electronically Signed By: ILENE Fernandez CRNA  March 28, 2019  4:28 PM

## 2019-03-28 NOTE — PROGRESS NOTES
Admission medication history interview status for the 3/28/2019  admission is complete. See EPIC admission navigator for prior to admission medications     Medication history source reliability:Good    Medication history interview source(s):Patient    Medication history resources (including written lists, pill bottles, clinic record):mailed in list    Primary pharmacy[facility]    Additional medication history information not noted on PTA med list :  Patient completed pre-op antibiotics:  Metronidazole 500mg tid X1 day - last dose on 3/27/19 at 2300  Neomycin 1000mg tid X 1 day - last dose on 3/27/19 at 2300      Time spent in this activity: 45 minutes    Prior to Admission medications    Medication Sig Last Dose Taking? Auth Provider   albuterol (PROAIR HFA/PROVENTIL HFA/VENTOLIN HFA) 108 (90 Base) MCG/ACT inhaler Inhale 2 puffs into the lungs 2 times daily And every 4 hours as needed 3/28/2019 at 0700 Yes Tello Finney MD   amLODIPine (NORVASC) 5 MG tablet Take 1 tablet (5 mg) by mouth daily 3/28/2019 at 0700 Yes Tello Finney MD   buPROPion (WELLBUTRIN SR) 150 MG 12 hr tablet Take 150 mg by mouth 2 times daily (patient takes in the Morning and before 15:00) 3/28/2019 at 0700 Yes Reported, Patient   diphenhydrAMINE (BENADRYL) 25 MG tablet Take 25 mg by mouth nightly as needed  3/26/2019 at prn Yes Reported, Patient   gabapentin (NEURONTIN) 400 MG capsule Take 400 mg by mouth every morning 3/28/2019 at 0700 Yes Reported, Patient   gabapentin (NEURONTIN) 400 MG capsule Take 800 mg by mouth every evening (2 x 400 mg = 800 mg dose) 3/27/2019 at pm Yes Reported, Patient   Homeopathic Products (LEG CRAMP RELIEF PO) Take 2 tablets by mouth daily as needed 3/28/2019 at 0700 Yes Reported, Patient   lidocaine-prilocaine (EMLA) 2.5-2.5 % external cream Apply topically daily as needed (Apply one hour prior to acccess)  more than a week at prn Yes Reported, Patient   LORazepam (ATIVAN) 0.5 MG tablet Take 0.5 mg by mouth  every morning  3/28/2019 at 0700 Yes Reported, Patient   losartan-hydrochlorothiazide (HYZAAR) 100-25 MG tablet TAKE 1 TABLET DAILY 3/26/2019 Yes Claudia Curry MD   naproxen sodium (ANAPROX) 220 MG tablet Take 440 mg by mouth daily as needed for moderate pain more than a week at prn Yes Reported, Patient   nicotine (NICODERM CQ) 21 MG/24HR 24 hr patch Place 1 patch onto the skin every 24 hours 3/28/2019 at 0700 Yes Reported, Patient

## 2019-03-28 NOTE — ANESTHESIA POSTPROCEDURE EVALUATION
Patient: Anna Dyer    Procedure(s):  INTRAOPERATIVE COLONOSCOPY,  DIAGNOSTIC LAPAROSCOPY  LAPAROSCOPIC ASSISTED COLOSTOMY REVERSAL    Diagnosis:ATTENTION TO COLOSTOMY  Diagnosis Additional Information: No value filed.    Anesthesia Type:  General, ETT    Note:  Anesthesia Post Evaluation    Patient location during evaluation: PACU  Patient participation: Able to fully participate in evaluation  Level of consciousness: awake, sleepy but conscious and responsive to verbal stimuli  Pain management: adequate  Airway patency: patent  Cardiovascular status: acceptable  Respiratory status: acceptable  Hydration status: acceptable  PONV: controlled     Anesthetic complications: None          Last vitals:  Vitals:    03/28/19 1730 03/28/19 1740 03/28/19 1750   BP: 133/69 143/66 137/61   Pulse: 76 78 78   Resp: 16 10 21   Temp:  36.3  C (97.3  F)    SpO2: 100% 99% 99%         Electronically Signed By: Luis E Brand MD  March 28, 2019  6:13 PM

## 2019-03-28 NOTE — BRIEF OP NOTE
[facility]    Brief Operative Note    Pre-operative diagnosis: ATTENTION TO COLOSTOMY  Post-operative diagnosis same  Procedure: Procedure(s):  INTRAOPERATIVE COLONOSCOPY,  DIAGNOSTIC LAPAROSCOPY  LAPAROSCOPIC ASSISTED COLOSTOMY REVERSAL  Surgeon: Surgeon(s) and Role:     * Jesus Caba MD - Primary     * Reyna Orosco PA-C - Assisting     * John Hatfield MD - Fellow - Assisting  Anesthesia: General   Estimated blood loss: Less than 50 ml  Drains: None  Specimens:   ID Type Source Tests Collected by Time Destination   A : PERITONEAL BIOPSY Tissue Peritoneum SURGICAL PATHOLOGY EXAM Jesus Caba MD 3/28/2019  2:50 PM    B : COLOSTOMY Tissue Other SURGICAL PATHOLOGY EXAM Jesus Caba MD 3/28/2019  3:07 PM      Findings:   No evidence of peritoneal disease. Prolapsed, viable colostomy. Isoperistaltic side-to-side colo-colonic anastamosis.  .  Complications: None.  Implants: None.    Condition on discharge from OR: Satisfactory    Reyna Orosco PA-C   [facility]   [phone].        ADDENDUM:    PATIENT DATA  Indicate Y or N:  Home O2 No  Hemodialysis  No  Transplant patient  No  Cirrhosis  No  Steroids in last 30 days  No  Immunomodulators in last 30 days  No  Anticoagulation at time of surgery  No  Prior abdominal surgery  Yes  Pelvic irradiation  No    Albumin within 30 days if known  3.6  Hgb within 30 days if known   Hemoglobin   Date Value Ref Range Status   03/28/2019 14.2 11.7 - 15.7 g/dL Final   ]  Cr within 30 days if known    Creatinine   Date Value Ref Range Status   03/28/2019 1.31 (H) 0.52 - 1.04 mg/dL Final   ]  Body mass index is 23.41 kg/m .      OR DATA  Emergent  No   <24 hours  No   <1 week  No  Bowel Prep Yes  Antibiotics  Yes  DVT prophylaxis    Heparin  Yes   SCD  Yes   None  No  Drain  No  ASA (1,2,3,4) 3  OR time (min) 155  Stents  No  Transfuse >/= 2U  No  Anastomosis   Stapled  Yes   Handsewn  No  Leak Test     Positive  No   Negative  No   Not done  Yes     Route (Have a good day)

## 2019-03-28 NOTE — ANESTHESIA PREPROCEDURE EVALUATION
Anesthesia Pre-Procedure Evaluation    Patient: Anna Dyer   MRN: [id number] : 1945          Preoperative Diagnosis: ATTENTION TO COLOSTOMY    Procedure(s):  INTRAOPERATIVE COLONOSCOPY,  DIAGNOSTIC LAPAROSCOPY  LAPAROSCOPIC COLOSTOMY TAKEDOWN, POSSIBLE OPEN    Past Medical History:   Diagnosis Date     Cancer (H)     metastatic colorectal adenocarcinoma     Hypertension     No cardiologist     RLS (restless legs syndrome)      Past Surgical History:   Procedure Laterality Date     BIOPSY      liver     BREAST SURGERY      breast implants     COSMETIC SURGERY      facelift and tummy tuck     GYN SURGERY      tubal     INSERT PORT VASCULAR ACCESS N/A 3/24/2017    Procedure: INSERT PORT VASCULAR ACCESS;  Surgeon: Yemi Ordaz MD;  Location: [facility] OR     LAPAROSCOPIC ASSISTED COLECTOMY LEFT (DESCENDING) N/A 2018    Procedure: LAPAROSCOPIC ASSISTED COLECTOMY LEFT (DESCENDING);;  Surgeon: Maddie Baron MD;  Location: [facility] OR     LAPAROTOMY EXPLORATORY N/A 2018    Procedure: LAPAROTOMY EXPLORATORY;  exploratory laparoscopy, left hemicolectomy, transverse colostomy;  Surgeon: Maddie Baron MD;  Location: [facility] OR     EKG  Sinus  Rhythm   -Right atrial enlargement.    Right atrial abnormality and rotation -possible pulmonary disease.     ABNORMAL   Anesthesia Evaluation     .             ROS/MED HX    ENT/Pulmonary: Comment: Wheezing on patient's preop physical    (+)tobacco use, Current use COPD, , . .    Neurologic:       Cardiovascular:     (+) Dyslipidemia, hypertension----. : . . . :. .       METS/Exercise Tolerance:     Hematologic:         Musculoskeletal: Comment: Restless leg        GI/Hepatic:     (+) liver disease,       Renal/Genitourinary:         Endo:         Psychiatric:         Infectious Disease:         Malignancy:   (+) Malignancy History of GI  GI CA status post Chemo and Surgery, Stage 4 colon cancer with mets to liver        Other:                       "    Physical Exam  Normal systems: cardiovascular, pulmonary and dental    Airway   Mallampati: II  TM distance: >3 FB  Neck ROM: full    Dental     Cardiovascular   Rhythm and rate: regular and normal      Pulmonary             Lab Results   Component Value Date    WBC 6.2 05/08/2009    HGB 10.8 (L) 01/26/2018    HCT 38.2 05/08/2009     (L) 01/28/2018     01/26/2018    POTASSIUM 3.4 01/26/2018    CHLORIDE 102 01/26/2018    CO2 27 01/26/2018    BUN 7 01/26/2018    CR 0.70 01/26/2018     (H) 01/26/2018    CAITY 8.4 (L) 01/26/2018    ALBUMIN 3.6 03/07/2017    PROTTOTAL 8.3 03/07/2017    ALT 7 01/03/2018    AST 21 01/03/2018     (H) 05/08/2009    ALKPHOS 122 03/07/2017    BILITOTAL 0.4 03/07/2017    PTT 28 03/07/2017    INR 0.98 03/07/2017    TSH 2.88 05/08/2009       Preop Vitals  BP Readings from Last 3 Encounters:   03/11/19 144/76   01/02/19 130/82   10/22/18 102/62    Pulse Readings from Last 3 Encounters:   03/11/19 88   01/02/19 86   10/22/18 94      Resp Readings from Last 3 Encounters:   03/11/19 16   09/25/18 16   04/06/18 16    SpO2 Readings from Last 3 Encounters:   03/11/19 96%   01/02/19 97%   09/25/18 96%      Temp Readings from Last 1 Encounters:   03/11/19 36.7  C (98  F) (Oral)    Ht Readings from Last 1 Encounters:   01/02/19 1.549 m (5' 1\")      Wt Readings from Last 1 Encounters:   03/11/19 56.2 kg (123 lb 14.4 oz)    Estimated body mass index is 23.41 kg/m  as calculated from the following:    Height as of 1/2/19: 1.549 m (5' 1\").    Weight as of 3/11/19: 56.2 kg (123 lb 14.4 oz).       Anesthesia Plan      History & Physical Review  History and physical reviewed and following examination; no interval change.    ASA Status:  3 .        Plan for General and ETT with Intravenous induction.   PONV prophylaxis:  Ondansetron (or other 5HT-3) and Dexamethasone or Solumedrol  Additional equipment: 2nd IV     -preop albuterol neb      Postoperative Care      Consents  Anesthetic " plan, risks, benefits and alternatives discussed with:  Patient..                 Luis E Brand MD

## 2019-03-28 NOTE — OR NURSING
Spoke Dr. Brand regarding pts continued nausea after 8mg of zofran and 5mg of compazine. Order obtained for Scop patch and promethazine IV injection 12.5 mg

## 2019-03-29 LAB
ANION GAP SERPL CALCULATED.3IONS-SCNC: 7 MMOL/L (ref 3–14)
BUN SERPL-MCNC: 12 MG/DL (ref 7–30)
CALCIUM SERPL-MCNC: 7.6 MG/DL (ref 8.5–10.1)
CHLORIDE SERPL-SCNC: 99 MMOL/L (ref 94–109)
CO2 SERPL-SCNC: 24 MMOL/L (ref 20–32)
CREAT SERPL-MCNC: 0.98 MG/DL (ref 0.52–1.04)
ERYTHROCYTE [DISTWIDTH] IN BLOOD BY AUTOMATED COUNT: 14.7 % (ref 10–15)
GFR SERPL CREATININE-BSD FRML MDRD: 57 ML/MIN/{1.73_M2}
GLUCOSE BLDC GLUCOMTR-MCNC: 136 MG/DL (ref 70–99)
GLUCOSE SERPL-MCNC: 127 MG/DL (ref 70–99)
HCT VFR BLD AUTO: 34.1 % (ref 35–47)
HGB BLD-MCNC: 12 G/DL (ref 11.7–15.7)
MAGNESIUM SERPL-MCNC: 1.5 MG/DL (ref 1.6–2.3)
MCH RBC QN AUTO: 34 PG (ref 26.5–33)
MCHC RBC AUTO-ENTMCNC: 35.2 G/DL (ref 31.5–36.5)
MCV RBC AUTO: 97 FL (ref 78–100)
PHOSPHATE SERPL-MCNC: 2.7 MG/DL (ref 2.5–4.5)
PLATELET # BLD AUTO: 95 10E9/L (ref 150–450)
POTASSIUM SERPL-SCNC: 3.6 MMOL/L (ref 3.4–5.3)
RBC # BLD AUTO: 3.53 10E12/L (ref 3.8–5.2)
SODIUM SERPL-SCNC: 130 MMOL/L (ref 133–144)
WBC # BLD AUTO: 8.2 10E9/L (ref 4–11)

## 2019-03-29 PROCEDURE — A9270 NON-COVERED ITEM OR SERVICE: HCPCS | Mod: GY | Performed by: PHYSICIAN ASSISTANT

## 2019-03-29 PROCEDURE — 80048 BASIC METABOLIC PNL TOTAL CA: CPT | Performed by: COLON & RECTAL SURGERY

## 2019-03-29 PROCEDURE — 12000000 ZZH R&B MED SURG/OB

## 2019-03-29 PROCEDURE — A9270 NON-COVERED ITEM OR SERVICE: HCPCS | Mod: GY | Performed by: COLON & RECTAL SURGERY

## 2019-03-29 PROCEDURE — 00000146 ZZHCL STATISTIC GLUCOSE BY METER IP

## 2019-03-29 PROCEDURE — 25000132 ZZH RX MED GY IP 250 OP 250 PS 637: Mod: GY | Performed by: COLON & RECTAL SURGERY

## 2019-03-29 PROCEDURE — 25000128 H RX IP 250 OP 636: Performed by: COLON & RECTAL SURGERY

## 2019-03-29 PROCEDURE — 25000132 ZZH RX MED GY IP 250 OP 250 PS 637: Mod: GY | Performed by: PHYSICIAN ASSISTANT

## 2019-03-29 PROCEDURE — 84100 ASSAY OF PHOSPHORUS: CPT | Performed by: COLON & RECTAL SURGERY

## 2019-03-29 PROCEDURE — 85027 COMPLETE CBC AUTOMATED: CPT | Performed by: COLON & RECTAL SURGERY

## 2019-03-29 PROCEDURE — 83735 ASSAY OF MAGNESIUM: CPT | Performed by: COLON & RECTAL SURGERY

## 2019-03-29 RX ADMIN — ALBUTEROL SULFATE 2 PUFF: 90 INHALANT RESPIRATORY (INHALATION) at 22:27

## 2019-03-29 RX ADMIN — CIPROFLOXACIN 400 MG: 2 INJECTION, SOLUTION INTRAVENOUS at 01:05

## 2019-03-29 RX ADMIN — GABAPENTIN 400 MG: 300 CAPSULE ORAL at 08:40

## 2019-03-29 RX ADMIN — ENOXAPARIN SODIUM 40 MG: 40 INJECTION SUBCUTANEOUS at 13:01

## 2019-03-29 RX ADMIN — DIPHENHYDRAMINE HYDROCHLORIDE 12.5 MG: 50 INJECTION, SOLUTION INTRAMUSCULAR; INTRAVENOUS at 21:41

## 2019-03-29 RX ADMIN — ALBUTEROL SULFATE 2 PUFF: 90 INHALANT RESPIRATORY (INHALATION) at 08:48

## 2019-03-29 RX ADMIN — GABAPENTIN 800 MG: 300 CAPSULE ORAL at 21:39

## 2019-03-29 RX ADMIN — METRONIDAZOLE 500 MG: 500 INJECTION, SOLUTION INTRAVENOUS at 06:14

## 2019-03-29 RX ADMIN — METRONIDAZOLE 500 MG: 500 INJECTION, SOLUTION INTRAVENOUS at 15:06

## 2019-03-29 RX ADMIN — OXYCODONE HYDROCHLORIDE 5 MG: 5 TABLET ORAL at 08:46

## 2019-03-29 RX ADMIN — LORAZEPAM 0.5 MG: 0.5 TABLET ORAL at 08:46

## 2019-03-29 RX ADMIN — ALVIMOPAN 12 MG: 12 CAPSULE ORAL at 08:40

## 2019-03-29 RX ADMIN — ACETAMINOPHEN 975 MG: 325 TABLET, FILM COATED ORAL at 15:06

## 2019-03-29 RX ADMIN — NICOTINE 1 PATCH: 7 PATCH, EXTENDED RELEASE TRANSDERMAL at 15:05

## 2019-03-29 RX ADMIN — HYDROMORPHONE HYDROCHLORIDE 0.5 MG: 1 INJECTION, SOLUTION INTRAMUSCULAR; INTRAVENOUS; SUBCUTANEOUS at 01:10

## 2019-03-29 RX ADMIN — CIPROFLOXACIN 400 MG: 2 INJECTION, SOLUTION INTRAVENOUS at 16:16

## 2019-03-29 RX ADMIN — AMLODIPINE BESYLATE 5 MG: 5 TABLET ORAL at 08:48

## 2019-03-29 RX ADMIN — BUPROPION HYDROCHLORIDE 150 MG: 150 TABLET, EXTENDED RELEASE ORAL at 06:09

## 2019-03-29 RX ADMIN — ALVIMOPAN 12 MG: 12 CAPSULE ORAL at 21:40

## 2019-03-29 RX ADMIN — ACETAMINOPHEN 975 MG: 325 TABLET, FILM COATED ORAL at 06:09

## 2019-03-29 RX ADMIN — ACETAMINOPHEN 975 MG: 325 TABLET, FILM COATED ORAL at 21:39

## 2019-03-29 RX ADMIN — BUPROPION HYDROCHLORIDE 150 MG: 150 TABLET, EXTENDED RELEASE ORAL at 15:06

## 2019-03-29 ASSESSMENT — ACTIVITIES OF DAILY LIVING (ADL)
ADLS_ACUITY_SCORE: 13
ADLS_ACUITY_SCORE: 14

## 2019-03-29 NOTE — PLAN OF CARE
A&O. VSS. CMS intact. Weened off 02. BS hypo. Not passing flatus. Midline IWONA. Dressing marked drainage, erythema.lap sites x2, liquid bandage. IV dilaudid for pain. Assist 1-2. Full liquid diet. Montesinos.

## 2019-03-29 NOTE — PLAN OF CARE
VSS. Aox4. Tolerating full liquid diet. No bm, no flatus. Dressing changed and cota removed. Pain controlled with tylenol and oxy. Ambulate in hallway x3 w/ assist of 1. Continue to monitor.

## 2019-03-29 NOTE — PROVIDER NOTIFICATION
Paged Dr. Caba regarding pt's plt count of 95 and if okay to give lovenox.    Order to give, will re-check CBC tomorrow

## 2019-03-29 NOTE — PROGRESS NOTES
"COLON & RECTAL SURGERY  PROGRESS NOTE    March 29, 2019  Post-op Day # 1    SUBJECTIVE:  Pt resting in bed upon arrival. She denies pain besides when she coughs. She is tolerating FLD, denies N/V. No flatus or BM yet. Pt is apprehensive to \"use her colon\". Cota patent.     OBJECTIVE:  Temp:  [97.1  F (36.2  C)-97.9  F (36.6  C)] 97.6  F (36.4  C)  Pulse:  [75-92] 90  Heart Rate:  [75-92] 80  Resp:  [8-23] 16  BP: ()/(50-85) 93/50  FiO2 (%):  [1 %] 1 %  SpO2:  [92 %-100 %] 94 %    Intake/Output Summary (Last 24 hours) at 3/29/2019 0844  Last data filed at 3/29/2019 0616  Gross per 24 hour   Intake 3907 ml   Output 825 ml   Net 3082 ml       GENERAL:  Awake, sleeping in bed, no acute distress  HEAD: Normocephalic atraumatic  SCLERA: Anicteric  EXTREMITIES: Warm and well perfused  ABDOMEN:  Soft, appropriately tender, non-distended. No guarding, rigidity, or peritoneal signs. Colostomy site covered with serosanguinous gauze, packed.   INCISION:  Lap incisions C/d/i, colostomy site packed and bandaged    LABS:  Lab Results   Component Value Date    WBC 8.2 03/29/2019     Lab Results   Component Value Date    HGB 12.0 03/29/2019     Lab Results   Component Value Date    HCT 34.1 03/29/2019     Lab Results   Component Value Date    PLT 95 03/29/2019     Last Basic Metabolic Panel:  Lab Results   Component Value Date     03/29/2019      Lab Results   Component Value Date    POTASSIUM 3.6 03/29/2019     Lab Results   Component Value Date    CHLORIDE 99 03/29/2019     Lab Results   Component Value Date    CAITY 7.6 03/29/2019     Lab Results   Component Value Date    CO2 24 03/29/2019     Lab Results   Component Value Date    BUN 12 03/29/2019     Lab Results   Component Value Date    CR 0.98 03/29/2019     Lab Results   Component Value Date     03/29/2019       ASSESSMENT/PLAN: POD 1, colostomy reversal. Pt doing well. AVSS, tolerating FLD, cota output adequate. Will continue cota and LR today.     1. " Continue FLD diet  2. PRN pain medication  3. Decrease IVF 50ml/hr  4. Continue Cota  5. OOB, ambulate  6. Lovenox for ppx      For questions/paging, please contact the CRS office at [phone].    Reyna Orosco PA-C  [facility]  Phone: [phone]    CRS Staff  I performed a history and physical examination of the patient and discussed their management with the physician assistant. I reviewed the physician assistants note and agree with the documented findings and plan of care.     Await return of bowel function.  Full liquid diet.  Remove cota.    Jesus Caba MD  [facility]  [phone] (office)  [phone] (pager)  [email or web address]

## 2019-03-29 NOTE — PLAN OF CARE
A/O x4, lethargic. VSS. +LS, on capno on 2LPM. Hypo active BS, -flatus, -BM. Montesinos catheter with AUOP. Abdominal incision with liquid bandage, IWONA. Lap sites x2 with liquid bandage. Colostomy take down dressing with marked drainage. Dangled. Full liquid diet, educated to take slow. Pain managed with PRN IV dilaudid x1, effective.

## 2019-03-29 NOTE — PHARMACY-CONSULT NOTE
Alvimopan (Entereg) Pharmacy Consult    Pharmacy has been consulted to review this patient and determine if they are an appropriate candidate for post-operative use of alvimopan.    Current approved uses for alvimopan include:  laproscopic, open, or converted partial bowel resection.    The following criteria must be met to be a post-operative candidate for alvimopan:    Must have received pre-operative dose of alvimopan    Surgery performed was an open, laproscopic, or converted partial bowel resection with primary anastomisis.    Patient must not have been on chronic narcotics before surgery  o Alvimopan contraindicated if patient has received therapeutic doses of opioids for >7 consecutive days before surgery  o Alvimopan not recommended if patient has received >3 opioid doses in the last 7 days before surgery (increase the risk of patient developing significant GI side effects)    No history of myocardial infarction    No bowel obstruction present (or recent surgery for bowel obstruction)    No End-stage renal disease present    No Severe hepatic impairment present    Patient did not have a pancreatic or gastric anastomosis    Ordering provider has received alvimopan educational materials    This patient does meet the criteria for appropriate use of alvimopan.    Dosing recommendation:  Take one 12 mg capsule by mouth twice daily for up to 15 total doses    1st dose given 30 minutes to 5 hours prior to surgery    Then one 12 mg capsule twice daily for up to 14 additional doses    STOP Alvimopan as soon as GI function returns (upon 1st bowel movement)    May NOT be continued as an outpatient  Note: Alvimopan is a hard gelatin capsule which may not be crushed or opened    Thank you,

## 2019-03-29 NOTE — OP NOTE
Procedure Date: 03/28/2019      PREOPERATIVE DIAGNOSES:   1. 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   2. History of metastatic colon cancer.      POSTOPERATIVE DIAGNOSES:   1. 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   2. History of metastatic colon cancer.      PROCEDURES PERFORMED:   1. Diagnostic laparoscopy.   2. Intraoperative colonoscopy.   3. Laparoscopic-assisted colostomy reversal.   4. Laparoscopic peritoneal biopsy.      STAFF SURGEON:  Jesus Caba MD      FIRST ASSISTANT:  John Hatfield MD, [facility] Colorectal Surgery Fellow      ANESTHESIA:  General.      ESTIMATED BLOOD LOSS:  50 mL.      SPECIMENS:   1. Colostomy.   2. Peritoneal biopsy.      FINDINGS:  There was no evidence of peritoneal disease.  The colostomy was viable but prolapsed secondary to the prep.  We performed an isoperistaltic side-to-side colocolonic anastomosis.  There were some small nodules seen in the area of the resection bed that were biopsied intraoperatively, and frozen section was negative.      INDICATIONS:  Anna is a 73-year-old female with a history of colon cancer.  She presented back in January with an obstructing splenic flexure tumor.  She underwent an exploratory laparoscopy, a laparoscopic left hemicolectomy, and end transverse colostomy.  She has undergone adjuvant therapy with FOLFOX and Avastin.  She had an excellent response with very minimal disease seen in the liver.  She desires to have a colostomy reversal.  She has reduced her smoking intake.  She has not had any chemotherapy for 1 month and has not been on Avastin for at least 2 months.  I recommended an intraoperative colonoscopy and a diagnostic laparoscopy to evaluate for peritoneal disease.  I advised her that if there was any peritoneal disease, we would not be able to proceed with the colostomy reversal.  The risk of surgery including the risk of bleeding,  infection, injury to adjacent structures, need for further surgery, anastomotic leak, complications of anesthesia, and even more serious complications were discussed.  She wished to proceed.      DESCRIPTION OF PROCEDURE:  After obtaining informed consent and after full bowel prep, the patient was brought to the operating room.  She was kept on the travel cart.  General anesthesia was induced.  She was intubated without difficulty.  We kept her in the supine position to perform a colonoscopy.  Her stoma had somewhat prolapsed due to the prep.  This was easily reduced once she was under anesthesia.  We then intubated the stoma with an adult colonoscope and advanced to the cecum with identification of the appendiceal orifice, the ileocecal valve, and the terminal ileum.  The scope was slowly withdrawn.  The prep was good.  There were no polyps or masses noted.  We then turned the patient to the left lateral decubitus position.  There was some dual manually evacuated from the rectum digitally.  The scope was then advanced into the anus and advanced into the descending colon to the top of the Marine stump.  The scope was withdrawn and again, no masses were seen.  There was some diverticular disease noted.      We then put the patient in the supine position on the operating table.  The Montesinos catheter was placed under sterile conditions.  Arms were tucked at her side.  She was placed in low modified lithotomy position.  All pressure points were inspected and padded appropriately.  She was secured to the table with tape across the chest, as well as the use of a Pigazzi pad.  The abdomen was shaved and prepped and draped in the usual sterile fashion.  A timeout was undertaken, which identified the patient and correct procedure.  We made a stab incision in the left upper quadrant.  A Veress needle was used to establish pneumoperitoneum.  We used a 5 mm Optiview trocar to enter into the abdomen.  We surveyed the abdomen,  and there were really no significant adhesions to the anterior abdominal wall.  Close inspection of the liver revealed no obvious metastatic lesions.  Close inspection of the peritoneal surfaces, as well as the omentum and small bowel, again, was similarly unremarkable.  We placed a 12 mm trocar in the supraumbilical position, as well as a right lower quadrant trocar to facilitate this exposure and eventually identified the rectal stump by placing the patient in steep Trendelenburg position.  We identified a couple of small nodules along the white line of Toldt that had been incised along the descending colon.  A Metzenbaum scissors was used to excise one of these nodules laparoscopically, and it was sent off to pathology for frozen section, which was negative.      We turned our attention to the rectal stump.  I incised the flimsy attachments to the left pelvic sidewall to mobilize the remaining sigmoid colon and the upper rectum in a lateral to medial fashion.  I should note that the uterus was identified and was normal, and bilateral ovaries were also noted to be normal.      We then put the patient back in the supine position.  We evacuated pneumoperitoneum.  We then mobilized the colostomy by incising at the mucocutaneous junction circumferentially around the stoma.  We easily got into a large parastomal hernia sac, and this nicely eviscerated the transverse colon after freeing it from the underlying fascia and the subcutaneous fat.  We elected to resect a small portion of the colostomy.  Electrocautery was used to isolate the bowel, and the intervening mesentery was divided between clamps and controlled with ties.  The colostomy was transected with a single firing of a 75 mm blue load MICHEAL stapler.  Specimen was sent off as stoma.  We were not really able to sufficiently deliver the top of the sigmoid stump up to the right-sided trephine to do the stoma takedown through that opening, so we extended our  supraumbilical incision down to below the umbilicus and just cephalad a little bit.  Dissection was taken down with electrocautery, and the fascia was incised in midline to open up the wound.  An Morgan wound retractor was used for excellent exposure.  We then delivered the transverse colon, as well as the sigmoid colon into this wound and swept the small bowel down into the pelvis.  After ensuring that the mesentery was in proper orientation, we lined the bowel up for a side-to-side functional isoperistaltic anastomosis.  Stay sutures of 3-0 Vicryl were placed to hold the bowel in alignment.  Antimesenteric enterotomies were made in the more proximal portion of the bowel.  The stapler was delivered into the bowel, and the stapler was closed and fired to create a side-to-side functional end-to-end anastomosis.  The common enterotomy was then closed in 2 layers with interrupted 3-0 Vicryl sutures, and full-thickness bites followed by Lembert sutures of seromuscular bites for the second layer.  The abdomen was copiously irrigated.      We then turned our attention to closing the trephine in the parastomal hernia defect in the right mid abdomen.  This was closed in 2 layers with interrupted #1 Vicryl sutures in a figure-of-eight fashion from both the abdominal field, as well as from externally.  The midline incision was then closed with #1 running suture of looped 0 PDS.  Skin and subcutaneous fat was irrigated.  All the port sites and the midline incision were closed with 4-0 Vicryl in a subcuticular fashion.  The ostomy site was closed with 2-0 Vicryl in a pursestring fashion, partially closing off this wound.  This was packed with gauze.  The patient was placed in supine position, awakened, extubated and transferred to the PACU in stable condition.  Lap, sponge and needle counts were correct at the end of the case x2.            LOKESH JOSÉ MD             D: 03/29/2019   T: 03/29/2019   MT: ANGELA      Name:      ANTONY HALEY   MRN:      [id number]        Account:        [id number]   :      1945           Procedure Date: 2019      Document: [id number]       cc: Jesus Caba MD       [facility]

## 2019-03-30 LAB
GLUCOSE SERPL-MCNC: 107 MG/DL (ref 70–99)
MAGNESIUM SERPL-MCNC: 1.6 MG/DL (ref 1.6–2.3)

## 2019-03-30 PROCEDURE — A9270 NON-COVERED ITEM OR SERVICE: HCPCS | Mod: GY | Performed by: PHYSICIAN ASSISTANT

## 2019-03-30 PROCEDURE — 25000128 H RX IP 250 OP 636: Performed by: COLON & RECTAL SURGERY

## 2019-03-30 PROCEDURE — A9270 NON-COVERED ITEM OR SERVICE: HCPCS | Mod: GY | Performed by: COLON & RECTAL SURGERY

## 2019-03-30 PROCEDURE — 12000000 ZZH R&B MED SURG/OB

## 2019-03-30 PROCEDURE — 25000132 ZZH RX MED GY IP 250 OP 250 PS 637: Mod: GY | Performed by: PHYSICIAN ASSISTANT

## 2019-03-30 PROCEDURE — 25000132 ZZH RX MED GY IP 250 OP 250 PS 637: Mod: GY | Performed by: COLON & RECTAL SURGERY

## 2019-03-30 PROCEDURE — 25800030 ZZH RX IP 258 OP 636: Performed by: COLON & RECTAL SURGERY

## 2019-03-30 PROCEDURE — 83735 ASSAY OF MAGNESIUM: CPT | Performed by: COLON & RECTAL SURGERY

## 2019-03-30 PROCEDURE — 82947 ASSAY GLUCOSE BLOOD QUANT: CPT | Performed by: COLON & RECTAL SURGERY

## 2019-03-30 RX ORDER — MAGNESIUM SULFATE HEPTAHYDRATE 40 MG/ML
4 INJECTION, SOLUTION INTRAVENOUS ONCE
Status: COMPLETED | OUTPATIENT
Start: 2019-03-30 | End: 2019-03-30

## 2019-03-30 RX ORDER — DEXTROSE MONOHYDRATE, SODIUM CHLORIDE, AND POTASSIUM CHLORIDE 50; 1.49; 4.5 G/1000ML; G/1000ML; G/1000ML
INJECTION, SOLUTION INTRAVENOUS CONTINUOUS
Status: DISCONTINUED | OUTPATIENT
Start: 2019-03-30 | End: 2019-04-01

## 2019-03-30 RX ORDER — KETOROLAC TROMETHAMINE 15 MG/ML
15 INJECTION, SOLUTION INTRAMUSCULAR; INTRAVENOUS EVERY 6 HOURS PRN
Status: DISPENSED | OUTPATIENT
Start: 2019-03-30 | End: 2019-04-04

## 2019-03-30 RX ADMIN — NICOTINE 1 PATCH: 7 PATCH, EXTENDED RELEASE TRANSDERMAL at 08:31

## 2019-03-30 RX ADMIN — ALBUTEROL SULFATE 2 PUFF: 90 INHALANT RESPIRATORY (INHALATION) at 08:30

## 2019-03-30 RX ADMIN — ALBUTEROL SULFATE 2 PUFF: 90 INHALANT RESPIRATORY (INHALATION) at 20:29

## 2019-03-30 RX ADMIN — ALVIMOPAN 12 MG: 12 CAPSULE ORAL at 20:26

## 2019-03-30 RX ADMIN — ACETAMINOPHEN 975 MG: 325 TABLET, FILM COATED ORAL at 06:09

## 2019-03-30 RX ADMIN — ACETAMINOPHEN 975 MG: 325 TABLET, FILM COATED ORAL at 15:21

## 2019-03-30 RX ADMIN — KETOROLAC TROMETHAMINE 15 MG: 15 INJECTION, SOLUTION INTRAMUSCULAR; INTRAVENOUS at 18:10

## 2019-03-30 RX ADMIN — ENOXAPARIN SODIUM 40 MG: 40 INJECTION SUBCUTANEOUS at 08:31

## 2019-03-30 RX ADMIN — AMLODIPINE BESYLATE 5 MG: 5 TABLET ORAL at 08:27

## 2019-03-30 RX ADMIN — ALVIMOPAN 12 MG: 12 CAPSULE ORAL at 08:27

## 2019-03-30 RX ADMIN — BUPROPION HYDROCHLORIDE 150 MG: 150 TABLET, EXTENDED RELEASE ORAL at 06:09

## 2019-03-30 RX ADMIN — BUPROPION HYDROCHLORIDE 150 MG: 150 TABLET, EXTENDED RELEASE ORAL at 15:21

## 2019-03-30 RX ADMIN — LORAZEPAM 0.5 MG: 0.5 TABLET ORAL at 08:32

## 2019-03-30 RX ADMIN — GABAPENTIN 800 MG: 300 CAPSULE ORAL at 20:25

## 2019-03-30 RX ADMIN — ACETAMINOPHEN 975 MG: 325 TABLET, FILM COATED ORAL at 22:01

## 2019-03-30 RX ADMIN — GABAPENTIN 400 MG: 300 CAPSULE ORAL at 08:27

## 2019-03-30 RX ADMIN — OXYCODONE HYDROCHLORIDE 5 MG: 5 TABLET ORAL at 00:53

## 2019-03-30 RX ADMIN — POTASSIUM CHLORIDE, DEXTROSE MONOHYDRATE AND SODIUM CHLORIDE: 150; 5; 450 INJECTION, SOLUTION INTRAVENOUS at 08:29

## 2019-03-30 RX ADMIN — MAGNESIUM SULFATE IN WATER 4 G: 40 INJECTION, SOLUTION INTRAVENOUS at 08:39

## 2019-03-30 ASSESSMENT — ACTIVITIES OF DAILY LIVING (ADL)
ADLS_ACUITY_SCORE: 16
ADLS_ACUITY_SCORE: 15
ADLS_ACUITY_SCORE: 16
ADLS_ACUITY_SCORE: 16

## 2019-03-30 NOTE — PLAN OF CARE
Confused. Impulsive. Midline incision with ecchymosis. Take down dressing changed, attempted to educate pt on dressing changes, pt unable to retain information at this time. Pt aware that she is having hallucinations at times and easily redirectable. Pain managed with tylenol, toradol available PRN, has not received any narcotics since last night (3/29). Tolerating full liquids, denies nausea. Abd distended, kike flatus. Voiding. Up with Ax1.

## 2019-03-30 NOTE — PLAN OF CARE
Pt A&Ox4, forgetful at times.  VSS on RA.  Pain managed w/oxycodone.  CMS intact.  Up w/A1.  Voiding adequately.  Port-a-cath infusing LR at 50mL/hr.  Platelets = 95.  MD aware.  Magnesium = 1.5, recheck in AM.  Abdominal dressing CDI.  Full liquid diet.  Bowel sounds hypoactive, -flatus.  Nursing to continue to monitor.

## 2019-03-30 NOTE — PROGRESS NOTES
COLON & RECTAL SURGERY  PROGRESS NOTE    March 30, 2019  Post-op Day # 2 colostomy takedown    SUBJECTIVE:  No flatus/stool.  No nausea.  Pain with movement but no pain while in bed.  Voiding urine.      OBJECTIVE:  Temp:  [97.7  F (36.5  C)-98.9  F (37.2  C)] 98.3  F (36.8  C)  Pulse:  [88] 88  Heart Rate:  [77-89] 77  Resp:  [16] 16  BP: (102-128)/(43-63) 111/54  SpO2:  [90 %-95 %] 92 %    Intake/Output Summary (Last 24 hours) at 3/30/2019 0828  Last data filed at 3/30/2019 0727  Gross per 24 hour   Intake 720 ml   Output 1250 ml   Net -530 ml       GENERAL:  Awake, alert, no acute distress  ABDOMEN:  Soft, appropriately tender, mild distension/bloated  INCISION:  C/d/i, stoma site dressing to be changed today    LABS:  Lab Results   Component Value Date    WBC 8.2 03/29/2019     Lab Results   Component Value Date    HGB 12.0 03/29/2019     Lab Results   Component Value Date    HCT 34.1 03/29/2019     Lab Results   Component Value Date    PLT 95 03/29/2019     Last Basic Metabolic Panel:  Lab Results   Component Value Date     03/29/2019      Lab Results   Component Value Date    POTASSIUM 3.6 03/29/2019     Lab Results   Component Value Date    CHLORIDE 99 03/29/2019     Lab Results   Component Value Date    CAITY 7.6 03/29/2019     Lab Results   Component Value Date    CO2 24 03/29/2019     Lab Results   Component Value Date    BUN 12 03/29/2019     Lab Results   Component Value Date    CR 0.98 03/29/2019     Lab Results   Component Value Date     03/30/2019       ASSESSMENT/PLAN: POD#2 colostomy takedown  1. Await return of bowel function  2. Full liquid diet  3. Encourage ambulation  4. Daily dressing change to stoma site and teach patient how to do this dressing  5. Ok to shower  6. lovenox  7. Magnesium replacement ordered    Maddie Toledo MD  [facility]  Pager:  [phone]  Phone: [phone]  Fax: [phone]

## 2019-03-31 LAB
ANION GAP SERPL CALCULATED.3IONS-SCNC: 6 MMOL/L (ref 3–14)
BUN SERPL-MCNC: 7 MG/DL (ref 7–30)
CALCIUM SERPL-MCNC: 8.6 MG/DL (ref 8.5–10.1)
CHLORIDE SERPL-SCNC: 99 MMOL/L (ref 94–109)
CO2 SERPL-SCNC: 26 MMOL/L (ref 20–32)
CREAT SERPL-MCNC: 0.6 MG/DL (ref 0.52–1.04)
ERYTHROCYTE [DISTWIDTH] IN BLOOD BY AUTOMATED COUNT: 14.8 % (ref 10–15)
GFR SERPL CREATININE-BSD FRML MDRD: >90 ML/MIN/{1.73_M2}
GLUCOSE SERPL-MCNC: 107 MG/DL (ref 70–99)
HCT VFR BLD AUTO: 34.4 % (ref 35–47)
HGB BLD-MCNC: 12.1 G/DL (ref 11.7–15.7)
MAGNESIUM SERPL-MCNC: 1.9 MG/DL (ref 1.6–2.3)
MCH RBC QN AUTO: 34 PG (ref 26.5–33)
MCHC RBC AUTO-ENTMCNC: 35.2 G/DL (ref 31.5–36.5)
MCV RBC AUTO: 97 FL (ref 78–100)
PLATELET # BLD AUTO: 137 10E9/L (ref 150–450)
POTASSIUM SERPL-SCNC: 3.5 MMOL/L (ref 3.4–5.3)
RBC # BLD AUTO: 3.56 10E12/L (ref 3.8–5.2)
SODIUM SERPL-SCNC: 131 MMOL/L (ref 133–144)
WBC # BLD AUTO: 8.8 10E9/L (ref 4–11)

## 2019-03-31 PROCEDURE — A9270 NON-COVERED ITEM OR SERVICE: HCPCS | Mod: GY | Performed by: COLON & RECTAL SURGERY

## 2019-03-31 PROCEDURE — 25000128 H RX IP 250 OP 636: Performed by: COLON & RECTAL SURGERY

## 2019-03-31 PROCEDURE — 85027 COMPLETE CBC AUTOMATED: CPT | Performed by: COLON & RECTAL SURGERY

## 2019-03-31 PROCEDURE — 25800030 ZZH RX IP 258 OP 636: Performed by: COLON & RECTAL SURGERY

## 2019-03-31 PROCEDURE — 12000000 ZZH R&B MED SURG/OB

## 2019-03-31 PROCEDURE — 25000132 ZZH RX MED GY IP 250 OP 250 PS 637: Mod: GY | Performed by: COLON & RECTAL SURGERY

## 2019-03-31 PROCEDURE — 83735 ASSAY OF MAGNESIUM: CPT | Performed by: COLON & RECTAL SURGERY

## 2019-03-31 PROCEDURE — 36415 COLL VENOUS BLD VENIPUNCTURE: CPT | Performed by: COLON & RECTAL SURGERY

## 2019-03-31 PROCEDURE — 25000132 ZZH RX MED GY IP 250 OP 250 PS 637: Mod: GY | Performed by: PHYSICIAN ASSISTANT

## 2019-03-31 PROCEDURE — A9270 NON-COVERED ITEM OR SERVICE: HCPCS | Mod: GY | Performed by: PHYSICIAN ASSISTANT

## 2019-03-31 PROCEDURE — 80048 BASIC METABOLIC PNL TOTAL CA: CPT | Performed by: COLON & RECTAL SURGERY

## 2019-03-31 PROCEDURE — 40000257 ZZH STATISTIC CONSULT NO CHARGE VASC ACCESS

## 2019-03-31 RX ORDER — HYDROMORPHONE HYDROCHLORIDE 1 MG/ML
0.2 INJECTION, SOLUTION INTRAMUSCULAR; INTRAVENOUS; SUBCUTANEOUS
Status: DISCONTINUED | OUTPATIENT
Start: 2019-03-31 | End: 2019-04-04 | Stop reason: HOSPADM

## 2019-03-31 RX ORDER — BISACODYL 10 MG
10 SUPPOSITORY, RECTAL RECTAL DAILY PRN
Status: DISCONTINUED | OUTPATIENT
Start: 2019-03-31 | End: 2019-04-04 | Stop reason: HOSPADM

## 2019-03-31 RX ADMIN — ALVIMOPAN 12 MG: 12 CAPSULE ORAL at 21:55

## 2019-03-31 RX ADMIN — ENOXAPARIN SODIUM 40 MG: 40 INJECTION SUBCUTANEOUS at 11:48

## 2019-03-31 RX ADMIN — BUPROPION HYDROCHLORIDE 150 MG: 150 TABLET, EXTENDED RELEASE ORAL at 06:18

## 2019-03-31 RX ADMIN — ALBUTEROL SULFATE 2 PUFF: 90 INHALANT RESPIRATORY (INHALATION) at 22:01

## 2019-03-31 RX ADMIN — GABAPENTIN 400 MG: 300 CAPSULE ORAL at 11:40

## 2019-03-31 RX ADMIN — ALVIMOPAN 12 MG: 12 CAPSULE ORAL at 11:39

## 2019-03-31 RX ADMIN — GABAPENTIN 800 MG: 300 CAPSULE ORAL at 21:58

## 2019-03-31 RX ADMIN — NICOTINE 1 PATCH: 7 PATCH, EXTENDED RELEASE TRANSDERMAL at 11:39

## 2019-03-31 RX ADMIN — ALBUTEROL SULFATE 2 PUFF: 90 INHALANT RESPIRATORY (INHALATION) at 11:39

## 2019-03-31 RX ADMIN — KETOROLAC TROMETHAMINE 15 MG: 15 INJECTION, SOLUTION INTRAMUSCULAR; INTRAVENOUS at 19:12

## 2019-03-31 RX ADMIN — ONDANSETRON 4 MG: 2 INJECTION INTRAMUSCULAR; INTRAVENOUS at 02:39

## 2019-03-31 RX ADMIN — ACETAMINOPHEN 975 MG: 325 TABLET, FILM COATED ORAL at 05:21

## 2019-03-31 RX ADMIN — KETOROLAC TROMETHAMINE 15 MG: 15 INJECTION, SOLUTION INTRAMUSCULAR; INTRAVENOUS at 11:39

## 2019-03-31 RX ADMIN — AMLODIPINE BESYLATE 5 MG: 5 TABLET ORAL at 11:39

## 2019-03-31 RX ADMIN — POTASSIUM CHLORIDE, DEXTROSE MONOHYDRATE AND SODIUM CHLORIDE: 150; 5; 450 INJECTION, SOLUTION INTRAVENOUS at 01:28

## 2019-03-31 RX ADMIN — BISACODYL 10 MG: 10 SUPPOSITORY RECTAL at 11:39

## 2019-03-31 ASSESSMENT — ACTIVITIES OF DAILY LIVING (ADL)
ADLS_ACUITY_SCORE: 16

## 2019-03-31 NOTE — PLAN OF CARE
Note from 8309-6255:  Disoriented to time, place and situation intermittently. Pleasantly confused, but redirectable. Impulsive and hallucinating. VSS. Pt. c/o minimal abdominal pain. Scheduled Tylenol given x1 for relief. Pt. denies nausea. CMS intact. Abdominal dressing CDI. Tolerating full liquid diet. Voiding spontaneously. No BM, +BS. Up with assist of 1 with gait belt. Unsteady gait this evening. No acute events. Will continue POC and notify MD with changes.

## 2019-03-31 NOTE — PROGRESS NOTES
COLON & RECTAL SURGERY  PROGRESS NOTE    March 31, 2019  Post-op Day # 3 colostomy takedown    SUBJECTIVE:  Very confused starting yesterday and worse overnight.  Had to be moved closer to nursing station and requires constant surveillance.  Very pleasant and easily directed.  Answers questions but is confused.  No flatus/bm.  Feels bloated with occasional burping and nausea.  No emesis.      OBJECTIVE:  Temp:  [97.8  F (36.6  C)-98.5  F (36.9  C)] 98.5  F (36.9  C)  Pulse:  [83] 83  Heart Rate:  [87-91] 87  Resp:  [16] 16  BP: (133-162)/(59-85) 157/80  SpO2:  [91 %-95 %] 95 %    Intake/Output Summary (Last 24 hours) at 3/31/2019 1042  Last data filed at 3/30/2019 2259  Gross per 24 hour   Intake 680 ml   Output --   Net 680 ml       GENERAL:  Awake, alert, no acute distress, confused but pleasant  ABDOMEN:  Soft, appropriately tender, bloated and distended  INCISION:  C/d/i, stoma site with dressing    LABS:  Lab Results   Component Value Date    WBC 8.8 03/31/2019     Lab Results   Component Value Date    HGB 12.1 03/31/2019     Lab Results   Component Value Date    HCT 34.4 03/31/2019     Lab Results   Component Value Date     03/31/2019     Last Basic Metabolic Panel:  Lab Results   Component Value Date     03/31/2019      Lab Results   Component Value Date    POTASSIUM 3.5 03/31/2019     Lab Results   Component Value Date    CHLORIDE 99 03/31/2019     Lab Results   Component Value Date    CAITY 8.6 03/31/2019     Lab Results   Component Value Date    CO2 26 03/31/2019     Lab Results   Component Value Date    BUN 7 03/31/2019     Lab Results   Component Value Date    CR 0.60 03/31/2019     Lab Results   Component Value Date     03/31/2019       ASSESSMENT/PLAN: POD#3 colostomy takedown.  Worsening confusion starting yesterday. Persistent ileus.  Afebrile with normal labs.  - return to NPO  - try a suppository today (safe to give)  - stop all narcotics.  Treat pain with ice/warm pack, tylenol  or toradol  - ambulate with assist  - continue IVF  - will need PT/OT eval tomorrow to assist with dispo planning  - long discussion with patient's son today about plan for discharge.  He is concerned about her mental status and she lives alone.  He and his brother are not available during the day to assist with cares and safety.  He would like her evaluated for a rehab/SNF option for discharge.    Maddie Toledo MD  [facility]  Pager:  [phone]  Phone: [phone]  Fax: [phone]

## 2019-03-31 NOTE — PLAN OF CARE
Confused. Needs frequent re-orienting. NPO. Intermittent nausea, relieved with heat pack. Pain managed with toradol. Up with Ax1. Voiding. Given suppository with no results. Denies flatus

## 2019-03-31 NOTE — PLAN OF CARE
Disoriented to time and situation. Pt having hallucinations, redirectable. VSS ex high BP. Denies flatus. Midline incision ecchymotic. Takedown dressing CDI. Denies pain. PRN zofran for nausea. Full liquid diet. Assist 1. Chest port swelling and leaking IVF stopped. IV team called, message left to come look at port site.

## 2019-03-31 NOTE — PROGRESS NOTES
Called to assess pt's port. Site infiltrated. Upon removing dressing, noted port needle completely out of port. Port reaccessed with great blood return and flushes easily.

## 2019-04-01 ENCOUNTER — APPOINTMENT (OUTPATIENT)
Dept: PHYSICAL THERAPY | Facility: CLINIC | Age: 74
DRG: 330 | End: 2019-04-01
Attending: PHYSICIAN ASSISTANT
Payer: MEDICARE

## 2019-04-01 LAB
ALBUMIN UR-MCNC: 10 MG/DL
ANION GAP SERPL CALCULATED.3IONS-SCNC: 7 MMOL/L (ref 3–14)
APPEARANCE UR: CLEAR
BASOPHILS # BLD AUTO: 0 10E9/L (ref 0–0.2)
BASOPHILS NFR BLD AUTO: 0 %
BILIRUB UR QL STRIP: NEGATIVE
BUN SERPL-MCNC: 7 MG/DL (ref 7–30)
CALCIUM SERPL-MCNC: 8.4 MG/DL (ref 8.5–10.1)
CHLORIDE SERPL-SCNC: 97 MMOL/L (ref 94–109)
CO2 SERPL-SCNC: 27 MMOL/L (ref 20–32)
COLOR UR AUTO: YELLOW
COPATH REPORT: NORMAL
CREAT SERPL-MCNC: 0.6 MG/DL (ref 0.52–1.04)
DIFFERENTIAL METHOD BLD: ABNORMAL
EOSINOPHIL # BLD AUTO: 0.1 10E9/L (ref 0–0.7)
EOSINOPHIL NFR BLD AUTO: 1.5 %
ERYTHROCYTE [DISTWIDTH] IN BLOOD BY AUTOMATED COUNT: 14.6 % (ref 10–15)
GFR SERPL CREATININE-BSD FRML MDRD: >90 ML/MIN/{1.73_M2}
GLUCOSE SERPL-MCNC: 117 MG/DL (ref 70–99)
GLUCOSE UR STRIP-MCNC: NEGATIVE MG/DL
HCT VFR BLD AUTO: 32.1 % (ref 35–47)
HGB BLD-MCNC: 11.2 G/DL (ref 11.7–15.7)
HGB UR QL STRIP: NEGATIVE
IMM GRANULOCYTES # BLD: 0 10E9/L (ref 0–0.4)
IMM GRANULOCYTES NFR BLD: 0.2 %
KETONES UR STRIP-MCNC: 5 MG/DL
LEUKOCYTE ESTERASE UR QL STRIP: NEGATIVE
LYMPHOCYTES # BLD AUTO: 1.1 10E9/L (ref 0.8–5.3)
LYMPHOCYTES NFR BLD AUTO: 20 %
MAGNESIUM SERPL-MCNC: 1.5 MG/DL (ref 1.6–2.3)
MAGNESIUM SERPL-MCNC: 2.9 MG/DL (ref 1.6–2.3)
MCH RBC QN AUTO: 33.7 PG (ref 26.5–33)
MCHC RBC AUTO-ENTMCNC: 34.9 G/DL (ref 31.5–36.5)
MCV RBC AUTO: 97 FL (ref 78–100)
MONOCYTES # BLD AUTO: 0.6 10E9/L (ref 0–1.3)
MONOCYTES NFR BLD AUTO: 10.9 %
MUCOUS THREADS #/AREA URNS LPF: PRESENT /LPF
NEUTROPHILS # BLD AUTO: 3.7 10E9/L (ref 1.6–8.3)
NEUTROPHILS NFR BLD AUTO: 67.4 %
NITRATE UR QL: NEGATIVE
NRBC # BLD AUTO: 0 10*3/UL
NRBC BLD AUTO-RTO: 0 /100
PH UR STRIP: 7.5 PH (ref 5–7)
PHOSPHATE SERPL-MCNC: 1.9 MG/DL (ref 2.5–4.5)
PHOSPHATE SERPL-MCNC: 3.4 MG/DL (ref 2.5–4.5)
PLATELET # BLD AUTO: 153 10E9/L (ref 150–450)
POTASSIUM SERPL-SCNC: 3.2 MMOL/L (ref 3.4–5.3)
POTASSIUM SERPL-SCNC: 3.5 MMOL/L (ref 3.4–5.3)
RBC # BLD AUTO: 3.32 10E12/L (ref 3.8–5.2)
RBC #/AREA URNS AUTO: 1 /HPF (ref 0–2)
SODIUM SERPL-SCNC: 131 MMOL/L (ref 133–144)
SOURCE: ABNORMAL
SP GR UR STRIP: 1.01 (ref 1–1.03)
UROBILINOGEN UR STRIP-MCNC: NORMAL MG/DL (ref 0–2)
WBC # BLD AUTO: 5.5 10E9/L (ref 4–11)
WBC #/AREA URNS AUTO: 1 /HPF (ref 0–5)

## 2019-04-01 PROCEDURE — 25000132 ZZH RX MED GY IP 250 OP 250 PS 637: Mod: GY | Performed by: COLON & RECTAL SURGERY

## 2019-04-01 PROCEDURE — A9270 NON-COVERED ITEM OR SERVICE: HCPCS | Mod: GY | Performed by: PHYSICIAN ASSISTANT

## 2019-04-01 PROCEDURE — 80048 BASIC METABOLIC PNL TOTAL CA: CPT | Performed by: COLON & RECTAL SURGERY

## 2019-04-01 PROCEDURE — 25000132 ZZH RX MED GY IP 250 OP 250 PS 637: Mod: GY | Performed by: PHYSICIAN ASSISTANT

## 2019-04-01 PROCEDURE — 83735 ASSAY OF MAGNESIUM: CPT | Performed by: COLON & RECTAL SURGERY

## 2019-04-01 PROCEDURE — 83735 ASSAY OF MAGNESIUM: CPT | Performed by: HOSPITALIST

## 2019-04-01 PROCEDURE — 97161 PT EVAL LOW COMPLEX 20 MIN: CPT | Mod: GP

## 2019-04-01 PROCEDURE — 25000128 H RX IP 250 OP 636: Performed by: PHYSICIAN ASSISTANT

## 2019-04-01 PROCEDURE — 25000128 H RX IP 250 OP 636: Performed by: COLON & RECTAL SURGERY

## 2019-04-01 PROCEDURE — 84132 ASSAY OF SERUM POTASSIUM: CPT | Performed by: HOSPITALIST

## 2019-04-01 PROCEDURE — 84100 ASSAY OF PHOSPHORUS: CPT | Performed by: HOSPITALIST

## 2019-04-01 PROCEDURE — 25800030 ZZH RX IP 258 OP 636: Performed by: PHYSICIAN ASSISTANT

## 2019-04-01 PROCEDURE — 25000125 ZZHC RX 250: Performed by: PHYSICIAN ASSISTANT

## 2019-04-01 PROCEDURE — 12000000 ZZH R&B MED SURG/OB

## 2019-04-01 PROCEDURE — 99207 ZZC CONSULT E&M CHANGED TO INITIAL LEVEL: CPT | Performed by: PHYSICIAN ASSISTANT

## 2019-04-01 PROCEDURE — 85025 COMPLETE CBC W/AUTO DIFF WBC: CPT | Performed by: COLON & RECTAL SURGERY

## 2019-04-01 PROCEDURE — 84100 ASSAY OF PHOSPHORUS: CPT | Performed by: COLON & RECTAL SURGERY

## 2019-04-01 PROCEDURE — 25800030 ZZH RX IP 258 OP 636: Performed by: COLON & RECTAL SURGERY

## 2019-04-01 PROCEDURE — A9270 NON-COVERED ITEM OR SERVICE: HCPCS | Mod: GY | Performed by: COLON & RECTAL SURGERY

## 2019-04-01 PROCEDURE — 99222 1ST HOSP IP/OBS MODERATE 55: CPT | Performed by: PHYSICIAN ASSISTANT

## 2019-04-01 PROCEDURE — 81001 URINALYSIS AUTO W/SCOPE: CPT | Performed by: COLON & RECTAL SURGERY

## 2019-04-01 PROCEDURE — 97530 THERAPEUTIC ACTIVITIES: CPT | Mod: GP

## 2019-04-01 RX ORDER — POTASSIUM CHLORIDE 1.5 G/1.58G
20-40 POWDER, FOR SOLUTION ORAL
Status: DISCONTINUED | OUTPATIENT
Start: 2019-04-01 | End: 2019-04-04 | Stop reason: HOSPADM

## 2019-04-01 RX ORDER — POTASSIUM CL/LIDO/0.9 % NACL 10MEQ/0.1L
10 INTRAVENOUS SOLUTION, PIGGYBACK (ML) INTRAVENOUS
Status: DISCONTINUED | OUTPATIENT
Start: 2019-04-01 | End: 2019-04-04 | Stop reason: HOSPADM

## 2019-04-01 RX ORDER — POTASSIUM CHLORIDE 7.45 MG/ML
10 INJECTION INTRAVENOUS
Status: DISCONTINUED | OUTPATIENT
Start: 2019-04-01 | End: 2019-04-04 | Stop reason: HOSPADM

## 2019-04-01 RX ORDER — POTASSIUM CHLORIDE 1500 MG/1
20-40 TABLET, EXTENDED RELEASE ORAL
Status: DISCONTINUED | OUTPATIENT
Start: 2019-04-01 | End: 2019-04-04 | Stop reason: HOSPADM

## 2019-04-01 RX ORDER — MAGNESIUM SULFATE HEPTAHYDRATE 40 MG/ML
4 INJECTION, SOLUTION INTRAVENOUS EVERY 4 HOURS PRN
Status: DISCONTINUED | OUTPATIENT
Start: 2019-04-01 | End: 2019-04-04 | Stop reason: HOSPADM

## 2019-04-01 RX ORDER — POTASSIUM CHLORIDE 29.8 MG/ML
20 INJECTION INTRAVENOUS
Status: DISCONTINUED | OUTPATIENT
Start: 2019-04-01 | End: 2019-04-04 | Stop reason: HOSPADM

## 2019-04-01 RX ADMIN — SODIUM PHOSPHATE, MONOBASIC, MONOHYDRATE 20 MMOL: 276; 142 INJECTION, SOLUTION INTRAVENOUS at 12:04

## 2019-04-01 RX ADMIN — POTASSIUM CHLORIDE 20 MEQ: 400 INJECTION, SOLUTION INTRAVENOUS at 10:54

## 2019-04-01 RX ADMIN — ALBUTEROL SULFATE 2 PUFF: 90 INHALANT RESPIRATORY (INHALATION) at 22:15

## 2019-04-01 RX ADMIN — NICOTINE 1 PATCH: 7 PATCH, EXTENDED RELEASE TRANSDERMAL at 09:45

## 2019-04-01 RX ADMIN — ONDANSETRON 4 MG: 2 INJECTION INTRAMUSCULAR; INTRAVENOUS at 19:21

## 2019-04-01 RX ADMIN — ALVIMOPAN 12 MG: 12 CAPSULE ORAL at 09:46

## 2019-04-01 RX ADMIN — ONDANSETRON 4 MG: 2 INJECTION INTRAMUSCULAR; INTRAVENOUS at 06:51

## 2019-04-01 RX ADMIN — ENOXAPARIN SODIUM 40 MG: 40 INJECTION SUBCUTANEOUS at 09:46

## 2019-04-01 RX ADMIN — AMLODIPINE BESYLATE 5 MG: 5 TABLET ORAL at 09:46

## 2019-04-01 RX ADMIN — MAGNESIUM SULFATE IN WATER 4 G: 40 INJECTION, SOLUTION INTRAVENOUS at 14:43

## 2019-04-01 RX ADMIN — ACETAMINOPHEN 975 MG: 325 TABLET, FILM COATED ORAL at 14:34

## 2019-04-01 RX ADMIN — PROCHLORPERAZINE EDISYLATE 5 MG: 5 INJECTION INTRAMUSCULAR; INTRAVENOUS at 07:13

## 2019-04-01 RX ADMIN — POTASSIUM CHLORIDE, DEXTROSE MONOHYDRATE AND SODIUM CHLORIDE: 150; 5; 450 INJECTION, SOLUTION INTRAVENOUS at 01:18

## 2019-04-01 RX ADMIN — ACETAMINOPHEN 975 MG: 325 TABLET, FILM COATED ORAL at 06:35

## 2019-04-01 RX ADMIN — Medication 5 MG: at 00:03

## 2019-04-01 RX ADMIN — DEXTROSE AND SODIUM CHLORIDE: 5; 900 INJECTION, SOLUTION INTRAVENOUS at 11:43

## 2019-04-01 RX ADMIN — POTASSIUM CHLORIDE 20 MEQ: 400 INJECTION, SOLUTION INTRAVENOUS at 12:47

## 2019-04-01 RX ADMIN — ONDANSETRON 4 MG: 2 INJECTION INTRAMUSCULAR; INTRAVENOUS at 00:03

## 2019-04-01 ASSESSMENT — ACTIVITIES OF DAILY LIVING (ADL)
ADLS_ACUITY_SCORE: 16
ADLS_ACUITY_SCORE: 17
ADLS_ACUITY_SCORE: 16
ADLS_ACUITY_SCORE: 17
ADLS_ACUITY_SCORE: 17
ADLS_ACUITY_SCORE: 16

## 2019-04-01 NOTE — PROGRESS NOTES
COLON & RECTAL SURGERY  PROGRESS NOTE    April 1, 2019  Post-op Day # 4    SUBJECTIVE:  Pt resting in bed when arrived. She was groggy but easy to wake and oriented when I saw her. Per night nurse, patient up all night and confused. She said she had a BM yesterday but nothing was reported per nursing staff. She endorses nausea but has no other complaints.     OBJECTIVE:  Temp:  [97.9  F (36.6  C)-98.6  F (37  C)] 98.3  F (36.8  C)  Pulse:  [89-99] 89  Heart Rate:  [90-99] 99  Resp:  [16] 16  BP: (135-151)/(68-90) 137/79  SpO2:  [94 %-96 %] 96 %    Intake/Output Summary (Last 24 hours) at 4/1/2019 0917  Last data filed at 4/1/2019 0336  Gross per 24 hour   Intake --   Output 590 ml   Net -590 ml       GENERAL:  Awake, sleepy, no acute distress  HEAD: Normocephalic atraumatic  SCLERA: Anicteric  EXTREMITIES: Warm and well perfused  ABDOMEN:  Soft, appropriately tender, distended. No guarding, rigidity, or peritoneal signs.   INCISION:  C/d/i, ecchymosis noted around central vertical incision. Stoma site covered with gauze dressing, clean.    LABS:  Lab Results   Component Value Date    WBC 8.8 03/31/2019     Lab Results   Component Value Date    HGB 12.1 03/31/2019     Lab Results   Component Value Date    HCT 34.4 03/31/2019     Lab Results   Component Value Date     03/31/2019     Last Basic Metabolic Panel:  Lab Results   Component Value Date     03/31/2019      Lab Results   Component Value Date    POTASSIUM 3.5 03/31/2019     Lab Results   Component Value Date    CHLORIDE 99 03/31/2019     Lab Results   Component Value Date    CAITY 8.6 03/31/2019     Lab Results   Component Value Date    CO2 26 03/31/2019     Lab Results   Component Value Date    BUN 7 03/31/2019     Lab Results   Component Value Date    CR 0.60 03/31/2019     Lab Results   Component Value Date     03/31/2019       ASSESSMENT/PLAN: POD#4 colostomy takedown.  Worsening confusion starting Saturday. Persistent ileus.  Afebrile  awaiting labs ordered today. UA ordered to r/o infection. Will put in orders for hospitalist consult for delirium evaluation, and PT/OT evaluation.       1. NPO diet  2. PRN tylenol or toradol for pain. No narcotics.  3. AM labs and UA ordered. Results pending.   4. Continue 50ml/hr IVF  5. Hospitalist consult for evaluation of delirium ordered  6. PT/OT eval ordered  7. Lovenox for ppx  8. OOB, ambulate at least QID      For questions/paging, please contact the CRS office at [phone].    Reyna Orosco PA-C  [facility]  Phone: [phone]    CRS Staff  I performed a history and physical examination of the patient and discussed their management with the physician assistant. I reviewed the physician assistants note and agree with the documented findings and plan of care.     Agree with above.  Seen and examined independently.  RN reports overall doing much better.  Sleeping most of this shift.  Passing gas and BM verified by RN.  Wounds look good.  Abdomen soft, but distended.  Lytes being replaced.  Appreciate Joanna Barthell and the Hospitalist's service assistance.    Jesus Caba MD  [facility]  [phone] (office)  [phone] (pager)  [email or web address]

## 2019-04-01 NOTE — PROVIDER NOTIFICATION
Pt confusion increased. Contacted on call Dr. Hatfield. Verbal order with readback to give 5mg melatonin to promote sleep.

## 2019-04-01 NOTE — PLAN OF CARE
Pt confused, hallucinations. Frequent re-orienting. Alert to self. VSS. BS hypo. Abdomin incision IWONA. Takedown dressing scant drainage. Denies pain. PRN zofran for nausea. PRN melatonin for sleep.  Assist 1. NPO ice chips and med's. Pt refusing cares, growing distrustful. Handed off to another nurse at patients request.

## 2019-04-01 NOTE — PLAN OF CARE
Pt AOx4, was disoriented and impulsive in previous shifts, VPM present. Potassium, Magnesium and phosphorus replaced, Mg and phos results pending, K 3.5. Increased serosanguinous drainage from colectomy reversal incision site, abd binder applied. Bleeding noted from hemorrhoids. Ambulated x2 3-7, pt reports feeling unable to make it to hallway.

## 2019-04-01 NOTE — PLAN OF CARE
Pt confused, has hallucinations. Burnet frequently. C/o growing nausea towards end of shift, zof and compazine given no results, lavender patch given. Alert to self. Bowels hypo, +flatus, no BM, pt self reports BM. A1 w /GB. Denies pain. Voiding frequently. New mass-like structure found around incision, AM nurse notified. Will continue to monitor.

## 2019-04-01 NOTE — PLAN OF CARE
Discharge Planner PT   Patient plan for discharge: Not stated  Current status: Orders received, chart reviewed, PT evaluation completed and treatment initiated. Patient admitted on 3/28/19 for colostomy takedown on 03/28/2019; post-op course complicated by  post-op ileus and confusion. Per nursing report, mentation clearing and patient more alert and oriented with some confusion at times. Patient reports she lives in a house alone with 1 step to enter and a flight of stairs to access laundry downstairs, with single railing for access. Patient notes she was independent at baseline with no AD.     Patient supine in bed upon arrival of therapist, agreeable to working with PT. Educated on role of PT and PT POC. Supine>sit with SBA and bed rails, patient using log roll technique for transfer. Sit>stand from EOB with IV pole for support and SBA. Upon standing, patient stating she doesn't feel well and not wanting to mobilize out of room. Patient able to ambulated around bed (~15 feet) with use of IV pole for support and CGA. Patient returning to supine abruptly without log roll technique and yelling out in pain. Educated on importance of log roll technique for comfort with bed mobility, patient notes understanding. Patient in supine at end of session with all needs in reach and bed alarm on.   Barriers to return to prior living situation: Current level of A, stairs-not yet assessed, decreased tolerance to activity   Recommendations for discharge: TCU   Rationale for recommendations: Patient lives alone and previously independent at baseline, now requiring increased level of A for all mobility and ambulation 2/2 to safety with confusion and balance deficits. Patient would benefit from continued skilled therapy to further improve strength, balance, and independence with mobility and ambulation to address functional limitations and decrease falls risk. Will continue to monitor progress during therapy sessions and update  discharge recommendations as appropriate. Pending length of stay, progress with therapy/mobility, and mentation clearing patient may progress level of independence for safe discharge home.        Entered by: Marimar Vaca 04/01/2019 5:06 PM

## 2019-04-01 NOTE — PLAN OF CARE
Contacted colorectal surgery re: increased bleeding from incision site. Per on call provider, use pressure dressing to decrease bleeding, will continue to evaluate.

## 2019-04-01 NOTE — PROGRESS NOTES
Hospitalist admission note dictated. Confirmation #: [id number].    JoAnna Barthell, PA-C  4/1/2019   10:12 AM

## 2019-04-01 NOTE — CONSULTS
Consult Date:  04/01/2019      HOSPITALIST CONSULTATION      DATE OF CONSULTATION:  04/01/2019       PRIMARY CARE PROVIDER:  Tello Finney MD      REQUESTING PROVIDER:  Jesus Caba MD      REASON FOR CONSULTATION:  Evaluation of delirium.      History is provided by the patient and in discussion with nursing staff and chart review.      HISTORY OF PRESENT ILLNESS:  Anna Dyer is a pleasant 73-year-old female with history of restless leg syndrome, chemotherapy-induced peripheral neuropathy, COPD with ongoing tobacco dependence, HTN, and stage IV colon cancer, who was admitted under the care of General Surgery for management following a colostomy takedown on 03/28/2019. Post-operative course complicated by post-op ileus and confusion. Tells me her last bowel movement was yesterday, however in conversation with nursing staff these reported episodes have been unwitnessed and she has not followed directions to not flush the toilet. Complains of nausea rating severity 3/10 and abdominal pain pointing specifically to her prior colostomy site and qualifying abdominal pain as absent if she is sitting still, but increases up to 7/10 with mobility.    On postop day #4, the Hospitalist Service was consulted for evaluation of delirium.  Nurse seen and chart review indicates the patient became confused and started having hallucinations on postop day #2, which seems to be worse at night.  In conversation with the patient, she is alert and oriented x 3 at present, though does seem to have some difficulty tracking our conversation and appears mildly confused.  She recalls her visual hallucination as puppies and ducks.  Notably, she started on Wellbutrin for smoking cessation medical management approximately 2 weeks prior to admission.  Additionally, she was treated with Dilaudid, Benadryl, Ativan, oxycodone, as well as scopolamine patch in the first 2 days postoperatively.  These medications appear to have been held.  She  denies shortness of breath, chest pain, coughing, urinary frequency, urgency, dysuria.     PAST MEDICAL HISTORY:   1.  Stage IV colon cancer with metastatic lesions to liver.  Diagnosed 2016.  Follows with Dr. Tim with [facility].   2.  Hypertension.   3.  COPD.   4.  Tobacco dependence.   5.  Oxaliplatin-induced chemotherapy neuropathy.   6.  Restless leg syndrome.      PAST SURGICAL HISTORY:   1.  Liver biopsy.   2.  Breast implants.   3.  Facelift.   4.  Tummy tuck.    5.  Tubal ligation.   6.  Laparoscopic-assisted colectomy.   7.  Exploratory laparotomy with hemicolectomy and transverse colostomy.   8.  Midline port placement.      FAMILY HISTORY:  The patient is uncertain of her family history but does not believe there has been any MI, CVA, cancer.      SOCIAL HISTORY:  The patient is a 1 pack-per-day smoker since age of 17.  She lives alone in a house.      PRIOR TO ADMISSION MEDICATIONS:   1.  Albuterol 2 puffs b.i.d. every 4 hours p.r.n.   2.  Amlodipine 5 mg daily.   3.  Wellbutrin- mg b.i.d.   4.  Diphenhydramine 25 mg at bedtime p.r.n.   5.  Gabapentin 400 mg q. a.m.   6.  Gabapentin 800 mg at bedtime.   7.  Homeopathic leg cramp relief 2 tablets daily p.r.n.   8.  EMLA Cream apply topically as needed.   9.  Ativan 0.5 mg every morning.   10.  Losartan-hydrochlorothiazide 100-25 mg 1 tablet daily.   11.  Naproxen 440 mg daily p.r.n.   12.  Nicotine 21 mg per 24-hour patch every 24 hours.      ALLERGIES:  LACTOSE CAUSES GI DISTURBANCE AND SULFA DRUGS CAUSE HIVES.      REVIEW OF SYSTEMS:  A complete review of systems was performed and is negative, except for that noted in the HPI.      PHYSICAL EXAMINATION:   VITAL SIGNS:  Blood pressure 137/79, heart rate is 89, temperature 98.3, respirations 16, oxygen saturation is 96%.   GENERAL:  A pleasant female who appears stated age and looks comfortable but restless in bed.  She is alert and oriented x 3, but does have some difficulty tracking  our conversation and seems mildly confused.   SKIN:  Warm, dry.  No rash or lesions on exposed skin.   HEENT:  Normocephalic, atraumatic.  EOMs grossly intact.  PERRLA.  Moist mucous membranes.   NECK:  Supple.  No cervical lymphadenopathy or JVD.   CHEST:  Breath sounds clear to auscultation.  No increased work of breathing on room air.   CARDIOVASCULAR:  Regular rate and rhythm.  No rub or murmur.  No peripheral edema.   ABDOMEN:  Nontender, moderately distended with no bowel sounds present in all 4 quadrants.  Surgical incision appears clean and dry with some crusted blood.  Her colostomy site is left with surgical dressing in place and previous dried drainage is present.   MUSCULOSKELETAL:  Moves all 4 extremities.   NEUROLOGIC:  Cranial nerves II-XII grossly intact.      LABORATORY DATA:  Sodium 131, potassium 3.2, creatinine 0.60, GFR greater than 90, magnesium 1.5, phosphorus 1.9, glucose 117.  WBC 5.5, hemoglobin 11.2, platelets 153.      ASSESSMENT AND PLAN:  Anna Dyer is a pleasant 73-year-old female with restless leg syndrome, chemotherapy-induced neuropathy, tobacco dependence, COPD, HTN, and stage IV colon cancer who is admitted under the General Surgery for colostomy takedown.  The Hospitalist Service was consulted on postop day #4 for further evaluation of confusion.    Postoperative delirium.  This is in the setting of hospitalization, postoperative ileus x 4 days, and potential contributing medications including new Wellbutrin (started 2 weeks PTA), scopolamine patch, oxycodone, Ativan, Benadryl, and Dilaudid with last doses 2 days prior to Hospitalist consultation.  Additionally has some electrolyte abnormalities that are mild and unlikely to be significantly contributing. No overt signs or symptoms for infection.  -- Rule out UTI.  -- Discontinue Wellbutrin and will hold gabapentin.   -- Judicious use of narcotic medications discussed with nursing staff and discontinue scopolamine patch.   --  Encourage out of bed and ambulation to help with resolution of postop ileus.   -- Reorient as needed.     Electrolyte abnormalities.  Low sodium, potassium, magnesium and phosphorus.  This is likely secondary to her n.p.o. state x 4 days.  She does not elicit any overt signs or symptoms, other than confusion, which is likely more related to above.   -- Change D5 half normal saline with potassium to D5 normal saline to help with hyponatremia.   -- Electrolyte replacement protocols ordered for phosphorus, magnesium, and potassium.     Postoperative ileus.   Parastomal hernia s/p colostomy takedown (3/28/2019).   Stage IV colon cancer metastatic to the liver. Colon cancer stable to patient's knowledge and review of [facility] notes.   -- Management per Surgical Service.     Hypertension.  Blood pressure is adequately controlled.  -- Continues on PTA amlodipine.  -- PTA losartan-hydrochlorothiazide on hold due to electrolyte abnormalities and adequately controlled blood pressure while NPO.    -- Resume losartan when blood pressure rising or tolerating PO.    COPD.   Tobacco dependence.  No adventitious breath sounds on exam.  She is a 1 pack-per-day smoker since age 17.   -- Continue nicotine patch.   -- Hold PTA Wellbutrin which was started approximately 2 weeks PTA for smoking cessation as is known to cause hallucinations and vivid dreams which may be contributing to postoperative delirium.    Restless leg syndrome.  Chemotherapy-induced neuropathy.  -- Hold PTA gabapentin to reduce any potential contribution to postoperative delirium. If peripheral neuropathy causes issues, will resume.     Deep venous thrombosis prophylaxis:  Lovenox.      CODE STATUS:  FULL CODE.      DISPOSITION:  Discharge per primary service.      Hospitalist Service thanks you for this consultation and will continue to follow with you.      This patient was discussed with Dr. Grant Duckworth of the Hospitalist Service, who is in agreement  with my assessment and plan of care as outlined above.         LOS MACKENZIE MD       As dictated by JOANNA ULMEN BARTHELL, PA-C            D: 2019   T: 2019   MT: KEYANA      Name:     ANTONY HALEY   MRN:      [id number]        Account:       [id number]   :      1945           Consult Date:  2019      Document: [id number]       cc: Jesus Caba MD

## 2019-04-01 NOTE — PROGRESS NOTES
04/01/19 1600   Quick Adds   Type of Visit Initial PT Evaluation   Living Environment   Lives With alone   Living Arrangements house   Home Accessibility stairs to enter home;stairs within home   Number of Stairs, Main Entrance (1)   Number of Stairs, Within Home, Primary (1 flight of steps)   Stair Railings, Within Home, Primary railing on right side (ascending)   Self-Care   Usual Activity Tolerance good   Current Activity Tolerance fair   Equipment Currently Used at Home none   Functional Level Prior   Ambulation 0-->independent   Transferring 0-->independent   Toileting 0-->independent   Bathing 0-->independent   Communication 0-->understands/communicates without difficulty   Swallowing 0-->swallows foods/liquids without difficulty   Cognition 0 - no cognition issues reported   Fall history within last six months no   Which of the above functional risks had a recent onset or change? ambulation;transferring   General Information   Onset of Illness/Injury or Date of Surgery - Date 03/28/19   Referring Physician Reyna Orosco PA-C   Patient/Family Goals Statement Not stated    Pertinent History of Current Problem (include personal factors and/or comorbidities that impact the POC) Patient admitted on 3/28/19 for colostomy takedown on 03/28/2019; post-op course complicated by  post-op ileus and confusion. Patient with PMH of restless leg syndrome, chemotherapy-induced peripheral neuropathy, COPD with ongoing tobacco dependence, HTN, and stage IV colon cancer.    Precautions/Limitations fall precautions   Weight-Bearing Status - LLE full weight-bearing   Weight-Bearing Status - RLE full weight-bearing   General Observations Patient supine in bed upon arrival of therapist, agreeable to working with PT.    Cognitive Status Examination   Orientation orientation to person, place and time   Level of Consciousness alert   Follows Commands and Answers Questions 100% of the time;able to follow multistep  "instructions   Pain Assessment   Patient Currently in Pain Yes, see Vital Sign flowsheet  (noting pain upon returning to supine )   Integumentary/Edema   Integumentary/Edema Comments Incision open to air on abdomen    Posture    Posture Forward head position   Range of Motion (ROM)   ROM Comment B LE ROM WNL    Strength   Strength Comments Not formally assessed   Bed Mobility   Bed Mobility Comments Supine>sit with SBA   Transfer Skills   Transfer Comments Sit>stand from EOB with no AD (IV pole for support) and SBA    Gait   Gait Comments Patient ambulated a few steps with use of IV pole for support and CGA    Balance   Balance Comments Slightly unsteady initially then more steady with progressed mobility    General Therapy Interventions   Planned Therapy Interventions balance training;bed mobility training;gait training;strengthening;transfer training   Clinical Impression   Criteria for Skilled Therapeutic Intervention yes, treatment indicated   PT Diagnosis Impaired mobility and gait    Influenced by the following impairments pain, decreased tolerance to activity   Functional limitations due to impairments bed mobility, transfers, gait, stairs   Clinical Presentation Stable/Uncomplicated   Clinical Presentation Rationale Based on PMH, current presentation, and social support    Clinical Decision Making (Complexity) Low complexity   Therapy Frequency` daily   Predicted Duration of Therapy Intervention (days/wks) 3 days    Anticipated Discharge Disposition Transitional Care Facility   Risk & Benefits of therapy have been explained Yes   Patient, Family & other staff in agreement with plan of care Yes   [facility] [employer] TM \"6 Clicks\"   2016, Trustees of [facility], under license to [employer].  All rights reserved.   6 Clicks Short Forms Basic Mobility Inpatient Short Form   [facility]  \"6 Clicks\" V.2 Basic Mobility Inpatient Short Form   1. Turning from your back to your side while in " a flat bed without using bedrails? 4 - None   2. Moving from lying on your back to sitting on the side of a flat bed without using bedrails? 3 - A Little   3. Moving to and from a bed to a chair (including a wheelchair)? 3 - A Little   4. Standing up from a chair using your arms (e.g., wheelchair, or bedside chair)? 3 - A Little   5. To walk in hospital room? 3 - A Little   6. Climbing 3-5 steps with a railing? 3 - A Little   Basic Mobility Raw Score (Score out of 24.Lower scores equate to lower levels of function) 19   Total Evaluation Time   Total Evaluation Time (Minutes) 10

## 2019-04-01 NOTE — PLAN OF CARE
Vital Signs stable. Alert and Oriented, forgetful at times but more clear towards the afternoon. Lung sounds clear throughout. Bowel sounds hypoactive, pt has BM. Passed large amount of flatus. Abdomen still appears distended but is softer. Pt has bleeding hemorrhoids, pt states they are not causing any pain.  NPO diet. Intermittent nausea. Adequate urinary output. CMS intact.  Midline incision is open to air with liquid bandage closure, ecchymosis surrounds site. Ostomy takedown site dressing is clean dry and intact with scant amount of dried drainage. Up with assist of 1, pt ambulated in the halls several times. Pain controlled with scheduled Tylenol. Potassium replaced recheck scheduled for 1700. Phosphorus and magnesium currently being replaced and infusing.

## 2019-04-01 NOTE — PLAN OF CARE
Confused,having hallucinations and needs frequent re-orienting. Alert to self and place. VSS. Lung sounds clear. Bowel sounds hypoactive, +flatus. Adequate urine output, Abdomen approximated with some bruising. Take down dressing changed this shift with scant drainage. Ambulates assist x1. NPO ex ice chips and meds. Pain controlled by PRN Toradol.

## 2019-04-02 ENCOUNTER — APPOINTMENT (OUTPATIENT)
Dept: OCCUPATIONAL THERAPY | Facility: CLINIC | Age: 74
DRG: 330 | End: 2019-04-02
Attending: PHYSICIAN ASSISTANT
Payer: MEDICARE

## 2019-04-02 ENCOUNTER — APPOINTMENT (OUTPATIENT)
Dept: PHYSICAL THERAPY | Facility: CLINIC | Age: 74
DRG: 330 | End: 2019-04-02
Attending: PHYSICIAN ASSISTANT
Payer: MEDICARE

## 2019-04-02 LAB
ANION GAP SERPL CALCULATED.3IONS-SCNC: 7 MMOL/L (ref 3–14)
BUN SERPL-MCNC: 7 MG/DL (ref 7–30)
CALCIUM SERPL-MCNC: 8.2 MG/DL (ref 8.5–10.1)
CHLORIDE SERPL-SCNC: 101 MMOL/L (ref 94–109)
CO2 SERPL-SCNC: 27 MMOL/L (ref 20–32)
CREAT SERPL-MCNC: 0.7 MG/DL (ref 0.52–1.04)
GFR SERPL CREATININE-BSD FRML MDRD: 86 ML/MIN/{1.73_M2}
GLUCOSE SERPL-MCNC: 106 MG/DL (ref 70–99)
HGB BLD-MCNC: 11.8 G/DL (ref 11.7–15.7)
POTASSIUM SERPL-SCNC: 3.2 MMOL/L (ref 3.4–5.3)
SODIUM SERPL-SCNC: 135 MMOL/L (ref 133–144)

## 2019-04-02 PROCEDURE — 25000128 H RX IP 250 OP 636: Performed by: HOSPITALIST

## 2019-04-02 PROCEDURE — 85018 HEMOGLOBIN: CPT | Performed by: HOSPITALIST

## 2019-04-02 PROCEDURE — 25000128 H RX IP 250 OP 636: Performed by: COLON & RECTAL SURGERY

## 2019-04-02 PROCEDURE — 80048 BASIC METABOLIC PNL TOTAL CA: CPT | Performed by: PHYSICIAN ASSISTANT

## 2019-04-02 PROCEDURE — A9270 NON-COVERED ITEM OR SERVICE: HCPCS | Mod: GY | Performed by: COLON & RECTAL SURGERY

## 2019-04-02 PROCEDURE — 25000128 H RX IP 250 OP 636: Performed by: PHYSICIAN ASSISTANT

## 2019-04-02 PROCEDURE — 25800030 ZZH RX IP 258 OP 636: Performed by: COLON & RECTAL SURGERY

## 2019-04-02 PROCEDURE — 25000132 ZZH RX MED GY IP 250 OP 250 PS 637: Mod: GY | Performed by: COLON & RECTAL SURGERY

## 2019-04-02 PROCEDURE — A9270 NON-COVERED ITEM OR SERVICE: HCPCS | Mod: GY | Performed by: PHYSICIAN ASSISTANT

## 2019-04-02 PROCEDURE — 99232 SBSQ HOSP IP/OBS MODERATE 35: CPT | Performed by: HOSPITALIST

## 2019-04-02 PROCEDURE — 97535 SELF CARE MNGMENT TRAINING: CPT | Mod: GO | Performed by: OCCUPATIONAL THERAPIST

## 2019-04-02 PROCEDURE — 25000132 ZZH RX MED GY IP 250 OP 250 PS 637: Mod: GY | Performed by: PHYSICIAN ASSISTANT

## 2019-04-02 PROCEDURE — 12000000 ZZH R&B MED SURG/OB

## 2019-04-02 PROCEDURE — 97116 GAIT TRAINING THERAPY: CPT | Mod: GP | Performed by: PHYSICAL THERAPIST

## 2019-04-02 PROCEDURE — 97530 THERAPEUTIC ACTIVITIES: CPT | Mod: GP | Performed by: PHYSICAL THERAPIST

## 2019-04-02 PROCEDURE — 97165 OT EVAL LOW COMPLEX 30 MIN: CPT | Mod: GO | Performed by: OCCUPATIONAL THERAPIST

## 2019-04-02 RX ORDER — HEPARIN SODIUM (PORCINE) LOCK FLUSH IV SOLN 100 UNIT/ML 100 UNIT/ML
5 SOLUTION INTRAVENOUS
Status: DISCONTINUED | OUTPATIENT
Start: 2019-04-02 | End: 2019-04-04 | Stop reason: HOSPADM

## 2019-04-02 RX ORDER — HEPARIN SODIUM,PORCINE 10 UNIT/ML
5-10 VIAL (ML) INTRAVENOUS
Status: DISCONTINUED | OUTPATIENT
Start: 2019-04-02 | End: 2019-04-04 | Stop reason: HOSPADM

## 2019-04-02 RX ORDER — HEPARIN SODIUM,PORCINE 10 UNIT/ML
5-10 VIAL (ML) INTRAVENOUS EVERY 24 HOURS
Status: DISCONTINUED | OUTPATIENT
Start: 2019-04-02 | End: 2019-04-04 | Stop reason: HOSPADM

## 2019-04-02 RX ADMIN — POTASSIUM CHLORIDE 20 MEQ: 400 INJECTION, SOLUTION INTRAVENOUS at 20:46

## 2019-04-02 RX ADMIN — KETOROLAC TROMETHAMINE 15 MG: 15 INJECTION, SOLUTION INTRAMUSCULAR; INTRAVENOUS at 11:36

## 2019-04-02 RX ADMIN — ENOXAPARIN SODIUM 40 MG: 40 INJECTION SUBCUTANEOUS at 11:38

## 2019-04-02 RX ADMIN — KETOROLAC TROMETHAMINE 15 MG: 15 INJECTION, SOLUTION INTRAMUSCULAR; INTRAVENOUS at 06:45

## 2019-04-02 RX ADMIN — NICOTINE 1 PATCH: 7 PATCH, EXTENDED RELEASE TRANSDERMAL at 11:38

## 2019-04-02 RX ADMIN — POTASSIUM CHLORIDE 40 MEQ: 1500 TABLET, EXTENDED RELEASE ORAL at 06:48

## 2019-04-02 RX ADMIN — AMLODIPINE BESYLATE 5 MG: 5 TABLET ORAL at 11:38

## 2019-04-02 RX ADMIN — SODIUM CHLORIDE, PRESERVATIVE FREE 5 ML: 5 INJECTION INTRAVENOUS at 19:26

## 2019-04-02 RX ADMIN — DEXTROSE AND SODIUM CHLORIDE: 5; 900 INJECTION, SOLUTION INTRAVENOUS at 11:39

## 2019-04-02 RX ADMIN — KETOROLAC TROMETHAMINE 15 MG: 15 INJECTION, SOLUTION INTRAMUSCULAR; INTRAVENOUS at 19:37

## 2019-04-02 RX ADMIN — ALBUTEROL SULFATE 2 PUFF: 90 INHALANT RESPIRATORY (INHALATION) at 11:37

## 2019-04-02 RX ADMIN — ALBUTEROL SULFATE 2 PUFF: 90 INHALANT RESPIRATORY (INHALATION) at 20:46

## 2019-04-02 RX ADMIN — POTASSIUM CHLORIDE 20 MEQ: 400 INJECTION, SOLUTION INTRAVENOUS at 22:36

## 2019-04-02 RX ADMIN — KETOROLAC TROMETHAMINE 15 MG: 15 INJECTION, SOLUTION INTRAMUSCULAR; INTRAVENOUS at 00:31

## 2019-04-02 ASSESSMENT — ACTIVITIES OF DAILY LIVING (ADL)
PREVIOUS_RESPONSIBILITIES: MEAL PREP;HOUSEKEEPING;LAUNDRY;SHOPPING;MEDICATION MANAGEMENT;FINANCES;DRIVING
ADLS_ACUITY_SCORE: 16
ADLS_ACUITY_SCORE: 15
ADLS_ACUITY_SCORE: 16
ADLS_ACUITY_SCORE: 15
ADLS_ACUITY_SCORE: 16
ADLS_ACUITY_SCORE: 17

## 2019-04-02 NOTE — PLAN OF CARE
Discharge Planner PT   Patient plan for discharge: TCU  Current status: Pt reporting pain in bed, needing CGA and cues for bed mobility transfers. Able to ambulate 80 ft today but needed 4 standing rest breaks secondary to SOB. Reports pain improved with ambulation. Up in chair at end of session with good tolerance. HR tachy up to 106 BPM, O2 sats 96% following ambulation. Pt noted to be forgetful and have decreased safety awareness during session.  Barriers to return to prior living situation: Lives alone, impaired cognition, weakness and decreased activity tolerance  Recommendations for discharge: TCU  Rationale for recommendations: Pt will benefit from ongoing PT to maximize strength, activity tolerance, and progression of independence with functional mobility to allow for eventual return home.       Entered by: Alba Lau 04/02/2019 8:51 AM

## 2019-04-02 NOTE — PLAN OF CARE
Pt is A&Ox4 ex forgetfulness at times; is on VPM, VSS, on RA, PRN toradol for pain management. Dsg changed x1 this shift, is CDI. ABD is rounded with mild ecchymosis present, ABD binder in place. Port to L chest, blood return noted. LS clear, BS+ audible and normoactive, flatus+, adequate UOP. Pt does have bleeding hemorrhoids. Pt is up with assist x1, is NPO ex meds and ice chips. Potassium this AM was 3.2; potassium replacement started.

## 2019-04-02 NOTE — PROGRESS NOTES
04/02/19 1128   Living Environment   Lives With alone   Living Arrangements house   Living Environment Comment has laundry in the basement   Self-Care   Usual Activity Tolerance good   Current Activity Tolerance fair   Equipment Currently Used at Home grab bar, tub/shower   Activity/Exercise/Self-Care Comment was I with ADL's, has assist with yard work   General Information   Onset of Illness/Injury or Date of Surgery - Date 03/28/19   Referring Physician Reyna Orosco PA-C    Patient/Family Goals Statement pt would like to go home.   Additional Occupational Profile Info/Pertinent History of Current Problem Patient admitted on 3/28/19 for colostomy takedown on 03/28/2019; post-op course complicated by  post-op ileus and confusion. Patient with PMH of restless leg syndrome, chemotherapy-induced peripheral neuropathy, COPD with ongoing tobacco dependence, HTN, and stage IV colon cancer   Precautions/Limitations fall precautions;abdominal precautions   General Observations decreased I with ADL's and functional mobility   General Info Comments pt needing to us bathroom upon arrival   Cognitive Status Examination   Orientation orientation to person, place and time   Cognitive Comment pt feels that cognition is clearing   Visual Perception   Visual Perception Comments wears glasses for reading. denies blurry or double vision   Sensory Examination   Sensory Quick Adds No deficits were identified   Pain Assessment   Patient Currently in Pain No  (when in bed)   Integumentary/Edema   Integumentary/Edema no deficits were identifed   Range of Motion (ROM)   ROM Quick Adds No deficits were identified   Strength   Strength Comments B UE overall 4+/5   Hand Strength   Hand Strength Comments WFL   Muscle Tone Assessment   Muscle Tone Quick Adds No deficits were identified   Mobility   Bed Mobility Comments SBA   Transfer Skills   Transfer Comments SBA using FWW   Transfer Skill: Sit to Stand   Level of [place]: Sit/Stand  "contact guard   Transfer Skill: Toilet Transfer   Level of [place]: Toilet stand-by assist   Lower Body Dressing   Level of [place]: Dress Lower Body minimum assist (75% patients effort)   Toileting   Level of [place]: Toilet stand-by assist   Grooming   Level of [place]: Grooming stand-by assist   Instrumental Activities of Daily Living (IADL)   Previous Responsibilities meal prep;housekeeping;laundry;shopping;medication management;finances;driving   Activities of Daily Living Analysis   Impairments Contributing to Impaired Activities of Daily Living pain;post surgical precautions;flexibility decreased   General Therapy Interventions   Planned Therapy Interventions ADL retraining;IADL retraining;cognition;strengthening;progressive activity/exercise;transfer training   Clinical Impression   Criteria for Skilled Therapeutic Interventions Met yes, treatment indicated   OT Diagnosis decreased I with ADL's and functional mobility   Influenced by the following impairments decreased activity tolerance, increased pain   Assessment of Occupational Performance 1-3 Performance Deficits   Identified Performance Deficits decreased dressing, home mgmt, BR transfers   Clinical Decision Making (Complexity) Low complexity   Therapy Frequency daily   Predicted Duration of Therapy Intervention (days/wks) 5 days   Anticipated Discharge Disposition Transitional Care Facility   Risks and Benefits of Treatment have been explained. Yes   Patient, Family & other staff in agreement with plan of care Yes   [employer] TM \"6 Clicks\"   2016, Trustees of [facility], under license to [employer].  All rights reserved.   6 Clicks Short Forms Daily Activity Inpatient Short Form   [facility]  \"6 Clicks\" Daily Activity Inpatient Short Form   1. Putting on and taking off regular lower body clothing? 3 - A Little   2. Bathing (including washing, rinsing, drying)? 3 - A Little   3. Toileting, which " includes using toilet, bedpan or urinal? 3 - A Little   4. Putting on and taking off regular upper body clothing? 4 - None   5. Taking care of personal grooming such as brushing teeth? 3 - A Little   6. Eating meals? 4 - None   Daily Activity Raw Score (Score out of 24.Lower scores equate to lower levels of function) 20   Total Evaluation Time   Total Evaluation Time (Minutes) 10

## 2019-04-02 NOTE — PROGRESS NOTES
[facility]  Hospitalist Progress Note        Grant Duckworth MD   04/02/2019        Interval History:      - Noted some hematuria this am; confusion much improved, some increased drainage/bleeding from the ostomy takedown site overnight--resolved with pressure dressing         Assessment and Plan:        Anna Dyer is a pleasant 73-year-old female with restless leg syndrome, chemotherapy-induced neuropathy, tobacco dependence, COPD, HTN, and stage IV colon cancer who is admitted under the General Surgery for colostomy takedown. Hospitalist Service consulted on postop day #4 for evaluation of confusion.     # Postoperative delirium.  - This is in the setting of hospitalization, postoperative ileus, and potential contributing medications including new Wellbutrin (started 2 weeks PTA), scopolamine patch, oxycodone, Ativan, Benadryl, and opioids  - clinically confusion/delirium much improved with meds adjustment  -- no s/o UTI.  -- Discontinued Wellbutrin and will continue to hold gabapentin;Judicious use of narcotic medications and discontinue scopolamine patch.   -- Encourage out of bed and ambulation to help with resolution of postop ileus.   -- Reorient as needed.      # Electrolyte abnormalities.  - Low sodium, potassium, magnesium and phosphorus.  This is likely secondary to her n.p.o. state x 4 days  -- fluids changed to D5 NS  - sodium improved; K 3.2  -- on Electrolyte replacement protocols ordered for phosphorus, magnesium, and potassium.      # Postoperative ileus.   # Parastomal hernia s/p colostomy takedown (3/28/2019).   #Stage IV colon cancer metastatic to the liver.   - Colon cancer stable to patient's knowledge and review of [facility] notes.   - Management per Surgical Service, remains NPO  - on D5 NS at 50 ml/hr.     # Hematuria  - had couple instances of hematuria, hemodynamically stable  - post op Hemoglobin 14---12.1--->11.2; will repeat Hb today and in am  - UA on  "4/1/19 was UR  - will defer to primary service for urology consult    Hypertension.  - Continue on PTA amlodipine.  -- PTA losartan-hydrochlorothiazide on hold due to electrolyte abnormalities and adequately controlled blood pressure while NPO.    -- Resume losartan when blood pressure rising or tolerating PO.     # COPD.   # Tobacco dependence.  - respiratory status stable   -- Continue nicotine patch; counselled smoking cessation.   -- Hold PTA Wellbutrin which was started approximately 2 weeks PTA for smoking cessation as is known to cause hallucinations and vivid dreams which may be contributing to postoperative delirium.     Restless leg syndrome.  Chemotherapy-induced neuropathy.  -- Hold PTA gabapentin to reduce any potential contribution to postoperative delirium. If peripheral neuropathy causes issues, will resume.      Deep venous thrombosis prophylaxis: per primary; on  Lovenox--- might have to discontinue if hematuria persistent.      CODE STATUS:  FULL CODE.      DISPOSITION:  Discharge per primary service.                   Physical Exam:      Blood pressure 145/75, pulse 90, temperature 98.5  F (36.9  C), resp. rate 16, height 1.549 m (5' 1\"), weight 56.2 kg (123 lb 14.4 oz), SpO2 94 %, not currently breastfeeding.  Vitals:    03/28/19 1132   Weight: 56.2 kg (123 lb 14.4 oz)     Vital Signs with Ranges  Temp:  [97.8  F (36.6  C)-99  F (37.2  C)] 98.5  F (36.9  C)  Pulse:  [90-98] 90  Heart Rate:  [90-92] 90  Resp:  [16] 16  BP: (125-149)/(74-88) 145/75  SpO2:  [92 %-94 %] 94 %  I/O's Last 24 hours  I/O last 3 completed shifts:  In: -   Out: 800 [Urine:800]    Constitutional: Alert, awake and oriented X 3; lying comfortably in bed in no apparent distress   HEENT: Pupils equal and reactive to light and accomodation, EOMI intact; neck supple no raised JVD or rigidity    Oral cavity: Moist mucosa   Cardiovascular: Normal s1 s2, regular rate and rhythm, no murmur   Lungs: B/l clear to auscultation, no " wheezes or crepitations   Abdomen: Soft, nt, sluggish bowel sounds, no guarding, rigidity or rebound; post op dressing in place, no significant oozing at the stoma site   LE : No edema   Musculoskeletal: Power 5/5 in all extremities   Neuro: No focal neurological deficits noted, CN II to XII grossly intact   Psychiatry: normal mood and affect                Medications:          albuterol  2 puff Inhalation BID     amLODIPine  5 mg Oral Daily     enoxaparin  40 mg Subcutaneous Q24H     nicotine  1 patch Transdermal Daily     nicotine   Transdermal Q8H     nicotine   Transdermal Daily     sodium chloride (PF)  3 mL Intracatheter Q8H     PRN Meds: bisacodyl, HYDROmorphone, ketorolac, lactated ringers, lidocaine 4%, lidocaine (buffered or not buffered), magnesium sulfate, melatonin, naloxone, ondansetron, potassium chloride, potassium chloride with lidocaine, potassium chloride, potassium chloride, potassium chloride, prochlorperazine, sodium chloride (PF), sodium phosphate, sodium phosphate, sodium phosphate         Data:      All new lab and imaging data was reviewed.   Recent Labs   Lab Test 04/01/19  0845 03/31/19  0627 03/29/19  0514  03/07/17  1419   WBC 5.5 8.8 8.2  --   --    HGB 11.2* 12.1 12.0   < > 13.0   MCV 97 97 97  --   --     137* 95*   < >  --    INR  --   --   --   --  0.98    < > = values in this interval not displayed.      Recent Labs   Lab Test 04/02/19  0555 04/01/19  1700 04/01/19  0845 03/31/19  0627     --  131* 131*   POTASSIUM 3.2* 3.5 3.2* 3.5   CHLORIDE 101  --  97 99   CO2 27  --  27 26   BUN 7  --  7 7   CR 0.70  --  0.60 0.60   ANIONGAP 7  --  7 6   CAITY 8.2*  --  8.4* 8.6   *  --  117* 107*     No lab results found.    Invalid input(s): TROP, TROPONINIES

## 2019-04-02 NOTE — PLAN OF CARE
"Ox4, verbalizes mild hallucinations although acknowledges she understands they are not real. Calls appropriately throughout shift, VPM discontinued. Old stoma dressing CDI. \"Midline/ large trochar\" site was IWONA throughout shift. Thought pt experiencing hematuria, instead was site leaking enough into vaginal area to color urine in hat. Site dressed. Urine now straw color, WNL. Toradol and ice packs for pain. +small BMs on shift,good BS, denies nausea. Tolerating ice chips/clears. Plan for shower tonight. Requiring K+ replacement tonight.   "

## 2019-04-02 NOTE — PLAN OF CARE
A&O, vss. Lung sounds clear. Bowel sounds active, gas+, Bm documented on previous shift so entereg discontinued. Small amount of bleeding from hemorrhoids.  Abdominal incision with bleeding, dressing marked and monitored. Abdominal binder in place. Pt had some nausea, zofran given, declined aromatherapy.   NPO with ice chips, IV fluids dextrose 5% in normal saline at 50ml/hr.

## 2019-04-02 NOTE — PLAN OF CARE
"Discharge Planner OT   Patient plan for discharge: none in chart  Current status: eval completed and treatment initiated. Pt lives alone in a house at baseline. Has laundry in basement. Was I with ADL\"s and IADL's including cooking, cleaning, med mgmt and driving. Currently notes indicate confusion and impulsiveness that is clearing per nursing and patient. Pt was alert and oriented x4, very pleasant and able to answer all questions appropriately. Will continue to monitor cognition and assess higher level of cognition. Demos toilet transfer with SBA, bed mobiltiy with SBA. But demo's decreased activity tolerance and increased pain with minimal activity.   Barriers to return to prior living situation: lives alone, increased need for assist with IADL's  Recommendations for discharge: TCU  Rationale for recommendations: pt is making improvements daily and is really motivated to return home.        Entered by: Mago Ruiz 04/02/2019 11:48 AM       "

## 2019-04-03 ENCOUNTER — APPOINTMENT (OUTPATIENT)
Dept: PHYSICAL THERAPY | Facility: CLINIC | Age: 74
DRG: 330 | End: 2019-04-03
Attending: COLON & RECTAL SURGERY
Payer: MEDICARE

## 2019-04-03 ENCOUNTER — APPOINTMENT (OUTPATIENT)
Dept: OCCUPATIONAL THERAPY | Facility: CLINIC | Age: 74
DRG: 330 | End: 2019-04-03
Attending: COLON & RECTAL SURGERY
Payer: MEDICARE

## 2019-04-03 LAB
CREAT SERPL-MCNC: 0.68 MG/DL (ref 0.52–1.04)
GFR SERPL CREATININE-BSD FRML MDRD: 87 ML/MIN/{1.73_M2}
PLATELET # BLD AUTO: 148 10E9/L (ref 150–450)
POTASSIUM SERPL-SCNC: 4 MMOL/L (ref 3.4–5.3)

## 2019-04-03 PROCEDURE — 82565 ASSAY OF CREATININE: CPT | Performed by: COLON & RECTAL SURGERY

## 2019-04-03 PROCEDURE — A9270 NON-COVERED ITEM OR SERVICE: HCPCS | Mod: GY | Performed by: PHYSICIAN ASSISTANT

## 2019-04-03 PROCEDURE — 97535 SELF CARE MNGMENT TRAINING: CPT | Mod: GO

## 2019-04-03 PROCEDURE — 25000128 H RX IP 250 OP 636: Performed by: COLON & RECTAL SURGERY

## 2019-04-03 PROCEDURE — 84132 ASSAY OF SERUM POTASSIUM: CPT | Performed by: COLON & RECTAL SURGERY

## 2019-04-03 PROCEDURE — 97116 GAIT TRAINING THERAPY: CPT | Mod: GP | Performed by: PHYSICAL THERAPIST

## 2019-04-03 PROCEDURE — 12000000 ZZH R&B MED SURG/OB

## 2019-04-03 PROCEDURE — A9270 NON-COVERED ITEM OR SERVICE: HCPCS | Mod: GY | Performed by: INTERNAL MEDICINE

## 2019-04-03 PROCEDURE — 25000132 ZZH RX MED GY IP 250 OP 250 PS 637: Mod: GY | Performed by: PHYSICIAN ASSISTANT

## 2019-04-03 PROCEDURE — 25000132 ZZH RX MED GY IP 250 OP 250 PS 637: Mod: GY | Performed by: INTERNAL MEDICINE

## 2019-04-03 PROCEDURE — 97110 THERAPEUTIC EXERCISES: CPT | Mod: GP | Performed by: PHYSICAL THERAPIST

## 2019-04-03 PROCEDURE — 85049 AUTOMATED PLATELET COUNT: CPT | Performed by: COLON & RECTAL SURGERY

## 2019-04-03 PROCEDURE — 99232 SBSQ HOSP IP/OBS MODERATE 35: CPT | Performed by: INTERNAL MEDICINE

## 2019-04-03 RX ORDER — LABETALOL 20 MG/4 ML (5 MG/ML) INTRAVENOUS SYRINGE
10
Status: DISCONTINUED | OUTPATIENT
Start: 2019-04-03 | End: 2019-04-04 | Stop reason: HOSPADM

## 2019-04-03 RX ADMIN — ALBUTEROL SULFATE 2 PUFF: 90 INHALANT RESPIRATORY (INHALATION) at 11:23

## 2019-04-03 RX ADMIN — ENOXAPARIN SODIUM 40 MG: 40 INJECTION SUBCUTANEOUS at 11:23

## 2019-04-03 RX ADMIN — KETOROLAC TROMETHAMINE 15 MG: 15 INJECTION, SOLUTION INTRAMUSCULAR; INTRAVENOUS at 11:24

## 2019-04-03 RX ADMIN — NICOTINE 1 PATCH: 7 PATCH, EXTENDED RELEASE TRANSDERMAL at 11:23

## 2019-04-03 RX ADMIN — AMLODIPINE BESYLATE 5 MG: 5 TABLET ORAL at 11:24

## 2019-04-03 RX ADMIN — ALBUTEROL SULFATE 2 PUFF: 90 INHALANT RESPIRATORY (INHALATION) at 20:44

## 2019-04-03 ASSESSMENT — ACTIVITIES OF DAILY LIVING (ADL)
ADLS_ACUITY_SCORE: 15

## 2019-04-03 NOTE — PROGRESS NOTES
[facility]  Hospitalist Progress Note  Vik Torres MD    Assessment & Plan   Anna Dyer is a pleasant 73-year-old female with PMHx notable for stage IV colon cancer, restless leg syndrome, chemotherapy-induced neuropathy, tobacco dependence, COPD, and HTN, who was admitted under the Colorectal Surgery for colostomy takedown. Hospitalist Service consulted on postop day #4 for evaluation of confusion.     # Postoperative delirium.  This is in the setting of hospitalization, postoperative ileus, and potential contributing medications including new Wellbutrin (started 2 weeks PTA), scopolamine patch, oxycodone, Ativan, Benadryl, and opioids.  No s/o UTI. Discontinued Wellbutrin and scopolamine patch.  Clinically confusion/delirium resolved.    -- Judicious use of narcotic medications   -- Encourage out of bed and ambulation to help with resolution of postop ileus.   -- Reorient as needed.      # Electrolyte abnormalities.  Noted low sodium, potassium, magnesium and phosphorus. Likely secondary to her n.p.o. state x 4 days    -- Replaced per protocol     # Postoperative ileus.   # Parastomal hernia s/p colostomy takedown (3/28/2019).   #Stage IV colon cancer metastatic to the liver.   Colon cancer stable to patient's knowledge and review of [facility] notes.   -- Diet advanced  -- Management per Surgical Service     # Hematuria.  Noted couple instances of hematuria.  UA on 4/1/19 unremarkable  -- Continue to monitor     Hypertension.  BP is stable.  -- Continue on PTA amlodipine 5 mg po daily.  -- Continue to hold PTA losartan-hydrochlorothiazide for now  -- IV labetalol available prn     # COPD.   # Tobacco dependence.  Respiratory status stable   -- Continue nicotine patch; counselled smoking cessation.   -- Hold PTA Wellbutrin which was started approximately 2 weeks PTA for smoking cessation as is known to cause hallucinations and vivid dreams which may be contributing to  postoperative delirium.     Restless leg syndrome.  Chemotherapy-induced neuropathy.  -- May resume PTA gabapentin at discharge    Orders Placed This Encounter      Clear Liquid Diet      DVT Prophylaxis: Lovenox  Code Status: Full code  Disposition: Per CRS. Anticipate discharge to TCU soon.    Interval History   Patient is doing well. Confusion resolved. Diet advanced. Good BMs. No new issues overnight.    Data reviewed today: I reviewed all new labs and imaging over the last 24 hours. I personally reviewed no images or EKG's today.    Physical Exam   Temp: 97.8  F (36.6  C) Temp src: Oral BP: 146/78 Pulse: 94 Heart Rate: 90 Resp: 16 SpO2: 98 % O2 Device: None (Room air)    Vitals:    03/28/19 1132   Weight: 56.2 kg (123 lb 14.4 oz)     Vital Signs with Ranges  Temp:  [97.8  F (36.6  C)-98.5  F (36.9  C)] 97.8  F (36.6  C)  Pulse:  [90-94] 94  Heart Rate:  [90] 90  Resp:  [16] 16  BP: (133-149)/(75-88) 146/78  SpO2:  [93 %-98 %] 98 %  I/O's Last 24 hours  I/O last 3 completed shifts:  In: 1672 [P.O.:150; I.V.:1522]  Out: 150 [Urine:150]    Constitutional: Comfortable, no acute distress  HEENT: No conjunctival pallor.  Neurologic: Awake, alert, oriented x3  Neck: Supple, no elevated JVP  Respiratory: Clear to auscultation  Cardiovascular: Normal S1 and S2, regular rhythm and rate  GI: Abdomen soft, non distended, BS active  Extremities: No calf tenderness, no significant LE edema  Skin/integument: No acute rash, no cyanosis    Medications   All medications were reviewed.    dextrose 5% and 0.9% NaCl 50 mL/hr at 04/02/19 1139       albuterol  2 puff Inhalation BID     amLODIPine  5 mg Oral Daily     enoxaparin  40 mg Subcutaneous Q24H     heparin  5 mL Intracatheter Q28 Days     heparin lock flush  5-10 mL Intracatheter Q24H     nicotine  1 patch Transdermal Daily     nicotine   Transdermal Q8H     nicotine   Transdermal Daily     sodium chloride (PF)  3 mL Intracatheter Q8H        Data   Recent Labs   Lab  04/03/19  0615 04/03/19  0400 04/02/19  1630 04/02/19  0555 04/01/19  1700 04/01/19  0845 03/31/19  0627  03/29/19  0514   WBC  --   --   --   --   --  5.5 8.8  --  8.2   HGB  --   --  11.8  --   --  11.2* 12.1  --  12.0   MCV  --   --   --   --   --  97 97  --  97   *  --   --   --   --  153 137*  --  95*   NA  --   --   --  135  --  131* 131*  --  130*   POTASSIUM  --  4.0  --  3.2* 3.5 3.2* 3.5  --  3.6   CHLORIDE  --   --   --  101  --  97 99  --  99   CO2  --   --   --  27  --  27 26  --  24   BUN  --   --   --  7  --  7 7  --  12   CR 0.68  --   --  0.70  --  0.60 0.60  --  0.98   ANIONGAP  --   --   --  7  --  7 6  --  7   CAITY  --   --   --  8.2*  --  8.4* 8.6  --  7.6*   GLC  --   --   --  106*  --  117* 107*   < > 127*    < > = values in this interval not displayed.       No results found for this or any previous visit (from the past 24 hour(s)).

## 2019-04-03 NOTE — PLAN OF CARE
AVSS on RA. Pain controlled with PRN Toradol.  Incisions CDI Ex drainage from midline. Diet NPO with sips and chips. Up with SBA. BS active and audible; several small bowel movements.

## 2019-04-03 NOTE — PLAN OF CARE
Discharge Planner PT   Patient plan for discharge: TCU  Current status: Supine to sit using log roll technique with SBA. Pt required multiple cues to not sit up immediately. Performed log roll technique with minimal cues. Able to ambulate 150 ft with FWW and CGA, 3 seated rest breaks with wheelchair follow d/t SOB. Up in chair at end of session with good tolerance. HR 98 BPM, O2 sats 96% following ambulation. Pt appeared more oriented today, able to follow commands and integrate cues.  Barriers to return to prior living situation: Lives alone, impaired cognition, weakness and decreased activity tolerance  Recommendations for discharge: TCU  Rationale for recommendations: Pt will benefit from ongoing PT to maximize strength, activity tolerance, and progression of independence with functional mobility to allow for eventual return home.       Entered by: Alba Lau 04/03/2019 8:36 AM

## 2019-04-03 NOTE — PROGRESS NOTES
SPIRITUAL HEALTH SERVICES  Spiritual Assessment Progress Note  [facility]   visited pt due ot LOS.  Pt was in good spirits and is looking forward to going home.    Pt share portions of her life story - including her marriages, her children, her W[employer] upbring, her career as an  and her health.    Pt is enjoying alf and a newly purchased home with her two dogs.  She has two sons that are very supportive.     Pt has left the [employer] but practices her beliefs on her own.      listened as pt shared her story.  Provided support.       No plans to follow.                                                                                                                                           Sara Morocho M.Div., [facility]  Staff    Pager [phone]

## 2019-04-03 NOTE — PLAN OF CARE
A&O, AVSS. Calls appropriately throughout shift. Tolerating fulls, denies nausea. Multiple small BMs, good BS. Abdomen remains firm but less distended, good BS. Incisions and old stoma site dressings changed per POC. Scant serousy drainage. Adequate UOP. Left chest port patent, C/o PIV site pain with flushing, utilized port as needed. Toradol for pain x1 but ice packs seem most helpful. Up in chair and OOB with assist 1 ww/gb, ambulating often in halls with encouragement. Loose productive cough, IS IND to 1000.

## 2019-04-03 NOTE — PROGRESS NOTES
COLON & RECTAL [facility]GERY  PROGRESS NOTE    April 3, 2019  Post-op Day # 6    SUBJECTIVE:  Doing very well. Multiple bowel movements, passing gas. Nausea resolved. Feeling hungry. Spent good amount of time out of bed yesterday. Question of hematuria after blood noted in the toilet, but felt to be contamination from rectal and/or wound drainage, no hematuria on subsequent voids. Still occasionally confused/hallucinating but much improved.    OBJECTIVE:  Temp:  [97.8  F (36.6  C)-98.5  F (36.9  C)] 97.8  F (36.6  C)  Pulse:  [90-94] 94  Heart Rate:  [90] 90  Resp:  [16] 16  BP: (133-149)/(75-88) 146/78  SpO2:  [93 %-98 %] 98 %    Intake/Output Summary (Last 24 hours) at 4/3/2019 0701  Last data filed at 4/2/2019 1900  Gross per 24 hour   Intake 1672 ml   Output 150 ml   Net 1522 ml       GENERAL:  Awake, alert, no acute distress  RESP: Normal work of breathing  EXTREMITIES: Warm and well perfused  ABDOMEN:  Soft, minimally tender, non-distended. No guarding, rigidity, or peritoneal signs.  INCISION:  C/d/i with dermabond. Stoma site CDI. Midline dressing with serosang drainage, no active drainage from wound.    LABS:  Lab Results   Component Value Date    WBC 5.5 04/01/2019     Lab Results   Component Value Date    HGB 11.8 04/02/2019     Lab Results   Component Value Date    HCT 32.1 04/01/2019     Lab Results   Component Value Date     04/03/2019     Last Basic Metabolic Panel:  Lab Results   Component Value Date     04/02/2019      Lab Results   Component Value Date    POTASSIUM 4.0 04/03/2019     Lab Results   Component Value Date    CHLORIDE 101 04/02/2019     Lab Results   Component Value Date    CAITY 8.2 04/02/2019     Lab Results   Component Value Date    CO2 27 04/02/2019     Lab Results   Component Value Date    BUN 7 04/02/2019     Lab Results   Component Value Date    CR 0.68 04/03/2019     Lab Results   Component Value Date     04/02/2019       ASSESSMENT/PLAN: 73F POD#6 colostomy  takedown. Postop delirium, now improved. Slow return of bowel function. Clinically well.      - Advance to clear liquid this AM. If tolerating, likely ADAT this afternoon or tomorrow  - PRN tylenol or toradol for pain. No narcotics.  - Continue 50ml/hr IVF, stop with PO  - Appreciate Hospitalist assistance, holding gabapentin and discontinued wellbutrin with improvement in delirium  - Lovenox for ppx  - OOB, ambulate at least QID  - PT recs TCU, patient agreeable. Dispo planning, likely medically ready by end of the week     Will update Dr. Caba    For questions/paging, please contact the CRS office at [phone].    Oswaldo Hatfield MD  Colorectal Surgery Fellow  [facility]  Phone: [phone]    CRS Staff  Seen and examined.  Agree with above.  Advance diet as tolerated.  Plan for discharge to TCU tomorrow so long as she continues to progress.    Jesus Caba MD  [facility]  [phone] (office)  [phone] (pager)  [email or web address]

## 2019-04-03 NOTE — CONSULTS
Care Transition Initial Assessment - SW     Met with: Patient    Active Problems:    Colostomy status (H)       DATA  Lives With: alone   Living Arrangements: house     Description of Support System: Supportive, Involved  Who is your support system?: Children  Support Assessment: Adequate family and caregiver support, Adequate social supports.     ASSESSMENT  Cognitive Status:  awake, alert and oriented    SW has reviewed pt records. Pt is Anna, a 73yo female who was admitted on 3/28/19 and underwent a colostomy reversal. Pt had some post op delirium that has resolved. It is recommended at this time that pt discharge to a TCU prior to returning home. SW met with pt this morning to introduce self/role, perform assessment, and discuss discharge planning. Pt shared that she resides along in a house in [place] with her two dogs. PT son, Clemente, is local and involved. Pt discussed that he has been attending to her dogs while she has been her and feels that this is quite burdensome. SW discuss recommendation for TCU. Pt receptive to explanation of insurance coverage, referral process, facility locations, and general expectations for TCU. With that said, pt ultimately shared that she does not wish to pursue facility placement and much prefers to return home. SW discussed that her care team has recommended this level of care however it is ultimately her decision. We discussed her support at home and she shared that her some would be available as needed to assist her. Pt has had [facility] in the past and would be open to this. SW discussed home care referral process and pt agreeable to referral to [facility] again. ARIELLA plans to connect with pt again tomorrow to ensure that this is what she desires and will make referral accordingly. SW left TCU list in pt room to review if she finds she would want to pursue this.     PLAN  Financial costs for the patient includes: None known at this time.  Patient given options and  choices for discharge Yes, TCU list provided.  Patient/family is agreeable to the plan?  Yes  Patient Goals and Preferences: Home with home care services.  Patient anticipates discharging to:  Home with home care services.    JUNO Razo, [facility]  Daytime (8:00am-4:30pm): [phone]  After-Hours  Pager (4:30pm-11:30pm): [phone]

## 2019-04-03 NOTE — PLAN OF CARE
Discharge Planner OT   Patient plan for discharge: Home with assist   Current status: While seated/standing pt completed lower body dressing including don/doff pants and socks with overall SBA and crossed leg method. During session, pt demonstrated impulsiveness with functional transfers on 2 occassions. Completed the [facility] Cognitive Screen. Pt scored 26/30 indicating cognitive impairment. Pt demonstrated difficulty with attention, problem solving. Further cognitive testing warranted.   Barriers to return to prior living situation: lives alone, increased need for assist with IADL's  Recommendations for discharge: Recommend TCU. Pt declines TCU, if home then home OT and 24/7 A/supervision for all I/ADLs   Rationale for recommendations: Pt will benefit from continued skilled therapy to address safety and independence in I/ADLs.        Entered by: [place] 04/03/2019 2:39 PM

## 2019-04-04 ENCOUNTER — APPOINTMENT (OUTPATIENT)
Dept: OCCUPATIONAL THERAPY | Facility: CLINIC | Age: 74
DRG: 330 | End: 2019-04-04
Attending: COLON & RECTAL SURGERY
Payer: MEDICARE

## 2019-04-04 ENCOUNTER — APPOINTMENT (OUTPATIENT)
Dept: PHYSICAL THERAPY | Facility: CLINIC | Age: 74
DRG: 330 | End: 2019-04-04
Attending: COLON & RECTAL SURGERY
Payer: MEDICARE

## 2019-04-04 VITALS
DIASTOLIC BLOOD PRESSURE: 74 MMHG | SYSTOLIC BLOOD PRESSURE: 138 MMHG | RESPIRATION RATE: 18 BRPM | OXYGEN SATURATION: 98 % | HEIGHT: 61 IN | HEART RATE: 98 BPM | WEIGHT: 123.9 LBS | BODY MASS INDEX: 23.39 KG/M2 | TEMPERATURE: 98.5 F

## 2019-04-04 PROCEDURE — 97530 THERAPEUTIC ACTIVITIES: CPT | Mod: GO

## 2019-04-04 PROCEDURE — 97116 GAIT TRAINING THERAPY: CPT | Mod: GP | Performed by: PHYSICAL THERAPIST

## 2019-04-04 PROCEDURE — 25000132 ZZH RX MED GY IP 250 OP 250 PS 637: Mod: GY | Performed by: INTERNAL MEDICINE

## 2019-04-04 PROCEDURE — 25000132 ZZH RX MED GY IP 250 OP 250 PS 637: Mod: GY | Performed by: PHYSICIAN ASSISTANT

## 2019-04-04 PROCEDURE — A9270 NON-COVERED ITEM OR SERVICE: HCPCS | Mod: GY | Performed by: PHYSICIAN ASSISTANT

## 2019-04-04 PROCEDURE — 40000257 ZZH STATISTIC CONSULT NO CHARGE VASC ACCESS

## 2019-04-04 PROCEDURE — 25000128 H RX IP 250 OP 636: Performed by: COLON & RECTAL SURGERY

## 2019-04-04 PROCEDURE — 99232 SBSQ HOSP IP/OBS MODERATE 35: CPT | Performed by: INTERNAL MEDICINE

## 2019-04-04 PROCEDURE — A9270 NON-COVERED ITEM OR SERVICE: HCPCS | Mod: GY | Performed by: INTERNAL MEDICINE

## 2019-04-04 PROCEDURE — 97535 SELF CARE MNGMENT TRAINING: CPT | Mod: GO

## 2019-04-04 PROCEDURE — 99207 ZZC CDG-MDM COMPONENT: MEETS MODERATE - UP CODED: CPT | Performed by: INTERNAL MEDICINE

## 2019-04-04 PROCEDURE — 97530 THERAPEUTIC ACTIVITIES: CPT | Mod: GP | Performed by: PHYSICAL THERAPIST

## 2019-04-04 PROCEDURE — 25000128 H RX IP 250 OP 636: Performed by: HOSPITALIST

## 2019-04-04 RX ADMIN — HEPARIN 5 ML: 100 SYRINGE at 14:49

## 2019-04-04 RX ADMIN — KETOROLAC TROMETHAMINE 15 MG: 15 INJECTION, SOLUTION INTRAMUSCULAR; INTRAVENOUS at 03:52

## 2019-04-04 RX ADMIN — ENOXAPARIN SODIUM 40 MG: 40 INJECTION SUBCUTANEOUS at 10:11

## 2019-04-04 RX ADMIN — SODIUM CHLORIDE, PRESERVATIVE FREE 5 ML: 5 INJECTION INTRAVENOUS at 03:52

## 2019-04-04 RX ADMIN — AMLODIPINE BESYLATE 5 MG: 5 TABLET ORAL at 10:11

## 2019-04-04 RX ADMIN — NICOTINE 1 PATCH: 7 PATCH, EXTENDED RELEASE TRANSDERMAL at 10:11

## 2019-04-04 ASSESSMENT — ACTIVITIES OF DAILY LIVING (ADL)
ADLS_ACUITY_SCORE: 16

## 2019-04-04 NOTE — DISCHARGE INSTRUCTIONS
Discharge Instructions following Abdominal Surgery  [facility]      Bowel Function  After removal of a portion of the intestines, it is normal for bowel movements to be erratic.  They are often looser and more frequent.  This condition generally resolves within a few weeks or it can be controlled with diet or medication.  Diet  In general, you may return to a well-balanced diet upon discharge.  Your doctor will let you know when to add extra fiber to your diet.  Be sure to drink plenty of fluids.  Incision  You should expect some discomfort in the area of your incision, particularly as you increase your activity.  If you notice an area of increasing redness or new drainage, please call your doctor.  You may shower as desired but refrain from tub baths or swimming until the incisions are fully healed.  Activity  Gradually increase your activity each day.  There are generally no restrictions on walking, climbing stairs, or riding in a car.  You can usually drive a car 7-10 days after your leave the hospital.  Do not drive while taking narcotic pain medications.  Ask your doctor regarding resumption of physical sexual activity; in most cases, you can resume sex after a few weeks.  Your physician will advise you about when to return to work.  It is preferable to have somebody stay with you at home until you are fully healed and able to resume a normal level of activity   Medications  You will be discharged with a prescription pain medication and any medications you were taking prior to surgery.  Do not drive while taking narcotic pain medications.  If you need additional medications or a refill, you must call your doctor during normal business hours.  It is the policy of [facility] to not refill pain medication after hours or on weekends because your chart is not available.  Follow-up  Typically, you will be asked to schedule a follow-up office visit 1  to 4 weeks after surgery.  If you have any questions related to follow-up, medications, or your condition, please call the office.      Call your surgeon if you have these signs or symptoms:  ([phone])    Problem with the incision, including increasing pain, swelling, redness, or drainage    Increasing abdominal pain    Uncontrolled nausea or vomiting    Fever or chills    Constipation  (no bowel movement for 3 days)    Diarrhea (more than 3 watery stools within 24 hours)    Bleeding from the rectum, wound, or stoma    Any questions or concerns    You are being discharged with home care services through [facility]. [facility] will contact you to schedule initial visit. If you have any questions prior to this call, please contact the 24 hour patient line at [phone].

## 2019-04-04 NOTE — PROGRESS NOTES
ARIELLA  D/I: SW spoke to patient's son, Alban ([phone]).  Son reported that he met with his mother this morning for a long time and that patient just wants to go home.  Patient's son stated that he is strongly advocating for any type of home care solution. SW expressed understanding of the situation to patient's son, and discussed the importance of safety and having a safe discharge plan for his mother.  SW informed patient's son that SW would review and coordinate with care coordination and therapy to see if a safe, home discharge plan can be arranged for patient.    P: SW will continue to monitor and follow for a safe, discharge plan.

## 2019-04-04 NOTE — PLAN OF CARE
Discharge Planner OT   Patient plan for discharge: Home  Current status: Pt completed medication set up task. Pt demonstrated 95% accuracy with simple medication set up task. Pt demonstrated difficulty with complex medication management tasks with an accuracy of 0%.  Pt completed 1 hygiene task while standing at the sink with CGA. Pt impulsive with functional transfers.   Barriers to return to prior living situation: lives alone, increased need for assist with IADL's; impulsive   Recommendations for discharge: Encourage and Recommend TCU. Pt sternly declines TCU, if home then home OT and 24/7 A/supervision for all I/ADLs   Rationale for recommendations: Pt will benefit from continued skilled therapy to address safety and independence in I/ADLs.          Entered by: [place] 04/04/2019 12:16 PM

## 2019-04-04 NOTE — PLAN OF CARE
Pt is A&Ox4. VSS on RA. Denies hallucinations overnight. Given PRN toradol x 1 for abdominal pain with relief. LS clear, dry cough. BS active, passing gas. Denies nausea. Abdomen distended and firm. Voiding adequately. Old stoma site covered with dry gauze CDI. Midline dressing with small serous drainage, changed x 1, CDI. Port HL, PIV SL. Up with A/1 to bathroom, gait steady. Plan to discharge to TCU vs home today. Continue to monitor.

## 2019-04-04 NOTE — PLAN OF CARE
Physical Therapy Discharge Summary    Reason for therapy discharge:    Discharged to home with home therapy.    Progress towards therapy goal(s). See goals on Care Plan in [facility] electronic health record for goal details.  Goals not met.  Barriers to achieving goals:   discharge from facility and decreased safety awareness .    Therapy recommendation(s):    Continued therapy is recommended.  Rationale/Recommendations:   . TCU was recommended but pt firmly declined. Would benefit from home PT safety eval secondary to refusal of TCU.       patient arrived in wheelchair - states that he was here for a nuclear test yesterday and has been vomiting all day today

## 2019-04-04 NOTE — PROGRESS NOTES
[facility]  Due to a high volume of referrals [facility] has requested this patient's home care episode be transferred to their deferral partner [facility], [facility]. [phone].  Care team and patient notified.

## 2019-04-04 NOTE — PLAN OF CARE
Occupational Therapy Discharge Summary    Reason for therapy discharge:    Discharged to home with home therapy.    Progress towards therapy goal(s). See goals on Care Plan in [facility] electronic health record for goal details.  Goals not met.  Barriers to achieving goals:   discharge from facility.    Therapy recommendation(s):    Continued therapy is recommended.  Rationale/Recommendations:  Recommend TCU. Pt sternly declines TCU, if home then home OT and 24/7 A/supervision for all I/ADLs .

## 2019-04-04 NOTE — PLAN OF CARE
Discharge Planner PT   Patient plan for discharge: Home  Current status: Supine to sit using log roll technique with SBA. Pt required cueing to not sit up immediately, performed log roll technique with minimal cues. Able to ambulate 100 ft with FWW and CGA. Unable to trial stairs due to bowel incontinence. CGA transfer to toilet, required min assist with self cares. Demonstrates impulsivity with transfers. Educated pt on safety and recommended discharge plan to TCU, pt expressed understanding but stated her plan is still to go home. Pt left supine in bed with bed alarm on and needs in reach.  Barriers to return to prior living situation: Lives alone, impaired cognition, weakness and decreased activity tolerance  Recommendations for discharge: TCU  Rationale for recommendations: Pt will benefit from ongoing PT to maximize strength, activity tolerance, safety awareness, and progression of independence with functional mobility to allow for eventual return home.         Entered by: Alba Lau 04/04/2019 9:30 AM

## 2019-04-04 NOTE — PROGRESS NOTES
"COLON & RECTAL SURGERY  PROGRESS NOTE    April 4, 2019  Post-op Day # 7 s/p colostomy takedown    SUBJECTIVE:  Pt resting in bed upon arrival. States she really wants to order eggs for breakfast. She reports multiple loose BMs yesterday that are \"firming up\". Reports minimal pain except when coughing. She has been up ambulating in the halls multiple times per day. Pt denies N/V. She would like to discharge today to home.     OBJECTIVE:  Temp:  [98.2  F (36.8  C)-98.8  F (37.1  C)] 98.2  F (36.8  C)  Pulse:  [85] 85  Heart Rate:  [87-95] 90  Resp:  [16-18] 18  BP: (129-147)/(70-79) 129/76  SpO2:  [95 %-97 %] 96 %    Intake/Output Summary (Last 24 hours) at 4/4/2019 0702  Last data filed at 4/4/2019 0300  Gross per 24 hour   Intake 1235 ml   Output 550 ml   Net 685 ml       GENERAL:  Awake, alert, no acute distress  HEAD: Normocephalic atraumatic  SCLERA: Anicteric  EXTREMITIES: Warm and well perfused  ABDOMEN:  Soft, appropriately tender, non-distended. No guarding, rigidity, or peritoneal signs. Stoma closure site and incisions bandaged, clean and dry.   INCISION:  C/d/i    LABS:  Lab Results   Component Value Date    WBC 5.5 04/01/2019     Lab Results   Component Value Date    HGB 11.8 04/02/2019     Lab Results   Component Value Date    HCT 32.1 04/01/2019     Lab Results   Component Value Date     04/03/2019     Last Basic Metabolic Panel:  Lab Results   Component Value Date     04/02/2019      Lab Results   Component Value Date    POTASSIUM 4.0 04/03/2019     Lab Results   Component Value Date    CHLORIDE 101 04/02/2019     Lab Results   Component Value Date    CAITY 8.2 04/02/2019     Lab Results   Component Value Date    CO2 27 04/02/2019     Lab Results   Component Value Date    BUN 7 04/02/2019     Lab Results   Component Value Date    CR 0.68 04/03/2019     Lab Results   Component Value Date     04/02/2019       ASSESSMENT/PLAN: POD #7 s/p colostomy takedown. Pt doing well. Tolerating FLD, " AVSS, having multiple BMs. Advance diet to LRD. Pt would like to discharge home today, will have to speak with Dr Caba about this and see how she tolerates advancing diet.     1. Advance to low fiber diet  2. PRN tylenol or toradol for pain   3. OOB, ambulate QID in hallway  4. Lovenox for ppx  5. Discharge: possibly later today or tomorrow. TCU vs home. PT/OT recommend TCU, patient requesting home.       For questions/paging, please contact the CRS office at [phone].    Reyna Orosco PA-C  [facility]  Phone: [phone]    CRS Staff  Seen and examined.  Agree with above.  OK to discharge from surgical and medical standpoint.   Patient adamantly refusing TCU.  Discussed with son need for 24/7 monitoring.  He is going to discuss with mom.  Discharge orders written for home PT/OT/home RN.    Jesus Caba MD  [facility]  [phone] (office)  [phone] (pager)  [email or web address]

## 2019-04-04 NOTE — PLAN OF CARE
VSS. Oriented x4, but forgetful at times and slightly impulsive. Crackle lung sounds present with inspiratory wheezes in LLL GARO and RLL. Regular rate and rhythm heart sounds. Passing flatus and BM today. Low fiber diet and tolerating it well.  Ambulates with standby assistance, ambulated in the halls. Abdominal dressings clean, dry and intact. Arelis incisional area slightly ecchymotic.      Discharge instructions gone over, pt verbalized understanding, son at bedside, all questions answered. Pt discharged home, son provided transportation.

## 2019-04-04 NOTE — PROGRESS NOTES
Met with patient to review discharge plan recommendations from therapy are for TCU. Patient firmly refuses and wants to go home. Encouraged patient to work with scheduled therapy at 1130 this morning and offered to call her son Alban with update. Verbal permission to speak to Alban given. Patient given HHC list to pick from for agency.     1240 updated by MD that son updated, and will work on home plan with [facility] services. Recommendations for TCU or HHC with 24hr assist reviewed. Patient states she would like [facility].     1335 spoke to son via phone and he would like information on [facility]. And is coming to hospital soon.    1500 Patient son, Alban,  spoke to patient about TCU again and final decision is to go home with family help, son enrolling her in [facility] and HHC services arranged through [place].  Referral sent to [facility] via standard process, family request for start of service Friday noted on referral.

## 2019-04-04 NOTE — PROGRESS NOTES
[facility]  Hospitalist Progress Note  Vik Torres MD    Assessment & Plan   Anna Dyer is a pleasant 73-year-old female with PMHx notable for stage IV colon cancer, restless leg syndrome, chemotherapy-induced neuropathy, tobacco dependence, COPD, and HTN, who was admitted under the Colorectal Surgery for colostomy takedown. Hospitalist Service consulted on postop day #4 for evaluation of confusion.     # Postoperative delirium, unable to specify, resolved.  This is in the setting of hospitalization, postoperative ileus, and potential contributing medications including new Wellbutrin (started 2 weeks PTA), scopolamine patch, oxycodone, Ativan, Benadryl, and opioids.  No s/o UTI. Discontinued Wellbutrin and scopolamine patch.  Clinically confusion/delirium resolved.    -- Judicious use of narcotic medications   -- Encourage out of bed and ambulation to help with resolution of postop ileus.   -- Reorient as needed.      # Electrolyte abnormalities.  Noted low sodium, potassium, magnesium and phosphorus. Likely secondary to her n.p.o. state x 4 days    -- Replaced per protocol     # Postoperative ileus.   # Parastomal hernia s/p colostomy takedown (3/28/2019).   #Stage IV colon cancer metastatic to the liver.   Colon cancer stable to patient's knowledge and review of [facility] notes.   -- Diet advanced  -- Management per Surgical Service     # Hematuria.  Noted couple instances of hematuria.  UA on 4/1/19 unremarkable  -- Continue to monitor     Hypertension.  BP is stable.  -- Continue on PTA amlodipine 5 mg po daily.  -- Resume PTA losartan-hydrochlorothiazide at discharge  -- IV labetalol available prn     # COPD.   # Tobacco dependence.  Respiratory status stable   -- Continue nicotine patch; counselled smoking cessation.   -- OK to resume PTA Wellbutrin at discharge     Restless leg syndrome.  Chemotherapy-induced neuropathy.  -- May resume PTA gabapentin at  discharge    Orders Placed This Encounter      Advance Diet as Tolerated: Low Fiber      DVT Prophylaxis: Lovenox  Code Status: Full code  Disposition: Anticipate discharge today or tomorrow per CR surgery. Patient is stable from medical [facility] for discharge.    Interval History   Patient is doing well. No recurrence of delirium. Diet has been advanced. Patient reports BM. No new issues overnight.    Data reviewed today: I reviewed all new labs and imaging over the last 24 hours. I personally reviewed no images or EKG's today.    Physical Exam   Temp: 98.4  F (36.9  C) Temp src: Oral BP: 136/66 Pulse: 85 Heart Rate: 84 Resp: 18 SpO2: 95 % O2 Device: None (Room air)    Vitals:    03/28/19 1132   Weight: 56.2 kg (123 lb 14.4 oz)     Vital Signs with Ranges  Temp:  [98.2  F (36.8  C)-98.8  F (37.1  C)] 98.4  F (36.9  C)  Pulse:  [85] 85  Heart Rate:  [84-95] 84  Resp:  [16-18] 18  BP: (129-147)/(66-79) 136/66  SpO2:  [95 %-97 %] 95 %  I/O's Last 24 hours  I/O last 3 completed shifts:  In: 1235 [P.O.:510; I.V.:725]  Out: 550 [Urine:550]    Constitutional: Comfortable, no acute distress  HEENT: No conjunctival pallor.  Neurologic: Awake, alert, oriented x3  Neck: Supple, no elevated JVP  Respiratory: Clear to auscultation  Cardiovascular: Normal S1 and S2, regular rhythm and rate  GI: Abdomen soft, non distended, BS active  Extremities: No calf tenderness, no significant LE edema  Skin/integument: No acute rash, no cyanosis    Medications   All medications were reviewed.      albuterol  2 puff Inhalation BID     amLODIPine  5 mg Oral Daily     enoxaparin  40 mg Subcutaneous Q24H     heparin  5 mL Intracatheter Q28 Days     heparin lock flush  5-10 mL Intracatheter Q24H     nicotine  1 patch Transdermal Daily     nicotine   Transdermal Q8H     nicotine   Transdermal Daily     sodium chloride (PF)  3 mL Intracatheter Q8H        Data   Recent Labs   Lab 04/03/19  0615 04/03/19  0400 04/02/19  1630 04/02/19  0555  04/01/19  1700 04/01/19  0845 03/31/19  0627  03/29/19  0514   WBC  --   --   --   --   --  5.5 8.8  --  8.2   HGB  --   --  11.8  --   --  11.2* 12.1  --  12.0   MCV  --   --   --   --   --  97 97  --  97   *  --   --   --   --  153 137*  --  95*   NA  --   --   --  135  --  131* 131*  --  130*   POTASSIUM  --  4.0  --  3.2* 3.5 3.2* 3.5  --  3.6   CHLORIDE  --   --   --  101  --  97 99  --  99   CO2  --   --   --  27  --  27 26  --  24   BUN  --   --   --  7  --  7 7  --  12   CR 0.68  --   --  0.70  --  0.60 0.60  --  0.98   ANIONGAP  --   --   --  7  --  7 6  --  7   CAITY  --   --   --  8.2*  --  8.4* 8.6  --  7.6*   GLC  --   --   --  106*  --  117* 107*   < > 127*    < > = values in this interval not displayed.       No results found for this or any previous visit (from the past 24 hour(s)).

## 2019-04-05 NOTE — DISCHARGE SUMMARY
[facility] Discharge Summary      Anna Dyer MRN# [id number]   Age: 74 year old YOB: 1945     Date of Admission:  3/28/2019  Date of Discharge::  4/4/2019  4:00 PM  Admitting Physician:  Jesus Caba MD  Discharge Physician:  Jesus Caba MD     PCP:  Tello Finney    Disposition: Patient discharged from [facility] to home in stable condition.        Primary Diagnosis:   Metastatic Colon Cancer, s/p colostomy reversal            Discharge Medications:   Discharge Medication List as of 4/4/2019  2:59 PM      CONTINUE these medications which have NOT CHANGED    Details   albuterol (PROAIR HFA/PROVENTIL HFA/VENTOLIN HFA) 108 (90 Base) MCG/ACT inhaler Inhale 2 puffs into the lungs 2 times daily And every 4 hours as needed, Disp-1 Inhaler, R-1, E-Prescribe      amLODIPine (NORVASC) 5 MG tablet Take 1 tablet (5 mg) by mouth daily, Historical      buPROPion (WELLBUTRIN SR) 150 MG 12 hr tablet Take 150 mg by mouth 2 times daily (patient takes in the Morning and before 15:00), Historical      !! gabapentin (NEURONTIN) 400 MG capsule Take 400 mg by mouth every morning, Historical      !! gabapentin (NEURONTIN) 400 MG capsule Take 800 mg by mouth every evening (2 x 400 mg = 800 mg dose), Historical      Homeopathic Products (LEG CRAMP RELIEF PO) Take 2 tablets by mouth daily as needed, Historical      lidocaine-prilocaine (EMLA) 2.5-2.5 % external cream Apply topically daily as needed (Apply one hour prior to acccess) Historical      LORazepam (ATIVAN) 0.5 MG tablet Take 0.5 mg by mouth every morning , R-1, Historical      losartan-hydrochlorothiazide (HYZAAR) 100-25 MG tablet TAKE 1 TABLET DAILY, Disp-90 tablet, R-0, E-Prescribe      naproxen sodium (ANAPROX) 220 MG tablet Take 440 mg by mouth daily as needed for moderate pain, Historical      nicotine (NICODERM CQ) 21 MG/24HR 24 hr patch Place 1 patch onto the skin every 24 hours, Historical       !! - Potential  duplicate medications found. Please discuss with provider.      STOP taking these medications       diphenhydrAMINE (BENADRYL) 25 MG tablet Comments:   Reason for Stopping:                      Follow Up, Special Instructions:   Discharge diet: Low residue   Discharge activity: No heavy lifting, pushing, pulling for 6 week(s)   Discharge follow-up: Follow up with Dr. Caba in 4 weeks   Wound care: Ice to area for comfort  Keep wound clean and dry  May get incision wet in shower but do not soak or scrub              Procedures:   Procedure(s): 1. Diagnostic laparoscopy.   2. Intraoperative colonoscopy.   3. Laparoscopic-assisted colostomy reversal.   4. Laparoscopic peritoneal biopsy.            No procedures performed during this admission            Consultations:   Hospitalist          Brief Hospital Summary:   Patient is a 74 year old female who underwent laparoscopic-assisted colostomy reversal on 3/29/19 by Dr. Caba.   There were no immediate complications during this procedure.    Please refer to the full operative summary for details.  The patient's hospital course was unremarkable.  Pain was controlled on oral pain regimen.  She was tolerating a low fiber diet.  Bowel function had returned prior to discharge.  She recovered as anticipated and experienced no post-operative complications.         Attestation:  I have reviewed today's vital signs, notes, medications, labs and imaging.    Reyna Orosco PA-C  [facility]          ADDENDUM:  Length of stay: 7 days  Indicate Y or N for the following:  UTI  No  C diff  No  PNA  No  SSI No  DVT No  PE  No  CVA No  MI No  Enterocutaneous fistula  No  Peripheral nerve injury  No  Abscess (not adjacent to anastomosis)  No  Leak No    Treated with:   Antibiotics N/A   Drain  N/A   Reoperation  N/A  Death within 30 days No  Reintubation  No  Reoperation  No

## 2019-04-08 ENCOUNTER — TELEPHONE (OUTPATIENT)
Dept: INTERNAL MEDICINE | Facility: CLINIC | Age: 74
End: 2019-04-08

## 2019-04-08 NOTE — TELEPHONE ENCOUNTER
"ED/Discharge Protocol    \"Hi, my name is Rach KYLER SANDHU, a registered nurse, and I am calling on behalf of Dr. Finney's office at [place].  I am calling to follow up and see how things are going for you after your recent visit.\"    \"I see that you were in the (ER/UC/IP) on 3/28/19.    How are you doing now that you are home?\" doing fine now that I am home    Is patient experiencing symptoms that may require a hospital visit?  No    Discharge Instructions    \"Let's review your discharge instructions.  What is/are the follow-up recommendations?  Pt. Response: doing well    \"Were you instructed to make a follow-up appointment?\"  Pt. Response: No.       \"When you see the provider, I would recommend that you bring your discharge instructions with you.    Medications    \"How many new medications are you on since your hospitalization/ED visit?\"    0-1  \"How many of your current medicines changed (dose, timing, name, etc.) while you were in the hospital/ED visit?\"   0-1  \"Do you have questions about your medications?\"   No  \"Were you newly diagnosed with heart failure, COPD, diabetes or did you have a heart attack?\"   No  For patients on insulin: \"Did you start on insulin in the hospital or did you have your insulin dose changed?\"   No    Medication reconciliation completed? Yes    Was MTM referral placed (*Make sure to put transitions as reason for referral)?   No    Call Summary    \"Do you have any questions or concerns about your condition or care plan at the moment?\"    No  Triage nurse advice given: will call as needed per surgeon    Patient was in ER 0 in the past year (assess appropriateness of ER visits.)      \"If you have questions or things don't continue to improve, we encourage you contact us through the main clinic number,  [phone].  Even if the clinic is not open, triage nurses are available 24/7 to help you.     We would like you to know that our clinic has extended hours (provide information).  We also " "have urgent care (provide details on closest location and hours/contact info)\"      \"Thank you for your time and take care!\"        "

## 2019-04-09 ENCOUNTER — DOCUMENTATION ONLY (OUTPATIENT)
Dept: INTERNAL MEDICINE | Facility: CLINIC | Age: 74
End: 2019-04-09

## 2019-04-09 NOTE — PROGRESS NOTES
Home care form placed in Doctors folder at [place].     Patient unable to drive so she is going to call back to schedule an appointment with PCP.    Mara Perera, CMA

## 2019-04-16 ENCOUNTER — TELEPHONE (OUTPATIENT)
Dept: INTERNAL MEDICINE | Facility: CLINIC | Age: 74
End: 2019-04-16

## 2019-04-16 ENCOUNTER — TRANSFERRED RECORDS (OUTPATIENT)
Dept: HEALTH INFORMATION MANAGEMENT | Facility: CLINIC | Age: 74
End: 2019-04-16

## 2019-04-17 ENCOUNTER — MEDICAL CORRESPONDENCE (OUTPATIENT)
Dept: HEALTH INFORMATION MANAGEMENT | Facility: CLINIC | Age: 74
End: 2019-04-17

## 2019-04-24 ENCOUNTER — TELEPHONE (OUTPATIENT)
Dept: INTERNAL MEDICINE | Facility: CLINIC | Age: 74
End: 2019-04-24

## 2019-04-24 ENCOUNTER — MYC MEDICAL ADVICE (OUTPATIENT)
Dept: INTERNAL MEDICINE | Facility: CLINIC | Age: 74
End: 2019-04-24

## 2019-04-24 NOTE — LETTER
[facility]  [address] [address]  [phone]  April 24, 2019    Anna Dyer  [address] [address]        Dear Anna,    We care about your health and based on a review of your medical records, recommend the the following, to better manage your health:      You are in particular need of attention regarding:  -High Blood Pressure  -Wellness (Physical) Visit     I am recommending that you:     -schedule a WELLNESS (Physical) APPOINTMENT with me.   I will check fasting labs the same day - nothing to eat except water and meds for 8-10 hours prior.      Here is a list of Health Maintenance topics that are due now or due soon:  Health Maintenance Due   Topic Date Due     COPD ACTION PLAN Q1 YR  1945     Hepatitis C Screening  04/03/1964     Zoster (Shingles) Vaccine (1 of 2) 04/03/1996     Annual Wellness Visit  04/03/2011     PHQ-2  01/01/2019     FALL RISK ASSESSMENT  04/06/2019       Please call us at [phone] or [phone] (or use [employer]) to address the above recommendations.     Thank you for trusting [facility].  We appreciate the opportunity to serve you and look forward to supporting your healthcare needs in the future.    If you have (or plan to have) any of these tests done at a facility other than a [facility] or a [facility], please have the results from these tests sent to your primary physician at [facility].    Healthy Regards,    Tello Finney MD

## 2019-04-24 NOTE — TELEPHONE ENCOUNTER
Panel Management Review      Patient has the following on her problem list:     Hypertension   Last three blood pressure readings:  BP Readings from Last 3 Encounters:   04/04/19 138/74   03/11/19 144/76   01/02/19 130/82     Blood pressure: FAILED    HTN Guidelines:  Less than 140/90      Composite cancer screening  Chart review shows that this patient is due/due soon for the following None  Summary:    Patient is due/failing the following:   BP CHECK    Action needed:   Patient needs office visit for Wellness and BP check.    Type of outreach:    Sent [employer] message. and Sent letter.    Questions for provider review:    None                                                                                                                                    Mara Perera, [facility]       Chart routed to none .

## 2019-04-29 ENCOUNTER — TRANSFERRED RECORDS (OUTPATIENT)
Dept: HEALTH INFORMATION MANAGEMENT | Facility: CLINIC | Age: 74
End: 2019-04-29

## 2019-04-30 ENCOUNTER — TRANSFERRED RECORDS (OUTPATIENT)
Dept: HEALTH INFORMATION MANAGEMENT | Facility: CLINIC | Age: 74
End: 2019-04-30

## 2019-05-08 ENCOUNTER — TELEPHONE (OUTPATIENT)
Dept: INTERNAL MEDICINE | Facility: CLINIC | Age: 74
End: 2019-05-08

## 2019-05-08 NOTE — TELEPHONE ENCOUNTER
Reason for Call:  Other  shingrix immunization acceptability    Detailed comments: the patient wants to know if her health is acceptable to receive the shingrix immunization due to her previous health - stage four colon cancer, colostomy bag.  She said her platelets are normal.  Please advise with her if her health is acceptable to receive the injection.      Phone Number Patient can be reached at: Home number on file [phone] (home)    Best Time: anytime    Can we leave a detailed message on this number? YES    Call taken on 5/8/2019 at 9:57 AM by Jaimie Mcintyre

## 2019-05-08 NOTE — TELEPHONE ENCOUNTER
Spoke with patient she is currently on chemo therapy treatment for spot found on her liver.     She reports she is overall healthy.    PCP please advise if any contraindication for this.    Ayaka KWOK RN, BSN, PHN

## 2019-05-14 ENCOUNTER — TRANSFERRED RECORDS (OUTPATIENT)
Dept: HEALTH INFORMATION MANAGEMENT | Facility: CLINIC | Age: 74
End: 2019-05-14

## 2019-05-28 ENCOUNTER — TRANSFERRED RECORDS (OUTPATIENT)
Dept: HEALTH INFORMATION MANAGEMENT | Facility: CLINIC | Age: 74
End: 2019-05-28

## 2019-06-11 ENCOUNTER — TRANSFERRED RECORDS (OUTPATIENT)
Dept: HEALTH INFORMATION MANAGEMENT | Facility: CLINIC | Age: 74
End: 2019-06-11

## 2019-06-24 ENCOUNTER — TRANSFERRED RECORDS (OUTPATIENT)
Dept: HEALTH INFORMATION MANAGEMENT | Facility: CLINIC | Age: 74
End: 2019-06-24

## 2019-06-25 ENCOUNTER — TRANSFERRED RECORDS (OUTPATIENT)
Dept: HEALTH INFORMATION MANAGEMENT | Facility: CLINIC | Age: 74
End: 2019-06-25

## 2019-06-25 LAB — COLONOSCOPY: NORMAL

## 2019-07-05 ENCOUNTER — TELEPHONE (OUTPATIENT)
Dept: INTERNAL MEDICINE | Facility: CLINIC | Age: 74
End: 2019-07-05

## 2019-07-05 NOTE — TELEPHONE ENCOUNTER
Pt was seen a few weeks ago at the [facility] for a study re: leg sx and then they have ANITHA and does that match up with PAD . Stella [phone]. Also if you have any additional questions call her . PT had ANITHA .47 left leg and .76 right leg and left leg did have some blistering . Results will come via fax today .Cintia Avalos RN

## 2019-07-09 ENCOUNTER — TRANSFERRED RECORDS (OUTPATIENT)
Dept: HEALTH INFORMATION MANAGEMENT | Facility: CLINIC | Age: 74
End: 2019-07-09

## 2019-07-11 ENCOUNTER — TRANSFERRED RECORDS (OUTPATIENT)
Dept: HEALTH INFORMATION MANAGEMENT | Facility: CLINIC | Age: 74
End: 2019-07-11

## 2019-07-15 ENCOUNTER — HOSPITAL ENCOUNTER (OUTPATIENT)
Facility: CLINIC | Age: 74
End: 2019-07-15

## 2019-07-17 ENCOUNTER — HOSPITAL ENCOUNTER (OUTPATIENT)
Dept: MRI IMAGING | Facility: CLINIC | Age: 74
Discharge: HOME | End: 2019-07-17
Attending: INTERNAL MEDICINE | Admitting: INTERNAL MEDICINE
Payer: MEDICARE

## 2019-07-17 ENCOUNTER — HOSPITAL ENCOUNTER (OUTPATIENT)
Dept: MRI IMAGING | Facility: CLINIC | Age: 74
End: 2019-07-17
Attending: INTERNAL MEDICINE
Payer: MEDICARE

## 2019-07-17 DIAGNOSIS — M50.90 CERVICAL NECK PAIN WITH EVIDENCE OF DISC DISEASE: ICD-10-CM

## 2019-07-17 DIAGNOSIS — C19 COLORECTAL CANCER (H): ICD-10-CM

## 2019-07-17 LAB
CREAT BLD-MCNC: 1.1 MG/DL (ref 0.52–1.04)
GFR SERPL CREATININE-BSD FRML MDRD: 49 ML/MIN/{1.73_M2}

## 2019-07-17 PROCEDURE — 72141 MRI NECK SPINE W/O DYE: CPT

## 2019-07-17 PROCEDURE — A9585 GADOBUTROL INJECTION: HCPCS | Performed by: INTERNAL MEDICINE

## 2019-07-17 PROCEDURE — 82565 ASSAY OF CREATININE: CPT

## 2019-07-17 PROCEDURE — 25800025 ZZH RX 258: Performed by: INTERNAL MEDICINE

## 2019-07-17 PROCEDURE — 74183 MRI ABD W/O CNTR FLWD CNTR: CPT

## 2019-07-17 PROCEDURE — 25500064 ZZH RX 255 OP 636: Performed by: INTERNAL MEDICINE

## 2019-07-17 RX ORDER — ACYCLOVIR 200 MG/1
30 CAPSULE ORAL ONCE
Status: COMPLETED | OUTPATIENT
Start: 2019-07-17 | End: 2019-07-17

## 2019-07-17 RX ORDER — GADOBUTROL 604.72 MG/ML
6 INJECTION INTRAVENOUS ONCE
Status: COMPLETED | OUTPATIENT
Start: 2019-07-17 | End: 2019-07-17

## 2019-07-17 RX ADMIN — GADOBUTROL 6 ML: 604.72 INJECTION INTRAVENOUS at 14:43

## 2019-07-17 RX ADMIN — SODIUM CHLORIDE 30 ML: 9 INJECTION, SOLUTION INTRAMUSCULAR; INTRAVENOUS; SUBCUTANEOUS at 14:44

## 2019-07-21 DIAGNOSIS — I10 ESSENTIAL HYPERTENSION WITH GOAL BLOOD PRESSURE LESS THAN 140/90: ICD-10-CM

## 2019-07-22 RX ORDER — LOSARTAN POTASSIUM AND HYDROCHLOROTHIAZIDE 25; 100 MG/1; MG/1
TABLET ORAL
Qty: 90 TABLET | Refills: 0 | Status: SHIPPED | OUTPATIENT
Start: 2019-07-22 | End: 2019-09-09

## 2019-07-22 NOTE — TELEPHONE ENCOUNTER
"Requested Prescriptions   Pending Prescriptions Disp Refills     losartan-hydrochlorothiazide (HYZAAR) 100-25 MG tablet [Pharmacy Med Name: LOSARTAN/HCT -25] 90 tablet 0     Sig: TAKE 1 TABLET DAILY       Angiotensin-II Receptors Failed - 7/21/2019  7:10 PM        Failed - Normal serum creatinine on file in past 12 months     Recent Labs   Lab Test 07/17/19  1323 04/03/19  0615   CR  --  0.68   CREAT 1.1*  --              Passed - Blood pressure under 140/90 in past 12 months     BP Readings from Last 3 Encounters:   04/04/19 138/74   03/11/19 144/76   01/02/19 130/82                 Passed - Recent (12 mo) or future (30 days) visit within the authorizing provider's specialty     Patient had office visit in the last 12 months or has a visit in the next 30 days with authorizing provider or within the authorizing provider's specialty.  See \"Patient Info\" tab in inbasket, or \"Choose Columns\" in Meds & Orders section of the refill encounter.              Passed - Medication is active on med list        Passed - Patient is age 18 or older        Passed - No active pregnancy on record        Passed - Normal serum potassium on file in past 12 months     Recent Labs   Lab Test 04/03/19  0400   POTASSIUM 4.0                    Passed - No positive pregnancy test in past 12 months        Routing refill request to provider for review/approval because:  Labs out of range:  creat        "

## 2019-07-23 ENCOUNTER — TRANSFERRED RECORDS (OUTPATIENT)
Dept: HEALTH INFORMATION MANAGEMENT | Facility: CLINIC | Age: 74
End: 2019-07-23

## 2019-08-06 ENCOUNTER — TRANSFERRED RECORDS (OUTPATIENT)
Dept: HEALTH INFORMATION MANAGEMENT | Facility: CLINIC | Age: 74
End: 2019-08-06

## 2019-08-30 ENCOUNTER — MEDICAL CORRESPONDENCE (OUTPATIENT)
Dept: HEALTH INFORMATION MANAGEMENT | Facility: CLINIC | Age: 74
End: 2019-08-30

## 2019-09-09 ENCOUNTER — OFFICE VISIT (OUTPATIENT)
Dept: SURGERY | Facility: CLINIC | Age: 74
End: 2019-09-09
Payer: MEDICARE

## 2019-09-09 VITALS — DIASTOLIC BLOOD PRESSURE: 60 MMHG | HEART RATE: 108 BPM | SYSTOLIC BLOOD PRESSURE: 126 MMHG

## 2019-09-09 DIAGNOSIS — N18.5 CKD (CHRONIC KIDNEY DISEASE) STAGE 5, GFR LESS THAN 15 ML/MIN (H): ICD-10-CM

## 2019-09-09 DIAGNOSIS — N18.4 CKD (CHRONIC KIDNEY DISEASE) STAGE 4, GFR 15-29 ML/MIN (H): ICD-10-CM

## 2019-09-09 DIAGNOSIS — N18.30 CKD (CHRONIC KIDNEY DISEASE) STAGE 3, GFR 30-59 ML/MIN (H): ICD-10-CM

## 2019-09-09 DIAGNOSIS — K43.2 INCISIONAL HERNIA, WITHOUT OBSTRUCTION OR GANGRENE: Primary | ICD-10-CM

## 2019-09-09 PROCEDURE — 99214 OFFICE O/P EST MOD 30 MIN: CPT | Performed by: SURGERY

## 2019-09-12 PROBLEM — N18.30 CKD (CHRONIC KIDNEY DISEASE) STAGE 3, GFR 30-59 ML/MIN (H): Status: ACTIVE | Noted: 2019-09-12

## 2019-09-12 PROBLEM — N18.4 CKD (CHRONIC KIDNEY DISEASE) STAGE 4, GFR 15-29 ML/MIN (H): Status: ACTIVE | Noted: 2019-09-12

## 2019-09-12 PROBLEM — N18.5 CKD (CHRONIC KIDNEY DISEASE) STAGE 5, GFR LESS THAN 15 ML/MIN (H): Status: ACTIVE | Noted: 2019-09-12

## 2019-09-12 NOTE — PROGRESS NOTES
[facility]  Surgery Consultation    Primary care provider:  Tello Finney [phone]  Consultation requested by: Reyna Orosco PA-C    HPI: Patient is a 74-year-old female referred by the above provider for consultation regarding incisional hernia.  She is known to me distantly from prior identification/diagnosis of metastatic colon cancer.  She ultimately underwent laparoscopic-assisted colectomy with colostomy formation.  She had developed a parastomal hernia and ultimately underwent colostomy reversal.  She is continued to undergo chemotherapy without evidence of progression of disease.  Since the colostomy reversal surgery she has developed bulging to the right of her midline incision that she feels is getting larger.  She has no obstructive symptoms.  No other significant pain or discomfort associated with this.  She continues to smoke.    PMH:   has a past medical history of Cancer (H), Hypertension, and RLS (restless legs syndrome).  PSH:    has a past surgical history that includes biopsy; Cosmetic surgery; GYN surgery; Breast surgery; Insert port vascular access (N/A, 3/24/2017); Laparotomy exploratory (N/A, 1/25/2018); Laparoscopic assisted colectomy left (descending) (N/A, 1/25/2018); Colonoscopy (N/A, 3/28/2019); Laparoscopy diagnostic (general) (N/A, 3/28/2019); and Laparoscopic assisted colostomy takedown (N/A, 3/28/2019).  Social History:   reports that she has been smoking cigarettes.  She has a 12.50 pack-year smoking history. She has never used smokeless tobacco. She reports that she does not drink alcohol or use drugs.  Family History:  family history includes Family History Negative in her father, mother, and sister; Hypothyroidism in her son; Psoriasis in her brother.  Medications/Allergies: Home medications and allergies reviewed.    ROS:  The 10 point Review of Systems is negative other than noted in the HPI.    Physical Exam:  /60   Pulse 108   GENERAL:  Generally appears well.  Psych: Alert and Oriented.  Normal affect  Eyes: Sclera clear  Respiratory:  Lungs clear to ausculation bilaterally with good air excursion  Cardiovascular:  Regular Rate and Rhythm with no murmurs gallops or rubs, normal peripheral pulses  GI: Abdomen Non Distended Non-Tender  Incisional hernia palpated to the right of the midline incision as well as a another small palpable hernia in the right lateral abdomen at the old ostomy site..  Lymphatic/Hematologic/Immune:  No femoral or cervical lymphadenopathy.  Integumentary:  No rashes  Neurological: grossly intact     All new lab and imaging data was reviewed.     Impression and Plan:  Patient is a 74 year old female with complex abdominal wall hernia as described above in a patient who continues to smoke and is also receiving chemotherapy.    PLAN: I discussed her management options with her.  I am concerned that her overall body size would preclude a robotic operation for her.  Also have great concerns about her ongoing smoking and need for chemotherapy.  She discussed that at her last operation a holiday for chemotherapy was arranged and she recovered well from this procedure.  I told her that she needs to be tobacco free for at least 6 weeks before proceeding with repair.  She is going to work on this.  We would then likely trial robotic surgery with the possibility of converting to a combined open and laparoscopic repair if necessary  I discussed the pathophysiology of hernias and options for repair including laparoscopic VS open.  The risks associated with the procedure including, but not limited to, recurrence, nerve entrapment or injury, persistence of pain, injury to the bowel/bladder, infertility, hematoma, mesh migration, mesh infection, MI, and PE were discussed with the patient. She indicated understanding of the discussion, asked appropriate questions, and provided consent. Signs and symptoms of incarceration were discussed. If  these develop in the interim, she promises to call or go straight to the ER. I have provided the patient with an information pamphlet.      Thank you very much for this consult.    Salas Calderon M.D.  [facility]  [phone]    Please route or send letter to:  Primary Care Provider (PCP) and Referring Provider

## 2019-09-18 ENCOUNTER — TRANSFERRED RECORDS (OUTPATIENT)
Dept: HEALTH INFORMATION MANAGEMENT | Facility: CLINIC | Age: 74
End: 2019-09-18

## 2019-09-28 ENCOUNTER — HEALTH MAINTENANCE LETTER (OUTPATIENT)
Age: 74
End: 2019-09-28

## 2019-10-11 DIAGNOSIS — I10 ESSENTIAL HYPERTENSION WITH GOAL BLOOD PRESSURE LESS THAN 140/90: ICD-10-CM

## 2019-10-11 RX ORDER — AMLODIPINE BESYLATE 5 MG/1
5 TABLET ORAL DAILY
Qty: 90 TABLET | Refills: 1 | Status: SHIPPED | OUTPATIENT
Start: 2019-10-11 | End: 2020-02-18

## 2019-10-11 RX ORDER — HYDROCHLOROTHIAZIDE 25 MG/1
25 TABLET ORAL DAILY
Qty: 90 TABLET | Refills: 1 | Status: SHIPPED | OUTPATIENT
Start: 2019-10-11 | End: 2020-02-18

## 2019-10-11 RX ORDER — LOSARTAN POTASSIUM 100 MG/1
100 TABLET ORAL DAILY
Qty: 90 TABLET | Refills: 1 | Status: SHIPPED | OUTPATIENT
Start: 2019-10-11 | End: 2020-02-18

## 2019-10-11 RX ORDER — LOSARTAN POTASSIUM AND HYDROCHLOROTHIAZIDE 25; 100 MG/1; MG/1
1 TABLET ORAL DAILY
Qty: 90 TABLET | Refills: 0 | OUTPATIENT
Start: 2019-10-11

## 2019-10-11 NOTE — LETTER
[facility]  [address] [id number] [phone]            Anna Alexus Dyer  [address] [facility] [address]        October 11, 2019    Dear Anna,    After reviewing your medication refill requests, we see that you are due for an annual physical and medication follow-up with your primary doctor. Please call our office to schedule an appointment or feel free to make an appointment on [facility] with Dr. Finney. A follow-up appointment must be made before any additional refills can be approved.     Taking care of your health is important to us and we look forward to seeing you in the near future.  Please call us at [phone] or [phone] (or use [employer]) to schedule an appointment.     Please disregard this notice if you have already made an appointment.    Sincerely,        [facility]

## 2019-10-11 NOTE — TELEPHONE ENCOUNTER
"Dr. Finney-    Routing refill request to provider for review/approval because:  Amlodipine medication not active on patient's medication list  Labs out of range:  See Creatinine lab    Will send letter to patient to make follow-up appointment    Hospital Outpatient Visit on 07/17/2019   Component Date Value Ref Range Status     Creatinine 07/17/2019 1.1* 0.52 - 1.04 mg/dL Final     GFR Estimate 07/17/2019 49* >60 mL/min/[1.73_m2] Final     GFR Estimate If Black 07/17/2019 59* >60 mL/min/[1.73_m2] Final       Last Written Prescription Date:  3/27/19  Last Fill Quantity: 90 tabs,  # refills: 0   Last office visit: 3/11/2019 with prescribing provider:  Tello Finney   Future Office Visit:  None scheduled    Requested Prescriptions   Pending Prescriptions Disp Refills     amLODIPine (NORVASC) 5 MG tablet       Sig: Take 1 tablet (5 mg) by mouth daily       Calcium Channel Blockers Protocol  Failed - 10/11/2019  3:15 PM        Failed - Medication is active on med list        Failed - Normal serum creatinine on file in past 12 months     Recent Labs   Lab Test 07/17/19  1323 04/03/19  0615   CR  --  0.68   CREAT 1.1*  --              Passed - Blood pressure under 140/90 in past 12 months     BP Readings from Last 3 Encounters:   09/09/19 126/60   04/04/19 138/74   03/11/19 144/76                 Passed - Recent (12 mo) or future (30 days) visit within the authorizing provider's specialty     Patient has had an office visit with the authorizing provider or a provider within the authorizing providers department within the previous 12 mos or has a future within next 30 days. See \"Patient Info\" tab in inbasket, or \"Choose Columns\" in Meds & Orders section of the refill encounter.              Passed - Patient is age 18 or older        Passed - No active pregnancy on record        Passed - No positive pregnancy test in past 12 months         Last Written Prescription Date:  3/27/19  Last Fill Quantity: 90 tablets,  # " "refills: 0   Last office visit: 3/11/2019 with prescribing provider:  Tello Finney   Future Office Visit:  none    Routing refill request to provider for review/approval because:  Labs out of range: see Creatinine below  Pharmacy is requesting separate medications for Losartan and Hydrochlorothiazide. Separate medications are pending your approval. Please review.    Hospital Outpatient Visit on 07/17/2019   Component Date Value Ref Range Status     Creatinine 07/17/2019 1.1* 0.52 - 1.04 mg/dL Final     GFR Estimate 07/17/2019 49* >60 mL/min/[1.73_m2] Final     GFR Estimate If Black 07/17/2019 59* >60 mL/min/[1.73_m2] Final                losartan-hydrochlorothiazide (HYZAAR) 100-25 MG tablet 90 tablet 0     Sig: Take 1 tablet by mouth daily       Angiotensin-II Receptors Failed - 10/11/2019  3:15 PM        Failed - Normal serum creatinine on file in past 12 months     Recent Labs   Lab Test 07/17/19  1323 04/03/19  0615   CR  --  0.68   CREAT 1.1*  --              Passed - Last blood pressure under 140/90 in past 12 months     BP Readings from Last 3 Encounters:   09/09/19 126/60   04/04/19 138/74   03/11/19 144/76                 Passed - Recent (12 mo) or future (30 days) visit within the authorizing provider's specialty     Patient has had an office visit with the authorizing provider or a provider within the authorizing providers department within the previous 12 mos or has a future within next 30 days. See \"Patient Info\" tab in inbasket, or \"Choose Columns\" in Meds & Orders section of the refill encounter.              Passed - Medication is active on med list        Passed - Patient is age 18 or older        Passed - No active pregnancy on record        Passed - Normal serum potassium on file in past 12 months     Recent Labs   Lab Test 04/03/19  0400   POTASSIUM 4.0                    Passed - No positive pregnancy test in past 12 months        Jackie Plata RN on 10/11/2019 at 4:02 PM  "

## 2019-10-11 NOTE — TELEPHONE ENCOUNTER
Pt denies stopping Amlodipine. Please refill.    Pharmacy stated that the losartan-hydrochlorothiazide (HYZAAR) 100-25 MG tablet. Please send separate meds.    90 day supply for mailorder.

## 2019-12-02 ENCOUNTER — OFFICE VISIT (OUTPATIENT)
Dept: INTERNAL MEDICINE | Facility: CLINIC | Age: 74
End: 2019-12-02
Payer: MEDICARE

## 2019-12-02 VITALS
TEMPERATURE: 98.9 F | WEIGHT: 127.8 LBS | BODY MASS INDEX: 24.13 KG/M2 | HEART RATE: 73 BPM | HEIGHT: 61 IN | DIASTOLIC BLOOD PRESSURE: 84 MMHG | OXYGEN SATURATION: 100 % | SYSTOLIC BLOOD PRESSURE: 153 MMHG

## 2019-12-02 DIAGNOSIS — Z71.6 TOBACCO ABUSE COUNSELING: ICD-10-CM

## 2019-12-02 DIAGNOSIS — C18.9 COLON CANCER METASTASIZED TO LIVER (H): ICD-10-CM

## 2019-12-02 DIAGNOSIS — I70.213 ATHEROSCLEROSIS OF NATIVE ARTERY OF BOTH LOWER EXTREMITIES WITH INTERMITTENT CLAUDICATION (H): ICD-10-CM

## 2019-12-02 DIAGNOSIS — I10 ESSENTIAL HYPERTENSION WITH GOAL BLOOD PRESSURE LESS THAN 140/90: Primary | ICD-10-CM

## 2019-12-02 DIAGNOSIS — I70.8 ATHEROSCLEROSIS OF OTHER ARTERIES: ICD-10-CM

## 2019-12-02 DIAGNOSIS — Z00.00 MEDICARE ANNUAL WELLNESS VISIT, INITIAL: ICD-10-CM

## 2019-12-02 DIAGNOSIS — C78.7 COLON CANCER METASTASIZED TO LIVER (H): ICD-10-CM

## 2019-12-02 DIAGNOSIS — K43.2 INCISIONAL HERNIA, WITHOUT OBSTRUCTION OR GANGRENE: ICD-10-CM

## 2019-12-02 PROBLEM — N18.5 CKD (CHRONIC KIDNEY DISEASE) STAGE 5, GFR LESS THAN 15 ML/MIN (H): Status: RESOLVED | Noted: 2019-09-12 | Resolved: 2019-12-02

## 2019-12-02 PROCEDURE — G0438 PPPS, INITIAL VISIT: HCPCS | Performed by: INTERNAL MEDICINE

## 2019-12-02 PROCEDURE — 99213 OFFICE O/P EST LOW 20 MIN: CPT | Mod: 25 | Performed by: INTERNAL MEDICINE

## 2019-12-02 RX ORDER — NICOTINE 21 MG/24HR
1 PATCH, TRANSDERMAL 24 HOURS TRANSDERMAL EVERY 24 HOURS
Qty: 14 PATCH | Refills: 0 | Status: SHIPPED | OUTPATIENT
Start: 2019-12-02 | End: 2020-05-21

## 2019-12-02 RX ORDER — NICOTINE 21 MG/24HR
1 PATCH, TRANSDERMAL 24 HOURS TRANSDERMAL EVERY 24 HOURS
Qty: 14 PATCH | Refills: 0 | Status: SHIPPED | OUTPATIENT
Start: 2019-12-17 | End: 2020-05-21

## 2019-12-02 RX ORDER — ATORVASTATIN CALCIUM 20 MG/1
20 TABLET, FILM COATED ORAL AT BEDTIME
Qty: 90 TABLET | Refills: 1 | Status: ON HOLD | OUTPATIENT
Start: 2019-12-02 | End: 2020-03-12

## 2019-12-02 ASSESSMENT — MIFFLIN-ST. JEOR: SCORE: 1022.08

## 2019-12-02 NOTE — PROGRESS NOTES
"Subjective     Anna Dyer is a 74 year old female who presents to clinic today for the following health issues:    HPI   Hypertension Follow-up  BP Readings from Last 3 Encounters:   12/02/19 (!) 153/84   09/09/19 126/60   04/04/19 138/74        Do you check your blood pressure regularly outside of the clinic? Yes -at oncology every two weeks    Are you following a low salt diet? No    Are your blood pressures ever more than 140 on the top number (systolic) OR more   than 90 on the bottom number (diastolic), for example 140/90? No      How many servings of fruits and vegetables do you eat daily?  0-1    On average, how many sweetened beverages do you drink each day (Examples: soda, juice, sweet tea, etc.  Do NOT count diet or artificially sweetened beverages)?   0    How many days per week do you miss taking your medication? 0    Vascular Disease Follow-up  -Had recent screening ANITHA done with abnormal results bilaterally.  -She reports claudication with moderate exertion. Walking around target or menards symptoms occur. Stairs not limited.  No resting pain.  -Try to quit smoking and has not smoked in 4 weeks.  On nicotine replacement.  -Not on a statin nor aspirin.    How often do you take nitroglycerin? Never    Do you take an aspirin every day? No    Colon Ca w/liver mets  - off chemo due to wanting upcoming procedure to repair a incisional abdominal hernia.   -w/this slight rise in CEA noted. Has multiple small liver mets on imaging.  Plan is to restart chemo post surgery.     Annual Wellness Visit  Are you in the first 12 months of your Medicare Part B coverage?  No    Physical Health:    In general, how would you rate your overall physical health? good    If you drink alcohol do you typically have >3 drinks per day or >7 drinks per week? No    Do you usually eat at least 4 servings of fruit and vegetables a day, include whole grains & fiber and avoid regularly eating high fat or \"junk\" foods? " "NO    Needs assistance for the following daily activities: no assistance needed    Which of the following safety concerns are present in your home?  none identified     Hearing impairment: No    In the past 6 months, have you been bothered by leaking of urine? Yes.  Mild stress .     Mental Health:    In general, how would you rate your overall mental or emotional health? good  PHQ-2 Score:      Do you feel safe in your environment? Yes    Do you have a Health Care Directive? Yes, advance care planning is on file.    Fall risk:     Cognitive Screenin) Repeat 3 items (Leader, Season, Table)    2) Clock draw: NORMAL  3) 3 item recall: Recalls 3 objects  Results: 3 items recalled: COGNITIVE IMPAIRMENT LESS LIKELY    Mini-CogTM Copyright S Janneth. Licensed by the author for use in [place] [employer]; reprinted with permission (roiso@.[facility]). All rights reserved.      Current providers sharing in care for this patient include:   Patient Care Team:  Tello Finney MD as PCP - General (Internal Medicine)  Tello Finney MD as Assigned PCP    Do you have sleep apnea, excessive snoring or daytime drowsiness?: no       Reviewed and updated as needed this visit by Provider         Review of Systems   ROS COMP: Constitutional, HEENT, cardiovascular, pulmonary, GI, , musculoskeletal, neuro, skin, endocrine and psych systems are negative, except as otherwise noted.      Objective    BP (!) 146/72   Pulse 73   Temp 98.9  F (37.2  C) (Temporal)   Ht 1.549 m (5' 1\")   Wt 58 kg (127 lb 12.8 oz)   SpO2 100%   BMI 24.15 kg/m    Body mass index is 24.15 kg/m .  Physical Exam   GENERAL: alert, no distress and elderly  EYES: Eyes grossly normal to inspection, PERRL and conjunctivae and sclerae normal  HENT: normal cephalic/atraumatic, ear canals and TM's normal, nose and mouth without ulcers or lesions, oropharynx clear and oral mucous membranes moist  NECK: no adenopathy, no asymmetry, masses, or scars and " thyroid normal to palpation  RESP: lungs clear to auscultation - no rales, rhonchi or wheezes and decreased breath sounds bilateral  CV: regular rate and rhythm, normal S1 S2, no S3 or S4, no murmur, click or rub, no peripheral edema and peripheral pulses strong  ABDOMEN: soft, nontender and hernia - large to left of midline.   MS: no gross musculoskeletal defects noted, no edema  SKIN: no suspicious lesions or rashes  NEURO: Normal strength and tone, mentation intact and speech normal  PSYCH: mentation appears normal, affect normal/bright  LYMPH: no cervical, supraclavicular, axillary, or inguinal adenopathy    none         Assessment & Plan     1. Essential hypertension with goal blood pressure less than 140/90  Elevated today.  Recent control is been significantly better.  With this in mind will not change any medications today.  If it remains elevated may need to adjust her regimen.    2. Colon cancer metastasized to liver (H)  Per Onc.     3. Medicare annual wellness visit, initial  uptodate on immunizations. Negative cognitive/fall screens    4. Incisional hernia, without obstruction or gangrene  Surgical repair per pt request this winter    5. Atherosclerosis of native artery of both lower extremities with intermittent claudication (H)  6. Atherosclerosis of other arteries   PAD exercise referral  Start ASA and statin , recheck lipids  - Lipid panel reflex to direct LDL Fasting; Future    7. Tobacco abuse counseling  Continue nicotine taper  - nicotine (NICODERM CQ) 14 MG/24HR 24 hr patch; Place 1 patch onto the skin every 24 hours for 14 days  Dispense: 14 patch; Refill: 0  - nicotine (NICODERM CQ) 21 MG/24HR 24 hr patch; Place 1 patch onto the skin every 24 hours for 14 days  Dispense: 14 patch; Refill: 0  - nicotine (NICODERM CQ) 7 MG/24HR 24 hr patch; Place 1 patch onto the skin every 24 hours for 14 days  Dispense: 14 patch; Refill: 0     Tobacco Cessation:   reports that she has been smoking cigarettes.  She has a 12.50 pack-year smoking history. She has never used smokeless tobacco.  Tobacco Cessation Action Plan: Pharmacotherapies : Nicotine patch      See Patient Instructions    Return in about 3 months (around 3/2/2020) for BP Recheck.    Tello Finney MD  [facility]

## 2019-12-02 NOTE — PATIENT INSTRUCTIONS
Nicotine Patch    Dosing:    >10 cigarettes per day Dose   Weeks 1-6 Use one 21 mg patch per day.   Weeks 7-8 Use one 14 mg patch per day.   Weeks 9-10 Use one 7 mg patch per day       How to use the Nicotine Patch:    Apply a new patch to non-hairy, clean, dry skin on the upper body or upper outer arm.  Remove backing from patch and press on skin.  Hold for 10 seconds.    Apply patch around the same time every day.    Wash your hands after applying the patch.    Remove the patch at the end of the day before you go to bed.    Apply a new patch the next morning.    Do not apply the patch to an area where you have worn a patch in the last week.   This will help prevent or reduce skin irritation.    Some Tips:    Do not smoke while you are using the nicotine patch!    Do not cut the nicotine patch -it will be ineffective.    Remove the patch prior to receiving an MRI since the patch may contain some metal.    Do not put the patch on irritated, cut, or burned skin.    If the patch falls off and cannot be reapplied, put on a new patch.    Follow -up with your health care professional if you have any questions and also to help taper your nicotine patch dose.    Side Effects:  Some people experience some skin irritation where the patch was placed.  Moving around the site where you are putting the patch each day should help.  If you experience any other troublesome or unusual side effects, call your health care professional.    Preventive Health Recommendations    See your health care provider every year to    Review health changes.     Discuss preventive care.      Review your medicines if your doctor has prescribed any.    You no longer need a yearly Pap test unless you've had an abnormal Pap test in the past 10 years. If you have vaginal symptoms, such as bleeding or discharge, be sure to talk with your provider about a Pap test.    Every 1 to 2 years, have a mammogram.  If you are over 69, talk with your health care  provider about whether or not you want to continue having screening mammograms.    Every 10 years, have a colonoscopy. Or, have a yearly FIT test (stool test). These exams will check for colon cancer.     Have a cholesterol test every 5 years, or more often if your doctor advises it.     Have a diabetes test (fasting glucose) every three years. If you are at risk for diabetes, you should have this test more often.     At age 65, have a bone density scan (DEXA) to check for osteoporosis (brittle bone disease).    Shots:    Get a flu shot each year.    Get a tetanus shot every 10 years.    Talk to your doctor about your pneumonia vaccines. There are now two you should receive - Pneumovax (PPSV 23) and Prevnar (PCV 13).    Talk to your pharmacist about the shingles vaccine.    Talk to your doctor about the hepatitis B vaccine.    Nutrition:     Eat at least 5 servings of fruits and vegetables each day.    Eat whole-grain bread, whole-wheat pasta and brown rice instead of white grains and rice.    Get adequate Calcium and Vitamin D.     Lifestyle    Exercise at least 150 minutes a week (30 minutes a day, 5 days a week). This will help you control your weight and prevent disease.    Limit alcohol to one drink per day.    No smoking.     Wear sunscreen to prevent skin cancer.     See your dentist twice a year for an exam and cleaning.    See your eye doctor every 1 to 2 years to screen for conditions such as glaucoma, macular degeneration and cataracts.    Personalized Prevention Plan  You are due for the preventive services outlined below.  Your care team is available to assist you in scheduling these services.  If you have already completed any of these items, please share that information with your care team to update in your medical record.  Health Maintenance Due   Topic Date Due     Hepatitis C Screening  1945     COPD Action Plan  1945     Annual Wellness Visit  04/03/2011     PHQ-2  01/01/2019     FALL  RISK ASSESSMENT  04/06/2019     Flu Vaccine (1) 09/01/2019     Discuss Advance Care Planning  09/15/2019

## 2020-01-08 ENCOUNTER — HOSPITAL ENCOUNTER (OUTPATIENT)
Facility: CLINIC | Age: 75
End: 2020-01-08
Attending: SURGERY | Admitting: SURGERY
Payer: MEDICARE

## 2020-01-08 ENCOUNTER — PREP FOR PROCEDURE (OUTPATIENT)
Dept: SURGERY | Facility: CLINIC | Age: 75
End: 2020-01-08

## 2020-01-08 DIAGNOSIS — K43.9 ABDOMINAL WALL HERNIA: ICD-10-CM

## 2020-01-08 DIAGNOSIS — K43.9 ABDOMINAL WALL HERNIA: Primary | ICD-10-CM

## 2020-01-09 ENCOUNTER — TELEPHONE (OUTPATIENT)
Dept: SURGERY | Facility: CLINIC | Age: 75
End: 2020-01-09

## 2020-01-09 RX ORDER — CEFAZOLIN SODIUM 2 G/100ML
2 INJECTION, SOLUTION INTRAVENOUS
Status: CANCELLED | OUTPATIENT
Start: 2020-01-09

## 2020-01-09 RX ORDER — CEFAZOLIN SODIUM 1 G/3ML
1 INJECTION, POWDER, FOR SOLUTION INTRAMUSCULAR; INTRAVENOUS SEE ADMIN INSTRUCTIONS
Status: CANCELLED | OUTPATIENT
Start: 2020-01-09

## 2020-01-09 NOTE — TELEPHONE ENCOUNTER
Type of surgery: Robotic assisted abdominal wall reconstruction, possible conversion to combined open and laparoscopic repair  Location of surgery: [facility]  Date and time of surgery: 3/3/20 at 10:30am  Surgeon: Dr. Salas Calderon  Pre-Op Appt Date: Patient to schedule  Post-Op Appt Date: Patient to schedule   Packet sent out: Yes  Pre-cert/Authorization completed:  Not Applicable  Date: 1/9/20

## 2020-01-13 ENCOUNTER — TRANSFERRED RECORDS (OUTPATIENT)
Dept: HEALTH INFORMATION MANAGEMENT | Facility: CLINIC | Age: 75
End: 2020-01-13

## 2020-01-15 ENCOUNTER — TRANSFERRED RECORDS (OUTPATIENT)
Dept: HEALTH INFORMATION MANAGEMENT | Facility: CLINIC | Age: 75
End: 2020-01-15

## 2020-01-29 ENCOUNTER — TRANSFERRED RECORDS (OUTPATIENT)
Dept: HEALTH INFORMATION MANAGEMENT | Facility: CLINIC | Age: 75
End: 2020-01-29

## 2020-02-12 ENCOUNTER — TELEPHONE (OUTPATIENT)
Dept: OTHER | Facility: CLINIC | Age: 75
End: 2020-02-12

## 2020-02-12 DIAGNOSIS — I73.9 PAD (PERIPHERAL ARTERY DISEASE) (H): Primary | ICD-10-CM

## 2020-02-12 NOTE — TELEPHONE ENCOUNTER
Referral received via fax on 2/12/20.    Pt referred to [facility] by Kerri Ramirez CNP ([facility]) for PAD, left foot pain, small ulceration involving lateral aspect of 5th toe.    Per chart review, appears pt had a resting ANITHA completed on 6/24/19 at [facility].  Right ANITHA 0.76.  Left ANITHA 0.47.    Pt needs to be scheduled for ANITHA with exercise and consult with Vascular Surgery.  Will route to scheduling to coordinate an appointment within 1 week per referring providers request.    Angeles Valenzuela, ILIANAN, RN-[facility] [place]

## 2020-02-13 NOTE — TELEPHONE ENCOUNTER
February 13, 2020    Patient is scheduled for ANITHA US & New Patient Consult appointment on 2/18/2020 with Dr. Arambula at [facility].     [facility]  [facility]    Office: [phone]  Fax: [phone]

## 2020-02-13 NOTE — TELEPHONE ENCOUNTER
Med recs received via fax from [facility], given to Stephania to scan into chart.     ILIANA ReddN, RN  [facility] [place]

## 2020-02-14 DIAGNOSIS — I10 ESSENTIAL HYPERTENSION WITH GOAL BLOOD PRESSURE LESS THAN 140/90: ICD-10-CM

## 2020-02-15 NOTE — TELEPHONE ENCOUNTER
"Requested Prescriptions   Pending Prescriptions Disp Refills     hydrochlorothiazide (HYDRODIURIL) 25 MG tablet [Pharmacy Med Name: HYDROCHLOROT TAB 25MG] 90 tablet 1     Sig: TAKE 1 TABLET DAILY   Last Written Prescription Date:  10/11/2019  Last Fill Quantity: 90,  # refills: 1   Last Office Visit: 12/2/2019   Future Office Visit:         Diuretics (Including Combos) Protocol Failed - 2/14/2020  8:04 PM        Failed - Blood pressure under 140/90 in past 12 months     BP Readings from Last 3 Encounters:   12/02/19 (!) 153/84   09/09/19 126/60   04/04/19 138/74                 Failed - Normal serum creatinine on file in past 12 months     Recent Labs   Lab Test 04/03/19  0615   CR 0.68              Passed - Recent (12 mo) or future (30 days) visit within the authorizing provider's specialty     Patient has had an office visit with the authorizing provider or a provider within the authorizing providers department within the previous 12 mos or has a future within next 30 days. See \"Patient Info\" tab in inbasket, or \"Choose Columns\" in Meds & Orders section of the refill encounter.              Passed - Medication is active on med list        Passed - Patient is age 18 or older        Passed - No active pregancy on record        Passed - Normal serum potassium on file in past 12 months     Recent Labs   Lab Test 04/03/19  0400   POTASSIUM 4.0                    Passed - Normal serum sodium on file in past 12 months     Recent Labs   Lab Test 04/02/19  0555                 Passed - No positive pregnancy test in past 12 months        amLODIPine (NORVASC) 5 MG tablet [Pharmacy Med Name: AMLODIPINE TAB 5MG] 90 tablet 1     Sig: TAKE 1 TABLET DAILY   Last Written Prescription Date:  10/11/2019  Last Fill Quantity: 90,  # refills: 1   Last Office Visit: 12/2/2019   Future Office Visit:         Calcium Channel Blockers Protocol  Failed - 2/14/2020  8:04 PM        Failed - Blood pressure under 140/90 in past 12 months " "    BP Readings from Last 3 Encounters:   12/02/19 (!) 153/84   09/09/19 126/60   04/04/19 138/74                 Failed - Normal serum creatinine on file in past 12 months     Recent Labs   Lab Test 07/17/19  1323 04/03/19  0615   CR  --  0.68   CREAT 1.1*  --              Passed - Recent (12 mo) or future (30 days) visit within the authorizing provider's specialty     Patient has had an office visit with the authorizing provider or a provider within the authorizing providers department within the previous 12 mos or has a future within next 30 days. See \"Patient Info\" tab in inbasket, or \"Choose Columns\" in Meds & Orders section of the refill encounter.              Passed - Medication is active on med list        Passed - Patient is age 18 or older        Passed - No active pregnancy on record        Passed - No positive pregnancy test in past 12 months        losartan (COZAAR) 100 MG tablet [Pharmacy Med Name: LOSARTAN TAB 100MG] 90 tablet 1     Sig: TAKE 1 TABLET DAILY   Last Written Prescription Date:  10/11/2019  Last Fill Quantity: 90,  # refills: 1   Last Office Visit: 12/2/2019   Future Office Visit:         Angiotensin-II Receptors Failed - 2/14/2020  8:04 PM        Failed - Last blood pressure under 140/90 in past 12 months     BP Readings from Last 3 Encounters:   12/02/19 (!) 153/84   09/09/19 126/60   04/04/19 138/74                 Failed - Normal serum creatinine on file in past 12 months     Recent Labs   Lab Test 07/17/19  1323 04/03/19  0615   CR  --  0.68   CREAT 1.1*  --              Passed - Recent (12 mo) or future (30 days) visit within the authorizing provider's specialty     Patient has had an office visit with the authorizing provider or a provider within the authorizing providers department within the previous 12 mos or has a future within next 30 days. See \"Patient Info\" tab in inbasket, or \"Choose Columns\" in Meds & Orders section of the refill encounter.              Passed - " Medication is active on med list        Passed - Patient is age 18 or older        Passed - No active pregnancy on record        Passed - Normal serum potassium on file in past 12 months     Recent Labs   Lab Test 04/03/19  0400   POTASSIUM 4.0                    Passed - No positive pregnancy test in past 12 months

## 2020-02-18 ENCOUNTER — HOSPITAL ENCOUNTER (OUTPATIENT)
Dept: ULTRASOUND IMAGING | Facility: CLINIC | Age: 75
Discharge: HOME | End: 2020-02-18
Attending: SURGERY | Admitting: SURGERY
Payer: MEDICARE

## 2020-02-18 ENCOUNTER — OFFICE VISIT (OUTPATIENT)
Dept: OTHER | Facility: CLINIC | Age: 75
End: 2020-02-18
Attending: SURGERY
Payer: MEDICARE

## 2020-02-18 VITALS — DIASTOLIC BLOOD PRESSURE: 81 MMHG | SYSTOLIC BLOOD PRESSURE: 143 MMHG | HEART RATE: 83 BPM

## 2020-02-18 DIAGNOSIS — I73.9 PAD (PERIPHERAL ARTERY DISEASE) (H): ICD-10-CM

## 2020-02-18 DIAGNOSIS — I70.245 ATHEROSCLEROSIS OF NATIVE ARTERY OF LEFT LOWER EXTREMITY WITH ULCERATION OF OTHER PART OF FOOT (H): ICD-10-CM

## 2020-02-18 DIAGNOSIS — Z72.0 TOBACCO ABUSE: ICD-10-CM

## 2020-02-18 DIAGNOSIS — Z01.818 PREOP TESTING: Primary | ICD-10-CM

## 2020-02-18 PROCEDURE — 93924 LWR XTR VASC STDY BILAT: CPT

## 2020-02-18 PROCEDURE — 99203 OFFICE O/P NEW LOW 30 MIN: CPT | Mod: ZP | Performed by: SURGERY

## 2020-02-18 PROCEDURE — G0463 HOSPITAL OUTPT CLINIC VISIT: HCPCS

## 2020-02-18 RX ORDER — AMLODIPINE BESYLATE 5 MG/1
10 TABLET ORAL DAILY
Qty: 180 TABLET | Refills: 0 | Status: SHIPPED | OUTPATIENT
Start: 2020-02-18 | End: 2020-06-16

## 2020-02-18 RX ORDER — LOSARTAN POTASSIUM 100 MG/1
TABLET ORAL
Qty: 90 TABLET | Refills: 0 | Status: SHIPPED | OUTPATIENT
Start: 2020-02-18 | End: 2020-03-30

## 2020-02-18 RX ORDER — HYDROCHLOROTHIAZIDE 25 MG/1
TABLET ORAL
Qty: 90 TABLET | Refills: 0 | Status: SHIPPED | OUTPATIENT
Start: 2020-02-18 | End: 2020-03-30

## 2020-02-18 RX ORDER — ERGOCALCIFEROL 1.25 MG/1
CAPSULE, LIQUID FILLED ORAL
Refills: 0 | COMMUNITY
Start: 2019-06-01 | End: 2020-08-11

## 2020-02-18 ASSESSMENT — ENCOUNTER SYMPTOMS
ALLERGIC/IMMUNOLOGIC NEGATIVE: 1
CONSTITUTIONAL NEGATIVE: 1
NUMBNESS: 1
BLURRED VISION: 1
RESPIRATORY NEGATIVE: 1
POOR WOUND HEALING: 1
COLOR CHANGE: 1
MUSCULOSKELETAL NEGATIVE: 1
CLAUDICATION: 1
PSYCHIATRIC NEGATIVE: 1
HEMATOLOGIC/LYMPHATIC NEGATIVE: 1
ROS GI COMMENTS: SEE HPI.

## 2020-02-18 NOTE — NURSING NOTE
Left lateral foot, photo taken 2/18/20 with patient permission.    Angeles Valenzuela, ILIANAN, RN-[facility]

## 2020-02-18 NOTE — TELEPHONE ENCOUNTER
Routing refill request to provider for review/approval because:  BP not at goal    Ayaka BARRIENTOSN, RN, PHN

## 2020-02-18 NOTE — NURSING NOTE
"Anna Dyer is a 74 year old female who presents for:  Chief Complaint   Patient presents with     Consult     US ANITHA W/ EX BILAT (SHUS 2:20; TJG 3:30) NEW - Pt referred to [facility] by Kerri Ramirez CNP ([facility]) for PAD, left foot pain, small ulceration involving lateral aspect of 5th toe. Resting ANITHA completed on 6/24/19. Right ANITHA 0.76. Left ANITHA 0.47.         Vitals:    Vitals:    02/18/20 1512 02/18/20 1513   BP: 132/72 (!) 143/81   BP Location: Left arm Right arm   Patient Position: Chair Chair   Cuff Size: Adult Regular Adult Regular   Pulse: 87 83       BMI:  Estimated body mass index is 24.15 kg/m  as calculated from the following:    Height as of 12/2/19: 5' 1\" (1.549 m).    Weight as of 12/2/19: 127 lb 12.8 oz (58 kg).    Pain Score:  Data Unavailable        Suzy Mclean MA    " Name band;

## 2020-02-19 ENCOUNTER — TRANSFERRED RECORDS (OUTPATIENT)
Dept: HEALTH INFORMATION MANAGEMENT | Facility: CLINIC | Age: 75
End: 2020-02-19

## 2020-02-19 NOTE — PROGRESS NOTES
[facility] INITIAL VASCULAR SURGERY CONSULT    Impression:   1.  Bilateral lower extremity peripheral arterial disease with left lateral foot superficial ulceration.  Based on physical exam I suspect predominantly infrainguinal disease.    2.  Metastatic colon cancer.  Status post left hemicolectomy with proven liver metastases.    3.  Ongoing tobacco abuse.    Plan:   I had a lengthy discussion with Anna reviewing all the above.  Resting left-sided ANITHA is 0.47 decreasing to 0.23.  This is not significantly changed compared to a prior study in June 2019.  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  I suspect that the lateral left foot ulceration was initially related to the neuropathy.  She has borderline left foot perfusion to potentially heal this ulceration.  I recommended abdominal aortogram with bilateral lower extremity runoff and possible left leg intervention.  Given her ABIs there is a high likelihood of a long segment left leg occlusion which may not be amenable to percutaneous intervention.  We will obtain bilateral greater saphenous vein mapping.    I also discussed the contribution of tobacco abuse to peripheral arterial disease and wound healing.  I have told her that she must quit smoking.  I have stressed that her left leg is potentially threatened.  She verbalizes full understanding to the above.  She may continue her aspirin uninterrupted.  Lower extremity angiography with possible intervention will be scheduled in a timely manner at [facility].      HPI:   Anna Dyer is a 74-year-old female with hypertension, hyperlipidemia, and ongoing tobacco abuse.  She has a several year history of bilateral lower extremity claudication at less than 1 block.  Her left leg is more affected than the right.  Her past medical history is most notable for metastatic colon cancer with liver metastases.  She is status post left  hemicolectomy.  She is status post chemotherapy with resultant lower extremity neuropathy.  She has complained of left foot pain for some time.  About a month ago 1 of her providers noticed a small ulceration on the lateral aspect of the left fifth metatarsal.  This area has remained stable but is very slowly improving.  She is therefore referred today for vascular surgical consultation.    Of note, she was scheduled to undergo a robot-assisted abdominal wall reconstruction early next month.  She states that that procedure has been canceled due to a deterioration in her metastatic colon cancer.  I am unclear as to the exact details of this disease progression.    She has no prior history of arterial intervention.  She continues to smoke about 0.25 packs/day.      CURRENT MEDICATIONS  albuterol (PROAIR HFA/PROVENTIL HFA/VENTOLIN HFA) 108 (90 Base) MCG/ACT inhaler, Inhale 2 puffs into the lungs 2 times daily And every 4 hours as needed  aspirin (ASA) 81 MG tablet, Take 1 tablet (81 mg) by mouth daily  atorvastatin (LIPITOR) 20 MG tablet, Take 1 tablet (20 mg) by mouth At Bedtime  buPROPion (WELLBUTRIN SR) 150 MG 12 hr tablet, Take 150 mg by mouth 2 times daily (patient takes in the Morning and before 15:00)  gabapentin (NEURONTIN) 400 MG capsule, Take 1,200 mg by mouth every morning   Homeopathic Products (LEG CRAMP RELIEF PO), Take 2 tablets by mouth daily as needed  lidocaine-prilocaine (EMLA) 2.5-2.5 % external cream, Apply topically daily as needed (Apply one hour prior to acccess)   LORazepam (ATIVAN) 0.5 MG tablet, Take 0.5 mg by mouth every morning As needed for sleep  amLODIPine 5 MG PO tablet, Take 2 tablets (10 mg) by mouth daily  HYDROCHLOROTHIAZIDE 25 MG PO tablet, TAKE 1 TABLET DAILY  LOSARTAN 100 MG PO tablet, TAKE 1 TABLET DAILY  [] nicotine (NICODERM CQ) 14 MG/24HR 24 hr patch, Place 1 patch onto the skin every 24 hours for 14 days  [] nicotine (NICODERM CQ) 21 MG/24HR 24 hr patch, Place  1 patch onto the skin every 24 hours for 14 days  [] nicotine (NICODERM CQ) 7 MG/24HR 24 hr patch, Place 1 patch onto the skin every 24 hours for 14 days  vitamin D2 87114 units (1250 mcg) PO capsule, Take 1 capsule weekly x 4weeks, then start 2000 IU vitamin D daily (OTC)    No current facility-administered medications on file prior to visit.         PAST MEDICAL HISTORY  Past Medical History:   Diagnosis Date     Cancer (H)     metastatic colorectal adenocarcinoma     Hypertension     No cardiologist     RLS (restless legs syndrome)          PAST SURGICAL HISTORY:  Past Surgical History:   Procedure Laterality Date     BIOPSY      liver     BREAST SURGERY      breast implants     COLONOSCOPY N/A 3/28/2019    Procedure: INTRAOPERATIVE COLONOSCOPY,;  Surgeon: Jesus Caba MD;  Location:  OR     COSMETIC SURGERY      facelift and tummy tuck     GYN SURGERY      tubal     INSERT PORT VASCULAR ACCESS N/A 3/24/2017    Procedure: INSERT PORT VASCULAR ACCESS;  Surgeon: Yemi Ordaz MD;  Location:  OR     LAPAROSCOPIC ASSISTED COLECTOMY LEFT (DESCENDING) N/A 2018    Procedure: LAPAROSCOPIC ASSISTED COLECTOMY LEFT (DESCENDING);;  Surgeon: Maddie Baron MD;  Location:  OR     LAPAROSCOPIC ASSISTED COLOSTOMY TAKEDOWN N/A 3/28/2019    Procedure: LAPAROSCOPIC ASSISTED COLOSTOMY REVERSAL;  Surgeon: Jesus Caba MD;  Location:  OR     LAPAROSCOPY DIAGNOSTIC (GENERAL) N/A 3/28/2019    Procedure: DIAGNOSTIC LAPAROSCOPY;  Surgeon: Jesus Caba MD;  Location:  OR     LAPAROTOMY EXPLORATORY N/A 2018    Procedure: LAPAROTOMY EXPLORATORY;  exploratory laparoscopy, left hemicolectomy, transverse colostomy;  Surgeon: Maddie Baron MD;  Location: [facility] OR       ALLERGIES     Allergies   Allergen Reactions     Lactose GI Disturbance     Sulfa Drugs Hives       FAMILY HISTORY  Family History   Problem Relation Age of Onset     Family History Negative Father      Family History  Negative Mother      Psoriasis Brother         half-siblings     Family History Negative Sister      Hypothyroidism Son        SOCIAL HISTORY  Social History     Tobacco Use     Smoking status: Current Some Day Smoker     Packs/day: 0.25     Years: 50.00     Pack years: 12.50     Types: Cigarettes     Smokeless tobacco: Never Used   Substance Use Topics     Alcohol use: No     Alcohol/week: 0.0 standard drinks     Drug use: No       ROS:   Review of Systems   Constitution: Negative.   HENT: Negative.    Eyes: Positive for blurred vision.   Cardiovascular: Positive for claudication.   Respiratory: Negative.    Hematologic/Lymphatic: Negative.    Skin: Positive for color change and poor wound healing.        Ruborous appearance of her bilateral feet.  The left foot blanches with elevation.   Musculoskeletal: Negative.    Gastrointestinal:        See HPI.   Genitourinary: Negative.    Neurological: Positive for numbness.   Psychiatric/Behavioral: Negative.    Allergic/Immunologic: Negative.          EXAM:  BP (!) 143/81 (BP Location: Right arm, Patient Position: Chair, Cuff Size: Adult Regular)   Pulse 83   Breastfeeding No   Physical Exam  Vitals signs and nursing note reviewed. Exam conducted with a chaperone present.   Constitutional:       Appearance: Normal appearance.   HENT:      Head: Normocephalic and atraumatic.   Eyes:      General: No scleral icterus.     Extraocular Movements: Extraocular movements intact.      Pupils: Pupils are equal, round, and reactive to light.   Neck:      Musculoskeletal: Normal range of motion.   Cardiovascular:      Rate and Rhythm: Normal rate and regular rhythm.      Pulses:           Radial pulses are 2+ on the right side and 2+ on the left side.        Femoral pulses are 2+ on the right side and 2+ on the left side.       Popliteal pulses are 0 on the right side and 0 on the left side.        Dorsalis pedis pulses are detected w/ Doppler on the right side and detected w/  Doppler on the left side.        Posterior tibial pulses are detected w/ Doppler on the right side and detected w/ Doppler on the left side.      Comments: Biphasic right DP/PT Doppler signals.    Monophasic left DP/PT Doppler signals.    Bilateral lower extremity veins grossly normal.    Ruborous appearance of the bilateral forefeet.  The left foot blanches with elevation.  Abdominal:      General: Abdomen is flat.      Hernia: A hernia is present.      Comments: Multiple surgical scars.  Incisional hernia to the right of midline-reducible.   Musculoskeletal: Normal range of motion.      Right lower leg: No edema.      Left lower leg: No edema.   Skin:     Comments: 1 cm superficial ulceration at the left lateral fifth metatarsal.  Please see accompanying photo in today's nursing notes.   Neurological:      General: No focal deficit present.      Mental Status: She is alert and oriented to person, place, and time. Mental status is at baseline.   Psychiatric:         Mood and Affect: Mood normal.         Behavior: Behavior normal.         Thought Content: Thought content normal.         Judgment: Judgment normal.         Labs:  LIPID RESULTS:  Lab Results   Component Value Date    CHOL 175 06/09/2015    HDL 65 06/09/2015     06/09/2015    TRIG 52 06/09/2015    CHOLHDLRATIO 2.7 06/09/2015       CBC RESULTS:  Lab Results   Component Value Date    WBC 5.5 04/01/2019    RBC 3.32 (L) 04/01/2019    HGB 11.8 04/02/2019    HCT 32.1 (L) 04/01/2019    MCV 97 04/01/2019    MCH 33.7 (H) 04/01/2019    MCHC 34.9 04/01/2019    RDW 14.6 04/01/2019     (L) 04/03/2019       BMP RESULTS:  Lab Results   Component Value Date     04/02/2019    POTASSIUM 4.0 04/03/2019    CHLORIDE 101 04/02/2019    CO2 27 04/02/2019    ANIONGAP 7 04/02/2019     (H) 04/02/2019    BUN 7 04/02/2019    CR 0.68 04/03/2019    GFRESTIMATED 49 (L) 07/17/2019    GFRESTBLACK 59 (L) 07/17/2019    CAITY 8.2 (L) 04/02/2019        A1C  RESULTS:  No results found for: A1C      Imaging:  Recent Results (from the past 24 hour(s))   US ANITHA Doppler with Exercise Bilateral    Narrative    US ANITHA DOPPLER WITH EXERCISE BILATERAL   2/18/2020 2:54 PM     HISTORY: History of PAD. Small ulceration of lateral aspect of left  5th toe. PAD (peripheral artery disease) (H).    COMPARISON: None.    FINDINGS:  Right ANITHA:   DP: 0.75  PT: 0.7.    Left ANITHA:   DP: 0.47   PT: 0.47.    Waveforms: Triphasic in the femorals bilaterally, monophasic in the  tibial vessels bilaterally.     Exercise: The patient was exercised on a treadmill at 0.5 mph at a 10  incline for 3 minutes 8 seconds total. Patient complained of left calf  tightness at 1 minute and 58 seconds and stopped at 3 minutes and 8  seconds because of shortness of breath.    Right exercise ANITHA: 0.56.  Left exercise ANITHA: 0.23.      Impression    IMPRESSION:  1. Right resting and exercise ANITHA shows moderate arterial  insufficiency.  2. Left resting ANITHA shows moderate arterial insufficiency which  becomes severe/critical with exercise.    ANITHA CRITERIA:  >0.95 Normal  0.90 - 0.94 Mild  0.5 - 0.89 Moderate  0.2 - 0.49 Severe  <0.2 Critical    VLADIMIR BREWER DO       Total face-to-face time was 30 minutes, greater than 50% spent providing counseling and education.          Moshe Arambula MD

## 2020-02-19 NOTE — TELEPHONE ENCOUNTER
Pt advised, ok with plan, stated she would  Call back to make appt with MR for f/u    Chikis Fry, CMA

## 2020-02-20 ENCOUNTER — TELEPHONE (OUTPATIENT)
Dept: OTHER | Facility: CLINIC | Age: 75
End: 2020-02-20

## 2020-02-20 NOTE — TELEPHONE ENCOUNTER
"Pt hx of bilateral lower extremity PAD, left lateral foot superficial ulceration.     Dr. Arambula noted on 2/18/20 \"I have stressed that her left leg is potentially threatened.\"    \"Lower extremity angiography with possible intervention will be scheduled in a timely manner at [facility]\"    JOHNATHAN Sales from [facility] [phone] called to report pt is on medication \"Bevacizumab\", this medication inhibits vascular healing/vascular regrowth, Thus oncology is requesting this medication be held 4 weeks prior to any vascular intervention (unless emergent). If possible to wait for vascular angiogram/intervention until after 3/11/20.     Pts last dose of Bevacizumab was on 2/12/20 and they will hold further dosing until they hear back from us.     Routing to Dr. Arambula's RN [place] to follow up with Dr. Arambula for recommendation.     Estephanie Villafuerte, BSN, RN  [facility]    "

## 2020-02-25 NOTE — TELEPHONE ENCOUNTER
Reviewed with Dr. Arambula.  Pt may wait 3/11/20 for angiogram.  Spoke with Henrry (George) and provided her with update.  She appreciated the return call and verbalized understanding.  Will route to scheduling to schedule patient for recommended angiogram after 3/11/20.    Angeles Valenzuela, ILIANAN, RN-[facility] [place]

## 2020-02-25 NOTE — NURSING NOTE
Late charting  Patient Education completed 2/18/20    Procedure: Abdominal aortogram with bilateral lower extremity runoffs; possible left lower extremity intervention  Diagnosis: PAD with left lateral foot ulcer  Anticoagulation Instruction: n/a  Pre-Operative Physical Exam: You need to have a pre-op physical exam within 30 days of your procedure. Your procedure may be cancelled if you do not have a current History and Physical. Call your PCP's office to schedule.  Allergies:  Updated in Epic  Bowel Prep: n/a  Post Procedure Education: [facility] patient post-procedure fact sheet reviewed with patient.    Learner(s):patient  Method: Listening, Reading  Barriers to Learning:No Barrier  Outcome: Patient did verbalize understanding of above education.    Angeles Valenzuela, ILIANAN, RN-[facility]

## 2020-02-26 ENCOUNTER — TRANSFERRED RECORDS (OUTPATIENT)
Dept: HEALTH INFORMATION MANAGEMENT | Facility: CLINIC | Age: 75
End: 2020-02-26

## 2020-02-26 ENCOUNTER — TELEPHONE (OUTPATIENT)
Dept: OTHER | Facility: CLINIC | Age: 75
End: 2020-02-26

## 2020-02-26 NOTE — TELEPHONE ENCOUNTER
[facility]    Who is the name of the provider?:  Ralf      What is the location you see this provider at?: [place]    Reason for call: Needs preop form sent to her.  email:  [email or web address]  or to Dr. Flores at [facility].    Can we leave a detailed message on this number?  YES

## 2020-02-27 ENCOUNTER — OFFICE VISIT (OUTPATIENT)
Dept: INTERNAL MEDICINE | Facility: CLINIC | Age: 75
End: 2020-02-27
Payer: MEDICARE

## 2020-02-27 VITALS
HEART RATE: 68 BPM | BODY MASS INDEX: 24.55 KG/M2 | DIASTOLIC BLOOD PRESSURE: 64 MMHG | RESPIRATION RATE: 14 BRPM | HEIGHT: 61 IN | SYSTOLIC BLOOD PRESSURE: 116 MMHG | WEIGHT: 130 LBS | TEMPERATURE: 98.2 F | OXYGEN SATURATION: 100 %

## 2020-02-27 DIAGNOSIS — Z01.818 PREOP GENERAL PHYSICAL EXAM: Primary | ICD-10-CM

## 2020-02-27 DIAGNOSIS — Z72.0 TOBACCO ABUSE: ICD-10-CM

## 2020-02-27 DIAGNOSIS — I73.9 PERIPHERAL ARTERY DISEASE (H): ICD-10-CM

## 2020-02-27 DIAGNOSIS — H25.9 AGE-RELATED CATARACT OF BOTH EYES, UNSPECIFIED AGE-RELATED CATARACT TYPE: ICD-10-CM

## 2020-02-27 PROCEDURE — 99214 OFFICE O/P EST MOD 30 MIN: CPT | Performed by: INTERNAL MEDICINE

## 2020-02-27 ASSESSMENT — MIFFLIN-ST. JEOR: SCORE: 1032.06

## 2020-02-27 NOTE — PROGRESS NOTES
[address] [address]  [phone]  Dept: [phone]    PRE-OP EVALUATION:  Today's date: 2020    Anna Dyer (: 1945) presents for pre-operative evaluation assessment as requested by Dr. Biggs (cataract) and Dr. Arambula (angioplasty/ angiogram). She requires evaluation and anesthesia risk assessment prior to undergoing both cataract removal/lens implant and peripheral artery angioplasty for treatment  of Cataracts  And PAD    Fax number for surgical facility: [phone]  And [phone]  Primary Physician: Tello Finney  Type of Anesthesia Anticipated: Local with MAC and to be determined    Patient has a Health Care Directive or Living Will:  YES on file    Preop Questions 2020   Who is doing your surgery? nicky and riedel   What are you having done? cataract both eyes   Date of Surgery/Procedure: 3-6-2020 and 3-   Facility or Hospital where procedure/surgery will be performed: [facility]   1.  Do you have a history of Heart attack, stroke, stent, coronary bypass surgery, or other heart surgery? No   2.  Do you ever have any pain or discomfort in your chest? No   3.  Do you have a history of  Heart Failure? No   4.   Are you troubled by shortness of breath when:  walking on a level surface, or up a slight hill, or at night? YES    5.  Do you currently have a cold, bronchitis or other respiratory infection? No   6.  Do you have a cough, shortness of breath, or wheezing? No   7.  Do you sometimes get pains in the calves of your legs when you walk? YES    8. Do you or anyone in your family have previous history of blood clots? UNKNOWN    9.  Do you or does anyone in your family have a serious bleeding problem such as prolonged bleeding following surgeries or cuts? No   10. Have you ever had problems with anemia or been told to take iron pills? No   11. Have  you had any abnormal blood loss such as black, tarry or bloody stools, or abnormal vaginal bleeding? No   12. Have you ever had a blood transfusion? No   13. Have you or any of your relatives ever had problems with anesthesia? YES    14. Do you have sleep apnea, excessive snoring or daytime drowsiness? No   15. Do you have any prosthetic heart valves? No   16. Do you have prosthetic joints? No   17. Is there any chance that you may be pregnant? No         HPI:   She is a long term smoker with underlying metastatic colon cancer w/colostomy currently undergoing chemotherapy and COPD     Pt will be having procedures for two issues:  1. Cataracts  2. Angiogram and possible angioplasty due to symptomatic PAD    MEDICAL HISTORY:     Patient Active Problem List    Diagnosis Date Noted     Abdominal wall hernia 01/08/2020     Priority: Medium     Added automatically from request for surgery [id number]       CKD (chronic kidney disease) stage 3, GFR 30-59 ml/min (H) 09/12/2019     Priority: Medium     Colostomy status (H) 03/28/2019     Priority: Medium     Status post colostomy (H) 04/06/2018     Priority: Medium     COPD- borderline obstruction on PFTs. long term smoker 01/19/2018     Priority: Medium     Tobacco abuse 01/19/2018     Priority: Medium     Colon cancer metastasized to liver (H) 03/17/2017     Priority: Medium     Advanced directives, counseling/discussion 09/15/2014     Priority: Medium     Advance Care Planning:   Receipt of ACP document:  Received: Health Care Directive which was witnessed or notarized on 08/20/2013.  Document not previously scanned.  Validation form completed and sent with document to be scanned.  .  Confirmed/documented designated decision maker(s). See permanent comments section of demographics in clinical tab. View document(s) and details by clicking on code status.   Added by Rach Olson on 1/19/2015.               Nicotine dependence 02/25/2014     Priority: Medium     Hypertension  goal BP (blood pressure) < 140/90 02/24/2014     Priority: Medium     Restless leg syndrome 02/24/2014     Priority: Medium     CARDIOVASCULAR SCREENING; LDL GOAL LESS THAN 130 02/24/2014     Priority: Medium      Past Medical History:   Diagnosis Date     Cancer (H)     metastatic colorectal adenocarcinoma     Hypertension     No cardiologist     RLS (restless legs syndrome)      Past Surgical History:   Procedure Laterality Date     BIOPSY      liver     BREAST SURGERY      breast implants     COLONOSCOPY N/A 3/28/2019    Procedure: INTRAOPERATIVE COLONOSCOPY,;  Surgeon: Jesus Caba MD;  Location:  OR     COSMETIC SURGERY      facelift and tummy tuck     GYN SURGERY      tubal     INSERT PORT VASCULAR ACCESS N/A 3/24/2017    Procedure: INSERT PORT VASCULAR ACCESS;  Surgeon: Yemi Ordaz MD;  Location:  OR     LAPAROSCOPIC ASSISTED COLECTOMY LEFT (DESCENDING) N/A 1/25/2018    Procedure: LAPAROSCOPIC ASSISTED COLECTOMY LEFT (DESCENDING);;  Surgeon: Maddie Baron MD;  Location:  OR     LAPAROSCOPIC ASSISTED COLOSTOMY TAKEDOWN N/A 3/28/2019    Procedure: LAPAROSCOPIC ASSISTED COLOSTOMY REVERSAL;  Surgeon: Jesus Caba MD;  Location:  OR     LAPAROSCOPY DIAGNOSTIC (GENERAL) N/A 3/28/2019    Procedure: DIAGNOSTIC LAPAROSCOPY;  Surgeon: Jesus Caba MD;  Location:  OR     LAPAROTOMY EXPLORATORY N/A 1/25/2018    Procedure: LAPAROTOMY EXPLORATORY;  exploratory laparoscopy, left hemicolectomy, transverse colostomy;  Surgeon: Maddie Baron MD;  Location: [facility] OR     Current Outpatient Medications   Medication Sig Dispense Refill     albuterol (PROAIR HFA/PROVENTIL HFA/VENTOLIN HFA) 108 (90 Base) MCG/ACT inhaler Inhale 2 puffs into the lungs 2 times daily And every 4 hours as needed 1 Inhaler 1     amLODIPine 5 MG PO tablet Take 2 tablets (10 mg) by mouth daily 180 tablet 0     aspirin (ASA) 81 MG tablet Take 1 tablet (81 mg) by mouth daily       atorvastatin (LIPITOR) 20 MG  "tablet Take 1 tablet (20 mg) by mouth At Bedtime 90 tablet 1     gabapentin (NEURONTIN) 400 MG capsule Take 1,200 mg by mouth every morning        Homeopathic Products (LEG CRAMP RELIEF PO) Take 2 tablets by mouth daily as needed       HYDROCHLOROTHIAZIDE 25 MG PO tablet TAKE 1 TABLET DAILY 90 tablet 0     lidocaine-prilocaine (EMLA) 2.5-2.5 % external cream Apply topically daily as needed (Apply one hour prior to acccess)        LORazepam (ATIVAN) 0.5 MG tablet Take 0.5 mg by mouth every morning As needed for sleep  1     LOSARTAN 100 MG PO tablet TAKE 1 TABLET DAILY 90 tablet 0     vitamin D2 95972 units (1250 mcg) PO capsule Take 1 capsule weekly x 4weeks, then start 2000 IU vitamin D daily (OTC)  0     OTC products: None, except as noted above    Allergies   Allergen Reactions     Lactose GI Disturbance     Sulfa Drugs Hives      Latex Allergy: NO    Social History     Tobacco Use     Smoking status: Current Some Day Smoker     Packs/day: 0.50     Years: 50.00     Pack years: 25.00     Types: Cigarettes     Smokeless tobacco: Never Used     Tobacco comment: 1/2 to 3/4 PPD   Substance Use Topics     Alcohol use: No     Alcohol/week: 0.0 standard drinks     History   Drug Use No       REVIEW OF SYSTEMS:   Constitutional, HEENT, cardiovascular, pulmonary, gi and gu systems are negative, except as otherwise noted.    EXAM:   /64   Pulse 68   Temp 98.2  F (36.8  C) (Oral)   Resp 14   Ht 1.549 m (5' 1\")   Wt 59 kg (130 lb)   SpO2 100%   BMI 24.56 kg/m      GENERAL APPEARANCE: over weight     HENT: nose and mouth without ulcers or lesions and normal cephalic/atraumatic     NECK: no adenopathy, no asymmetry, masses, or scars and thyroid normal to palpation     RESP: lungs clear to auscultation - no rales, rhonchi or wheezes     CV: regular rates and rhythm, normal S1 S2, no S3 or S4 and no murmur, click or rub     ABDOMEN:  soft, nontender, no HSM or masses and bowel sounds normal     MS: extremities " normal- no gross deformities noted.     SKIN: no suspicious lesions or rashes     NEURO: Normal strength and tone, sensory exam grossly normal, mentation intact and speech normal     PSYCH: mentation appears normal. and affect normal/bright     LYMPHATICS: No cervical adenopathy    DIAGNOSTICS:       Recent Labs   Lab Test 04/03/19  0615 04/03/19  0400 04/02/19  1630 04/02/19  0555  04/01/19  0845  03/07/17  1419   HGB  --   --  11.8  --   --  11.2*   < > 13.0   *  --   --   --   --  153   < >  --    INR  --   --   --   --   --   --   --  0.98   NA  --   --   --  135  --  131*   < > 136   POTASSIUM  --  4.0  --  3.2*   < > 3.2*   < > 4.0   CR 0.68  --   --  0.70  --  0.60   < > 0.81    < > = values in this interval not displayed.        IMPRESSION:   Reason for surgery/procedure: cataracts, claudication  Diagnosis/reason for consult: PAD, tobacco use, metastatic colon cancer    The proposed surgical procedure is considered LOW risk.    REVISED CARDIAC RISK INDEX  The patient has the following serious cardiovascular risks for perioperative complications such as (MI, PE, VFib and 3  AV Block):  Coronary Artery Disease Equivalent    INTERPRETATION: 1 risks: Class II (low risk - 0.9% complication rate)    The patient has the following additional risks for perioperative complications:  No identified additional risks      ICD-10-CM    1. Preop general physical exam Z01.818    2. Peripheral artery disease (H) I73.9    3. Age-related cataract of both eyes, unspecified age-related cataract type H25.9    4. Tobacco abuse Z72.0        RECOMMENDATIONS:       --Patient is to take all scheduled medications on the day of surgery    APPROVAL GIVEN to proceed with proposed procedure, without further diagnostic evaluation       Signed Electronically by: Tello Finney MD    Copy of this evaluation report is provided to requesting physician.    [facility] Preop Guidelines    Revised Cardiac Risk Index

## 2020-03-03 ENCOUNTER — TELEPHONE (OUTPATIENT)
Dept: OTHER | Facility: CLINIC | Age: 75
End: 2020-03-03

## 2020-03-03 NOTE — TELEPHONE ENCOUNTER
Type of procedure: ABDOMINAL AORTOGRAM WITH BILATERAL LOWER EXTREMITY RUNOFFS, POSSIBLE LEFT LOWER EXTREMITY ANGIOGRAM   Location of procedure: [facility]  Date and time of procedure: 3/12/20 @ 8:00 AM  Requesting Surgeon: DR. BAIG  Performing Surgeon/IR: DR. BAIG  Pre-Op Appt Date: PT TO SCHEDULE  Post-Op Appt Date: PT TO SCHEDULE   Packet sent out: Yes  Pre-cert/Authorization completed:  Yes  Date: 3/3/20

## 2020-03-12 ENCOUNTER — APPOINTMENT (OUTPATIENT)
Dept: ULTRASOUND IMAGING | Facility: CLINIC | Age: 75
End: 2020-03-12
Attending: SURGERY
Payer: MEDICARE

## 2020-03-12 ENCOUNTER — APPOINTMENT (OUTPATIENT)
Dept: INTERVENTIONAL RADIOLOGY/VASCULAR | Facility: CLINIC | Age: 75
End: 2020-03-12
Attending: SURGERY
Payer: MEDICARE

## 2020-03-12 ENCOUNTER — HOSPITAL ENCOUNTER (OUTPATIENT)
Facility: CLINIC | Age: 75
Discharge: HOME | End: 2020-03-12
Attending: SURGERY | Admitting: SURGERY
Payer: MEDICARE

## 2020-03-12 ENCOUNTER — APPOINTMENT (OUTPATIENT)
Dept: SURGERY | Facility: PHYSICIAN GROUP | Age: 75
End: 2020-03-12
Payer: MEDICARE

## 2020-03-12 VITALS
OXYGEN SATURATION: 94 % | TEMPERATURE: 97.4 F | WEIGHT: 129.2 LBS | HEIGHT: 61 IN | RESPIRATION RATE: 16 BRPM | HEART RATE: 67 BPM | DIASTOLIC BLOOD PRESSURE: 57 MMHG | BODY MASS INDEX: 24.39 KG/M2 | SYSTOLIC BLOOD PRESSURE: 108 MMHG

## 2020-03-12 DIAGNOSIS — Z92.89 H/O ANGIOGRAPHY: ICD-10-CM

## 2020-03-12 DIAGNOSIS — I70.245 ATHEROSCLEROSIS OF NATIVE ARTERY OF LEFT LOWER EXTREMITY WITH ULCERATION OF OTHER PART OF FOOT (H): ICD-10-CM

## 2020-03-12 DIAGNOSIS — E78.5 HYPERLIPIDEMIA LDL GOAL <70: ICD-10-CM

## 2020-03-12 DIAGNOSIS — I73.9 PAD (PERIPHERAL ARTERY DISEASE) (H): Primary | ICD-10-CM

## 2020-03-12 LAB
APTT PPP: 32 SEC (ref 22–37)
CHOLEST SERPL-MCNC: 184 MG/DL
CREAT SERPL-MCNC: 0.8 MG/DL (ref 0.52–1.04)
ERYTHROCYTE [DISTWIDTH] IN BLOOD BY AUTOMATED COUNT: 15.5 % (ref 10–15)
GFR SERPL CREATININE-BSD FRML MDRD: 73 ML/MIN/{1.73_M2}
HBA1C MFR BLD: 5.4 % (ref 0–5.6)
HCT VFR BLD AUTO: 41 % (ref 35–47)
HDLC SERPL-MCNC: 52 MG/DL
HGB BLD-MCNC: 14.2 G/DL (ref 11.7–15.7)
INR PPP: 0.88 (ref 0.86–1.14)
LDLC SERPL CALC-MCNC: 72 MG/DL
MCH RBC QN AUTO: 31.8 PG (ref 26.5–33)
MCHC RBC AUTO-ENTMCNC: 34.6 G/DL (ref 31.5–36.5)
MCV RBC AUTO: 92 FL (ref 78–100)
NONHDLC SERPL-MCNC: 132 MG/DL
PLATELET # BLD AUTO: 187 10E9/L (ref 150–450)
RBC # BLD AUTO: 4.46 10E12/L (ref 3.8–5.2)
TRIGL SERPL-MCNC: 298 MG/DL
WBC # BLD AUTO: 8.9 10E9/L (ref 4–11)

## 2020-03-12 PROCEDURE — 40000853 ZZH STATISTIC ANGIOGRAM, STENT, VERTEBRO PLASTY

## 2020-03-12 PROCEDURE — 27210894 ZZH CATH CR6

## 2020-03-12 PROCEDURE — 75716 ARTERY X-RAYS ARMS/LEGS: CPT | Mod: 26 | Performed by: SURGERY

## 2020-03-12 PROCEDURE — 25000128 H RX IP 250 OP 636

## 2020-03-12 PROCEDURE — 75625 CONTRAST EXAM ABDOMINL AORTA: CPT | Mod: 26 | Performed by: SURGERY

## 2020-03-12 PROCEDURE — 99204 OFFICE O/P NEW MOD 45 MIN: CPT | Performed by: INTERNAL MEDICINE

## 2020-03-12 PROCEDURE — 25800030 ZZH RX IP 258 OP 636: Performed by: SURGERY

## 2020-03-12 PROCEDURE — 75716 ARTERY X-RAYS ARMS/LEGS: CPT

## 2020-03-12 PROCEDURE — 27210805 ZZH SHEATH CR4

## 2020-03-12 PROCEDURE — 25500064 ZZH RX 255 OP 636: Performed by: SURGERY

## 2020-03-12 PROCEDURE — 85610 PROTHROMBIN TIME: CPT | Performed by: SURGERY

## 2020-03-12 PROCEDURE — C1769 GUIDE WIRE: HCPCS

## 2020-03-12 PROCEDURE — 27210742 ZZH CATH CR1

## 2020-03-12 PROCEDURE — 82565 ASSAY OF CREATININE: CPT | Performed by: SURGERY

## 2020-03-12 PROCEDURE — 25000125 ZZHC RX 250

## 2020-03-12 PROCEDURE — 93971 EXTREMITY STUDY: CPT | Mod: LT

## 2020-03-12 PROCEDURE — 36415 COLL VENOUS BLD VENIPUNCTURE: CPT

## 2020-03-12 PROCEDURE — 85027 COMPLETE CBC AUTOMATED: CPT | Performed by: SURGERY

## 2020-03-12 PROCEDURE — 80061 LIPID PANEL: CPT | Performed by: SURGERY

## 2020-03-12 PROCEDURE — 36246 INS CATH ABD/L-EXT ART 2ND: CPT | Performed by: SURGERY

## 2020-03-12 PROCEDURE — 99152 MOD SED SAME PHYS/QHP 5/>YRS: CPT | Performed by: SURGERY

## 2020-03-12 PROCEDURE — 25000128 H RX IP 250 OP 636: Performed by: SURGERY

## 2020-03-12 PROCEDURE — 27210886 ZZH ACCESSORY CR5

## 2020-03-12 PROCEDURE — 83036 HEMOGLOBIN GLYCOSYLATED A1C: CPT | Performed by: SURGERY

## 2020-03-12 PROCEDURE — 85730 THROMBOPLASTIN TIME PARTIAL: CPT | Performed by: SURGERY

## 2020-03-12 RX ORDER — DEXTROSE MONOHYDRATE 25 G/50ML
25-50 INJECTION, SOLUTION INTRAVENOUS
Status: DISCONTINUED | OUTPATIENT
Start: 2020-03-12 | End: 2020-03-12 | Stop reason: HOSPADM

## 2020-03-12 RX ORDER — NICOTINE POLACRILEX 4 MG
15-30 LOZENGE BUCCAL
Status: DISCONTINUED | OUTPATIENT
Start: 2020-03-12 | End: 2020-03-12 | Stop reason: HOSPADM

## 2020-03-12 RX ORDER — LIDOCAINE HYDROCHLORIDE 10 MG/ML
INJECTION, SOLUTION INFILTRATION; PERINEURAL
Status: COMPLETED
Start: 2020-03-12 | End: 2020-03-12

## 2020-03-12 RX ORDER — FENTANYL CITRATE 50 UG/ML
INJECTION, SOLUTION INTRAMUSCULAR; INTRAVENOUS
Status: COMPLETED
Start: 2020-03-12 | End: 2020-03-12

## 2020-03-12 RX ORDER — ACETAMINOPHEN 325 MG/1
650 TABLET ORAL EVERY 4 HOURS PRN
Status: DISCONTINUED | OUTPATIENT
Start: 2020-03-12 | End: 2020-03-12 | Stop reason: HOSPADM

## 2020-03-12 RX ORDER — FENTANYL CITRATE 50 UG/ML
25-50 INJECTION, SOLUTION INTRAMUSCULAR; INTRAVENOUS EVERY 5 MIN PRN
Status: DISCONTINUED | OUTPATIENT
Start: 2020-03-12 | End: 2020-03-12 | Stop reason: HOSPADM

## 2020-03-12 RX ORDER — LIDOCAINE 40 MG/G
CREAM TOPICAL
Status: DISCONTINUED | OUTPATIENT
Start: 2020-03-12 | End: 2020-03-12 | Stop reason: HOSPADM

## 2020-03-12 RX ORDER — FLUMAZENIL 0.1 MG/ML
0.2 INJECTION, SOLUTION INTRAVENOUS
Status: DISCONTINUED | OUTPATIENT
Start: 2020-03-12 | End: 2020-03-12 | Stop reason: HOSPADM

## 2020-03-12 RX ORDER — NALOXONE HYDROCHLORIDE 0.4 MG/ML
.1-.4 INJECTION, SOLUTION INTRAMUSCULAR; INTRAVENOUS; SUBCUTANEOUS
Status: DISCONTINUED | OUTPATIENT
Start: 2020-03-12 | End: 2020-03-12 | Stop reason: HOSPADM

## 2020-03-12 RX ORDER — HEPARIN SODIUM 1000 [USP'U]/ML
500-6000 INJECTION, SOLUTION INTRAVENOUS; SUBCUTANEOUS
Status: DISCONTINUED | OUTPATIENT
Start: 2020-03-12 | End: 2020-03-12 | Stop reason: HOSPADM

## 2020-03-12 RX ORDER — SODIUM CHLORIDE 9 MG/ML
INJECTION, SOLUTION INTRAVENOUS CONTINUOUS
Status: DISCONTINUED | OUTPATIENT
Start: 2020-03-12 | End: 2020-03-12 | Stop reason: HOSPADM

## 2020-03-12 RX ORDER — ATORVASTATIN CALCIUM 40 MG/1
40 TABLET, FILM COATED ORAL AT BEDTIME
Qty: 90 TABLET | Refills: 3 | Status: SHIPPED | OUTPATIENT
Start: 2020-03-12 | End: 2021-04-08

## 2020-03-12 RX ORDER — IODIXANOL 320 MG/ML
150 INJECTION, SOLUTION INTRAVASCULAR ONCE
Status: COMPLETED | OUTPATIENT
Start: 2020-03-12 | End: 2020-03-12

## 2020-03-12 RX ORDER — HEPARIN SODIUM 1000 [USP'U]/ML
INJECTION, SOLUTION INTRAVENOUS; SUBCUTANEOUS
Status: DISCONTINUED
Start: 2020-03-12 | End: 2020-03-12 | Stop reason: WASHOUT

## 2020-03-12 RX ADMIN — MIDAZOLAM HYDROCHLORIDE 0.5 MG: 1 INJECTION, SOLUTION INTRAMUSCULAR; INTRAVENOUS at 09:01

## 2020-03-12 RX ADMIN — MIDAZOLAM HYDROCHLORIDE 0.5 MG: 1 INJECTION, SOLUTION INTRAMUSCULAR; INTRAVENOUS at 08:37

## 2020-03-12 RX ADMIN — FENTANYL CITRATE 25 MCG: 50 INJECTION, SOLUTION INTRAMUSCULAR; INTRAVENOUS at 09:01

## 2020-03-12 RX ADMIN — SODIUM CHLORIDE: 9 INJECTION, SOLUTION INTRAVENOUS at 07:01

## 2020-03-12 RX ADMIN — FENTANYL CITRATE 25 MCG: 50 INJECTION, SOLUTION INTRAMUSCULAR; INTRAVENOUS at 08:37

## 2020-03-12 RX ADMIN — LIDOCAINE HYDROCHLORIDE 10 ML: 10 INJECTION, SOLUTION INFILTRATION; PERINEURAL at 08:45

## 2020-03-12 RX ADMIN — IODIXANOL 56 ML: 320 INJECTION, SOLUTION INTRAVASCULAR at 09:17

## 2020-03-12 RX ADMIN — HEPARIN SODIUM 10000 UNITS: 10000 INJECTION INTRAVENOUS; SUBCUTANEOUS at 09:05

## 2020-03-12 RX ADMIN — HEPARIN SODIUM 10000 UNITS: 10000 INJECTION INTRAVENOUS; SUBCUTANEOUS at 09:13

## 2020-03-12 ASSESSMENT — MIFFLIN-ST. JEOR: SCORE: 1028.43

## 2020-03-12 NOTE — PROGRESS NOTES
Care Suites Admission Nursing Note    Patient Information  Name: Anna Dyer  Age: 74 year old  Reason for admission: Leg angiogram.  Explained pre procedure and answered questions.  Resting in bed with call light in [facility]  Care Suites arrival time: 0635    Patient Admission/Assessment   Pre-procedure assessment complete: Yes  If abnormal assessment/labs, provider notified: N/A  NPO: Yes  Medications held per instructions/orders: N/A  Consent: deferred  If applicable, pregnancy test status: na  Patient oriented to room: Yes  Education/questions answered: Yes  Plan/other: 0800 angiogram    Discharge Planning  Accompanied by: self  Discharge name/phone number: Luanne [phone]  Overnight post sedation caregiver: friend  Discharge location: home    Stephania Arce RN

## 2020-03-12 NOTE — CONSULTS
[facility]    Vascular Medicine Consultation       Attestation: I have examined the patient independently of Maddy Roper PA-C and agree with the examination and plan as delineated below.    Booker Montesinos MD      Date of Admission:  3/12/2020  Date of Consult (When I saw the patient): 03/12/20    Assessment & Plan   1. Peripheral arterial disease with critical limb ischemia of non-healing left lateral foot wound s/p diagnostic angiogram 3/12/20    Post-procedure cares per Dr. Arambula. She may ultimately require surgical intervention given long-segment occlusion. She must quit smoking and needs tighter control of her lipids.     2. Hyperlipidemia    Her lipide panel this admission is significant for an LDL of 72, HDL 52, triglycerides 298, and total cholesterol 184 while presently on Atorvastatin 20 mg daily. Given her PAD, it is recommended that she be on a moderate to high dose of a maximally efficacious statin with a goal LDL of less than 70. As such, her Atorvastatin dose will be increased to 40 mg daily. She should then have a lipid panel repeated in 3 months through her primary care provider to ascertain whether or not her lipids are at goal on this regimen.     3. Ongoing tobacco use    She has been smoking most of her life. She did quit for 6-8 weeks a few months ago, but is now back to smoking about 1/4 pack per day. She already has nicotine patches at home. She understands the importance of smoking cessation, which was again stressed today. She feels she is ready to quit at this time. She was encouraged to use her nicotine patches to increase her chances of success with this.         Reason for Consult   Reason for consult: Asked by Dr. Arambula to evaluate and help manage vascular risk factors in this 74 year old female smoker with a history of hypertension, hyperlipidemia, stage IV colon cancer diagnosed in 2017 and PAD with critical limb ischemia of non-healing left foot ulcer s/p  diagnostic angiogram today.     Primary Care Physician   Tello Finney      History of Present Illness   Anna Dyer is a 74 year old female smoker with a history of hypertension, hyperlipidemia, and stage IV colon cancer diagnosed in 2017 who has developed peripheral neuropathy as a result of her chemotherapy. She has had a several year history of bilateral lower extremity claudication at one block and more recently developed over one month ago a non-healing left lateral foot ulceration. ABIs were 0.75 on the right at rest, dropping to 0.56 after exercise and 0.47 at rest on the left, dropping to 0.23 after exercise. She was evaluated by Dr. Arambula of Vascular Surgery. An angiogram was recommended for further evaluation and she presented for this today.     Past Medical History   Past Medical History:   Diagnosis Date     Cancer (H)     metastatic colorectal adenocarcinoma     Hypertension     No cardiologist     RLS (restless legs syndrome)        Past Surgical History   Past Surgical History:   Procedure Laterality Date     BIOPSY      liver     BREAST SURGERY      breast implants     COLONOSCOPY N/A 3/28/2019    Procedure: INTRAOPERATIVE COLONOSCOPY,;  Surgeon: Jesus Caba MD;  Location:  OR     COSMETIC SURGERY      facelift and tummy tuck     GYN SURGERY      tubal     INSERT PORT VASCULAR ACCESS N/A 3/24/2017    Procedure: INSERT PORT VASCULAR ACCESS;  Surgeon: Yemi Ordaz MD;  Location: [facility] OR     LAPAROSCOPIC ASSISTED COLECTOMY LEFT (DESCENDING) N/A 1/25/2018    Procedure: LAPAROSCOPIC ASSISTED COLECTOMY LEFT (DESCENDING);;  Surgeon: Maddie Baron MD;  Location:  OR     LAPAROSCOPIC ASSISTED COLOSTOMY TAKEDOWN N/A 3/28/2019    Procedure: LAPAROSCOPIC ASSISTED COLOSTOMY REVERSAL;  Surgeon: Jesus Caba MD;  Location:  OR     LAPAROSCOPY DIAGNOSTIC (GENERAL) N/A 3/28/2019    Procedure: DIAGNOSTIC LAPAROSCOPY;  Surgeon: Jesus Caba MD;  Location:  OR      LAPAROTOMY EXPLORATORY N/A 2018    Procedure: LAPAROTOMY EXPLORATORY;  exploratory laparoscopy, left hemicolectomy, transverse colostomy;  Surgeon: Maddie Baron MD;  Location:  OR       Prior to Admission Medications   Prior to Admission Medications   Prescriptions Last Dose Informant Patient Reported? Taking?   Carboxymethylcellulose Sodium (EYE DROPS OP) 3/11/2020 at Unknown time  Yes Yes   Si different eye drops post cataract surgery- pt not sure of names   HYDROCHLOROTHIAZIDE 25 MG PO tablet 3/12/2020 at Unknown time  No Yes   Sig: TAKE 1 TABLET DAILY   Homeopathic Products (LEG CRAMP RELIEF PO) 3/11/2020 at Unknown time Self Yes Yes   Sig: Take 2 tablets by mouth daily as needed   LORazepam (ATIVAN) 0.5 MG tablet Past Week at Unknown time Self Yes Yes   Sig: Take 0.5 mg by mouth every morning As needed for sleep   LOSARTAN 100 MG PO tablet 3/12/2020 at Unknown time  No Yes   Sig: TAKE 1 TABLET DAILY   albuterol (PROAIR HFA/PROVENTIL HFA/VENTOLIN HFA) 108 (90 Base) MCG/ACT inhaler More than a month at Unknown time Self No No   Sig: Inhale 2 puffs into the lungs 2 times daily And every 4 hours as needed   amLODIPine 5 MG PO tablet 3/12/2020 at Unknown time  No Yes   Sig: Take 2 tablets (10 mg) by mouth daily   aspirin (ASA) 81 MG tablet 3/12/2020 at Unknown time  No Yes   Sig: Take 1 tablet (81 mg) by mouth daily   atorvastatin (LIPITOR) 20 MG tablet 3/11/2020 at Unknown time  No No   Sig: Take 1 tablet (20 mg) by mouth At Bedtime   gabapentin (NEURONTIN) 400 MG capsule 3/12/2020 at Unknown time Self Yes Yes   Sig: Take 1,200 mg by mouth every morning    lidocaine-prilocaine (EMLA) 2.5-2.5 % external cream Unknown at Unknown time Self Yes No   Sig: Apply topically daily as needed (Apply one hour prior to acccess)    nicotine (NICODERM CQ) 14 MG/24HR 24 hr patch   No No   Sig: Place 1 patch onto the skin every 24 hours for 14 days   nicotine (NICODERM CQ) 21 MG/24HR 24 hr patch   No No   Sig:  Place 1 patch onto the skin every 24 hours for 14 days   Patient taking differently: Place 1 patch onto the skin every 24 hours Put on 3/12/2020   nicotine (NICODERM CQ) 7 MG/24HR 24 hr patch   No No   Sig: Place 1 patch onto the skin every 24 hours for 14 days   vitamin D2 66677 units (1250 mcg) PO capsule 3/12/2020 at Unknown time  Yes Yes   Sig: Take 1 capsule weekly x 4weeks, then start 2000 IU vitamin D daily (OTC)      Facility-Administered Medications: None     Allergies   Allergies   Allergen Reactions     Lactose GI Disturbance     Sulfa Drugs Hives       Social History   Anna Dyer  reports that she has been smoking cigarettes. She has a 25.00 pack-year smoking history. She has never used smokeless tobacco. She reports that she does not drink alcohol or use drugs.    Family History   Family History   Problem Relation Age of Onset     Family History Negative Father      Family History Negative Mother      Psoriasis Brother         half-siblings     Family History Negative Sister      Hypothyroidism Son        Review of Systems   The 10 point Review of Systems is negative other than noted in the HPI or here.     Physical Exam   Temp: 97.4  F (36.3  C) Temp src: Oral BP: 104/55 Pulse: 73 Heart Rate: 78 Resp: 16 SpO2: 94 % O2 Device: None (Room air) Oxygen Delivery: 2 LPM  Vital Signs with Ranges  Temp:  [97.4  F (36.3  C)] 97.4  F (36.3  C)  Pulse:  [73-98] 73  Heart Rate:  [77-86] 78  Resp:  [11-18] 16  BP: (104-153)/(55-90) 104/55  SpO2:  [93 %-100 %] 94 %  129 lbs 3.2 oz    Constitutional: awake, alert, cooperative, no apparent distress, and appears stated age  Eyes: Lids and lashes normal, pupils equal, round and reactive to light, extra ocular muscles intact, sclera clear, conjunctiva normal  ENT: normocepalic, without obvious abnormality, oropharynx pink and moist  Hematologic / Lymphatic: no lymphadenopathy  Respiratory: No increased work of breathing, good air exchange, clear to  auscultation bilaterally, no crackles or wheezing  Cardiovascular: regular rate and rhythm, normal S1 and S2 and no murmur noted  GI: Normal bowel sounds, soft, non-distended, non-tender. Abdominal incisional hernia.   Skin: no redness, warmth, or swelling, no rashes. Ulcer left lateral foot bandaged.   Musculoskeletal: There is no redness, warmth, or swelling of the joints.  Motor strength is 5 out of 5 all extremities bilaterally.  Tone is normal.  Neurologic: Awake, alert, oriented to name, place and time.  Cranial nerves II-XII are grossly intact.  Motor is 5 out of 5 bilaterally.    Neuropsychiatric:  Normal affect, memory, insight.  Pulses: Audible bilateral DP pulses with doppler. Unable to doppler bilateral PT pulses. No carotid bruits appreciated.     Data   Most Recent 3 CBC's:  Recent Labs   Lab Test 03/12/20  0700 04/03/19  0615 04/02/19  1630 04/01/19  0845 03/31/19  0627   WBC 8.9  --   --  5.5 8.8   HGB 14.2  --  11.8 11.2* 12.1   MCV 92  --   --  97 97    148*  --  153 137*     Most Recent 3 BMP's:  Recent Labs   Lab Test 03/12/20  0700 04/03/19  0615 04/03/19  0400 04/02/19  0555 04/01/19  1700 04/01/19  0845 03/31/19  0627   NA  --   --   --  135  --  131* 131*   POTASSIUM  --   --  4.0 3.2* 3.5 3.2* 3.5   CHLORIDE  --   --   --  101  --  97 99   CO2  --   --   --  27  --  27 26   BUN  --   --   --  7  --  7 7   CR 0.80 0.68  --  0.70  --  0.60 0.60   ANIONGAP  --   --   --  7  --  7 6   CAITY  --   --   --  8.2*  --  8.4* 8.6   GLC  --   --   --  106*  --  117* 107*     Most Recent 3 INR's:  Recent Labs   Lab Test 03/12/20  0700 03/07/17  1419   INR 0.88 0.98     Most Recent Cholesterol Panel:  Recent Labs   Lab Test 03/12/20  0700   CHOL 184   LDL 72   HDL 52   TRIG 298*     Most Recent Hemoglobin A1c:  Recent Labs   Lab Test 03/12/20  0700   A1C 5.4

## 2020-03-12 NOTE — PROGRESS NOTES
cervical spine                                                                        Care Suites Discharge Nursing Note    Patient Information  Name: Anna Dyer  Age: 74 year old    Discharge Education:  Discharge instructions reviewed: Yes  Additional education/resources provided: yes  Patient/patient representative verbalizes understanding: yes  Patient discharging on new medications: No  Medication education completed: Yes    Discharge Plans:   Discharge location: home  Discharge ride contacted: Yes  Approximate discharge time: 1430    Discharge Criteria:  Discharge criteria met and vital signs stable: Yes    Patient Belongs:  Patient belongings returned to patient: Yes    Brissa Pelletier RN

## 2020-03-12 NOTE — DISCHARGE INSTRUCTIONS
Peripheral Angiogram Discharge Instructions - Femoral     After you go home:      Have an adult stay with you until tomorrow.    Drink extra fluids for 2 days.    You may resume your normal diet.    No smoking       For 24 hours - due to the sedation you received:    Relax and take it easy.    Do NOT make any important or legal decisions.    Do NOT drive or operate machines at home or at work.    Do NOT drink alcohol.    Care of Groin Puncture Site:      For the first 24 hrs - check the puncture site every 1-2 hours while awake.    For 2 days, when you cough, sneeze, laugh or move your bowels, hold your hand over the puncture site and press firmly.    Remove the bandaid after 24 hours. If there is minor oozing, apply another bandaid and remove it after 12 hours.    It is normal to have a small bruise or pea size lump at the site.    You may shower tomorrow.  Do NOT take a bath, or use a hot tub or pool for at least 3 days. Do NOT scrub the site. Do not use lotion or powder near the puncture site.     Activity:            For 2 days:    No stooping or squatting    Do NOT do any heavy activity such as exercise, lifting, or straining.     No housework, yard work or any activity that make you sweat    Do NOT lift more than 10 pounds    Bleeding:      If you start bleeding from the site in your groin, lie down flat and press firmly on/above the site for 10 minutes.     Once bleeding stops, lay flat for 2 hours.     Call the [facility] as soon as you can.       Call 911 right away if you have heavy bleeding or bleeding that does not stop.      Medicines:    If you are taking an antiplatelet medication such as Plavix, do not stop taking it until you talk to your provider.       Take your medications, including blood thinners, unless your provider tells you not to.  If you take Coumadin (Warfarin), have your INR checked by your provider in  3-5 days. Call your clinic to schedule this.    If you have stopped any  medicines, check with your provider about when to restart them.        Follow Up Appointments:      Follow [facility] with [facility] as directed.    Call the clinic if:      You have increased pain or a large or growing hard lump around the site.    The site is red, swollen, hot or tender.    Blood or fluid is draining from the site.    You have chills or a fever greater than 101 F (38 C).    Your leg feels numb, cool or changes color.    You have hives, a rash or unusual itching.    New pain in the back or belly that you cannot control with Tylenol.    Any questions or concerns.      If you have questions or your original symptoms do not improve, call:         [facility] @ [phone]

## 2020-03-12 NOTE — PROCEDURES
Preoperative diagnosis: Peripheral arterial disease with left lateral foot ulcer.    Postoperative diagnosis: Same    Procedure performed:  1.  Abdominal aortogram.    2.  Selective left lower extremity arteriogram.    3.  Nonselective right lower extremity arteriogram.    Surgeon: Ralf    EBL: 5 cc    Complications: None  No interventions performed.  Diagnostic study only.    See dictation for findings and details.    Saw Arambula MD

## 2020-03-12 NOTE — IR NOTE
Interventional Radiology Intra-procedural Nursing Note    Patient Name: Anna Dyer  Medical Record Number: [id number]  Today's Date: March 12, 2020    Start Time: 0837  End of procedure time: 0907  Procedure: Abdominal Aortogram and Bilateral Lower Extremity Angiogram  Report given to: TAMMIE Cat in Care Suites    Other Notes: Pt. transferred to IR table. Prepped and draped appropriately. Monitoring equipment applied. NC applied. No complaints of pain at this time. Timeout recorded.    5f sheath to Right Femoral Artery. Removed at 0911. Manual pressure held to site by technician. Hemostasis at 0911. Site dressed with gauze and covered with tegaderm. Site C/D/I, soft.    Medication administration:    Fentanyl 50 mg IVP  Versed 1mg IVP

## 2020-03-12 NOTE — PRE-PROCEDURE
GENERAL PRE-PROCEDURE:   Procedure:  Bilateral lower extremity angiogram  Date/Time:  3/12/2020 8:06 AM    Written consent obtained?: Yes    Risks and benefits: Risks, benefits and alternatives were discussed    DC Plan: Appropriate discharge home plan in place for patients who are going home after procedure   Consent given by:  Patient  Patient states understanding of procedure being performed: Yes    Patient's understanding of procedure matches consent: Yes    Procedure consent matches procedure scheduled: Yes    Expected level of sedation:  Minimal  Appropriately NPO:  Yes  ASA Class:  Class 3- Severe systemic disease, definite functional limitations  Mallampati  :  Grade 2- soft palate, base of uvula, tonsillar pillars, and portion of posterior pharyngeal wall visible  Lungs:  Lungs clear with good breath sounds bilaterally  Heart:  Normal heart sounds and rate  History & Physical reviewed:  History and physical reviewed and no updates needed  Statement of review:  I have reviewed the lab findings, diagnostic data, medications, and the plan for sedation

## 2020-03-12 NOTE — PROGRESS NOTES
Care Suites Post Procedure Note    Patient Information  Name: Anna Dyer  Age: 74 year old    Post Procedure  Procedure: Leg Angiogram.  Denies c/o.  See flow sheet.  Time patient returned to Care Suites: 0945  Concerns/abnormal assessment: none  If abnormal assessment, provider notified: N/A  Plan/Other: bedrest til 0293    Stephania Arce RN

## 2020-03-16 ENCOUNTER — TELEPHONE (OUTPATIENT)
Dept: OTHER | Facility: CLINIC | Age: 75
End: 2020-03-16

## 2020-03-16 NOTE — TELEPHONE ENCOUNTER
Anna called about follow up care from being in the hospital ( Dr. Arambula) for a failed stent placement.  She would like to know her steps forward.    I have informed her of the Covid-19 and says to please call as her computer is down.     Anna is scheduled for eye surgery on March 20th.     Shwetha PITTS

## 2020-03-16 NOTE — TELEPHONE ENCOUNTER
I spoke with pt and updated her that Dr. Arambula will be in clinic tomorrow, 3/17/20, and will discuss his recommendations at that time.  Pt aware she will receive a call back once recommendations are available.  ILIANA JoseN, RN-[facility] [place]

## 2020-03-17 NOTE — TELEPHONE ENCOUNTER
I spoke with pt and she is aware the clinic is not scheduling pt's at this time.  Pt reported her left foot ulcer has actually healed.  Pt will be called to have follow up visit with Dr. Arambula once direction is provided.  Pt appreciated the call and had no further questions.  Angeles Valenzuela, BSN, RN-[facility] [place]

## 2020-03-30 DIAGNOSIS — I10 ESSENTIAL HYPERTENSION WITH GOAL BLOOD PRESSURE LESS THAN 140/90: ICD-10-CM

## 2020-03-30 RX ORDER — LOSARTAN POTASSIUM 100 MG/1
100 TABLET ORAL DAILY
Qty: 90 TABLET | Refills: 1 | Status: SHIPPED | OUTPATIENT
Start: 2020-03-30 | End: 2020-08-27

## 2020-03-30 RX ORDER — HYDROCHLOROTHIAZIDE 25 MG/1
25 TABLET ORAL DAILY
Qty: 90 TABLET | Refills: 1 | Status: SHIPPED | OUTPATIENT
Start: 2020-03-30 | End: 2020-08-27

## 2020-04-08 ENCOUNTER — TRANSFERRED RECORDS (OUTPATIENT)
Dept: HEALTH INFORMATION MANAGEMENT | Facility: CLINIC | Age: 75
End: 2020-04-08

## 2020-05-05 ENCOUNTER — TRANSFERRED RECORDS (OUTPATIENT)
Dept: HEALTH INFORMATION MANAGEMENT | Facility: CLINIC | Age: 75
End: 2020-05-05

## 2020-05-06 ENCOUNTER — TRANSFERRED RECORDS (OUTPATIENT)
Dept: HEALTH INFORMATION MANAGEMENT | Facility: CLINIC | Age: 75
End: 2020-05-06

## 2020-05-20 ENCOUNTER — TRANSFERRED RECORDS (OUTPATIENT)
Dept: HEALTH INFORMATION MANAGEMENT | Facility: CLINIC | Age: 75
End: 2020-05-20

## 2020-05-21 ENCOUNTER — OFFICE VISIT (OUTPATIENT)
Dept: INTERNAL MEDICINE | Facility: CLINIC | Age: 75
End: 2020-05-21
Payer: MEDICARE

## 2020-05-21 VITALS
WEIGHT: 130.7 LBS | HEART RATE: 83 BPM | BODY MASS INDEX: 24.67 KG/M2 | TEMPERATURE: 97.4 F | HEIGHT: 61 IN | DIASTOLIC BLOOD PRESSURE: 58 MMHG | SYSTOLIC BLOOD PRESSURE: 116 MMHG | OXYGEN SATURATION: 98 %

## 2020-05-21 DIAGNOSIS — K43.9 ABDOMINAL WALL HERNIA: ICD-10-CM

## 2020-05-21 DIAGNOSIS — C18.9 COLON CANCER METASTASIZED TO LIVER (H): ICD-10-CM

## 2020-05-21 DIAGNOSIS — J43.9 PULMONARY EMPHYSEMA, UNSPECIFIED EMPHYSEMA TYPE (H): ICD-10-CM

## 2020-05-21 DIAGNOSIS — H25.9 SENILE CATARACT OF LEFT EYE, UNSPECIFIED AGE-RELATED CATARACT TYPE: ICD-10-CM

## 2020-05-21 DIAGNOSIS — I73.9 PAD (PERIPHERAL ARTERY DISEASE) (H): ICD-10-CM

## 2020-05-21 DIAGNOSIS — C78.7 COLON CANCER METASTASIZED TO LIVER (H): ICD-10-CM

## 2020-05-21 DIAGNOSIS — Z01.818 PREOP GENERAL PHYSICAL EXAM: Primary | ICD-10-CM

## 2020-05-21 PROCEDURE — 99214 OFFICE O/P EST MOD 30 MIN: CPT | Performed by: INTERNAL MEDICINE

## 2020-05-21 ASSESSMENT — MIFFLIN-ST. JEOR: SCORE: 1030.23

## 2020-05-21 NOTE — PROGRESS NOTES
[address] [address]  [phone]  Dept: [phone]    PRE-OP EVALUATION:  Today's date: 2020    Anna Dyer (: 1945) presents for pre-operative evaluation assessment as requested by Dr. Riedel.  She requires evaluation and anesthesia risk assessment prior to undergoing surgery/procedure for treatment of R eye cataract .    Proposed Surgery/ Procedure: Cataract  Date of Surgery/ Procedure: 2020  Time of Surgery/ Procedure: [facility]  Hospital/Surgical Facility: [facility]  Fax number for surgical facility: [phone]  Primary Physician: Tello Finney  Type of Anesthesia Anticipated: to be determined    Patient has a Health Care Directive or Living Will:  YES on file    1. NO - Do you have a history of heart attack, stroke, stent, bypass or surgery on an artery in the head, neck, heart or legs?  2. NO - Do you ever have any pain or discomfort in your chest?  3. NO - Do you have a history of  Heart Failure?  4. NO - Are you troubled by shortness of breath when: walking on the level, up a slight hill or at night?  5. NO - Do you currently have a cold, bronchitis or other respiratory infection?  6. NO - Do you have a cough, shortness of breath or wheezing?  --7. YES - Do you sometimes get pains in the calves of your legs when you walk?  8. NO - Do you or anyone in your family have previous history of blood clots?  9. NO - Do you or does anyone in your family have a serious bleeding problem such as prolonged bleeding following surgeries or cuts?  10. NO - Have you ever had problems with anemia or been told to take iron pills?  11. NO - Have you had any abnormal blood loss such as black, tarry or bloody stools, or abnormal vaginal bleeding?  12. NO - Have you ever had a blood transfusion?  13. NO - Have you or any of your relatives ever had problems with anesthesia?  14. NO - Do you have sleep apnea,  excessive snoring or daytime drowsiness?  15. NO - Do you have any prosthetic heart valves?  16. NO - Do you have prosthetic joints?  17. NO - Is there any chance that you may be pregnant?      HPI:     Pt previously had L catarct removed,lens implanted earlier this year. COVID pandemic delayed L eye procedure.  Since that time she has resumed her chemotherapy for metastatic colon cancer.   Also had LE angiogram for several small non-healing superficial ulcerations in R foot. Stent placement failed and she will be discussing further procedures, though her ulcerations have been healing.            MEDICAL HISTORY:     Patient Active Problem List    Diagnosis Date Noted     PAD (peripheral artery disease) (H) 05/21/2020     Priority: Medium     Abdominal wall hernia 01/08/2020     Priority: Medium     Added automatically from request for surgery [id number]       CKD (chronic kidney disease) stage 3, GFR 30-59 ml/min (H) 09/12/2019     Priority: Medium     Colostomy status (H) 03/28/2019     Priority: Medium     Status post colostomy (H) 04/06/2018     Priority: Medium     COPD- borderline obstruction on PFTs. long term smoker 01/19/2018     Priority: Medium     Tobacco abuse 01/19/2018     Priority: Medium     Colon cancer metastasized to liver (H) 03/17/2017     Priority: Medium     Advanced directives, counseling/discussion 09/15/2014     Priority: Medium     Advance Care Planning:   Receipt of ACP document:  Received: Health Care Directive which was witnessed or notarized on 08/20/2013.  Document not previously scanned.  Validation form completed and sent with document to be scanned.  .  Confirmed/documented designated decision maker(s). See permanent comments section of demographics in clinical tab. View document(s) and details by clicking on code status.   Added by Rach Olson on 1/19/2015.               Nicotine dependence 02/25/2014     Priority: Medium     Hypertension goal BP (blood pressure) < 140/90  02/24/2014     Priority: Medium     Restless leg syndrome 02/24/2014     Priority: Medium     CARDIOVASCULAR SCREENING; LDL GOAL LESS THAN 130 02/24/2014     Priority: Medium      Past Medical History:   Diagnosis Date     Cancer (H)     metastatic colorectal adenocarcinoma     Hypertension     No cardiologist     RLS (restless legs syndrome)      Past Surgical History:   Procedure Laterality Date     BIOPSY      liver     BREAST SURGERY      breast implants     COLONOSCOPY N/A 3/28/2019    Procedure: INTRAOPERATIVE COLONOSCOPY,;  Surgeon: Jesus Caba MD;  Location:  OR     COSMETIC SURGERY      facelift and tummy tuck     GYN SURGERY      tubal     INSERT PORT VASCULAR ACCESS N/A 3/24/2017    Procedure: INSERT PORT VASCULAR ACCESS;  Surgeon: Yemi Ordaz MD;  Location:  OR     IR LOWER EXTREMITY ANGIOGRAM BILATERAL  3/12/2020     LAPAROSCOPIC ASSISTED COLECTOMY LEFT (DESCENDING) N/A 1/25/2018    Procedure: LAPAROSCOPIC ASSISTED COLECTOMY LEFT (DESCENDING);;  Surgeon: Maddie Baron MD;  Location: [facility] OR     LAPAROSCOPIC ASSISTED COLOSTOMY TAKEDOWN N/A 3/28/2019    Procedure: LAPAROSCOPIC ASSISTED COLOSTOMY REVERSAL;  Surgeon: Jesus Caba MD;  Location:  OR     LAPAROSCOPY DIAGNOSTIC (GENERAL) N/A 3/28/2019    Procedure: DIAGNOSTIC LAPAROSCOPY;  Surgeon: Jesus Caba MD;  Location:  OR     LAPAROTOMY EXPLORATORY N/A 1/25/2018    Procedure: LAPAROTOMY EXPLORATORY;  exploratory laparoscopy, left hemicolectomy, transverse colostomy;  Surgeon: Maddie Baron MD;  Location: [facility] OR     Current Outpatient Medications   Medication Sig Dispense Refill     albuterol (PROAIR HFA/PROVENTIL HFA/VENTOLIN HFA) 108 (90 Base) MCG/ACT inhaler Inhale 2 puffs into the lungs 2 times daily And every 4 hours as needed 1 Inhaler 1     amLODIPine 5 MG PO tablet Take 2 tablets (10 mg) by mouth daily 180 tablet 0     aspirin (ASA) 81 MG tablet Take 1 tablet (81 mg) by mouth daily       atorvastatin  "(LIPITOR) 40 MG tablet Take 1 tablet (40 mg) by mouth At Bedtime 90 tablet 3     gabapentin (NEURONTIN) 400 MG capsule Take 1,200 mg by mouth every morning        hydrochlorothiazide (HYDRODIURIL) 25 MG tablet Take 1 tablet (25 mg) by mouth daily 90 tablet 1     lidocaine-prilocaine (EMLA) 2.5-2.5 % external cream Apply topically daily as needed (Apply one hour prior to acccess)        LORazepam (ATIVAN) 0.5 MG tablet Take 0.5 mg by mouth every morning As needed for sleep  1     losartan (COZAAR) 100 MG tablet Take 1 tablet (100 mg) by mouth daily 90 tablet 1     vitamin D2 23523 units (1250 mcg) PO capsule Take 1 capsule weekly x 4weeks, then start 2000 IU vitamin D daily (OTC)  0     Carboxymethylcellulose Sodium (EYE DROPS OP) 4 different eye drops post cataract surgery- pt not sure of names       Homeopathic Products (LEG CRAMP RELIEF PO) Take 2 tablets by mouth daily as needed       OTC products: None, except as noted above    Allergies   Allergen Reactions     Lactose GI Disturbance     Sulfa Drugs Hives      Latex Allergy: NO    Social History     Tobacco Use     Smoking status: Current Some Day Smoker     Packs/day: 0.50     Years: 50.00     Pack years: 25.00     Types: Cigarettes     Smokeless tobacco: Never Used     Tobacco comment: 1/2 to 3/4 PPD   Substance Use Topics     Alcohol use: No     Alcohol/week: 0.0 standard drinks     History   Drug Use No       REVIEW OF SYSTEMS:   Constitutional, HEENT, cardiovascular, pulmonary, gi and gu systems are negative, except as otherwise noted.    EXAM:   /58   Pulse 83   Temp 97.4  F (36.3  C) (Temporal)   Ht 1.549 m (5' 1\")   Wt 59.3 kg (130 lb 11.2 oz)   SpO2 98%   BMI 24.70 kg/m      GENERAL APPEARANCE: alert, active and no distress     EYES: EOMI, PERRL     HENT: normal cephalic/atraumatic     NECK: no adenopathy, no asymmetry, masses, or scars and thyroid normal to palpation     RESP: inspiratory wheezes bilateral     CV: regular rates and rhythm, " normal S1 S2, no S3 or S4 and no murmur, click or rub     ABDOMEN: soft, nontender, without hepatosplenomegaly. Anterior abdominal wall hernia.      MS: extremities normal- no gross deformities noted, no evidence of inflammation in joints, FROM in all extremities.     SKIN: R foot several bandages, lateral 1st MTP very shallow 2 mm stage 2 ulceration in callus. Medial side 5th MTP stage 1 a few mm in size.      NEURO: Normal strength and tone, sensory exam grossly normal, mentation intact and speech normal     LYMPHATICS: No cervical adenopathy    DIAGNOSTICS:   No labs or EKG required for low risk surgery (cataract, skin procedure, breast biopsy, etc)    Recent Labs   Lab Test 03/12/20  0700 04/03/19  0615 04/03/19  0400 04/02/19  1630 04/02/19  0555  04/01/19  0845  03/07/17  1419   HGB 14.2  --   --  11.8  --   --  11.2*   < > 13.0    148*  --   --   --   --  153   < >  --    INR 0.88  --   --   --   --   --   --   --  0.98   NA  --   --   --   --  135  --  131*   < > 136   POTASSIUM  --   --  4.0  --  3.2*   < > 3.2*   < > 4.0   CR 0.80 0.68  --   --  0.70  --  0.60   < > 0.81   A1C 5.4  --   --   --   --   --   --   --   --     < > = values in this interval not displayed.        IMPRESSION:   Reason for surgery/procedure: cataracts  Diagnosis/reason for consult: HTN    The proposed surgical procedure is considered LOW risk.    REVISED CARDIAC RISK INDEX  The patient has the following serious cardiovascular risks for perioperative complications such as (MI, PE, VFib and 3  AV Block):  Coronary Artery Disease Equivalent  INTERPRETATION: 1 risks: Class II (low risk - 0.9% complication rate)    The patient has the following additional risks for perioperative complications:  No identified additional risks      ICD-10-CM    1. Preop general physical exam  Z01.818    2. Senile cataract of left eye, unspecified age-related cataract type  H25.9    3. COPD- borderline obstruction on PFTs. long term smoker  J43.9     4. Abdominal wall hernia  K43.9    5. Colon cancer metastasized to liver (H)  C18.9     C78.7    6. PAD (peripheral artery disease) (H)  I73.9        RECOMMENDATIONS:     Pulmonary Risk  Advised smoking cessation.     --Patient is to take all scheduled medications on the day of surgery    APPROVAL GIVEN to proceed with proposed procedure, without further diagnostic evaluation       Signed Electronically by: Tello Finney MD    Copy of this evaluation report is provided to requesting physician.    [facility] Preop Guidelines    Revised Cardiac Risk Index

## 2020-05-22 ENCOUNTER — TELEPHONE (OUTPATIENT)
Dept: OTHER | Facility: CLINIC | Age: 75
End: 2020-05-22

## 2020-05-22 NOTE — TELEPHONE ENCOUNTER
Patient called and left message regarding scheduling a follow up appointment with Dr. Arambula. She did not leave any details of increasing symptoms or changes.

## 2020-05-23 NOTE — TELEPHONE ENCOUNTER
Called patient back so could assist with setting up appointment.    NURSING ASSESSMENT:  Description:  Patient says she has a wax plug. Also went in too far with Qtip and has had loss of hearing.  Onset/duration:  months  Precip. factors:  Says this is common issue for her  Associated symptoms:  Denies pain, drainage, redness, or fever.  Improves/worsens symptoms:  Home treatment not resolving issues.  Pain scale (0-10)   0/10  Last exam/Treatment:  3/7/17  Allergies:   Allergies   Allergen Reactions     Sulfa Drugs Hives         RECOMMENDED DISPOSITION:  See in 24 hours - patient declined today, appointment made for tomorrow AM  Will comply with recommendation: Yes  If further questions/concerns or if symptoms do not improve, worsen or new symptoms develop, call your PCP or [facility] as soon as possible.      Guideline used:  Telephone Triage Protocols for Nurses, Fifth Edition, Bridgett Hester RN    
Home

## 2020-05-28 NOTE — TELEPHONE ENCOUNTER
"Pt hx of 3/12/20 Diagnostic only bilateral lower extremity angiogram.   Hx of PAD with critical limb ischemia.     I called pt back, pt reports her left heel wound has completely resolved, currenlty no open sores, however   Bilateral legs \"continue to hurt, feel like    wooden calves\".   Able to walk to basement and back up stairs then has to sit and rest.   Able to pull about 10 weeds in garden then has to rest.    No discoloration.   Pain \"may have increased since March 2020\" Pain 6/10, taking Alieve and Tylenol, wakes intermittently through the night with discomfort.       Having eye surgery for cataracts tomorrow 5/29/20, please call her next week for coordinating.     Discussed with Dr. Arambula's RN [place].     Routing to scheduling to coordinate in person follow up visit with Dr. Arambula at next available.      ILIANA ReddN, RN  [facility]              "

## 2020-06-02 ENCOUNTER — OFFICE VISIT (OUTPATIENT)
Dept: INTERNAL MEDICINE | Facility: CLINIC | Age: 75
End: 2020-06-02
Payer: MEDICARE

## 2020-06-02 VITALS
DIASTOLIC BLOOD PRESSURE: 70 MMHG | HEART RATE: 88 BPM | TEMPERATURE: 97.7 F | OXYGEN SATURATION: 97 % | RESPIRATION RATE: 16 BRPM | WEIGHT: 129 LBS | SYSTOLIC BLOOD PRESSURE: 138 MMHG | BODY MASS INDEX: 24.37 KG/M2

## 2020-06-02 DIAGNOSIS — H61.23 BILATERAL IMPACTED CERUMEN: Primary | ICD-10-CM

## 2020-06-02 PROCEDURE — 69209 REMOVE IMPACTED EAR WAX UNI: CPT | Performed by: PHYSICIAN ASSISTANT

## 2020-06-02 NOTE — TELEPHONE ENCOUNTER
June 2, 2020    Patient is scheduled for Follow-Up appointment on 6/9/2020 with Dr. Arambula at [facility].     [facility]    [facility]  Office: [phone]  Fax [phone]

## 2020-06-02 NOTE — PROGRESS NOTES
Subjective     Anna Dyer is a 75 year old female who presents to clinic today for the following health issues:    HPI   Concern - Ear  Onset: 1 week    Description:   Bilateral ears feel plugged    Intensity: mild    Progression of Symptoms:  worsening    Accompanying Signs & Symptoms:  No pain, hearing loss    Previous history of similar problem:   Yes    Precipitating factors:   Worsened by: Time    Alleviating factors:  Improved by: ear lavage    Therapies Tried and outcome: None  -------------------------------------    BP Readings from Last 3 Encounters:   06/02/20 138/70   05/21/20 116/58   03/12/20 108/57    Wt Readings from Last 3 Encounters:   06/02/20 58.5 kg (129 lb)   05/21/20 59.3 kg (130 lb 11.2 oz)   03/12/20 58.6 kg (129 lb 3.2 oz)                    -------------------------------------  Reviewed and updated as needed this visit by Provider  Allergies  Meds         Review of Systems   Constitutional, HEENT, cardiovascular, pulmonary, systems are negative, except as otherwise noted.      Objective    /70 (BP Location: Left arm, Patient Position: Chair, Cuff Size: Adult Regular)   Pulse 88   Temp 97.7  F (36.5  C) (Temporal)   Resp 16   Wt 58.5 kg (129 lb)   SpO2 97%   BMI 24.37 kg/m    Body mass index is 24.37 kg/m .  Physical Exam   GENERAL: healthy, alert and no distress  HENT: normal cephalic/atraumatic and both ears: occluded with wax  RESP: no shortness of breath able to speak in full sentences    CV: regular rates and rhythm  SKIN: no suspicious lesions or rashes    Diagnostic Test Results:  none         Assessment & Plan     1. Bilateral impacted cerumen  Ear lavage done by staff with good results  TM grey and canal clear   - HC REMOVAL IMPACTED CERUMEN IRRIGATION/LVG UNILAT           Return in about 6 months (around 12/2/2020) for Routine Visit, regular primary provider.    Ashtyn Donis PA-C  [facility]

## 2020-06-03 ENCOUNTER — TRANSFERRED RECORDS (OUTPATIENT)
Dept: HEALTH INFORMATION MANAGEMENT | Facility: CLINIC | Age: 75
End: 2020-06-03

## 2020-06-09 ENCOUNTER — OFFICE VISIT (OUTPATIENT)
Dept: OTHER | Facility: CLINIC | Age: 75
End: 2020-06-09
Attending: SURGERY
Payer: MEDICARE

## 2020-06-09 VITALS — HEART RATE: 92 BPM | DIASTOLIC BLOOD PRESSURE: 80 MMHG | SYSTOLIC BLOOD PRESSURE: 143 MMHG

## 2020-06-09 DIAGNOSIS — I73.9 PERIPHERAL VASCULAR DISEASE WITH CLAUDICATION (H): Primary | ICD-10-CM

## 2020-06-09 PROCEDURE — G0463 HOSPITAL OUTPT CLINIC VISIT: HCPCS

## 2020-06-09 PROCEDURE — 99214 OFFICE O/P EST MOD 30 MIN: CPT | Mod: ZP | Performed by: SURGERY

## 2020-06-09 NOTE — PROGRESS NOTES
"Anna Dyer is a 75 year old female who presents for:  Chief Complaint   Patient presents with     RECHECK     Pt hx of 3/12/20 Diagnostic only bilateral lower extremity angiogram.         Vitals:    Vitals:    06/09/20 1541   BP: (!) 143/80   BP Location: Right arm   Patient Position: Chair   Cuff Size: Adult Regular   Pulse: 92       BMI:  Estimated body mass index is 24.37 kg/m  as calculated from the following:    Height as of 5/21/20: 5' 1\" (1.549 m).    Weight as of 6/2/20: 129 lb (58.5 kg).    Pain Score:  Data Unavailable        Suzy Mclean MA    "

## 2020-06-14 DIAGNOSIS — I10 ESSENTIAL HYPERTENSION WITH GOAL BLOOD PRESSURE LESS THAN 140/90: ICD-10-CM

## 2020-06-14 NOTE — PROGRESS NOTES
Anna Dyer is a 75-year-old female smoker with stage IV metastatic colorectal adenocarcinoma with unresectable hepatic metastases previously evaluated by me on 2/18/2020 for lower extremity peripheral arterial disease and superficial left lateral foot ulcerations.  Resting left-sided ANITHA was 0.47.  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  I performed left leg vein mapping and a diagnostic left leg arteriogram on 3/12/2020.      She has been somewhat lost to follow-up due to the COVID-19 pandemic.  I was aware that her left lateral foot ulcerations had healed.  Anna returns today to discuss my left leg treatment recommendations.  Unfortunately she continues to smoke about a half a pack of cigarettes per day.  She remains a short distance left leg claudicator.  She was able to walk from the parking ramp across the [place] and to our office today without having to stop to rest.  Her absolute claudication distance is somewhat variable depending upon her pace and incline of the surface.      Her chemotherapy was held earlier this year for possible repair of an incisional abdominal wall hernia and for potential left leg revascularization.  She never did undergo the hernia repair.    Exam:  Well-developed female alert and oriented x3 in no acute distress.  Blood pressure 143/80 with a pulse of 92.  Left lateral foot wounds are now completely healed.  No open wounds on either foot.  No change in her lower extremity pulse exam.  Femoral pulses remain 2+ bilaterally.  Biphasic right-sided pedal Doppler signals.  Strong monophasic left-sided pedal Doppler signals.    Imaging:    ULTRASOUND LOWER EXTREMITY VENOUS MAPPING LEFT 3/12/2020 2:03 PM     HISTORY: 74-year-old woman with preoperative evaluation for possible  left femoral and popliteal surgical arterial bypass graft.     COMPARISON:  None     FINDINGS: The greater saphenous vein in the left lower extremity in  the thigh is 1.3 to 2.1 mm. At and surrounding the knee, the vein is  too small to visualize. In the mid to distal calf, diameter is 0.7 to  1.1 mm.                                                                      IMPRESSION: Left greater saphenous vein measurements, small in size.     NEREIDA FLYNN MD    Results of her bilateral lower extremity arteriogram from 3/12/2020:    IMPRESSION:  1. No hemodynamically significant inflow disease.  2. Severe disease of the entire left superficial femoral artery with  occlusion of the left above-knee popliteal artery. Reconstitution of  the left below-knee popliteal artery with single vessel runoff to the  foot via the peroneal artery. Reconstitution of the dorsalis pedis  artery via peroneal collaterals, although little pedal flow is  observed on this exam.  3. Somewhat of a mirror image picture in the right leg with diffuse  disease of a small caliber right superficial femoral artery.  High-grade nearly occlusive stenosis of the right above-knee popliteal  artery.     KIRSTEN BAIG MD      ASSESSMENT:  1.  Bilateral lower extremity peripheral arterial disease with short distance claudication.  Previous lateral foot ulcerations were neuropathic in origin and have completely healed.  She does not suffer from rest pain or threatened tissue loss.    2.  Stage IV metastatic colorectal cancer with unresectable liver metastases.  Some evidence of disease progression based on review of her oncology notes.    3.  Ongoing tobacco abuse.    RECOMMENDATION:  I had a candid conversation with Anna reviewing all the above.  I have reassured her that her lower extremity symptoms while lifestyle limiting are not immediately limb threatening.  She has diffuse disease of a small caliber left superficial femoral artery with occlusion of the left above-knee popliteal artery and single-vessel peroneal runoff to  the foot.  This would be poorly suited to percutaneous intervention.  She lacks autogenous conduit.  She continues to smoke.  Given all the above, I strongly recommend continued observation of her left leg peripheral arterial disease.  If she were to develop signs or symptoms of critical left leg ischemia she would likely require a left femoral to below-knee popliteal bypass with distal vein cuff.  Patency of such a bypass would likely be very limited.  I stressed the importance of absolute tobacco cessation.  She continues in her efforts to quit smoking.  For now, I feel that she is best served by continuing her chemotherapy for her metastatic colorectal cancer.  She will continue her medical regimen.  Vascular surgical follow-up can be with me on an as-needed basis.    Total face-to-face time was 25 minutes, greater than 50% spent providing counseling and education.    Saw Arambula MD

## 2020-06-16 RX ORDER — AMLODIPINE BESYLATE 5 MG/1
TABLET ORAL
Qty: 180 TABLET | Refills: 0 | Status: SHIPPED | OUTPATIENT
Start: 2020-06-16 | End: 2020-08-27

## 2020-06-17 ENCOUNTER — TRANSFERRED RECORDS (OUTPATIENT)
Dept: HEALTH INFORMATION MANAGEMENT | Facility: CLINIC | Age: 75
End: 2020-06-17

## 2020-06-29 NOTE — PROGRESS NOTES
[facility] Consultation    CHIEF COMPLAINT:  Ventral hernia    HISTORY OF PRESENT ILLNESS:  Anna Dyer is a 75 year old female who is seen in consultation at the request of Dr. Arambula for evaluation of a ventral hernia. Anna has metastatic colon cancer with stable liver metastasis on maintenance chemotherapy. She was diagnosed based on a positive fecal occult blood test and a colonoscopy  in February of 2017. Imaging demonstrated multiple bilateral liver lesions. She was treated with nonoperative management and started on chemotherapy. She did well for quite some time, but surveillance colonoscopies demonstrated progressive dilatation proximal to the tumor and she underwent a laparoscopic-assisted resection of the primary and an end transverse colostomy in the right lower quadrant with Dr. Maddie Baron on 1/25/2018 .  She did return to her chemotherapy and has been on continuous chemotherapy. She had takedown of her colostomy with Dr. Caba on 3/29/2019 and at that time had no evidence of peritoneal disease and colonoscopy did not reveal any polyps or masses. She had a parastomal hernia which was repaired primarily.  She had some nodules along the white line of toldt which were excised and negative.     She developed a hernia at her midline incision as well as at her prior right sided ostomy site. The hernia bothers her constantly. She has discomfort at her hernia daily especially when gardening. She has had a single episode of severe pain at the hernia which resolved after lying flat for some time. No obstructive symptoms. Denies issues with diarrhea or constipation. She has met with Dr. Calderon in 2019 and earlier this year and had planned for robotic vs laparoscopic and open hernia repair. She was able to quit smoking for 6 weeks prior to her scheduled surgery in March. Unfortunately, due to COVID surgery was cancelled in March and she has resumed smoking. She also continues on  chemotherapy under the care of Dr. Gerard. She has not had any significant progression of disease as far as I can tell in chart and imaging review. Most recent CT from 5/5/2020 with stable liver lesions and left upper lobe nodule.        REVIEW OF SYSTEMS:  10 point review of systems completed and otherwise negative aside from as listed in HPI.     Past Medical History:   Diagnosis Date     Cancer (H)     metastatic colorectal adenocarcinoma     Hypertension     No cardiologist     RLS (restless legs syndrome)        Past Surgical History:   Procedure Laterality Date     BIOPSY      liver     BREAST SURGERY      breast implants     COLONOSCOPY N/A 3/28/2019    Procedure: INTRAOPERATIVE COLONOSCOPY,;  Surgeon: Jesus Caba MD;  Location:  OR     COSMETIC SURGERY      facelift and tummy tuck     GYN SURGERY      tubal     INSERT PORT VASCULAR ACCESS N/A 3/24/2017    Procedure: INSERT PORT VASCULAR ACCESS;  Surgeon: Yemi Ordaz MD;  Location:  OR     IR LOWER EXTREMITY ANGIOGRAM BILATERAL  3/12/2020     LAPAROSCOPIC ASSISTED COLECTOMY LEFT (DESCENDING) N/A 1/25/2018    Procedure: LAPAROSCOPIC ASSISTED COLECTOMY LEFT (DESCENDING);;  Surgeon: Maddie Baron MD;  Location:  OR     LAPAROSCOPIC ASSISTED COLOSTOMY TAKEDOWN N/A 3/28/2019    Procedure: LAPAROSCOPIC ASSISTED COLOSTOMY REVERSAL;  Surgeon: Jesus Caba MD;  Location:  OR     LAPAROSCOPY DIAGNOSTIC (GENERAL) N/A 3/28/2019    Procedure: DIAGNOSTIC LAPAROSCOPY;  Surgeon: Jesus Caba MD;  Location:  OR     LAPAROTOMY EXPLORATORY N/A 1/25/2018    Procedure: LAPAROTOMY EXPLORATORY;  exploratory laparoscopy, left hemicolectomy, transverse colostomy;  Surgeon: Maddie Baron MD;  Location: [facility] OR       Family History   Problem Relation Age of Onset     Family History Negative Father      Family History Negative Mother      Psoriasis Brother         half-siblings     Family History Negative Sister      Hypothyroidism Son         Social History     Tobacco Use     Smoking status: Current Some Day Smoker     Packs/day: 0.50     Years: 50.00     Pack years: 25.00     Types: Cigarettes     Smokeless tobacco: Never Used     Tobacco comment: 1/2 to 3/4 PPD   Substance Use Topics     Alcohol use: No     Alcohol/week: 0.0 standard drinks       Patient Active Problem List   Diagnosis     Hypertension goal BP (blood pressure) < 140/90     Restless leg syndrome     CARDIOVASCULAR SCREENING; LDL GOAL LESS THAN 130     Nicotine dependence     Advanced directives, counseling/discussion     Colon cancer metastasized to liver (H)     COPD- borderline obstruction on PFTs. long term smoker     Tobacco abuse     Status post colostomy (H)     Colostomy status (H)     CKD (chronic kidney disease) stage 3, GFR 30-59 ml/min (H)     Abdominal wall hernia     PAD (peripheral artery disease) (H)       Allergies   Allergen Reactions     Lactose GI Disturbance     Sulfa Drugs Hives       Current Outpatient Medications   Medication Sig Dispense Refill     albuterol (PROAIR HFA/PROVENTIL HFA/VENTOLIN HFA) 108 (90 Base) MCG/ACT inhaler Inhale 2 puffs into the lungs 2 times daily And every 4 hours as needed (Patient not taking: Reported on 6/9/2020) 1 Inhaler 1     amLODIPine (NORVASC) 5 MG tablet TAKE 2 TABLETS [=10MG]     DAILY 180 tablet 0     aspirin (ASA) 81 MG tablet Take 1 tablet (81 mg) by mouth daily       atorvastatin (LIPITOR) 40 MG tablet Take 1 tablet (40 mg) by mouth At Bedtime 90 tablet 3     Carboxymethylcellulose Sodium (EYE DROPS OP) 4 different eye drops post cataract surgery- pt not sure of names       gabapentin (NEURONTIN) 400 MG capsule Take 1,200 mg by mouth every morning        Homeopathic Products (LEG CRAMP RELIEF PO) Take 2 tablets by mouth daily as needed       hydrochlorothiazide (HYDRODIURIL) 25 MG tablet Take 1 tablet (25 mg) by mouth daily 90 tablet 1     lidocaine-prilocaine (EMLA) 2.5-2.5 % external cream Apply topically daily as  "needed (Apply one hour prior to acccess)        LORazepam (ATIVAN) 0.5 MG tablet Take 0.5 mg by mouth every morning As needed for sleep  1     losartan (COZAAR) 100 MG tablet Take 1 tablet (100 mg) by mouth daily 90 tablet 1     vitamin D2 36160 units (1250 mcg) PO capsule Take 1 capsule weekly x 4weeks, then start 2000 IU vitamin D daily (OTC)  0       Vitals: /80   Pulse 84   Ht 1.549 m (5' 1\")   Wt 58.5 kg (129 lb)   BMI 24.37 kg/m      BMI= Body mass index is 24.37 kg/m .    EXAM:  GENERAL: healthy, alert and no distress   PSYCH: pleasant, normal affect  HEENT: moist mucus membranes, no scleral icterus  CARDIOVASCULAR:  RRR  RESPIRATORY: non labored breathing  NECK: Neck supple. No adenopathy. Thyroid symmetric, normal size,  GI: soft, nontender, nondistended, large midline hernia is easily reducible with patient supine, defect is 5cm in size. Right lower abdominal wall hernia at prior ostomy site is also easily reducible.   Extremities: warm and well perfused, no edema  SKIN: No suspicious lesions or rashes    LYMPH: no axillary adenopathy    All labs and imaging personally reviewed and significant for: imaging as above    ASSESSMENT:  Anna Dyer is a 75 year old with a PMH s/f metastatic colon cancer stable on chemotherapy without disease progression who presents with symptomatic ventral hernias. we discussed that her risk of perioperative complications is very high given her current smoking and chemotherapy. CEDAR risks of complications calculated to be 22% (Carolinas Equation for Determining Associated Risk).     I did review Dr. Caba's note from 2019 and there was no evidence of peritoneal disease or disease outside of the liver lesions without any significant intraabdominal adhesions. We discussed the nature of hernias, that they tend to enlarge over time and the risk of obstruction. She would very much like to proceed with surgery and is willing to accept the increased surgical " risk. I asked her if she can stop smoking for 6 weeks prior to surgery and she feels confident that she can as she has in the past. We discussed a combination of laparoscopic and open ventral hernia repair with mesh placement.       PLAN:  Smoking cessation 2 weeks.   Stop chemotherapy for 4 weeks prior to surgery and continue smoking cessation  Laparoscopic and open ventral hernia repair in 6 weeks - admit overnight      It was my pleasure to participate in the care of Anna Dyer in clinic today. Thank you for this consultation.         Maddy Gonzales MD    Please route or send letter to:  Primary Care Provider (PCP) and Referring Provider

## 2020-06-30 ENCOUNTER — TELEPHONE (OUTPATIENT)
Dept: SURGERY | Facility: CLINIC | Age: 75
End: 2020-06-30

## 2020-06-30 ENCOUNTER — OFFICE VISIT (OUTPATIENT)
Dept: SURGERY | Facility: CLINIC | Age: 75
End: 2020-06-30
Payer: MEDICARE

## 2020-06-30 ENCOUNTER — PREP FOR PROCEDURE (OUTPATIENT)
Dept: SURGERY | Facility: CLINIC | Age: 75
End: 2020-06-30

## 2020-06-30 VITALS
HEIGHT: 61 IN | WEIGHT: 129 LBS | HEART RATE: 84 BPM | BODY MASS INDEX: 24.35 KG/M2 | DIASTOLIC BLOOD PRESSURE: 80 MMHG | SYSTOLIC BLOOD PRESSURE: 120 MMHG

## 2020-06-30 DIAGNOSIS — K43.9 VENTRAL HERNIA: Primary | ICD-10-CM

## 2020-06-30 DIAGNOSIS — Z11.59 ENCOUNTER FOR SCREENING FOR OTHER VIRAL DISEASES: Primary | ICD-10-CM

## 2020-06-30 DIAGNOSIS — K43.9 VENTRAL HERNIA WITHOUT OBSTRUCTION OR GANGRENE: Primary | ICD-10-CM

## 2020-06-30 PROCEDURE — 99214 OFFICE O/P EST MOD 30 MIN: CPT | Performed by: SURGERY

## 2020-06-30 ASSESSMENT — MIFFLIN-ST. JEOR: SCORE: 1022.52

## 2020-06-30 NOTE — TELEPHONE ENCOUNTER
Type of surgery: Combined open and laparoscopic ventral hernia repair  Location of surgery: [facility]  Date and time of surgery: 8/12/20 at 1pm  Surgeon: Dr. Maddy Gonzales  Pre-Op Appt Date: Patient to schedule  Post-Op Appt Date: Patient to schedule   Packet sent out: Yes  Pre-cert/Authorization completed:  Not Applicable  Date: 6/30/20

## 2020-07-08 ENCOUNTER — TRANSFERRED RECORDS (OUTPATIENT)
Dept: HEALTH INFORMATION MANAGEMENT | Facility: CLINIC | Age: 75
End: 2020-07-08

## 2020-07-22 ENCOUNTER — TRANSFERRED RECORDS (OUTPATIENT)
Dept: HEALTH INFORMATION MANAGEMENT | Facility: CLINIC | Age: 75
End: 2020-07-22

## 2020-07-31 ENCOUNTER — OFFICE VISIT (OUTPATIENT)
Dept: INTERNAL MEDICINE | Facility: CLINIC | Age: 75
End: 2020-07-31
Payer: MEDICARE

## 2020-07-31 VITALS
HEIGHT: 61 IN | WEIGHT: 131.3 LBS | HEART RATE: 64 BPM | SYSTOLIC BLOOD PRESSURE: 124 MMHG | BODY MASS INDEX: 24.79 KG/M2 | TEMPERATURE: 98.8 F | OXYGEN SATURATION: 99 % | RESPIRATION RATE: 14 BRPM | DIASTOLIC BLOOD PRESSURE: 66 MMHG

## 2020-07-31 DIAGNOSIS — C18.9 COLON CANCER METASTASIZED TO LIVER (H): ICD-10-CM

## 2020-07-31 DIAGNOSIS — I73.9 PAD (PERIPHERAL ARTERY DISEASE) (H): ICD-10-CM

## 2020-07-31 DIAGNOSIS — N18.30 CKD (CHRONIC KIDNEY DISEASE) STAGE 3, GFR 30-59 ML/MIN (H): ICD-10-CM

## 2020-07-31 DIAGNOSIS — J43.9 PULMONARY EMPHYSEMA, UNSPECIFIED EMPHYSEMA TYPE (H): ICD-10-CM

## 2020-07-31 DIAGNOSIS — K43.9 VENTRAL HERNIA WITHOUT OBSTRUCTION OR GANGRENE: ICD-10-CM

## 2020-07-31 DIAGNOSIS — C78.7 COLON CANCER METASTASIZED TO LIVER (H): ICD-10-CM

## 2020-07-31 DIAGNOSIS — Z01.818 PREOP GENERAL PHYSICAL EXAM: Primary | ICD-10-CM

## 2020-07-31 PROCEDURE — 99215 OFFICE O/P EST HI 40 MIN: CPT | Performed by: PHYSICIAN ASSISTANT

## 2020-07-31 PROCEDURE — 93000 ELECTROCARDIOGRAM COMPLETE: CPT | Performed by: PHYSICIAN ASSISTANT

## 2020-07-31 ASSESSMENT — MIFFLIN-ST. JEOR: SCORE: 1032.95

## 2020-07-31 NOTE — PATIENT INSTRUCTIONS
Stop aspirin one week prior to surgery   Do not take hydrochlorothiazide the morning of surgery        Before Your Surgery      Call your surgeon if there is any change in your health. This includes signs of a cold or flu (such as a sore throat, runny nose, cough, rash or fever).    Do not smoke, drink alcohol or take over the counter medicine (unless your surgeon or primary care doctor tells you to) for the 24 hours before and after surgery.    If you take prescribed drugs: Follow your doctor s orders about which medicines to take and which to stop until after surgery.    Eating and drinking prior to surgery: follow the instructions from your surgeon    Take a shower or bath the night before surgery. Use the soap your surgeon gave you to gently clean your skin. If you do not have soap from your surgeon, use your regular soap. Do not shave or scrub the surgery site.  Wear clean pajamas and have clean sheets on your bed.

## 2020-07-31 NOTE — PROGRESS NOTES
[address] [address]  [phone]  Dept: [phone]    PRE-OP EVALUATION:  Today's date: 2020    Anna Dyer (: 1945) presents for pre-operative evaluation assessment as requested by Dr. Gonzales.  She requires evaluation and anesthesia risk assessment prior to undergoing surgery/procedure for treatment of Ventral hernia  .    Proposed Surgery/ Procedure:COMBINED OPEN AND LAPAROSCOPIC VENTRAL  hernia repair  Date of Surgery/ Procedure: 20  Time of Surgery/ Procedure: 2;05 pm  Hospital/Surgical Facility: [facility]  Surgery Fax Number: Note does not need to be faxed, will be available electronically in Epic.  Primary Physician: Tello Finney  Type of Anesthesia Anticipated: General    Preoperative Questionnaire:   No - Have you ever had a heart attack or stroke?  No - Have you ever had surgery on your heart or blood vessels, such as a stent, coronary (heart) bypass, or surgery on an artery in the head, neck, heart, or legs?  No - Do you have chest pain when you are physically active?  No - Do you have a history of heart failure?  No - Do you currently have a cold, bronchitis, or symptoms of other respiratory (head and chest) infections?  No - Do you have a cough, shortness of breath, or wheezing?  No - Do you or anyone in your family have a history of blood clots?  No - Do you or anyone in your family have a serious bleeding problem, such as long-lasting bleeding after surgeries or cuts?  No - Have you ever had anemia or been told to take iron pills?  No - Have you had any abnormal blood loss such as black, tarry or bloody stools, or abnormal vaginal bleeding?  No - Have you ever had a blood transfusion?  Yes - Are you willing to have a blood transfusion if it is medically needed before, during, or after your surgery?  No - Have you or anyone in your family ever had problems with anesthesia (sedation for  surgery)?  No - Do you have sleep apnea, excessive snoring, or daytime drowsiness?   No - Do you have any artifical heart valves or other implanted medical devices, such as a pacemaker, defibrillator, or continuous glucose monitor?  No - Do you have any artifical joints?  No - Are you allergic to latex?  No - Is there any chance that you may be pregnant?    Patient has a Health Care Directive or Living Will:  YES  On file     HPI:     HPI related to upcoming procedure: ventral hernia       See problem list for active medical problems.  Problems all longstanding and stable, except as noted/documented.  See ROS for pertinent symptoms related to these conditions.    COPD - Patient has a longstanding history of moderate-severe COPD . Patient has been doing well overall noting COUGH and continues on medication regimen consisting of prn albuterol without adverse reactions or side effects.    HYPERLIPIDEMIA - Patient has a long history of significant Hyperlipidemia requiring medication for treatment with recent good control. Patient reports no problems or side effects with the medication.     HYPERTENSION - Patient has longstanding history of HTN , currently denies any symptoms referable to elevated blood pressure. Specifically denies chest pain, palpitations, dyspnea, orthopnea, PND or peripheral edema. Blood pressure readings have been in normal range. Current medication regimen is as listed below. Patient denies any side effects of medication.     RENAL INSUFFICIENCY - Patient has a longstanding history of moderate-severe chronic renal insufficiency. Last Cr - with oncology see [facility] 7/2020- consult notes from [facility] .       MEDICAL HISTORY:     Patient Active Problem List    Diagnosis Date Noted     Ventral hernia 06/30/2020     Priority: Medium     Added automatically from request for surgery [id number]       PAD (peripheral artery disease) (H) 05/21/2020     Priority: Medium     Abdominal wall hernia 01/08/2020      Priority: Medium     Added automatically from request for surgery [id number]       CKD (chronic kidney disease) stage 3, GFR 30-59 ml/min (H) 09/12/2019     Priority: Medium     Colostomy status (H) 03/28/2019     Priority: Medium     Status post colostomy (H) 04/06/2018     Priority: Medium     COPD- borderline obstruction on PFTs. long term smoker 01/19/2018     Priority: Medium     Tobacco abuse 01/19/2018     Priority: Medium     Colon cancer metastasized to liver (H) 03/17/2017     Priority: Medium     Advanced directives, counseling/discussion 09/15/2014     Priority: Medium     Advance Care Planning:   Receipt of ACP document:  Received: Health Care Directive which was witnessed or notarized on 08/20/2013.  Document not previously scanned.  Validation form completed and sent with document to be scanned.  .  Confirmed/documented designated decision maker(s). See permanent comments section of demographics in clinical tab. View document(s) and details by clicking on code status.   Added by Rach Olson on 1/19/2015.               Nicotine dependence 02/25/2014     Priority: Medium     Hypertension goal BP (blood pressure) < 140/90 02/24/2014     Priority: Medium     Restless leg syndrome 02/24/2014     Priority: Medium     CARDIOVASCULAR SCREENING; LDL GOAL LESS THAN 130 02/24/2014     Priority: Medium      Past Medical History:   Diagnosis Date     Cancer (H)     metastatic colorectal adenocarcinoma     Hypertension     No cardiologist     RLS (restless legs syndrome)      Past Surgical History:   Procedure Laterality Date     BIOPSY      liver     BREAST SURGERY      breast implants     COLONOSCOPY N/A 3/28/2019    Procedure: INTRAOPERATIVE COLONOSCOPY,;  Surgeon: Jesus Caba MD;  Location:  OR     COSMETIC SURGERY      facelift and tummy [facility]     GYN SURGERY      tubal     INSERT PORT VASCULAR ACCESS N/A 3/24/2017    Procedure: INSERT PORT VASCULAR ACCESS;  Surgeon: Yemi Ordaz MD;   Location: [facility] OR     IR LOWER EXTREMITY ANGIOGRAM BILATERAL  3/12/2020     LAPAROSCOPIC ASSISTED COLECTOMY LEFT (DESCENDING) N/A 1/25/2018    Procedure: LAPAROSCOPIC ASSISTED COLECTOMY LEFT (DESCENDING);;  Surgeon: Maddie Baron MD;  Location: [facility] OR     LAPAROSCOPIC ASSISTED COLOSTOMY TAKEDOWN N/A 3/28/2019    Procedure: LAPAROSCOPIC ASSISTED COLOSTOMY REVERSAL;  Surgeon: Jesus Caba MD;  Location: [facility] OR     LAPAROSCOPY DIAGNOSTIC (GENERAL) N/A 3/28/2019    Procedure: DIAGNOSTIC LAPAROSCOPY;  Surgeon: Jesus Caba MD;  Location: [facility] OR     LAPAROTOMY EXPLORATORY N/A 1/25/2018    Procedure: LAPAROTOMY EXPLORATORY;  exploratory laparoscopy, left hemicolectomy, transverse colostomy;  Surgeon: Maddie Baron MD;  Location: [facility] OR     Current Outpatient Medications   Medication Sig Dispense Refill     amLODIPine (NORVASC) 5 MG tablet TAKE 2 TABLETS [=10MG]     DAILY 180 tablet 0     aspirin (ASA) 81 MG tablet Take 1 tablet (81 mg) by mouth daily       atorvastatin (LIPITOR) 40 MG tablet Take 1 tablet (40 mg) by mouth At Bedtime 90 tablet 3     gabapentin (NEURONTIN) 400 MG capsule Take 1,200 mg by mouth every morning        Homeopathic Products (LEG CRAMP RELIEF PO) Take 2 tablets by mouth daily as needed       hydrochlorothiazide (HYDRODIURIL) 25 MG tablet Take 1 tablet (25 mg) by mouth daily 90 tablet 1     lidocaine-prilocaine (EMLA) 2.5-2.5 % external cream Apply topically daily as needed (Apply one hour prior to acccess)        LORazepam (ATIVAN) 0.5 MG tablet Take 0.5 mg by mouth every morning As needed for sleep  1     losartan (COZAAR) 100 MG tablet Take 1 tablet (100 mg) by mouth daily 90 tablet 1     albuterol (PROAIR HFA/PROVENTIL HFA/VENTOLIN HFA) 108 (90 Base) MCG/ACT inhaler Inhale 2 puffs into the lungs 2 times daily And every 4 hours as needed (Patient not taking: Reported on 7/31/2020) 1 Inhaler 1     Carboxymethylcellulose Sodium (EYE DROPS OP) 4 different eye drops post cataract  "surgery- pt not sure of names       vitamin D2 42111 units (1250 mcg) PO capsule Take 1 capsule weekly x 4weeks, then start 2000 IU vitamin D daily (OTC)  0     OTC products: None, except as noted above    Allergies   Allergen Reactions     Lactose GI Disturbance     Sulfa Drugs Hives      Latex Allergy: NO    Social History     Tobacco Use     Smoking status: Former Smoker     Packs/day: 0.50     Years: 50.00     Pack years: 25.00     Types: Cigarettes     Last attempt to quit: 2020     Years since quittin.0     Smokeless tobacco: Never Used   Substance Use Topics     Alcohol use: No     Alcohol/week: 0.0 standard drinks     History   Drug Use No       REVIEW OF SYSTEMS:   CONSTITUTIONAL: NEGATIVE for fever, chills, change in weight  INTEGUMENTARY/SKIN: NEGATIVE for worrisome rashes, moles or lesions  EYES: NEGATIVE for vision changes or irritation  ENT/MOUTH: NEGATIVE for ear, mouth and throat problems  RESP: NEGATIVE for significant cough or SOB  CV: NEGATIVE for chest pain, palpitations or peripheral edema  GI: NEGATIVE for nausea, abdominal pain, heartburn, or change in bowel habits  MUSCULOSKELETAL: NEGATIVE for significant arthralgias or myalgia  NEURO: NEGATIVE for weakness, dizziness or paresthesias  ENDOCRINE: NEGATIVE for temperature intolerance, skin/hair changes  HEME/ALLERGY/IMMUNE: NEGATIVE for bleeding problems  PSYCHIATRIC: NEGATIVE for changes in mood or affect  ROS otherwise negative    EXAM:   /66   Pulse 64   Temp 98.8  F (37.1  C) (Tympanic)   Resp 14   Ht 1.549 m (5' 1\")   Wt 59.6 kg (131 lb 4.8 oz)   SpO2 99%   Breastfeeding No   BMI 24.81 kg/m    GENERAL APPEARANCE: healthy, alert and no distress  HENT: ear canals and TM's normal and nose and mouth without ulcers or lesions  RESP: lungs clear to auscultation - no rales, rhonchi or wheezes  CV: regular rate and rhythm, normal S1 S2, no S3 or S4 and no murmur, click or rub   MS: extremities normal- no gross deformities " noted  SKIN: no suspicious lesions or rashes  NEURO: Normal strength and tone, sensory exam grossly normal, mentation intact and speech normal  PSYCH: mentation appears normal and affect normal/bright    DIAGNOSTICS:   EKG: appears normal, NSR, normal axis, normal intervals, no acute ST/T changes c/w ischemia, no LVH by voltage criteria, unchanged from previous tracings    Labs done by [facility] every 2 weeks   Last 7/22/2020 (see [facility] for consultation and labs in notes) and will be getting again before surgery       Recent Labs   Lab Test 03/12/20  0700 04/03/19  0615 04/03/19  0400 04/02/19  1630 04/02/19  0555  04/01/19  0845  03/07/17  1419   HGB 14.2  --   --  11.8  --   --  11.2*   < > 13.0    148*  --   --   --   --  153   < >  --    INR 0.88  --   --   --   --   --   --   --  0.98   NA  --   --   --   --  135  --  131*   < > 136   POTASSIUM  --   --  4.0  --  3.2*   < > 3.2*   < > 4.0   CR 0.80 0.68  --   --  0.70  --  0.60   < > 0.81   A1C 5.4  --   --   --   --   --   --   --   --     < > = values in this interval not displayed.        IMPRESSION:   Reason for surgery/procedure: ventral hernia   Diagnosis/reason for consult: COPD, PAD, CKD, colon cancer ( under treatment)     The proposed surgical procedure is considered INTERMEDIATE risk.    REVISED CARDIAC RISK INDEX  The patient has the following serious cardiovascular risks for perioperative complications such as (MI, PE, VFib and 3  AV Block):  No serious cardiac risks  INTERPRETATION: 0 risks: Class I (very low risk - 0.4% complication rate)    The patient has the following additional risks for perioperative complications:  No identified additional risks      ICD-10-CM    1. Preop general physical exam  Z01.818 EKG 12-lead complete w/read - Clinics   2. Ventral hernia without obstruction or gangrene  K43.9 EKG 12-lead complete w/read - Clinics   3. COPD- borderline obstruction on PFTs. long term smoker  J43.9    4. PAD (peripheral artery  disease) (H)  I73.9    5. CKD (chronic kidney disease) stage 3, GFR 30-59 ml/min (H)  N18.3    6. Colon cancer metastasized to liver (H)  C18.9     C78.7        RECOMMENDATIONS:       Pulmonary Risk  Incentive spirometry post op  Advised smoking cessation.       --Patient is to take all scheduled medications on the day of surgery EXCEPT for modifications listed below.    Anticoagulant or Antiplatelet Medication Use  ASPIRIN: Discontinue ASA 7-10 days prior to procedure to reduce bleeding risk.  It should be resumed post-operatively.      Do not take hydrochlorothiazide the morning of surgery     APPROVAL GIVEN to proceed with proposed procedure, without further diagnostic evaluation       Signed Electronically by: Ashtyn Donis PA-C    Copy of this evaluation report is provided to requesting physician.    [facility] Preop Guidelines    Revised Cardiac Risk Index

## 2020-08-09 DIAGNOSIS — Z11.59 ENCOUNTER FOR SCREENING FOR OTHER VIRAL DISEASES: ICD-10-CM

## 2020-08-09 PROCEDURE — U0003 INFECTIOUS AGENT DETECTION BY NUCLEIC ACID (DNA OR RNA); SEVERE ACUTE RESPIRATORY SYNDROME CORONAVIRUS 2 (SARS-COV-2) (CORONAVIRUS DISEASE [COVID-19]), AMPLIFIED PROBE TECHNIQUE, MAKING USE OF HIGH THROUGHPUT TECHNOLOGIES AS DESCRIBED BY CMS-2020-01-R: HCPCS | Performed by: SURGERY

## 2020-08-10 LAB
SARS-COV-2 RNA SPEC QL NAA+PROBE: NOT DETECTED
SPECIMEN SOURCE: NORMAL

## 2020-08-11 RX ORDER — ACETAMINOPHEN 500 MG
1000 TABLET ORAL EVERY 6 HOURS PRN
COMMUNITY

## 2020-08-11 RX ORDER — NICOTINE 21 MG/24HR
1 PATCH, TRANSDERMAL 24 HOURS TRANSDERMAL EVERY 24 HOURS
COMMUNITY
End: 2021-01-25

## 2020-08-11 RX ORDER — NAPROXEN SODIUM 220 MG
440 TABLET ORAL DAILY PRN
COMMUNITY
End: 2021-02-22

## 2020-08-11 NOTE — PROGRESS NOTES
PTA medications updated by Medication Scribe prior to surgery via phone call with patient      -LAST DOSES ENTERED BY NURSE-    Comments:    Medication history sources: Patient, Surescripts, H&P and Patient's home med list  Medication history source reliability: Good  Adherence assessment: N/A Not Observed    Significant changes made to the medication list:  Patient reports no longer taking the following meds (med scribe removed from PTA med list): albuterol inahler, carboxymethylcellulose eye drops      Additional medication history information:   None        Prior to Admission medications    Medication Sig Last Dose Taking? Auth Provider   acetaminophen (TYLENOL) 500 MG tablet Take 1,000 mg by mouth every 6 hours as needed for mild pain  at PRN Yes Reported, Patient   amLODIPine (NORVASC) 5 MG tablet TAKE 2 TABLETS [=10MG]     DAILY  Patient taking differently: Take 10 mg by mouth every morning   at AM Yes Tello Finney MD   aspirin (ASA) 81 MG tablet Take 1 tablet (81 mg) by mouth daily  at AM Yes Tello Finney MD   atorvastatin (LIPITOR) 40 MG tablet Take 1 tablet (40 mg) by mouth At Bedtime  at AM Yes Maddy Roper PA-C   Ergocalciferol (VITAMIN D2 PO) Take 60 mcg by mouth every morning  at AM Yes Reported, Patient   gabapentin (NEURONTIN) 400 MG capsule Take 1,200 mg by mouth every morning (takes 3 X 400 mg = 1,200 mg dose)  at AM Yes Reported, Patient   Homeopathic Products (LEG CRAMP RELIEF PO) Take 2-3 tablets by mouth daily as needed (leg cramps)   at PRN Yes Reported, Patient   hydrochlorothiazide (HYDRODIURIL) 25 MG tablet Take 1 tablet (25 mg) by mouth daily  at AM Yes Tello Finney MD   lidocaine-prilocaine (EMLA) 2.5-2.5 % external cream Apply topically daily as needed (Apply one hour prior to port acccess)   at PRN Yes Reported, Patient   LORazepam (ATIVAN) 0.5 MG tablet Take 0.5 mg by mouth nightly as needed for sleep As needed for sleep  at HS Yes Reported, Patient    losartan (COZAAR) 100 MG tablet Take 1 tablet (100 mg) by mouth daily  at AM Yes Tello Finney MD   naproxen sodium (ANAPROX) 220 MG tablet Take 440 mg by mouth daily as needed for moderate pain  at PRN Yes Reported, Patient   nicotine (NICODERM CQ) 21 MG/24HR 24 hr patch Place 1 patch onto the skin every 24 hours  at inplace Yes Reported, Patient   Triprolidine-Pseudoephedrine (EQ ANTIHISTABS PO) Take 1 Dose by mouth daily as needed  at PRN Yes Reported, Patient

## 2020-08-12 ENCOUNTER — ANESTHESIA EVENT (OUTPATIENT)
Dept: SURGERY | Facility: CLINIC | Age: 75
End: 2020-08-12
Payer: MEDICARE

## 2020-08-12 ENCOUNTER — ANESTHESIA (OUTPATIENT)
Dept: SURGERY | Facility: CLINIC | Age: 75
End: 2020-08-12
Payer: MEDICARE

## 2020-08-12 ENCOUNTER — HOSPITAL ENCOUNTER (OUTPATIENT)
Facility: CLINIC | Age: 75
Discharge: HOME | End: 2020-08-14
Attending: SURGERY | Admitting: SURGERY
Payer: MEDICARE

## 2020-08-12 ENCOUNTER — SURGERY (OUTPATIENT)
Age: 75
End: 2020-08-12
Payer: MEDICARE

## 2020-08-12 ENCOUNTER — APPOINTMENT (OUTPATIENT)
Dept: SURGERY | Facility: PHYSICIAN GROUP | Age: 75
End: 2020-08-12
Payer: MEDICARE

## 2020-08-12 DIAGNOSIS — K43.9 VENTRAL HERNIA: ICD-10-CM

## 2020-08-12 DIAGNOSIS — K43.9 ABDOMINAL WALL HERNIA: ICD-10-CM

## 2020-08-12 DIAGNOSIS — K43.2 VENTRAL INCISIONAL HERNIA: Primary | ICD-10-CM

## 2020-08-12 PROCEDURE — 27210794 ZZH OR GENERAL SUPPLY STERILE: Performed by: SURGERY

## 2020-08-12 PROCEDURE — 71000012 ZZH RECOVERY PHASE 1 LEVEL 1 FIRST HR: Performed by: SURGERY

## 2020-08-12 PROCEDURE — 49568 ZZHC REPAIR HERNIA WITH MESH: CPT | Performed by: SURGERY

## 2020-08-12 PROCEDURE — 25000128 H RX IP 250 OP 636: Performed by: NURSE ANESTHETIST, CERTIFIED REGISTERED

## 2020-08-12 PROCEDURE — 37000009 ZZH ANESTHESIA TECHNICAL FEE, EACH ADDTL 15 MIN: Performed by: SURGERY

## 2020-08-12 PROCEDURE — 36000056 ZZH SURGERY LEVEL 3 1ST 30 MIN: Performed by: SURGERY

## 2020-08-12 PROCEDURE — 40000169 ZZH STATISTIC PRE-PROCEDURE ASSESSMENT I: Performed by: SURGERY

## 2020-08-12 PROCEDURE — 37000008 ZZH ANESTHESIA TECHNICAL FEE, 1ST 30 MIN: Performed by: SURGERY

## 2020-08-12 PROCEDURE — 25000566 ZZH SEVOFLURANE, EA 15 MIN: Performed by: SURGERY

## 2020-08-12 PROCEDURE — 25000125 ZZHC RX 250: Performed by: SURGERY

## 2020-08-12 PROCEDURE — U0003 INFECTIOUS AGENT DETECTION BY NUCLEIC ACID (DNA OR RNA); SEVERE ACUTE RESPIRATORY SYNDROME CORONAVIRUS 2 (SARS-COV-2) (CORONAVIRUS DISEASE [COVID-19]), AMPLIFIED PROBE TECHNIQUE, MAKING USE OF HIGH THROUGHPUT TECHNOLOGIES AS DESCRIBED BY CMS-2020-01-R: HCPCS | Performed by: SURGERY

## 2020-08-12 PROCEDURE — 25000125 ZZHC RX 250: Performed by: NURSE ANESTHETIST, CERTIFIED REGISTERED

## 2020-08-12 PROCEDURE — 49560 ZZHC REPAIR INCISIONAL HERNIA,REDUCIBLE: CPT | Performed by: SURGERY

## 2020-08-12 PROCEDURE — 25800030 ZZH RX IP 258 OP 636: Performed by: NURSE ANESTHETIST, CERTIFIED REGISTERED

## 2020-08-12 PROCEDURE — 25000128 H RX IP 250 OP 636: Performed by: SURGERY

## 2020-08-12 PROCEDURE — 25000128 H RX IP 250 OP 636: Performed by: ANESTHESIOLOGY

## 2020-08-12 PROCEDURE — 25000132 ZZH RX MED GY IP 250 OP 250 PS 637: Mod: GY

## 2020-08-12 PROCEDURE — 36000058 ZZH SURGERY LEVEL 3 EA 15 ADDTL MIN: Performed by: SURGERY

## 2020-08-12 PROCEDURE — 71000013 ZZH RECOVERY PHASE 1 LEVEL 1 EA ADDTL HR: Performed by: SURGERY

## 2020-08-12 PROCEDURE — 25000128 H RX IP 250 OP 636

## 2020-08-12 PROCEDURE — C1781 MESH (IMPLANTABLE): HCPCS | Performed by: SURGERY

## 2020-08-12 DEVICE — IMPLANTABLE DEVICE: Type: IMPLANTABLE DEVICE | Site: ABDOMEN | Status: FUNCTIONAL

## 2020-08-12 RX ORDER — MEPERIDINE HYDROCHLORIDE 25 MG/ML
12.5 INJECTION INTRAMUSCULAR; INTRAVENOUS; SUBCUTANEOUS
Status: DISCONTINUED | OUTPATIENT
Start: 2020-08-12 | End: 2020-08-12 | Stop reason: HOSPADM

## 2020-08-12 RX ORDER — BUPIVACAINE HYDROCHLORIDE 5 MG/ML
INJECTION, SOLUTION PERINEURAL PRN
Status: DISCONTINUED | OUTPATIENT
Start: 2020-08-12 | End: 2020-08-12 | Stop reason: HOSPADM

## 2020-08-12 RX ORDER — ONDANSETRON 2 MG/ML
4 INJECTION INTRAMUSCULAR; INTRAVENOUS EVERY 6 HOURS PRN
Status: DISCONTINUED | OUTPATIENT
Start: 2020-08-12 | End: 2020-08-14 | Stop reason: HOSPADM

## 2020-08-12 RX ORDER — LIDOCAINE 40 MG/G
CREAM TOPICAL
Status: DISCONTINUED | OUTPATIENT
Start: 2020-08-12 | End: 2020-08-14 | Stop reason: HOSPADM

## 2020-08-12 RX ORDER — ATORVASTATIN CALCIUM 40 MG/1
40 TABLET, FILM COATED ORAL AT BEDTIME
Status: DISCONTINUED | OUTPATIENT
Start: 2020-08-12 | End: 2020-08-14 | Stop reason: HOSPADM

## 2020-08-12 RX ORDER — NICOTINE 21 MG/24HR
1 PATCH, TRANSDERMAL 24 HOURS TRANSDERMAL EVERY 24 HOURS
Status: DISCONTINUED | OUTPATIENT
Start: 2020-08-12 | End: 2020-08-14 | Stop reason: HOSPADM

## 2020-08-12 RX ORDER — ACETAMINOPHEN 325 MG/1
650 TABLET ORAL EVERY 4 HOURS PRN
Status: DISCONTINUED | OUTPATIENT
Start: 2020-08-15 | End: 2020-08-14 | Stop reason: HOSPADM

## 2020-08-12 RX ORDER — FENTANYL CITRATE 50 UG/ML
25-50 INJECTION, SOLUTION INTRAMUSCULAR; INTRAVENOUS
Status: DISCONTINUED | OUTPATIENT
Start: 2020-08-12 | End: 2020-08-12

## 2020-08-12 RX ORDER — HYDROMORPHONE HYDROCHLORIDE 1 MG/ML
.3-.5 INJECTION, SOLUTION INTRAMUSCULAR; INTRAVENOUS; SUBCUTANEOUS
Status: DISCONTINUED | OUTPATIENT
Start: 2020-08-12 | End: 2020-08-14 | Stop reason: HOSPADM

## 2020-08-12 RX ORDER — HYDROMORPHONE HYDROCHLORIDE 1 MG/ML
.3-.5 INJECTION, SOLUTION INTRAMUSCULAR; INTRAVENOUS; SUBCUTANEOUS EVERY 10 MIN PRN
Status: DISCONTINUED | OUTPATIENT
Start: 2020-08-12 | End: 2020-08-12 | Stop reason: HOSPADM

## 2020-08-12 RX ORDER — POLYETHYLENE GLYCOL 3350 17 G/17G
17 POWDER, FOR SOLUTION ORAL DAILY PRN
Status: DISCONTINUED | OUTPATIENT
Start: 2020-08-12 | End: 2020-08-14 | Stop reason: HOSPADM

## 2020-08-12 RX ORDER — ONDANSETRON 4 MG/1
4 TABLET, ORALLY DISINTEGRATING ORAL EVERY 30 MIN PRN
Status: DISCONTINUED | OUTPATIENT
Start: 2020-08-12 | End: 2020-08-12 | Stop reason: HOSPADM

## 2020-08-12 RX ORDER — SODIUM CHLORIDE, SODIUM LACTATE, POTASSIUM CHLORIDE, CALCIUM CHLORIDE 600; 310; 30; 20 MG/100ML; MG/100ML; MG/100ML; MG/100ML
INJECTION, SOLUTION INTRAVENOUS CONTINUOUS PRN
Status: DISCONTINUED | OUTPATIENT
Start: 2020-08-12 | End: 2020-08-12

## 2020-08-12 RX ORDER — ONDANSETRON 2 MG/ML
INJECTION INTRAMUSCULAR; INTRAVENOUS PRN
Status: DISCONTINUED | OUTPATIENT
Start: 2020-08-12 | End: 2020-08-12

## 2020-08-12 RX ORDER — CEFAZOLIN SODIUM 1 G/3ML
1 INJECTION, POWDER, FOR SOLUTION INTRAMUSCULAR; INTRAVENOUS SEE ADMIN INSTRUCTIONS
Status: DISCONTINUED | OUTPATIENT
Start: 2020-08-12 | End: 2020-08-12 | Stop reason: HOSPADM

## 2020-08-12 RX ORDER — LOSARTAN POTASSIUM 100 MG/1
100 TABLET ORAL DAILY
Status: DISCONTINUED | OUTPATIENT
Start: 2020-08-13 | End: 2020-08-14 | Stop reason: HOSPADM

## 2020-08-12 RX ORDER — LIDOCAINE HYDROCHLORIDE 20 MG/ML
INJECTION, SOLUTION INFILTRATION; PERINEURAL PRN
Status: DISCONTINUED | OUTPATIENT
Start: 2020-08-12 | End: 2020-08-12

## 2020-08-12 RX ORDER — PROPOFOL 10 MG/ML
INJECTION, EMULSION INTRAVENOUS CONTINUOUS PRN
Status: DISCONTINUED | OUTPATIENT
Start: 2020-08-12 | End: 2020-08-12

## 2020-08-12 RX ORDER — AMLODIPINE BESYLATE 10 MG/1
10 TABLET ORAL EVERY MORNING
Status: DISCONTINUED | OUTPATIENT
Start: 2020-08-13 | End: 2020-08-14 | Stop reason: HOSPADM

## 2020-08-12 RX ORDER — NALOXONE HYDROCHLORIDE 0.4 MG/ML
.1-.4 INJECTION, SOLUTION INTRAMUSCULAR; INTRAVENOUS; SUBCUTANEOUS
Status: DISCONTINUED | OUTPATIENT
Start: 2020-08-12 | End: 2020-08-12 | Stop reason: HOSPADM

## 2020-08-12 RX ORDER — CYCLOBENZAPRINE HCL 10 MG
10 TABLET ORAL 3 TIMES DAILY
Status: DISCONTINUED | OUTPATIENT
Start: 2020-08-12 | End: 2020-08-14 | Stop reason: HOSPADM

## 2020-08-12 RX ORDER — FENTANYL CITRATE 50 UG/ML
INJECTION, SOLUTION INTRAMUSCULAR; INTRAVENOUS PRN
Status: DISCONTINUED | OUTPATIENT
Start: 2020-08-12 | End: 2020-08-12

## 2020-08-12 RX ORDER — ONDANSETRON 2 MG/ML
4 INJECTION INTRAMUSCULAR; INTRAVENOUS EVERY 30 MIN PRN
Status: DISCONTINUED | OUTPATIENT
Start: 2020-08-12 | End: 2020-08-12 | Stop reason: HOSPADM

## 2020-08-12 RX ORDER — FENTANYL CITRATE 0.05 MG/ML
25-50 INJECTION, SOLUTION INTRAMUSCULAR; INTRAVENOUS EVERY 5 MIN PRN
Status: DISCONTINUED | OUTPATIENT
Start: 2020-08-12 | End: 2020-08-12 | Stop reason: HOSPADM

## 2020-08-12 RX ORDER — AMOXICILLIN 250 MG
2 CAPSULE ORAL 2 TIMES DAILY PRN
Status: DISCONTINUED | OUTPATIENT
Start: 2020-08-12 | End: 2020-08-14

## 2020-08-12 RX ORDER — HYDROCHLOROTHIAZIDE 25 MG/1
25 TABLET ORAL DAILY
Status: DISCONTINUED | OUTPATIENT
Start: 2020-08-13 | End: 2020-08-14 | Stop reason: HOSPADM

## 2020-08-12 RX ORDER — ACETAMINOPHEN 325 MG/1
975 TABLET ORAL EVERY 8 HOURS
Status: DISCONTINUED | OUTPATIENT
Start: 2020-08-12 | End: 2020-08-14 | Stop reason: HOSPADM

## 2020-08-12 RX ORDER — DEXAMETHASONE SODIUM PHOSPHATE 4 MG/ML
INJECTION, SOLUTION INTRA-ARTICULAR; INTRALESIONAL; INTRAMUSCULAR; INTRAVENOUS; SOFT TISSUE PRN
Status: DISCONTINUED | OUTPATIENT
Start: 2020-08-12 | End: 2020-08-12

## 2020-08-12 RX ORDER — CEFAZOLIN SODIUM 2 G/100ML
2 INJECTION, SOLUTION INTRAVENOUS
Status: COMPLETED | OUTPATIENT
Start: 2020-08-12 | End: 2020-08-12

## 2020-08-12 RX ORDER — MAGNESIUM HYDROXIDE 1200 MG/15ML
LIQUID ORAL PRN
Status: DISCONTINUED | OUTPATIENT
Start: 2020-08-12 | End: 2020-08-12 | Stop reason: HOSPADM

## 2020-08-12 RX ORDER — ONDANSETRON 4 MG/1
4 TABLET, ORALLY DISINTEGRATING ORAL EVERY 6 HOURS PRN
Status: DISCONTINUED | OUTPATIENT
Start: 2020-08-12 | End: 2020-08-14 | Stop reason: HOSPADM

## 2020-08-12 RX ORDER — NALOXONE HYDROCHLORIDE 0.4 MG/ML
.1-.4 INJECTION, SOLUTION INTRAMUSCULAR; INTRAVENOUS; SUBCUTANEOUS
Status: DISCONTINUED | OUTPATIENT
Start: 2020-08-12 | End: 2020-08-14 | Stop reason: HOSPADM

## 2020-08-12 RX ORDER — GABAPENTIN 300 MG/1
1200 CAPSULE ORAL EVERY MORNING
Status: DISCONTINUED | OUTPATIENT
Start: 2020-08-13 | End: 2020-08-14 | Stop reason: HOSPADM

## 2020-08-12 RX ORDER — SODIUM CHLORIDE, SODIUM LACTATE, POTASSIUM CHLORIDE, CALCIUM CHLORIDE 600; 310; 30; 20 MG/100ML; MG/100ML; MG/100ML; MG/100ML
INJECTION, SOLUTION INTRAVENOUS CONTINUOUS
Status: DISCONTINUED | OUTPATIENT
Start: 2020-08-12 | End: 2020-08-12 | Stop reason: HOSPADM

## 2020-08-12 RX ORDER — LORAZEPAM 0.5 MG/1
0.5 TABLET ORAL
Status: DISCONTINUED | OUTPATIENT
Start: 2020-08-12 | End: 2020-08-14 | Stop reason: HOSPADM

## 2020-08-12 RX ORDER — PROPOFOL 10 MG/ML
INJECTION, EMULSION INTRAVENOUS PRN
Status: DISCONTINUED | OUTPATIENT
Start: 2020-08-12 | End: 2020-08-12

## 2020-08-12 RX ORDER — OXYCODONE HYDROCHLORIDE 5 MG/1
5-10 TABLET ORAL EVERY 4 HOURS PRN
Status: DISCONTINUED | OUTPATIENT
Start: 2020-08-12 | End: 2020-08-14 | Stop reason: HOSPADM

## 2020-08-12 RX ORDER — LABETALOL HYDROCHLORIDE 5 MG/ML
10 INJECTION, SOLUTION INTRAVENOUS
Status: DISCONTINUED | OUTPATIENT
Start: 2020-08-12 | End: 2020-08-12 | Stop reason: HOSPADM

## 2020-08-12 RX ADMIN — HYDROMORPHONE HYDROCHLORIDE 0.2 MG: 1 INJECTION, SOLUTION INTRAMUSCULAR; INTRAVENOUS; SUBCUTANEOUS at 15:13

## 2020-08-12 RX ADMIN — FENTANYL CITRATE 100 MCG: 50 INJECTION, SOLUTION INTRAMUSCULAR; INTRAVENOUS at 13:57

## 2020-08-12 RX ADMIN — FENTANYL CITRATE 25 MCG: 50 INJECTION, SOLUTION INTRAMUSCULAR; INTRAVENOUS at 15:38

## 2020-08-12 RX ADMIN — PHENYLEPHRINE HYDROCHLORIDE 100 MCG: 10 INJECTION INTRAVENOUS at 14:36

## 2020-08-12 RX ADMIN — ROCURONIUM BROMIDE 40 MG: 10 INJECTION INTRAVENOUS at 13:57

## 2020-08-12 RX ADMIN — HYDROMORPHONE HYDROCHLORIDE 0.5 MG: 1 INJECTION, SOLUTION INTRAMUSCULAR; INTRAVENOUS; SUBCUTANEOUS at 23:32

## 2020-08-12 RX ADMIN — HYDROMORPHONE HYDROCHLORIDE 0.5 MG: 1 INJECTION, SOLUTION INTRAMUSCULAR; INTRAVENOUS; SUBCUTANEOUS at 20:07

## 2020-08-12 RX ADMIN — PROPOFOL 150 MG: 10 INJECTION, EMULSION INTRAVENOUS at 13:57

## 2020-08-12 RX ADMIN — SODIUM CHLORIDE 1000 ML: 900 IRRIGANT IRRIGATION at 14:16

## 2020-08-12 RX ADMIN — LIDOCAINE HYDROCHLORIDE 100 MG: 20 INJECTION, SOLUTION INFILTRATION; PERINEURAL at 13:57

## 2020-08-12 RX ADMIN — HYDROMORPHONE HYDROCHLORIDE 0.3 MG: 1 INJECTION, SOLUTION INTRAMUSCULAR; INTRAVENOUS; SUBCUTANEOUS at 16:37

## 2020-08-12 RX ADMIN — PROPOFOL 30 MCG/KG/MIN: 10 INJECTION, EMULSION INTRAVENOUS at 14:06

## 2020-08-12 RX ADMIN — HYDROMORPHONE HYDROCHLORIDE 0.2 MG: 1 INJECTION, SOLUTION INTRAMUSCULAR; INTRAVENOUS; SUBCUTANEOUS at 16:47

## 2020-08-12 RX ADMIN — ONDANSETRON 4 MG: 2 INJECTION INTRAMUSCULAR; INTRAVENOUS at 15:24

## 2020-08-12 RX ADMIN — ATORVASTATIN CALCIUM 40 MG: 40 TABLET, FILM COATED ORAL at 21:50

## 2020-08-12 RX ADMIN — BUPIVACAINE HYDROCHLORIDE 30 ML: 5 INJECTION, SOLUTION PERINEURAL at 15:35

## 2020-08-12 RX ADMIN — DEXAMETHASONE SODIUM PHOSPHATE 4 MG: 4 INJECTION, SOLUTION INTRA-ARTICULAR; INTRALESIONAL; INTRAMUSCULAR; INTRAVENOUS; SOFT TISSUE at 14:11

## 2020-08-12 RX ADMIN — HYDROMORPHONE HYDROCHLORIDE 0.3 MG: 1 INJECTION, SOLUTION INTRAMUSCULAR; INTRAVENOUS; SUBCUTANEOUS at 14:31

## 2020-08-12 RX ADMIN — SODIUM CHLORIDE, POTASSIUM CHLORIDE, SODIUM LACTATE AND CALCIUM CHLORIDE: 600; 310; 30; 20 INJECTION, SOLUTION INTRAVENOUS at 13:50

## 2020-08-12 RX ADMIN — FENTANYL CITRATE 50 MCG: 0.05 INJECTION, SOLUTION INTRAMUSCULAR; INTRAVENOUS at 16:12

## 2020-08-12 RX ADMIN — PHENYLEPHRINE HYDROCHLORIDE 100 MCG: 10 INJECTION INTRAVENOUS at 14:11

## 2020-08-12 RX ADMIN — CEFAZOLIN SODIUM 2 G: 2 INJECTION, SOLUTION INTRAVENOUS at 14:05

## 2020-08-12 RX ADMIN — HYDROMORPHONE HYDROCHLORIDE 0.5 MG: 1 INJECTION, SOLUTION INTRAMUSCULAR; INTRAVENOUS; SUBCUTANEOUS at 17:44

## 2020-08-12 RX ADMIN — CYCLOBENZAPRINE 10 MG: 10 TABLET, FILM COATED ORAL at 21:49

## 2020-08-12 RX ADMIN — SODIUM CHLORIDE, POTASSIUM CHLORIDE, SODIUM LACTATE AND CALCIUM CHLORIDE: 600; 310; 30; 20 INJECTION, SOLUTION INTRAVENOUS at 15:27

## 2020-08-12 RX ADMIN — SUGAMMADEX 150 MG: 100 INJECTION, SOLUTION INTRAVENOUS at 15:27

## 2020-08-12 ASSESSMENT — ACTIVITIES OF DAILY LIVING (ADL)
SWALLOWING: 0-->SWALLOWS FOODS/LIQUIDS WITHOUT DIFFICULTY
TOILETING: 0-->INDEPENDENT
DRESS: 0-->INDEPENDENT
AMBULATION: 0-->INDEPENDENT
RETIRED_COMMUNICATION: 0-->UNDERSTANDS/COMMUNICATES WITHOUT DIFFICULTY
RETIRED_EATING: 0-->INDEPENDENT
TRANSFERRING: 0-->INDEPENDENT
BATHING: 0-->INDEPENDENT
FALL_HISTORY_WITHIN_LAST_SIX_MONTHS: NO
COGNITION: 0 - NO COGNITION ISSUES REPORTED

## 2020-08-12 ASSESSMENT — COPD QUESTIONNAIRES: COPD: 1

## 2020-08-12 ASSESSMENT — MIFFLIN-ST. JEOR: SCORE: 1035.22

## 2020-08-12 NOTE — ANESTHESIA PREPROCEDURE EVALUATION
Anesthesia Pre-Procedure Evaluation    Patient: Anna Dyer   MRN: [id number] : 1945          Preoperative Diagnosis: Ventral hernia [K43.9]    Procedure(s):  COMBINED OPEN AND LAPAROSCOPIC VENTRAL HERNIA REPAIR    Past Medical History:   Diagnosis Date     Cancer (H)     metastatic colorectal adenocarcinoma     Hypertension     No cardiologist     RLS (restless legs syndrome)      Past Surgical History:   Procedure Laterality Date     BIOPSY      liver     BREAST SURGERY      breast implants     COLONOSCOPY N/A 3/28/2019    Procedure: INTRAOPERATIVE COLONOSCOPY,;  Surgeon: Jesus Caba MD;  Location:  OR     COSMETIC SURGERY      facelift and tummy tuck     GYN SURGERY      tubal     INSERT PORT VASCULAR ACCESS N/A 3/24/2017    Procedure: INSERT PORT VASCULAR ACCESS;  Surgeon: Yemi Ordaz MD;  Location:  OR     IR LOWER EXTREMITY ANGIOGRAM BILATERAL  3/12/2020     LAPAROSCOPIC ASSISTED COLECTOMY LEFT (DESCENDING) N/A 2018    Procedure: LAPAROSCOPIC ASSISTED COLECTOMY LEFT (DESCENDING);;  Surgeon: Maddie Baron MD;  Location: [facility] OR     LAPAROSCOPIC ASSISTED COLOSTOMY TAKEDOWN N/A 3/28/2019    Procedure: LAPAROSCOPIC ASSISTED COLOSTOMY REVERSAL;  Surgeon: Jesus Caba MD;  Location: [facility] OR     LAPAROSCOPY DIAGNOSTIC (GENERAL) N/A 3/28/2019    Procedure: DIAGNOSTIC LAPAROSCOPY;  Surgeon: Jesus Caba MD;  Location:  OR     LAPAROTOMY EXPLORATORY N/A 2018    Procedure: LAPAROTOMY EXPLORATORY;  exploratory laparoscopy, left hemicolectomy, transverse colostomy;  Surgeon: Maddie Baron MD;  Location: [facility] OR       Anesthesia Evaluation     .             ROS/MED HX    ENT/Pulmonary:     (+)COPD, , . .    Neurologic:     (+)neuropathy other neuro RLS;    Cardiovascular:     (+) hypertension-Peripheral Vascular Disease---. : . . . :. .       METS/Exercise Tolerance:     Hematologic:         Musculoskeletal:         GI/Hepatic:         Renal/Genitourinary:    "  (+) chronic renal disease, type: CRI,       Endo:         Psychiatric:         Infectious Disease:         Malignancy:   (+) Malignancy History of GI  Colon cancer with mets to the liver;        Other:                                 Lab Results   Component Value Date    WBC 8.9 03/12/2020    HGB 14.2 03/12/2020    HCT 41.0 03/12/2020     03/12/2020     04/02/2019    POTASSIUM 4.0 04/03/2019    CHLORIDE 101 04/02/2019    CO2 27 04/02/2019    BUN 7 04/02/2019    CR 0.80 03/12/2020     (H) 04/02/2019    CAITY 8.2 (L) 04/02/2019    PHOS 3.4 04/01/2019    MAG 2.9 (H) 04/01/2019    ALBUMIN 3.6 03/07/2017    PROTTOTAL 8.3 03/07/2017    ALT 7 01/03/2018    AST 21 01/03/2018     (H) 05/08/2009    ALKPHOS 122 03/07/2017    BILITOTAL 0.4 03/07/2017    PTT 32 03/12/2020    INR 0.88 03/12/2020    TSH 2.88 05/08/2009       Preop Vitals  BP Readings from Last 3 Encounters:   07/31/20 124/66   06/30/20 120/80   06/09/20 (!) 143/80    Pulse Readings from Last 3 Encounters:   07/31/20 64   06/30/20 84   06/09/20 92      Resp Readings from Last 3 Encounters:   07/31/20 14   06/02/20 16   03/12/20 16    SpO2 Readings from Last 3 Encounters:   07/31/20 99%   06/02/20 97%   05/21/20 98%      Temp Readings from Last 1 Encounters:   07/31/20 37.1  C (98.8  F) (Tympanic)    Ht Readings from Last 1 Encounters:   07/31/20 1.549 m (5' 1\")      Wt Readings from Last 1 Encounters:   07/31/20 59.6 kg (131 lb 4.8 oz)    Estimated body mass index is 24.81 kg/m  as calculated from the following:    Height as of 7/31/20: 1.549 m (5' 1\").    Weight as of 7/31/20: 59.6 kg (131 lb 4.8 oz).       Anesthesia Plan      History & Physical Review  History and physical reviewed and following examination; no interval change.    ASA Status:  3 .        Plan for General with Intravenous induction. Maintenance will be Balanced.    PONV prophylaxis:  Ondansetron (or other 5HT-3)         Postoperative Care  Postoperative pain management: "  IV analgesics.      Consents  Anesthetic plan, risks, benefits and alternatives discussed with:  Patient..                 JAMESON GARRETT MD

## 2020-08-12 NOTE — ANESTHESIA POSTPROCEDURE EVALUATION
Patient: Anna Dyer    Procedure(s):  COMBINED OPEN AND LAPAROSCOPIC VENTRAL HERNIA REPAIR    Diagnosis:Ventral hernia [K43.9]  Diagnosis Additional Information: No value filed.    Anesthesia Type:  General    Note:  Anesthesia Post Evaluation    Patient location during evaluation: PACU  Patient participation: Able to fully participate in evaluation  Level of consciousness: awake  Pain management: adequate  Airway patency: patent  Cardiovascular status: acceptable  Respiratory status: acceptable  Hydration status: acceptable  PONV: none     Anesthetic complications: None          Last vitals:  Vitals:    08/12/20 1145 08/12/20 1600 08/12/20 1615   BP: (!) 154/56 (!) 150/66 (!) 145/73   Pulse:  84 79   Resp: 16 15 12   Temp: 36.1  C (96.9  F)  35.2  C (95.4  F)   SpO2: 99% 100% 100%         Electronically Signed By: JAMESON GARRETT MD  August 12, 2020  4:19 PM

## 2020-08-12 NOTE — OR NURSING
COSMO Rodriguez was asked by author  if there is a need for a K re-check.  Last K was 3.2.  COSMO stated no need to re -check K level.

## 2020-08-12 NOTE — PLAN OF CARE
Pt arrived from PACU at 1730. VSS on RA. A&OX4,  but lethargic. Hypoactive BS, no flatus. Abdominal binder on. 4 port sites, 2 puncture sites, and midline incision, CDI. IV dilaudid given for pain. Clear liquid diet. Montesinos in place with good output. Continue to monitor.

## 2020-08-12 NOTE — BRIEF OP NOTE
[facility]    Brief Operative Note    Pre-operative diagnosis: Ventral hernia [K43.9]  Post-operative diagnosis: Same as pre-operative diagnosis    Procedure(s):  COMBINED OPEN AND LAPAROSCOPIC VENTRAL HERNIA REPAIR     Surgeon(s) and Role:     * Maddy Gonzales MD - Primary     * Dagoberto Barton MD - Resident - Assisting     Anesthesia: General     Estimated blood loss: 10 mL    Drains: None    Specimens: * No specimens in log *    Findings: Minimal adhesions encountered. RLQ former ostomy site fascia closed with interrupted vicryl sutures. Midline incisional hernia reduced, then skin opened, hernia sac excised and fascia closed. Converted back to lap, inserted ventralex mesh and secured with vicryl tacks.    Complications: None.    Implants:   Implant Name Type Inv. Item Serial No.  Lot No. LRB No. Used Action   MESH VENTRALIGHT ST W/ECHO POS SYSTEM 7X9&quot; ELLIPSE [id number] Mesh MESH VENTRALIGHT ST W/ECHO POS SYSTEM 7X9&quot; ELLIPSE [id number]  CR [employer] [id number] N/A 1 Implanted       Post-op:  - Admit to surgery  - clear liquids  - continue cota overnight, remove POD#1  - Scheduled tylenol and flexeril  - 5-10 mg oxycodone q4h prn  - 0.3-0.5 mg IV dilaudid q2h prn  - No Toradol  - Abdominal binder    Dagoberto Barton MD  General Surgery Resident, PGY-4  [phone]

## 2020-08-12 NOTE — ANESTHESIA CARE TRANSFER NOTE
Patient: Anna Dyer    Procedure(s):  COMBINED OPEN AND LAPAROSCOPIC VENTRAL HERNIA REPAIR    Diagnosis: Ventral hernia [K43.9]  Diagnosis Additional Information: No value filed.    Anesthesia Type:   General     Note:  Airway :Face Mask  Patient transferred to:PACU  Comments: Neuromuscular blockade reversed after TOF 2/4, spontaneous respirations, adequate tidal volumes, followed commands to voice, oropharynx suctioned with soft flexible catheter, extubated atraumatically, extubated with suction, airway patent after extubation.  Oxygen via facemask at 6 liters per minute to PACU. Oxygen tubing connected to wall O2 in PACU, SpO2, NiBP, and EKG monitors and alarms on and functioning, Anay Hugger warmer connected to patient gown, report on patient's clinical status given to PACU RN, RN questions answered.   Handoff Report: Identifed the Patient, Identified the Reponsible Provider, Reviewed the pertinent medical history, Discussed the surgical course, Reviewed Intra-OP anesthesia mangement and issues during anesthesia, Set expectations for post-procedure period and Allowed opportunity for questions and acknowledgement of understanding      Vitals: (Last set prior to Anesthesia Care Transfer)    CRNA VITALS  8/12/2020 1522 - 8/12/2020 1559      8/12/2020             Resp Rate (set):  10        Spo2 99  HR 81        Electronically Signed By: ILENE Kelsey CRNA  August 12, 2020  3:59 PM

## 2020-08-13 LAB
LABORATORY COMMENT REPORT: NORMAL
SARS-COV-2 RNA SPEC QL NAA+PROBE: NEGATIVE
SARS-COV-2 RNA SPEC QL NAA+PROBE: NORMAL
SPECIMEN SOURCE: NORMAL
SPECIMEN SOURCE: NORMAL

## 2020-08-13 PROCEDURE — 25000132 ZZH RX MED GY IP 250 OP 250 PS 637: Mod: GY

## 2020-08-13 PROCEDURE — 25000128 H RX IP 250 OP 636: Performed by: SURGERY

## 2020-08-13 RX ORDER — HEPARIN SODIUM,PORCINE 10 UNIT/ML
5-10 VIAL (ML) INTRAVENOUS EVERY 24 HOURS
Status: DISCONTINUED | OUTPATIENT
Start: 2020-08-13 | End: 2020-08-14 | Stop reason: HOSPADM

## 2020-08-13 RX ORDER — HEPARIN SODIUM (PORCINE) LOCK FLUSH IV SOLN 100 UNIT/ML 100 UNIT/ML
5 SOLUTION INTRAVENOUS
Status: DISCONTINUED | OUTPATIENT
Start: 2020-08-13 | End: 2020-08-14 | Stop reason: HOSPADM

## 2020-08-13 RX ORDER — POLYETHYLENE GLYCOL 3350 17 G/17G
17 POWDER, FOR SOLUTION ORAL DAILY
Status: DISCONTINUED | OUTPATIENT
Start: 2020-08-13 | End: 2020-08-14

## 2020-08-13 RX ORDER — HEPARIN SODIUM,PORCINE 10 UNIT/ML
5-10 VIAL (ML) INTRAVENOUS
Status: DISCONTINUED | OUTPATIENT
Start: 2020-08-13 | End: 2020-08-14 | Stop reason: HOSPADM

## 2020-08-13 RX ORDER — LIDOCAINE 40 MG/G
CREAM TOPICAL
Status: DISCONTINUED | OUTPATIENT
Start: 2020-08-13 | End: 2020-08-14 | Stop reason: HOSPADM

## 2020-08-13 RX ADMIN — NICOTINE 1 PATCH: 21 PATCH, EXTENDED RELEASE TRANSDERMAL at 08:38

## 2020-08-13 RX ADMIN — AMLODIPINE BESYLATE 10 MG: 10 TABLET ORAL at 08:37

## 2020-08-13 RX ADMIN — OXYCODONE HYDROCHLORIDE 10 MG: 5 TABLET ORAL at 17:52

## 2020-08-13 RX ADMIN — OXYCODONE HYDROCHLORIDE 5 MG: 5 TABLET ORAL at 03:14

## 2020-08-13 RX ADMIN — POLYETHYLENE GLYCOL 3350 17 G: 17 POWDER, FOR SOLUTION ORAL at 11:12

## 2020-08-13 RX ADMIN — LOSARTAN POTASSIUM 100 MG: 100 TABLET, FILM COATED ORAL at 08:37

## 2020-08-13 RX ADMIN — HYDROCHLOROTHIAZIDE 25 MG: 25 TABLET ORAL at 08:37

## 2020-08-13 RX ADMIN — OXYCODONE HYDROCHLORIDE 10 MG: 5 TABLET ORAL at 13:18

## 2020-08-13 RX ADMIN — CYCLOBENZAPRINE 10 MG: 10 TABLET, FILM COATED ORAL at 08:37

## 2020-08-13 RX ADMIN — GABAPENTIN 1200 MG: 300 CAPSULE ORAL at 08:36

## 2020-08-13 RX ADMIN — CYCLOBENZAPRINE 10 MG: 10 TABLET, FILM COATED ORAL at 22:06

## 2020-08-13 RX ADMIN — OXYCODONE HYDROCHLORIDE 10 MG: 5 TABLET ORAL at 08:37

## 2020-08-13 RX ADMIN — Medication 5 ML: at 12:31

## 2020-08-13 RX ADMIN — CYCLOBENZAPRINE 10 MG: 10 TABLET, FILM COATED ORAL at 17:52

## 2020-08-13 RX ADMIN — ATORVASTATIN CALCIUM 40 MG: 40 TABLET, FILM COATED ORAL at 22:06

## 2020-08-13 NOTE — PROVIDER NOTIFICATION
MD Notification    Notified Person: MD    Notified Person Name: Kvng Lopez     Notification Date/Time: 08/12/20 8:48 PM     Notification Interaction: webpage    Purpose of Notification: FYI hospitalist not following patient, but surgery wanted me to contact regarding situation. Roomate just tested COVID-19 positive, need orders to test and possibly transfer. Thanks.    Orders Received: COVID-19 swab ordered. House NP to notify patient of situation.    Comments:

## 2020-08-13 NOTE — PLAN OF CARE
A&Ox4. Clear liquid diet, tolerating well. Montesinos removed this AM. VSS on RA, refused CAPNO retirement through the night. Some pain with movement, given PRN dilaudid and oxycodone with relief. Incisions covered, CDI. CMS intact. Pulses 1+. Abdominal binder when OOB. Poor IV access, possible port access today. Able to dangle at edge of bed with some pain, good strength throughout. Patient was roommate with COVID + patient, test collected and sent to lab. Continue to monitor.

## 2020-08-13 NOTE — PLAN OF CARE
Pt alert and oriented, up with stand by assist, abd incisions clean dry and intact, abd binder in place for comfort, oral pain medications given with good results, tolerates diet, good urine output, passing flatus, lungs clear, O2 sats mid 90's on room air, port accessed this shift, hep locked

## 2020-08-13 NOTE — UTILIZATION REVIEW
Concurrent stay review; Secondary Review Determination    Under the authority of the Utilization Management Committee, the utilization review process indicated a secondary review on the above patient. The review outcome is based on review of the medical records, discussions with staff, and applying clinical experience noted on the date of the review.    (x) Ootpatient Status Appropriate - Concurrent stay review        RATIONALE FOR DETERMINATION: 75-year-old female postop day #1 for incisional ventral hernia repair and right lower quadrant ostomy site hernia repair with mesh.  Patient doing well tolerating oral diet and oral pain medications.    Patient is clinically improving and there is no clear indication to change patient's status to inpatient. The severity of illness, intensity of service provided, expected LOS and risk for adverse outcome make the care appropriate for observation.    This document was produced using voice recognition software    The information on this document is developed by the utilization review team in order for the business office to ensure compliance. This only denotes the appropriateness of proper admission status and does not reflect the quality of care rendered.    The definitions of Inpatient Status and Observation Status used in making the determination above are those provided in the CMS Coverage Manual, Chapter 1 and Chapter 6, section 70.4.    Sincerely,    Arsh Monaco MD  Utilization Review  Physician Advisor  [facility].

## 2020-08-13 NOTE — SIGNIFICANT EVENT
Brief house NP note:    Patient was in semi-private room with a roommate that turned out to be COVID-19 positive. I spoke with our infection prevention point of contact, Kwame, and was instructed to notify patient of exposure. Patient was notified of the exposure, we will obtain a second COVID-19 swab, patient is technically a PUI.     I instructed the patient that she should quarantine for 14 days starting today when she eventually leaves the hospital. Patient was instructed to notify nursing staff if she would like to speak to patient relations about this whole situation.     Patient verbalized understanding of exposure and need for self quarantine. Patient asked if I could update one of her sons in regards to the exposure. I spoke with Alban, her son, who was very understanding.     Please page with additional concerns,    ILENE Eisenberg, CNP  Hospitalist - [facility]  Text Page  [phone])

## 2020-08-13 NOTE — PROGRESS NOTES
"SURGERY PROGRESS NOTE    Subjective/Interval events:  Doing well this morning. Pain at rest is tolerable, it's worse when she moves, coughs, laughs, etc. The meds help quite a bit. Her IV isn't working super well and she'd prefer to avoid another one, so she would be willing to try without IV pain meds if it stopped working. Burping some, not passing gas or having BMs. Montesinos was removed earlier, she has to void now. Has not been out of bed yet.    Objective:  /63 (BP Location: Right arm)   Pulse 91   Temp 97.7  F (36.5  C) (Oral)   Resp 16   Ht 1.549 m (5' 1\")   Wt 59.8 kg (131 lb 12.8 oz)   SpO2 97%   BMI 24.90 kg/m    General: The patient is alert and oriented. Appropriate. No acute distress  Respiratory: Non-labored breathing with symmetric chest rise on room air   GI: Abdomen soft, non-distended, appropriately tender to light palpation along incisions.   Extremities: No edema noted.    Skin/incision: No skin lesions on exposure skin. Surgical site dressings c/d/I.     ASSESSMENT/PLAN:  POD#1 incisional ventral hernia and RLQ ostomy site hernia repair with mesh (combined lap and open). Doing well. Pain is tolerable and she would prefer trying PO meds today if it means avoiding replacing her IV.   - Full liquid diet  - start miralax scheduled today  - Montesinos out, monitor urine output  - scheduled tylenol, flexeril  - PRN oxycodone and IV dilaudid  - Prefer to avoid using her port for medication administration, patient willing to try going without IV dilaudid  - Up to chair today, walking encouraged  - Encourage IS    Dagoberto Barton MD  General Surgery Resident, PGY-4  [phone]    "

## 2020-08-14 VITALS
SYSTOLIC BLOOD PRESSURE: 115 MMHG | HEART RATE: 91 BPM | RESPIRATION RATE: 16 BRPM | OXYGEN SATURATION: 91 % | HEIGHT: 61 IN | TEMPERATURE: 97.7 F | BODY MASS INDEX: 24.88 KG/M2 | DIASTOLIC BLOOD PRESSURE: 63 MMHG | WEIGHT: 131.8 LBS

## 2020-08-14 LAB — GLUCOSE BLDC GLUCOMTR-MCNC: 94 MG/DL (ref 70–99)

## 2020-08-14 PROCEDURE — 82962 GLUCOSE BLOOD TEST: CPT

## 2020-08-14 PROCEDURE — 25000132 ZZH RX MED GY IP 250 OP 250 PS 637: Mod: GY | Performed by: SURGERY

## 2020-08-14 PROCEDURE — 25000132 ZZH RX MED GY IP 250 OP 250 PS 637: Mod: GY

## 2020-08-14 PROCEDURE — 25000128 H RX IP 250 OP 636: Performed by: SURGERY

## 2020-08-14 RX ORDER — OXYCODONE HYDROCHLORIDE 5 MG/1
5-10 TABLET ORAL EVERY 4 HOURS PRN
Qty: 40 TABLET | Refills: 0 | Status: SHIPPED | OUTPATIENT
Start: 2020-08-14 | End: 2021-01-25 | Stop reason: SINTOL

## 2020-08-14 RX ORDER — CYCLOBENZAPRINE HCL 5 MG
5 TABLET ORAL 3 TIMES DAILY PRN
Qty: 20 TABLET | Refills: 0 | Status: SHIPPED | OUTPATIENT
Start: 2020-08-14 | End: 2021-02-22

## 2020-08-14 RX ORDER — BISACODYL 10 MG
10 SUPPOSITORY, RECTAL RECTAL DAILY PRN
Qty: 5 SUPPOSITORY | Refills: 0 | Status: SHIPPED | OUTPATIENT
Start: 2020-08-14 | End: 2021-01-25

## 2020-08-14 RX ORDER — AMOXICILLIN 250 MG
1 CAPSULE ORAL 2 TIMES DAILY
Qty: 60 TABLET | Refills: 0 | Status: SHIPPED | OUTPATIENT
Start: 2020-08-14 | End: 2021-01-25

## 2020-08-14 RX ORDER — POLYETHYLENE GLYCOL 3350 17 G/17G
17 POWDER, FOR SOLUTION ORAL 2 TIMES DAILY
Status: DISCONTINUED | OUTPATIENT
Start: 2020-08-14 | End: 2020-08-14 | Stop reason: HOSPADM

## 2020-08-14 RX ORDER — BISACODYL 10 MG
10 SUPPOSITORY, RECTAL RECTAL ONCE
Status: COMPLETED | OUTPATIENT
Start: 2020-08-14 | End: 2020-08-14

## 2020-08-14 RX ORDER — AMOXICILLIN 250 MG
2 CAPSULE ORAL 2 TIMES DAILY
Status: DISCONTINUED | OUTPATIENT
Start: 2020-08-14 | End: 2020-08-14 | Stop reason: HOSPADM

## 2020-08-14 RX ORDER — OXYCODONE HYDROCHLORIDE 5 MG/1
5-10 TABLET ORAL EVERY 4 HOURS PRN
Qty: 30 TABLET | Refills: 0 | Status: SHIPPED | OUTPATIENT
Start: 2020-08-14 | End: 2020-08-14

## 2020-08-14 RX ORDER — POLYETHYLENE GLYCOL 3350 17 G/17G
1 POWDER, FOR SOLUTION ORAL DAILY
Qty: 20 PACKET | Refills: 0 | Status: SHIPPED | OUTPATIENT
Start: 2020-08-14 | End: 2021-01-25

## 2020-08-14 RX ORDER — CALCIUM CARBONATE 500 MG/1
1000 TABLET, CHEWABLE ORAL EVERY 4 HOURS PRN
Status: DISCONTINUED | OUTPATIENT
Start: 2020-08-14 | End: 2020-08-14 | Stop reason: HOSPADM

## 2020-08-14 RX ADMIN — NICOTINE 1 PATCH: 21 PATCH, EXTENDED RELEASE TRANSDERMAL at 08:00

## 2020-08-14 RX ADMIN — AMLODIPINE BESYLATE 10 MG: 10 TABLET ORAL at 08:01

## 2020-08-14 RX ADMIN — CYCLOBENZAPRINE 10 MG: 10 TABLET, FILM COATED ORAL at 15:37

## 2020-08-14 RX ADMIN — LOSARTAN POTASSIUM 100 MG: 100 TABLET, FILM COATED ORAL at 08:01

## 2020-08-14 RX ADMIN — CYCLOBENZAPRINE 10 MG: 10 TABLET, FILM COATED ORAL at 08:01

## 2020-08-14 RX ADMIN — GABAPENTIN 1200 MG: 300 CAPSULE ORAL at 07:59

## 2020-08-14 RX ADMIN — HYDROCHLOROTHIAZIDE 25 MG: 25 TABLET ORAL at 08:01

## 2020-08-14 RX ADMIN — HEPARIN SODIUM (PORCINE) LOCK FLUSH IV SOLN 100 UNIT/ML 5 ML: 100 SOLUTION at 15:57

## 2020-08-14 RX ADMIN — ACETAMINOPHEN 975 MG: 325 TABLET ORAL at 00:40

## 2020-08-14 RX ADMIN — CALCIUM CARBONATE (ANTACID) CHEW TAB 500 MG 1000 MG: 500 CHEW TAB at 10:30

## 2020-08-14 RX ADMIN — OXYCODONE HYDROCHLORIDE 10 MG: 5 TABLET ORAL at 00:40

## 2020-08-14 RX ADMIN — POLYETHYLENE GLYCOL 3350 17 G: 17 POWDER, FOR SOLUTION ORAL at 10:24

## 2020-08-14 RX ADMIN — BISACODYL 10 MG: 10 SUPPOSITORY RECTAL at 13:05

## 2020-08-14 RX ADMIN — DOCUSATE SODIUM 50 MG AND SENNOSIDES 8.6 MG 2 TABLET: 8.6; 5 TABLET, FILM COATED ORAL at 10:24

## 2020-08-14 RX ADMIN — ACETAMINOPHEN 975 MG: 325 TABLET ORAL at 08:01

## 2020-08-14 NOTE — DISCHARGE INSTRUCTIONS
[facility]  Discharge Instructions: Post-Operative Abdominal Surgery    ACTIVITY    Increase your activity gradually.  Avoid strenuous physical activity or heavy lifting greater than 15 lbs. for 4 weeks.  You may climb stairs.    You may drive without restrictions when you are not using any prescription pain medication and comfortable in a car.    You may return to work/school when you are comfortable without any prescription pain medication.    WOUND CARE    You may shower after the surgery.  Pat your incisions dry and leave open to air.  Re-apply dressing (Band-aid or gauze/tape) as needed for drainage.    You may have steri-strips (looks like tape) or Dermabond (looks like glue) on your incision.  Leave it alone, it will peel up and fall off on its own.  If you have staples, they will be removed at your next office visit.    Do not soak your incisions in a tub or pool for 2 weeks.     A lump or ridge under the incision is normal and will gradually resolve.    DIET  :      Avoid heavy, spicy, greasy meals and gas forming foods.     You may find your appetite to be diminished briefly after surgery.  You may take nutritional supplement shakes if you are able.     Drinks plenty of fluids to stay hydrated.    PAIN    Expect some tenderness and discomfort at the incision site(s).  Use the prescribed pain medication at your discretion.  Expect gradual resolution of your pain over several days.    Do not drink alcohol or drive while you are taking pain medications.    You may apply ice to your incisions in 20 minute intervals as needed for the next 24-48 hours.  After that time, consider switching to heat if you prefer.    RETURN APPOINTMENT    Follow up with your surgeon in 10 days.  Please call the office at [phone] to schedule your appointment.  We are located at [address] [place] [address].    CALL OUR OFFICE IF YOU HAVE [phone]:     Chills or  fever above 101.5 F.    Increased redness or drainage at your incisions.    Significant bleeding.    Pain not relieved by your pain medication or rest.    Increasing pain after the first 48 hours.    Any other concerns or questions.    HELPFUL HINTS    Pain medications can cause constipation.  Limit use when possible.  Take over the counter stool softener/stimulant, such as Colace or Senna, with plenty of water.  You may take a mild over the counter laxative, such as Miralax or a suppository, as needed.    For laparoscopic surgery, you may have shoulder or upper back discomfort due to the gas used in surgery.  This is temporary and should resolve in 48-72 hours.  Short, frequent walks may help with this.                                                                                                                                  Revised August 2017

## 2020-08-14 NOTE — PROGRESS NOTES
Patient given outpatient observation brochure. Explained brochure and answered questions. Patient identifies no needs for discharge and has the help of her sons if needed.

## 2020-08-14 NOTE — DISCHARGE SUMMARY
General Surgery Service Discharge Summary:     NAME: Anna Dyer   MRN: [id number]   : 1945   PCP: Tello Finney    DATE OF ADMISSION: 2020       Procedure/Surgery Information   Procedure: Procedure(s):  COMBINED OPEN AND LAPAROSCOPIC VENTRAL HERNIA REPAIR   Surgeon(s): Surgeon(s) and Role:     * Maddy Gonzales MD - Primary     * Dagoberto Barton MD - Resident - Assisting   Specimens: * No specimens in log *         PRE/POSTOPERATIVE DIAGNOSES:    Incisional ventral hernia, reducible    PROCEDURES PERFORMED, 2020:   Combined open and laparoscopic ventral hernia repair with mesh    INTRAOPERATIVE FINDINGS:   Minimal adhesions encountered. Multiple fascial defects including large midline defect and prior ostomy defect with smaller surrounding swiss cheese defects along the midline.    POSTOPERATIVE COMPLICATIONS: None     DATE OF DISCHARGE: 2020    ADMISSION HPI (from 2020):   Anna is a 75yof  with h/o metastatic colon cancer with stable liver metastasis on maintenance chemotherapy. She was diagnosed based on a positive fecal occult blood test and a colonoscopy  in 2017. Imaging demonstrated multiple bilateral liver lesions. She was treated with nonoperative management and started on chemotherapy. She did well for quite some time, but surveillance colonoscopies demonstrated progressive dilatation proximal to the tumor and she underwent a laparoscopic-assisted resection of the primary and an end transverse colostomy in the right lower quadrant with Dr. Maddie Baron on 2018 .  She did return to her chemotherapy and has been on continuous chemotherapy. She had takedown of her colostomy with Dr. Caba on 3/29/2019 and at that time had no evidence of peritoneal disease and colonoscopy did not reveal any polyps or masses. She had a parastomal hernia which was repaired primarily.  She had some nodules along the white line of toldt which were excised and negative.       She developed a hernia at her midline incision as well as at her prior right sided ostomy site. The hernia bothers her constantly. She had been previously scheduled for hernia repair but with COVID this was cancelled. She also had not quit smoking and therefore her procedure also cancelled. We met in clinic and discussed options. She ensured me she had quit smoking and would not smoke for a 6 week period prior to her hernia repair. She understands the significant perioperative risks associated with repair of her hernia and choose to proceed.     HOSPITAL COURSE: Anna Dyer is a 75 year old female who was admitted on 8/12/2020 and underwent the above-named procedures.  She tolerated the procedure well and postoperatively was transferred to the general post-surgical unit.  The remainder of her course was essentiallly uncomplicated. Her initial post-op room was a dual room and her roommate was found to be covid19 positive. The patient was switched from her room to a different room and retested again negative for covid19. She was instructed to self-quarantine for 14 after discharge, and if she develops any covid19 symptoms (given a brochure), she should seek testing. Her pain was well controlled with oral medications on POD#1. Montesinos was removed and she was voiding fine. She was tolerating full liquid diet and ambulating in the halls. POD#2 she continued to improve. She was given a miralax, senna/colace and a suppository without flatus or BM. We discussed at length how she should recover bowel function and it's not an absolute before discharging. She was comfortable with discharging and administering suppositories herself. She was instructed that if she became nauseated or vomited, she should call the clinic.  On 8/14/2020, she was discharged to home in stable condition.      Time Spent on this Encounter   I, Dagoberto Barton MD, personally saw the patient today and spent less than or equal to 30 minutes  "discharging this patient.    Physical Exam:  /63 (BP Location: Right arm)   Pulse 91   Temp 97.7  F (36.5  C) (Oral)   Resp 16   Ht 1.549 m (5' 1\")   Wt 59.8 kg (131 lb 12.8 oz)   SpO2 91%   BMI 24.90 kg/m    General: The patient is alert and oriented. Appropriate. No acute distress  Respiratory: Non-labored breathing with symmetric chest rise on room air   GI: Abdomen soft, non-distended, appropriately tender to light palpation along incisions.   Extremities: No edema noted.    Skin/incision: No skin lesions on exposure skin. Surgical dressings removed.    PAST MEDICAL HISTORY:  Past Medical History:   Diagnosis Date     Cancer (H)     metastatic colorectal adenocarcinoma     Hypertension     No cardiologist     RLS (restless legs syndrome)        PAST SURGICAL HISTORY:  Past Surgical History:   Procedure Laterality Date     BIOPSY      liver     BREAST SURGERY      breast implants     COLONOSCOPY N/A 3/28/2019    Procedure: INTRAOPERATIVE COLONOSCOPY,;  Surgeon: Jesus Caba MD;  Location:  OR     COSMETIC SURGERY      facelift and tummy tuck     GYN SURGERY      tubal     INSERT PORT VASCULAR ACCESS N/A 3/24/2017    Procedure: INSERT PORT VASCULAR ACCESS;  Surgeon: Yemi Ordaz MD;  Location:  OR     IR LOWER EXTREMITY ANGIOGRAM BILATERAL  3/12/2020     LAPAROSCOPIC ASSISTED COLECTOMY LEFT (DESCENDING) N/A 1/25/2018    Procedure: LAPAROSCOPIC ASSISTED COLECTOMY LEFT (DESCENDING);;  Surgeon: Maddie Baron MD;  Location: [facility] OR     LAPAROSCOPIC ASSISTED COLOSTOMY TAKEDOWN N/A 3/28/2019    Procedure: LAPAROSCOPIC ASSISTED COLOSTOMY REVERSAL;  Surgeon: Jesus Caba MD;  Location: [facility] OR     LAPAROSCOPY DIAGNOSTIC (GENERAL) N/A 3/28/2019    Procedure: DIAGNOSTIC LAPAROSCOPY;  Surgeon: Jesus Caba MD;  Location:  OR     LAPAROTOMY EXPLORATORY N/A 1/25/2018    Procedure: LAPAROTOMY EXPLORATORY;  exploratory laparoscopy, left hemicolectomy, transverse colostomy;  Surgeon: " Loraine, Maddie Alcantara MD;  Location:  OR       Labs:  Recent Labs   Lab Test 03/12/20  0700 04/03/19  0615 04/02/19  1630 04/01/19  0845   WBC 8.9  --   --  5.5   RBC 4.46  --   --  3.32*   HGB 14.2  --  11.8 11.2*   HCT 41.0  --   --  32.1*   MCV 92  --   --  97   MCH 31.8  --   --  33.7*   MCHC 34.6  --   --  34.9    148*  --  153     Recent Labs   Lab Test 04/03/19  0400 04/02/19  0555 04/01/19  1700 04/01/19  0845 03/31/19  0627   POTASSIUM 4.0 3.2* 3.5 3.2* 3.5   CHLORIDE  --  101  --  97 99   BUN  --  7  --  7 7       DISCHARGE INSTRUCTIONS:  Discharge Orders      Reason for your hospital stay    Have hernia surgery on your abdomen     Activity    Your activity upon discharge:   No lifting > 15 lbs or strenuous activity for 6 weeks     Wound care and dressings    The steristrips underneath will fall off on their own in about one week.     Follow-up and recommended labs and tests     Please call [phone] to schedule a follow up visit in 2 weeks with Dr. Gonzales     Full Code     Diet    Follow this diet upon discharge: resume previous diet     Discharge Medications   Discharge Medication List as of 8/14/2020  4:50 PM      START taking these medications    Details   bisacodyl (DULCOLAX) 10 MG suppository Place 1 suppository (10 mg) rectally daily as needed for constipation, Disp-5 suppository,R-0, Local Print      cyclobenzaprine (FLEXERIL) 5 MG tablet Take 1 tablet (5 mg) by mouth 3 times daily as needed for muscle spasms, Disp-20 tablet,R-0, Local Print      polyethylene glycol (MIRALAX) 17 g packet Take 17 g by mouth daily, Disp-20 packet,R-0, Local PrintTake while you are using narcotic pain meds      senna-docusate (SENOKOT-S/PERICOLACE) 8.6-50 MG tablet Take 1 tablet by mouth 2 times daily, Disp-60 tablet,R-0, Local PrintTake while you are using oxycodone         CONTINUE these medications which have CHANGED    Details   oxyCODONE (ROXICODONE) 5 MG tablet Take 1-2 tablets (5-10 mg) by mouth  every 4 hours as needed for severe pain, Disp-40 tablet,R-0, E-Prescribe         CONTINUE these medications which have NOT CHANGED    Details   acetaminophen (TYLENOL) 500 MG tablet Take 1,000 mg by mouth every 6 hours as needed for mild pain, Historical      amLODIPine (NORVASC) 5 MG tablet TAKE 2 TABLETS [=10MG]     DAILY, Disp-180 tablet,R-0, E-Prescribe      aspirin (ASA) 81 MG tablet Take 1 tablet (81 mg) by mouth daily, OTC      atorvastatin (LIPITOR) 40 MG tablet Take 1 tablet (40 mg) by mouth At Bedtime, Disp-90 tablet,R-3, E-Prescribe      Ergocalciferol (VITAMIN D2 PO) Take 60 mcg by mouth every morning, Historical      gabapentin (NEURONTIN) 400 MG capsule Take 1,200 mg by mouth every morning (takes 3 X 400 mg = 1,200 mg dose), Historical      Homeopathic Products (LEG CRAMP RELIEF PO) Take 2-3 tablets by mouth daily as needed (leg cramps) , Historical      hydrochlorothiazide (HYDRODIURIL) 25 MG tablet Take 1 tablet (25 mg) by mouth daily, Disp-90 tablet,R-1, E-Prescribe      lidocaine-prilocaine (EMLA) 2.5-2.5 % external cream Apply topically daily as needed (Apply one hour prior to port acccess) Historical      LORazepam (ATIVAN) 0.5 MG tablet Take 0.5 mg by mouth nightly as needed for sleep As needed for sleep, R-1, Historical      losartan (COZAAR) 100 MG tablet Take 1 tablet (100 mg) by mouth daily, Disp-90 tablet,R-1, E-Prescribe      naproxen sodium (ANAPROX) 220 MG tablet Take 440 mg by mouth daily as needed for moderate pain, Historical      nicotine (NICODERM CQ) 21 MG/24HR 24 hr patch Place 1 patch onto the skin every 24 hours, Historical      Triprolidine-Pseudoephedrine (EQ ANTIHISTABS PO) Take 1 Dose by mouth daily as needed, Historical           Allergies   Allergies   Allergen Reactions     Lactose GI Disturbance     [facility] Kamari Barton MD  Surgery PGY4

## 2020-08-14 NOTE — PROVIDER NOTIFICATION
Spoke with Dr. Barton regarding whether or not the pt could go home despite not passing gas and not having a BM after stool softeners, Miralax and Doculax suppository being given per MD orders.  Dr. Barton to consult with Dr. Gonzales and get back to this RN.

## 2020-08-14 NOTE — PLAN OF CARE
Pt is A&Ox4, VSS, on RA, PRN oxycodone for pain management. Midline incision dsg CDI. Lap sites covered with steri-strips and band-aids, are CDI, ABD binder on. LS clear, BS+ hypoactive, flatus+, voiding adequately. Pt is up independently in room, on full liquid diet, denies N/V.

## 2020-08-15 NOTE — PROGRESS NOTES
Pt discharged to home per MD orders.  Dr. Barton ok with the pt going home despite not having a BM or passing gas prior to discharge.  Pt stated she understood discharge instructions including medication instructions.  Pt's son to drive the pt home.  Pt packed her own belongings and dressed herself.  Pt's port was de accessed by IV team prior to discharge.

## 2020-08-25 ENCOUNTER — TELEPHONE (OUTPATIENT)
Dept: SURGERY | Facility: CLINIC | Age: 75
End: 2020-08-25

## 2020-08-25 NOTE — TELEPHONE ENCOUNTER
[facility]  Post op call note - Laparoscopic Ventral Hernia Repair    Anna Dyer was called for an update regarding her recovery.  She underwent a laparoscopicventral hernia repair by Dr. Gonzales on 8/12/20.  Today she tells me she is doing well and denies any complaints.  She is eating a normal diet and her bowels are regular.  She states her wounds are healing well.  The patient denies fever/chills, n/v/d, abdominal pain, changes in urination or BM, or wound concerns.  She denies any symptoms of COVID19.    She was instructed to remove steri strips as needed as they are not really falling off yet, and continue to keep wounds clean.  She was advised to advance her activity as tolerated with no heavy lifting > 20 lbs or strenuous exercise for up to 6 weeks.  She can wear the abdominal binder as needed for comfort. The patient states all of her questions were answered and she understands our discussion.  She agrees to follow up as needed and to call our office with any concerns.    Dagoberto Barton MD  General Surgery Resident, PGY-4

## 2020-08-26 ENCOUNTER — TRANSFERRED RECORDS (OUTPATIENT)
Dept: HEALTH INFORMATION MANAGEMENT | Facility: CLINIC | Age: 75
End: 2020-08-26

## 2020-08-26 DIAGNOSIS — I10 ESSENTIAL HYPERTENSION WITH GOAL BLOOD PRESSURE LESS THAN 140/90: ICD-10-CM

## 2020-08-27 RX ORDER — AMLODIPINE BESYLATE 5 MG/1
TABLET ORAL
Qty: 180 TABLET | Refills: 2 | Status: SHIPPED | OUTPATIENT
Start: 2020-08-27 | End: 2021-06-14

## 2020-08-27 RX ORDER — LOSARTAN POTASSIUM 100 MG/1
TABLET ORAL
Qty: 90 TABLET | Refills: 1 | Status: SHIPPED | OUTPATIENT
Start: 2020-08-27 | End: 2020-10-13

## 2020-08-27 RX ORDER — HYDROCHLOROTHIAZIDE 25 MG/1
TABLET ORAL
Qty: 90 TABLET | Refills: 1 | Status: SHIPPED | OUTPATIENT
Start: 2020-08-27 | End: 2020-10-13

## 2020-09-09 ENCOUNTER — TRANSFERRED RECORDS (OUTPATIENT)
Dept: HEALTH INFORMATION MANAGEMENT | Facility: CLINIC | Age: 75
End: 2020-09-09

## 2020-09-23 ENCOUNTER — TRANSFERRED RECORDS (OUTPATIENT)
Dept: HEALTH INFORMATION MANAGEMENT | Facility: CLINIC | Age: 75
End: 2020-09-23

## 2020-10-07 ENCOUNTER — TRANSFERRED RECORDS (OUTPATIENT)
Dept: HEALTH INFORMATION MANAGEMENT | Facility: CLINIC | Age: 75
End: 2020-10-07

## 2020-10-13 ENCOUNTER — TELEPHONE (OUTPATIENT)
Dept: INTERNAL MEDICINE | Facility: CLINIC | Age: 75
End: 2020-10-13

## 2020-10-13 DIAGNOSIS — I10 ESSENTIAL HYPERTENSION WITH GOAL BLOOD PRESSURE LESS THAN 140/90: ICD-10-CM

## 2020-10-13 RX ORDER — LOSARTAN POTASSIUM 100 MG/1
100 TABLET ORAL DAILY
Qty: 90 TABLET | Refills: 1 | Status: SHIPPED | OUTPATIENT
Start: 2020-10-13 | End: 2021-02-03

## 2020-10-13 RX ORDER — HYDROCHLOROTHIAZIDE 25 MG/1
25 TABLET ORAL DAILY
Qty: 90 TABLET | Refills: 1 | Status: ON HOLD | OUTPATIENT
Start: 2020-10-13 | End: 2021-02-26

## 2020-10-13 NOTE — TELEPHONE ENCOUNTER
Pt called to say she needs refill of the losartan (COZAAR) 100 MG tablet and hydrochlorothiazide (HYDRODIURIL) 25 MG tablet. It looks like we sent in 6 months worth on 8/27/20. Please call [facility] to see what is going on. Pt has about 2 wks of the Losartan left.  Pharmacy phone [phone]

## 2020-10-13 NOTE — TELEPHONE ENCOUNTER
"Both meds re-sent to [facility].    Unsure what the problem is, both meds had the following:  \"E-Prescribing Status: Receipt confirmed by pharmacy (8/27/2020  7:53 PM CDT).\"  "

## 2020-10-21 ENCOUNTER — TRANSFERRED RECORDS (OUTPATIENT)
Dept: HEALTH INFORMATION MANAGEMENT | Facility: CLINIC | Age: 75
End: 2020-10-21

## 2020-11-04 ENCOUNTER — TRANSFERRED RECORDS (OUTPATIENT)
Dept: HEALTH INFORMATION MANAGEMENT | Facility: CLINIC | Age: 75
End: 2020-11-04

## 2020-11-11 ENCOUNTER — TRANSFERRED RECORDS (OUTPATIENT)
Dept: HEALTH INFORMATION MANAGEMENT | Facility: CLINIC | Age: 75
End: 2020-11-11

## 2020-11-30 ENCOUNTER — OFFICE VISIT (OUTPATIENT)
Dept: INTERNAL MEDICINE | Facility: CLINIC | Age: 75
End: 2020-11-30
Payer: MEDICARE

## 2020-11-30 VITALS
HEART RATE: 58 BPM | OXYGEN SATURATION: 97 % | RESPIRATION RATE: 15 BRPM | WEIGHT: 122.4 LBS | DIASTOLIC BLOOD PRESSURE: 72 MMHG | HEIGHT: 61 IN | BODY MASS INDEX: 23.11 KG/M2 | TEMPERATURE: 97.5 F | SYSTOLIC BLOOD PRESSURE: 130 MMHG

## 2020-11-30 DIAGNOSIS — H61.23 BILATERAL IMPACTED CERUMEN: Primary | ICD-10-CM

## 2020-11-30 PROCEDURE — 69210 REMOVE IMPACTED EAR WAX UNI: CPT | Performed by: PHYSICIAN ASSISTANT

## 2020-11-30 ASSESSMENT — MIFFLIN-ST. JEOR: SCORE: 992.58

## 2020-11-30 NOTE — PROGRESS NOTES
"Subjective     Anna Dyer is a 75 year old female who presents to clinic today for the following health issues:    HPI         Concern - Impacted Cerumen  Onset: Chronic   Description: Decreased hearing R>L   Intensity: moderate  Progression of Symptoms:  worsening  Accompanying Signs & Symptoms: None   Previous history of similar problem: Patient states she gets her ears irrigated every few yrs   Precipitating factors:        Worsened by: Chemo- painful   Therapies tried and outcome: Debrox  Seems a little better this afternoon    Last ear lavage in 6/2020        Review of Systems   Constitutional, HEENT, cardiovascular, pulmonary, gi and gu systems are negative, except as otherwise noted.      Objective    /72   Pulse 58   Temp 97.5  F (36.4  C) (Temporal)   Resp 15   Ht 1.549 m (5' 1\")   Wt 55.5 kg (122 lb 6.4 oz)   SpO2 97%   BMI 23.13 kg/m    Body mass index is 23.13 kg/m .  Physical Exam   GENERAL: healthy, alert and no distress  HENT: normal cephalic/atraumatic, right ear: small piece of cerum removed with ear loop and left ear: small piece of cerumen removed with ear loop   SKIN: no suspicious lesions or rashes            Assessment & Plan     Bilateral impacted cerumen  Removed cerumen as above only small amount   Reassurance given   She felt better.               Return in about 4 weeks (around 12/28/2020) for annual wellness exam, regular primary provider.    DEMARCUS Heard [facility]    "

## 2020-12-02 ENCOUNTER — TRANSFERRED RECORDS (OUTPATIENT)
Dept: HEALTH INFORMATION MANAGEMENT | Facility: CLINIC | Age: 75
End: 2020-12-02

## 2020-12-17 ENCOUNTER — TRANSFERRED RECORDS (OUTPATIENT)
Dept: HEALTH INFORMATION MANAGEMENT | Facility: CLINIC | Age: 75
End: 2020-12-17

## 2020-12-28 ENCOUNTER — OFFICE VISIT (OUTPATIENT)
Dept: INTERNAL MEDICINE | Facility: CLINIC | Age: 75
End: 2020-12-28
Payer: MEDICARE

## 2020-12-28 VITALS
OXYGEN SATURATION: 99 % | HEART RATE: 72 BPM | BODY MASS INDEX: 24.26 KG/M2 | TEMPERATURE: 97.5 F | DIASTOLIC BLOOD PRESSURE: 82 MMHG | WEIGHT: 128.5 LBS | RESPIRATION RATE: 16 BRPM | SYSTOLIC BLOOD PRESSURE: 136 MMHG | HEIGHT: 61 IN

## 2020-12-28 DIAGNOSIS — I73.9 PAD (PERIPHERAL ARTERY DISEASE) (H): ICD-10-CM

## 2020-12-28 DIAGNOSIS — C78.7 COLON CANCER METASTASIZED TO LIVER (H): ICD-10-CM

## 2020-12-28 DIAGNOSIS — Z01.818 PREOP GENERAL PHYSICAL EXAM: Primary | ICD-10-CM

## 2020-12-28 DIAGNOSIS — J43.9 PULMONARY EMPHYSEMA, UNSPECIFIED EMPHYSEMA TYPE (H): ICD-10-CM

## 2020-12-28 DIAGNOSIS — C18.9 COLON CANCER METASTASIZED TO LIVER (H): ICD-10-CM

## 2020-12-28 DIAGNOSIS — I10 ESSENTIAL HYPERTENSION WITH GOAL BLOOD PRESSURE LESS THAN 140/90: ICD-10-CM

## 2020-12-28 PROBLEM — Z72.0 TOBACCO ABUSE: Status: RESOLVED | Noted: 2018-01-19 | Resolved: 2020-12-28

## 2020-12-28 LAB
ALBUMIN SERPL-MCNC: 3.9 G/DL (ref 3.4–5)
ALP SERPL-CCNC: 86 U/L (ref 40–150)
ALT SERPL W P-5'-P-CCNC: 17 U/L (ref 0–50)
ANION GAP SERPL CALCULATED.3IONS-SCNC: 6 MMOL/L (ref 3–14)
AST SERPL W P-5'-P-CCNC: 25 U/L (ref 0–45)
BILIRUB SERPL-MCNC: 0.6 MG/DL (ref 0.2–1.3)
BUN SERPL-MCNC: 28 MG/DL (ref 7–30)
CALCIUM SERPL-MCNC: 10 MG/DL (ref 8.5–10.1)
CHLORIDE SERPL-SCNC: 106 MMOL/L (ref 94–109)
CO2 SERPL-SCNC: 26 MMOL/L (ref 20–32)
CREAT SERPL-MCNC: 0.75 MG/DL (ref 0.52–1.04)
ERYTHROCYTE [DISTWIDTH] IN BLOOD BY AUTOMATED COUNT: 15.7 % (ref 10–15)
GFR SERPL CREATININE-BSD FRML MDRD: 78 ML/MIN/{1.73_M2}
GLUCOSE SERPL-MCNC: 91 MG/DL (ref 70–99)
HCT VFR BLD AUTO: 42.4 % (ref 35–47)
HGB BLD-MCNC: 13.4 G/DL (ref 11.7–15.7)
INR PPP: 0.89 (ref 0.86–1.14)
MCH RBC QN AUTO: 31 PG (ref 26.5–33)
MCHC RBC AUTO-ENTMCNC: 31.6 G/DL (ref 31.5–36.5)
MCV RBC AUTO: 98 FL (ref 78–100)
PLATELET # BLD AUTO: 147 10E9/L (ref 150–450)
POTASSIUM SERPL-SCNC: 4.2 MMOL/L (ref 3.4–5.3)
PROT SERPL-MCNC: 7.9 G/DL (ref 6.8–8.8)
RBC # BLD AUTO: 4.32 10E12/L (ref 3.8–5.2)
SODIUM SERPL-SCNC: 138 MMOL/L (ref 133–144)
WBC # BLD AUTO: 6.6 10E9/L (ref 4–11)

## 2020-12-28 PROCEDURE — 85027 COMPLETE CBC AUTOMATED: CPT | Performed by: INTERNAL MEDICINE

## 2020-12-28 PROCEDURE — 99215 OFFICE O/P EST HI 40 MIN: CPT | Performed by: INTERNAL MEDICINE

## 2020-12-28 PROCEDURE — 36415 COLL VENOUS BLD VENIPUNCTURE: CPT | Performed by: INTERNAL MEDICINE

## 2020-12-28 PROCEDURE — 80053 COMPREHEN METABOLIC PANEL: CPT | Performed by: INTERNAL MEDICINE

## 2020-12-28 PROCEDURE — 85610 PROTHROMBIN TIME: CPT | Performed by: INTERNAL MEDICINE

## 2020-12-28 ASSESSMENT — MIFFLIN-ST. JEOR: SCORE: 1020.25

## 2020-12-28 NOTE — PROGRESS NOTES
[address] [address]  Phone: [phone]  Primary Provider: Tello Finney      PREOPERATIVE EVALUATION:  Today's date: 12/28/2020    Anna Dyer is a 75 year old female who presents for a preoperative evaluation.    Surgical Information:  Surgery/Procedure:  staging laparoscopy, intraoperative ultrasound of the liver, laparoscopic segment V segmentectomy, cholecystectomy, hilar lymphadenectomy    Surgery Location: [facility]  Surgeon: Dr. Gauthier   Surgery Date: 1/5/2020  Time of Surgery: 7:30A  Where patient plans to recover: At home alone   Fax number for surgical facility: [phone]      Type of Anesthesia Anticipated: General    Subjective     HPI related to upcoming procedure:     Pt w/ metastatic left-sided (splenic flexure) colon cancer with lung and bilobar liver metastases. She was initiated on FOLFOX and Avastin and a year later underwent partial colectomy due to impending obstruction of a primary colon cancer. . All but one of the liver metastases was in remission and there has been progression of the segment 5 liver met . In addition, on the PET scan there was mention of a PET avid hilar node of unknown significance but suspicious for metastatic disease as well. In addition, she has a known solitary lung metastases in the left lung.   She reports that while she gets some claudication with  Ambulation > 1-2 blocks she has not noticed any worsening in this over the last 6 months. She is tobacco free at this time.       Preop Questions 12/28/2020   1. Have you ever had a heart attack or stroke? No   2. Have you ever had surgery on your heart or blood vessels, such as a stent placement, a coronary artery bypass, or surgery on an artery in your head, neck, heart, or legs? No   3. Do you have chest pain with activity? No   4. Do you have a history of  heart failure? No   5. Do you currently have a cold, bronchitis or symptoms  of other infection? No   6. Do you have a cough, shortness of breath, or wheezing? No   7. Do you or anyone in your family have previous history of blood clots? UNKNOWN    8. Do you or does anyone in your family have a serious bleeding problem such as prolonged bleeding following surgeries or cuts? UNKNOWN    9. Have you ever had problems with anemia or been told to take iron pills? No   10. Have you had any abnormal blood loss such as black, tarry or bloody stools, or abnormal vaginal bleeding? No   11. Have you ever had a blood transfusion? No   12. Are you willing to have a blood transfusion if it is medically needed before, during, or after your surgery? Yes   13. Have you or any of your relatives ever had problems with anesthesia? UNKNOWN    14. Do you have sleep apnea, excessive snoring or daytime drowsiness? No   15. Do you have any artifical heart valves or other implanted medical devices like a pacemaker, defibrillator, or continuous glucose monitor? No   16. Do you have artificial joints? No   17. Are you allergic to latex? No   18. Is there any chance that you may be pregnant? No       Health Care Directive:  Patient has a Health Care Directive on file      Preoperative Review of :   reviewed - controlled substances prescribed by other outside provider(s).        Review of Systems  Constitutional, neuro, ENT, endocrine, pulmonary, cardiac, gastrointestinal, genitourinary, musculoskeletal, integument and psychiatric systems are negative, except as otherwise noted.    Patient Active Problem List    Diagnosis Date Noted     Ventral incisional hernia 08/12/2020     Priority: Medium     Ventral hernia 06/30/2020     Priority: Medium     Added automatically from request for surgery [id number]       PAD (peripheral artery disease) (H) 05/21/2020     Priority: Medium     Abdominal wall hernia 01/08/2020     Priority: Medium     Added automatically from request for surgery [id number]       CKD (chronic kidney  disease) stage 3, GFR 30-59 ml/min 09/12/2019     Priority: Medium     Colostomy status (H) 03/28/2019     Priority: Medium     Status post colostomy (H) 04/06/2018     Priority: Medium     COPD- borderline obstruction on PFTs. long term smoker 01/19/2018     Priority: Medium     Tobacco abuse 01/19/2018     Priority: Medium     Colon cancer metastasized to liver (H) 03/17/2017     Priority: Medium     Advanced directives, counseling/discussion 09/15/2014     Priority: Medium     Advance Care Planning:   Receipt of ACP document:  Received: Health Care Directive which was witnessed or notarized on 08/20/2013.  Document not previously scanned.  Validation form completed and sent with document to be scanned.  .  Confirmed/documented designated decision maker(s). See permanent comments section of demographics in clinical tab. View document(s) and details by clicking on code status.   Added by Rach Olson on 1/19/2015.               Nicotine dependence 02/25/2014     Priority: Medium     Hypertension goal BP (blood pressure) < 140/90 02/24/2014     Priority: Medium     Restless leg syndrome 02/24/2014     Priority: Medium     CARDIOVASCULAR SCREENING; LDL GOAL LESS THAN 130 02/24/2014     Priority: Medium      Past Medical History:   Diagnosis Date     Cancer (H)     metastatic colorectal adenocarcinoma     Hypertension     No cardiologist     RLS (restless legs syndrome)      Past Surgical History:   Procedure Laterality Date     BIOPSY      liver     BREAST SURGERY      breast implants     COLONOSCOPY N/A 3/28/2019    Procedure: INTRAOPERATIVE COLONOSCOPY,;  Surgeon: Jesus Caba MD;  Location:  OR     COSMETIC SURGERY      facelift and tummy [facility]     GYN SURGERY      tubal     INSERT PORT VASCULAR ACCESS N/A 3/24/2017    Procedure: INSERT PORT VASCULAR ACCESS;  Surgeon: Yemi Ordaz MD;  Location:  OR     IR LOWER EXTREMITY ANGIOGRAM BILATERAL  3/12/2020     LAPAROSCOPIC ASSISTED COLECTOMY LEFT  (DESCENDING) N/A 1/25/2018    Procedure: LAPAROSCOPIC ASSISTED COLECTOMY LEFT (DESCENDING);;  Surgeon: Maddie Baron MD;  Location: [facility] OR     LAPAROSCOPIC ASSISTED COLOSTOMY TAKEDOWN N/A 3/28/2019    Procedure: LAPAROSCOPIC ASSISTED COLOSTOMY REVERSAL;  Surgeon: Jesus Caba MD;  Location: [facility] OR     LAPAROSCOPIC HERNIORRHAPHY VENTRAL N/A 8/12/2020    Procedure: COMBINED OPEN AND LAPAROSCOPIC VENTRAL HERNIA REPAIR;  Surgeon: Maddy Gonzales MD;  Location: [facility] OR     LAPAROSCOPY DIAGNOSTIC (GENERAL) N/A 3/28/2019    Procedure: DIAGNOSTIC LAPAROSCOPY;  Surgeon: Jesus Caba MD;  Location: [facility] OR     LAPAROTOMY EXPLORATORY N/A 1/25/2018    Procedure: LAPAROTOMY EXPLORATORY;  exploratory laparoscopy, left hemicolectomy, transverse colostomy;  Surgeon: Maddie Baron MD;  Location: [facility] OR     Current Outpatient Medications   Medication Sig Dispense Refill     acetaminophen (TYLENOL) 500 MG tablet Take 1,000 mg by mouth every 6 hours as needed for mild pain       amLODIPine (NORVASC) 5 MG tablet TAKE 2 TABLETS [=10MG]     DAILY 180 tablet 2     aspirin (ASA) 81 MG tablet Take 1 tablet (81 mg) by mouth daily       atorvastatin (LIPITOR) 40 MG tablet Take 1 tablet (40 mg) by mouth At Bedtime 90 tablet 3     bisacodyl (DULCOLAX) 10 MG suppository Place 1 suppository (10 mg) rectally daily as needed for constipation 5 suppository 0     cyclobenzaprine (FLEXERIL) 5 MG tablet Take 1 tablet (5 mg) by mouth 3 times daily as needed for muscle spasms 20 tablet 0     Ergocalciferol (VITAMIN D2 PO) Take 60 mcg by mouth every morning       gabapentin (NEURONTIN) 400 MG capsule Take 1,200 mg by mouth every morning (takes 3 X 400 mg = 1,200 mg dose)       Homeopathic Products (LEG CRAMP RELIEF PO) Take 2-3 tablets by mouth daily as needed (leg cramps)        hydrochlorothiazide (HYDRODIURIL) 25 MG tablet Take 1 tablet (25 mg) by mouth daily 90 tablet 1     lidocaine-prilocaine (EMLA) 2.5-2.5 % external cream Apply  "topically daily as needed (Apply one hour prior to port acccess)        LORazepam (ATIVAN) 0.5 MG tablet Take 0.5 mg by mouth nightly as needed for sleep As needed for sleep  1     losartan (COZAAR) 100 MG tablet Take 1 tablet (100 mg) by mouth daily 90 tablet 1     naproxen sodium (ANAPROX) 220 MG tablet Take 440 mg by mouth daily as needed for moderate pain       nicotine (NICODERM CQ) 21 MG/24HR 24 hr patch Place 1 patch onto the skin every 24 hours       oxyCODONE (ROXICODONE) 5 MG tablet Take 1-2 tablets (5-10 mg) by mouth every 4 hours as needed for severe pain 40 tablet 0     polyethylene glycol (MIRALAX) 17 g packet Take 17 g by mouth daily 20 packet 0     senna-docusate (SENOKOT-S/PERICOLACE) 8.6-50 MG tablet Take 1 tablet by mouth 2 times daily 60 tablet 0     Triprolidine-Pseudoephedrine (EQ ANTIHISTABS PO) Take 1 Dose by mouth daily as needed         Allergies   Allergen Reactions     Lactose GI Disturbance     Sulfa Drugs Hives        Social History     Tobacco Use     Smoking status: Former Smoker     Packs/day: 0.50     Years: 50.00     Pack years: 25.00     Types: Cigarettes     Quit date: 2020     Years since quittin.4     Smokeless tobacco: Never Used   Substance Use Topics     Alcohol use: No     Alcohol/week: 0.0 standard drinks       History   Drug Use No         Objective     /82   Pulse 72   Temp 97.5  F (36.4  C) (Temporal)   Resp 16   Ht 1.549 m (5' 1\")   Wt 58.3 kg (128 lb 8 oz)   SpO2 99%   BMI 24.28 kg/m      Physical Exam    GENERAL APPEARANCE: alert and no distress     EYES: EOMI, PERRL     HENT: nose and mouth without ulcers or lesions and normal cephalic/atraumatic     NECK: no adenopathy, no asymmetry, masses, or scars and thyroid normal to palpation     RESP: lungs clear to auscultation - no rales, rhonchi or wheezes     CV: regular rates and rhythm, normal S1 S2, no S3 or S4 and no murmur, click or rub     ABDOMEN:  soft, nontender, no HSM or masses and bowel " sounds normal     MS: extremities normal- no gross deformities noted, no evidence of inflammation in joints, FROM in all extremities.     SKIN: no suspicious lesions or rashes     NEURO: Normal strength and tone, sensory exam grossly normal, mentation intact and speech normal     PSYCH: mentation appears normal. and affect normal/bright     LYMPHATICS: No cervical adenopathy    Recent Labs   Lab Test 03/12/20  0700 04/03/19  0615 04/03/19  0400 04/02/19  1630 04/02/19  0555 04/01/19  0845 04/01/19  0845   HGB 14.2  --   --  11.8  --   --  11.2*    148*  --   --   --   --  153   INR 0.88  --   --   --   --   --   --    NA  --   --   --   --  135  --  131*   POTASSIUM  --   --  4.0  --  3.2*   < > 3.2*   CR 0.80 0.68  --   --  0.70  --  0.60   A1C 5.4  --   --   --   --   --   --     < > = values in this interval not displayed.        Diagnostics:  Recent Results (from the past 24 hour(s))   CBC with platelets    Collection Time: 12/28/20 12:22 PM   Result Value Ref Range    WBC 6.6 4.0 - 11.0 10e9/L    RBC Count 4.32 3.8 - 5.2 10e12/L    Hemoglobin 13.4 11.7 - 15.7 g/dL    Hematocrit 42.4 35.0 - 47.0 %    MCV 98 78 - 100 fl    MCH 31.0 26.5 - 33.0 pg    MCHC 31.6 31.5 - 36.5 g/dL    RDW 15.7 (H) 10.0 - 15.0 %    Platelet Count 147 (L) 150 - 450 10e9/L   Comprehensive metabolic panel (BMP + Alb, Alk Phos, ALT, AST, Total. Bili, TP)    Collection Time: 12/28/20 12:22 PM   Result Value Ref Range    Sodium 138 133 - 144 mmol/L    Potassium 4.2 3.4 - 5.3 mmol/L    Chloride 106 94 - 109 mmol/L    Carbon Dioxide 26 20 - 32 mmol/L    Anion Gap 6 3 - 14 mmol/L    Glucose 91 70 - 99 mg/dL    Urea Nitrogen 28 7 - 30 mg/dL    Creatinine 0.75 0.52 - 1.04 mg/dL    GFR Estimate 78 >60 mL/min/[1.73_m2]    GFR Estimate If Black >90 >60 mL/min/[1.73_m2]    Calcium 10.0 8.5 - 10.1 mg/dL    Bilirubin Total 0.6 0.2 - 1.3 mg/dL    Albumin 3.9 3.4 - 5.0 g/dL    Protein Total 7.9 6.8 - 8.8 g/dL    Alkaline Phosphatase 86 40 - 150 U/L     ALT 17 0 - 50 U/L    AST 25 0 - 45 U/L   INR    Collection Time: 12/28/20 12:22 PM   Result Value Ref Range    INR 0.89 0.86 - 1.14      EKG: performed at cardiology preop consult (see EKG in [facility] EMR)    Revised Cardiac Risk Index (RCRI):  The patient has the following serious cardiovascular risks for perioperative complications:   - Coronary Artery Disease equivalent (PAD)     RCRI Interpretation: 1 point: Class II (low risk - 0.9% complication rate)       Assessment & Plan   The proposed surgical procedure is considered INTERMEDIATE risk.    Encounter Diagnoses   Name Primary?     Preop general physical exam Yes     Colon cancer metastasized to liver (H)      PAD (peripheral artery disease) (H)      Essential hypertension with goal blood pressure less than 140/90      COPD- borderline obstruction on PFTs. long term smoker      Risks and Recommendations:  The patient has the following additional risks and recommendations for perioperative complications:   - Consult Hospitalist / IM to assist with post-op medical management  Pulmonary:    - Incentive spirometry post-op    Medication Instructions:  Patient is to take all scheduled medications on the day of surgery EXCEPT for modifications listed below:   - aspirin: Discontinue aspirin 7 days prior to procedure to reduce bleeding risk. It should be resumed postoperatively.    - Diuretics: HOLD on the day of surgery.    RECOMMENDATION:  SEE cardiac specific preop consultation requested by surgeon for their recommendations  APPROVAL GIVEN to proceed with proposed procedure, without further diagnostic evaluation     Signed Electronically by: Tello Finney MD    Copy of this evaluation report is provided to requesting physician.    Preop [facility] Preop Guidelines    Revised Cardiac Risk Index

## 2021-01-05 ENCOUNTER — TRANSFERRED RECORDS (OUTPATIENT)
Dept: HEALTH INFORMATION MANAGEMENT | Facility: CLINIC | Age: 76
End: 2021-01-05

## 2021-01-10 ENCOUNTER — HEALTH MAINTENANCE LETTER (OUTPATIENT)
Age: 76
End: 2021-01-10

## 2021-01-19 ENCOUNTER — ALLIED HEALTH/NURSE VISIT (OUTPATIENT)
Dept: UROLOGY | Facility: CLINIC | Age: 76
End: 2021-01-19
Payer: MEDICARE

## 2021-01-19 DIAGNOSIS — R33.9 URINE RETENTION: Primary | ICD-10-CM

## 2021-01-19 PROCEDURE — 51700 IRRIGATION OF BLADDER: CPT

## 2021-01-19 NOTE — PROGRESS NOTES
Chief Complaint   Patient presents with     Urinary Retention     Patient here today for Trial Of Void Per Carol Carrington       not currently breastfeeding. There is no height or weight on file to calculate BMI.    Patient Active Problem List   Diagnosis     Hypertension goal BP (blood pressure) < 140/90     Restless leg syndrome     CARDIOVASCULAR SCREENING; LDL GOAL LESS THAN 130     Nicotine dependence     Advanced directives, counseling/discussion     Colon cancer metastasized to liver (H)     COPD- borderline obstruction on PFTs. long term smoker     Status post colostomy (H)     Colostomy status (H)     CKD (chronic kidney disease) stage 3, GFR 30-59 ml/min     Abdominal wall hernia     PAD (peripheral artery disease) (H)     Ventral hernia     Ventral incisional hernia       Allergies   Allergen Reactions     Lactose GI Disturbance     Sulfa Drugs Hives       Current Outpatient Medications   Medication Sig Dispense Refill     acetaminophen (TYLENOL) 500 MG tablet Take 1,000 mg by mouth every 6 hours as needed for mild pain       amLODIPine (NORVASC) 5 MG tablet TAKE 2 TABLETS [=10MG]     DAILY 180 tablet 2     aspirin (ASA) 81 MG tablet Take 1 tablet (81 mg) by mouth daily       atorvastatin (LIPITOR) 40 MG tablet Take 1 tablet (40 mg) by mouth At Bedtime 90 tablet 3     bisacodyl (DULCOLAX) 10 MG suppository Place 1 suppository (10 mg) rectally daily as needed for constipation 5 suppository 0     cyclobenzaprine (FLEXERIL) 5 MG tablet Take 1 tablet (5 mg) by mouth 3 times daily as needed for muscle spasms 20 tablet 0     Ergocalciferol (VITAMIN D2 PO) Take 60 mcg by mouth every morning       gabapentin (NEURONTIN) 400 MG capsule Take 1,200 mg by mouth every morning (takes 3 X 400 mg = 1,200 mg dose)       Homeopathic Products (LEG CRAMP RELIEF PO) Take 2-3 tablets by mouth daily as needed (leg cramps)        hydrochlorothiazide (HYDRODIURIL) 25 MG tablet Take 1 tablet (25 mg) by mouth daily 90 tablet 1      lidocaine-prilocaine (EMLA) 2.5-2.5 % external cream Apply topically daily as needed (Apply one hour prior to port acccess)        LORazepam (ATIVAN) 0.5 MG tablet Take 0.5 mg by mouth nightly as needed for sleep As needed for sleep  1     losartan (COZAAR) 100 MG tablet Take 1 tablet (100 mg) by mouth daily 90 tablet 1     naproxen sodium (ANAPROX) 220 MG tablet Take 440 mg by mouth daily as needed for moderate pain       nicotine (NICODERM CQ) 21 MG/24HR 24 hr patch Place 1 patch onto the skin every 24 hours       oxyCODONE (ROXICODONE) 5 MG tablet Take 1-2 tablets (5-10 mg) by mouth every 4 hours as needed for severe pain 40 tablet 0     polyethylene glycol (MIRALAX) 17 g packet Take 17 g by mouth daily 20 packet 0     senna-docusate (SENOKOT-S/PERICOLACE) 8.6-50 MG tablet Take 1 tablet by mouth 2 times daily 60 tablet 0     Triprolidine-Pseudoephedrine (EQ ANTIHISTABS PO) Take 1 Dose by mouth daily as needed         Social History     Tobacco Use     Smoking status: Former Smoker     Packs/day: 0.50     Years: 50.00     Pack years: 25.00     Types: Cigarettes     Quit date: 2020     Years since quittin.5     Smokeless tobacco: Never Used   Substance Use Topics     Alcohol use: No     Alcohol/week: 0.0 standard drinks     Drug use: No       Yary Ramos [facility]  2021  10:14 AM

## 2021-01-19 NOTE — NURSING NOTE
Chief Complaint   Patient presents with     Urinary Retention     Patient here today for Trial Of Void Per Carol Carrington       not currently breastfeeding. There is no height or weight on file to calculate BMI.    Patient Active Problem List   Diagnosis     Hypertension goal BP (blood pressure) < 140/90     Restless leg syndrome     CARDIOVASCULAR SCREENING; LDL GOAL LESS THAN 130     Nicotine dependence     Advanced directives, counseling/discussion     Colon cancer metastasized to liver (H)     COPD- borderline obstruction on PFTs. long term smoker     Status post colostomy (H)     Colostomy status (H)     CKD (chronic kidney disease) stage 3, GFR 30-59 ml/min     Abdominal wall hernia     PAD (peripheral artery disease) (H)     Ventral hernia     Ventral incisional hernia       Allergies   Allergen Reactions     Lactose GI Disturbance     Sulfa Drugs Hives       Current Outpatient Medications   Medication Sig Dispense Refill     acetaminophen (TYLENOL) 500 MG tablet Take 1,000 mg by mouth every 6 hours as needed for mild pain       amLODIPine (NORVASC) 5 MG tablet TAKE 2 TABLETS [=10MG]     DAILY 180 tablet 2     aspirin (ASA) 81 MG tablet Take 1 tablet (81 mg) by mouth daily       atorvastatin (LIPITOR) 40 MG tablet Take 1 tablet (40 mg) by mouth At Bedtime 90 tablet 3     bisacodyl (DULCOLAX) 10 MG suppository Place 1 suppository (10 mg) rectally daily as needed for constipation 5 suppository 0     cyclobenzaprine (FLEXERIL) 5 MG tablet Take 1 tablet (5 mg) by mouth 3 times daily as needed for muscle spasms 20 tablet 0     Ergocalciferol (VITAMIN D2 PO) Take 60 mcg by mouth every morning       gabapentin (NEURONTIN) 400 MG capsule Take 1,200 mg by mouth every morning (takes 3 X 400 mg = 1,200 mg dose)       Homeopathic Products (LEG CRAMP RELIEF PO) Take 2-3 tablets by mouth daily as needed (leg cramps)        hydrochlorothiazide (HYDRODIURIL) 25 MG tablet Take 1 tablet (25 mg) by mouth daily 90 tablet 1      lidocaine-prilocaine (EMLA) 2.5-2.5 % external cream Apply topically daily as needed (Apply one hour prior to port acccess)        LORazepam (ATIVAN) 0.5 MG tablet Take 0.5 mg by mouth nightly as needed for sleep As needed for sleep  1     losartan (COZAAR) 100 MG tablet Take 1 tablet (100 mg) by mouth daily 90 tablet 1     naproxen sodium (ANAPROX) 220 MG tablet Take 440 mg by mouth daily as needed for moderate pain       nicotine (NICODERM CQ) 21 MG/24HR 24 hr patch Place 1 patch onto the skin every 24 hours       oxyCODONE (ROXICODONE) 5 MG tablet Take 1-2 tablets (5-10 mg) by mouth every 4 hours as needed for severe pain 40 tablet 0     polyethylene glycol (MIRALAX) 17 g packet Take 17 g by mouth daily 20 packet 0     senna-docusate (SENOKOT-S/PERICOLACE) 8.6-50 MG tablet Take 1 tablet by mouth 2 times daily 60 tablet 0     Triprolidine-Pseudoephedrine (EQ ANTIHISTABS PO) Take 1 Dose by mouth daily as needed         Social History     Tobacco Use     Smoking status: Former Smoker     Packs/day: 0.50     Years: 50.00     Pack years: 25.00     Types: Cigarettes     Quit date: 2020     Years since quittin.5     Smokeless tobacco: Never Used   Substance Use Topics     Alcohol use: No     Alcohol/week: 0.0 standard drinks     Drug use: No         Catheter removal documentation on 2021:    Anna Dyer presents to the clinic for catheter removal.  Reason for removal: scheduled for Trial of Void  Order has been verifiedYES  Catheter successfully removed at 10:00 AM without immediate complication.  100 cc's of urine present in the catheter bag.  Urethral meatus is free of secretions and encrustation.  The patient is afebrile.  The patient tolerated the procedure and was instructed to watch for signs of infection and return to clinic if not able to void on her own.    Comes into clinic today at the request of  Carol Carrington. provider found for TOV / catheter removal    This service provided today was  under the direct supervision of Dr Dinh, who was available if needed.    presented today for a trial of void.  Approximately 225 mL of normal saline instilled into bladder via catheter.  Patient stated she had urge to urinate and catheter was removed without difficulty.  Patient was given a urine hat to measure urine output.  Patient voided approximately 75 mL of clear urine.Patient voided out about  150ml on exam table.   mL.    Cipro 500 given per protocol: No    Patient did tolerate procedure well.        This service provided today was under the direct supervision of Dr Dinh, who was available if needed.        AISHA Alonzo  1/19/2021  10:14 AM

## 2021-01-20 ENCOUNTER — TELEPHONE (OUTPATIENT)
Dept: INTERNAL MEDICINE | Facility: CLINIC | Age: 76
End: 2021-01-20

## 2021-01-20 NOTE — TELEPHONE ENCOUNTER
Pt is post op and states she is having swelling in her feet and ankles .minimal shortness of breath . She is scheduled next Monday for follow up . RN mentioned she should also call the surgeon office .PT had surgery for colon cancer with mets.Cintia Avalos RN

## 2021-01-25 ENCOUNTER — VIRTUAL VISIT (OUTPATIENT)
Dept: INTERNAL MEDICINE | Facility: CLINIC | Age: 76
End: 2021-01-25
Payer: MEDICARE

## 2021-01-25 DIAGNOSIS — C78.7 COLON CANCER METASTASIZED TO LIVER (H): ICD-10-CM

## 2021-01-25 DIAGNOSIS — R60.9 DEPENDENT EDEMA: Primary | ICD-10-CM

## 2021-01-25 DIAGNOSIS — C18.9 COLON CANCER METASTASIZED TO LIVER (H): ICD-10-CM

## 2021-01-25 PROCEDURE — 99214 OFFICE O/P EST MOD 30 MIN: CPT | Mod: 95 | Performed by: INTERNAL MEDICINE

## 2021-01-25 RX ORDER — FUROSEMIDE 20 MG
20 TABLET ORAL DAILY
Qty: 7 TABLET | Refills: 0 | Status: SHIPPED | OUTPATIENT
Start: 2021-01-25 | End: 2021-02-19

## 2021-01-25 RX ORDER — POTASSIUM CHLORIDE 1.5 G/1.58G
20 POWDER, FOR SOLUTION ORAL DAILY
Qty: 7 PACKET | Refills: 0 | Status: SHIPPED | OUTPATIENT
Start: 2021-01-25 | End: 2021-02-01

## 2021-01-25 NOTE — PROGRESS NOTES
Anna is a 75 year old who is being evaluated via a billable video visit.      How would you like to obtain your AVS? MyChart  If the video visit is dropped, the invitation should be resent by: Send to e-mail at: [email or web address]  Will anyone else be joining your video visit? No      Video Start Time: 3:10 PM  Assessment & Plan     Dependent edema  Colon cancer metastasized to liver (H)  Use loop diuretic x 1 week rather than thiazide to see if we can get some of the pedal edema improved.    Get labs and BP checked at oncology  - furosemide (LASIX) 20 MG tablet; Take 1 tablet (20 mg) by mouth daily  - potassium chloride (KLOR-CON) 20 MEQ packet; Take 20 mEq by mouth daily for 7 days                             No follow-ups on file.    Tello Finney MD  [facility]     Anna is a 75 year old who presents to clinic today for the following health issues  accompanied by her :    HPI   discharge of our patient, Anna Dyer from [facility]. As you know, the patient was admitted on 1/5/2021 with a diagnosis of Colorectal liver metastases. She underwent staging laparoscopy, lysis of adhesions, liver US, laparoscopic excision of right upper quadrant peritoneal nodule, laparoscopic segment 4 segmentectomy, cholecystectomy, and hilar lymph node dissection on 1/5/2021 with Dr. Jonathan Gauthier. The operative and pathology reports, as well as any important laboratories, are attached below. Briefly, the pathology revealed 3 tumors in segment 6 - with tumor abutting margin - this was intraoperatively microwave ablated, and 2 hilar lymph nodes positive for metastasis.    The patient did well postop. The evening of surgery she did have episode of rigors but remained vitally stable and afebrile. UA was collected which was a little dirty but urine culture revealed no growth - antibiotics stopped. After cota removal she had urinary retention and a cota  "was replaced. A urology consult felt this was consistent with a prior history of urinary retention after previous operations, for which she will follow up outpatient for cota removal. Her pain was controlled with PO pain meds. She tolerated advancement in her diet. Internal medicine assisted us with medical management.     The patient was discharged to home in stable/improving condition on POD#4. At the time of discharge the patient's pain was controlled with oral pain medication, they were tolerating a diet, bowels functioning, ambulating without difficulty (passed PT evaluation), remaining afebrile, and the surgical sites were healing without evidence of hernia or infection. We will plan on seeing the patient back in 3 weeks or earlier as needed. The patient's activity should be limited to no heavy lifting >10 lbs for 6 weeks. She will complete a 30 day course of daily PPI therapy for ulcer prophylaxis and 30 days of daily Lovenox 40 mg for dvt prophylaxis. We will obtain updated CT scan at her follow up appointment for baseline after ablation.       Concern - Edema  Onset: 7 or 8 days  Description: swelling in feet and ankles, with some discoloration  Intensity: moderate  Progression of Symptoms:  same  Accompanying Signs & Symptoms: as above  Previous history of similar problem: no  Precipitating factors:        Worsened by: unknown  Alleviating factors:        Improved by: nothing, unable to keep legs elevated  Therapies tried and outcome: trying to elevate at night, but \"hasn't seen much improvement\"  Pt already takes hydrochlorothiazide          Review of Systems         Objective           Vitals:  No vitals were obtained today due to virtual visit.    Physical Exam   GENERAL: healthy, alert, no distress and over weight  RESP: No audible wheeze, cough, or visible cyanosis.  No visible retractions or increased work of breathing.                  Video-Visit Details    Type of service:  Video Visit    Video " End Time:3:37 PM    Originating Location (pt. Location): Home    Distant Location (provider location):  [facility]     Platform used for Video Visit: [place]

## 2021-01-27 ENCOUNTER — TRANSFERRED RECORDS (OUTPATIENT)
Dept: HEALTH INFORMATION MANAGEMENT | Facility: CLINIC | Age: 76
End: 2021-01-27

## 2021-02-01 DIAGNOSIS — I10 ESSENTIAL HYPERTENSION WITH GOAL BLOOD PRESSURE LESS THAN 140/90: ICD-10-CM

## 2021-02-03 RX ORDER — LOSARTAN POTASSIUM 100 MG/1
100 TABLET ORAL DAILY
Qty: 90 TABLET | Refills: 2 | Status: SHIPPED | OUTPATIENT
Start: 2021-02-03 | End: 2021-12-21

## 2021-02-03 NOTE — TELEPHONE ENCOUNTER
"Requested Prescriptions   Pending Prescriptions Disp Refills     losartan (COZAAR) 100 MG tablet 90 tablet 1     Sig: Take 1 tablet (100 mg) by mouth daily       Angiotensin-II Receptors Passed - 2/1/2021  2:21 PM        Passed - Last blood pressure under 140/90 in past 12 months     BP Readings from Last 3 Encounters:   12/28/20 136/82   11/30/20 130/72   08/14/20 115/63                 Passed - Recent (12 mo) or future (30 days) visit within the authorizing provider's specialty     Patient has had an office visit with the authorizing provider or a provider within the authorizing providers department within the previous 12 mos or has a future within next 30 days. See \"Patient Info\" tab in inbasket, or \"Choose Columns\" in Meds & Orders section of the refill encounter.              Passed - Medication is active on med list        Passed - Patient is age 18 or older        Passed - No active pregnancy on record        Passed - Normal serum creatinine on file in past 12 months     Recent Labs   Lab Test 12/28/20  1222 07/17/19  1323 07/17/19  1323   CR 0.75   < >  --    CREAT  --   --  1.1*    < > = values in this interval not displayed.       Ok to refill medication if creatinine is low          Passed - Normal serum potassium on file in past 12 months     Recent Labs   Lab Test 12/28/20  1222   POTASSIUM 4.2                    Passed - No positive pregnancy test in past 12 months             "

## 2021-02-09 DIAGNOSIS — Z11.59 ENCOUNTER FOR SCREENING FOR OTHER VIRAL DISEASES: ICD-10-CM

## 2021-02-16 ENCOUNTER — ALLIED HEALTH/NURSE VISIT (OUTPATIENT)
Dept: UROLOGY | Facility: CLINIC | Age: 76
End: 2021-02-16
Payer: MEDICARE

## 2021-02-16 DIAGNOSIS — R33.9 URINE RETENTION: Primary | ICD-10-CM

## 2021-02-16 LAB — RESIDUAL VOLUME (RV) (EXTERNAL): 20

## 2021-02-16 PROCEDURE — 51798 US URINE CAPACITY MEASURE: CPT

## 2021-02-16 NOTE — NURSING NOTE
Chief Complaint   Patient presents with     Urinary Retention     Here to get a UA and a PVR 4 wks after getting catheter removed     PVR scan was 20ml      Nazia Alvarez

## 2021-02-18 ENCOUNTER — TRANSFERRED RECORDS (OUTPATIENT)
Dept: HEALTH INFORMATION MANAGEMENT | Facility: CLINIC | Age: 76
End: 2021-02-18

## 2021-02-19 ENCOUNTER — OFFICE VISIT (OUTPATIENT)
Dept: INTERNAL MEDICINE | Facility: CLINIC | Age: 76
End: 2021-02-19
Payer: MEDICARE

## 2021-02-19 VITALS
SYSTOLIC BLOOD PRESSURE: 132 MMHG | HEIGHT: 61 IN | OXYGEN SATURATION: 99 % | WEIGHT: 121 LBS | DIASTOLIC BLOOD PRESSURE: 64 MMHG | HEART RATE: 77 BPM | BODY MASS INDEX: 22.84 KG/M2 | TEMPERATURE: 97.3 F

## 2021-02-19 DIAGNOSIS — Z11.59 ENCOUNTER FOR SCREENING FOR OTHER VIRAL DISEASES: ICD-10-CM

## 2021-02-19 DIAGNOSIS — C18.9 COLON CANCER METASTASIZED TO LUNG (H): ICD-10-CM

## 2021-02-19 DIAGNOSIS — I73.9 PAD (PERIPHERAL ARTERY DISEASE) (H): ICD-10-CM

## 2021-02-19 DIAGNOSIS — Z01.818 PREOP GENERAL PHYSICAL EXAM: Primary | ICD-10-CM

## 2021-02-19 DIAGNOSIS — J43.9 PULMONARY EMPHYSEMA, UNSPECIFIED EMPHYSEMA TYPE (H): ICD-10-CM

## 2021-02-19 DIAGNOSIS — C78.00 COLON CANCER METASTASIZED TO LUNG (H): ICD-10-CM

## 2021-02-19 LAB
SARS-COV-2 RNA RESP QL NAA+PROBE: NORMAL
SPECIMEN SOURCE: NORMAL

## 2021-02-19 PROCEDURE — 99215 OFFICE O/P EST HI 40 MIN: CPT | Performed by: INTERNAL MEDICINE

## 2021-02-19 PROCEDURE — 87635 SARS-COV-2 COVID-19 AMP PRB: CPT | Performed by: THORACIC SURGERY (CARDIOTHORACIC VASCULAR SURGERY)

## 2021-02-19 ASSESSMENT — MIFFLIN-ST. JEOR: SCORE: 986.23

## 2021-02-19 NOTE — PROGRESS NOTES
[address] [address]  Phone: [phone]  Primary Provider: Dick Finney  Pre-op Performing Provider: DICK FINNEY       PREOPERATIVE EVALUATION:  Today's date: 2/19/2021    Anna Dyer is a 75 year old female who presents for a preoperative evaluation.    Surgical Information:  Surgery/Procedure: LEFT VIEDEO ASSISTED THORACOSCOPIC SURGERY, POSSIBLE LIMITED LEFT THORACOTOMY, WEDGE RESECTION LEFT UPPER LOBE NODULE  Surgery Location: [facility]  Surgeon: Dr. KYLER Ordaz  Surgery Date: 02/23/2021  Time of Surgery: 1:50pm  Where patient plans to recover: At home alone  Fax number for surgical facility: Note does not need to be faxed, will be available electronically in Epic.    Type of Anesthesia Anticipated: General    Assessment & Plan     The proposed surgical procedure is considered INTERMEDIATE risk.    Preop general physical exam  Colon cancer metastasized to lung (H)  PAD (peripheral artery disease) (H)  COPD- borderline obstruction on PFTs. long term smoker    Risks and Recommendations:  The patient has the following additional risks and recommendations for perioperative complications:   - No identified additional risk factors other than previously addressed    Medication Instructions:  Patient is to take all scheduled medications on the day of surgery EXCEPT for modifications listed below:   - Diuretics: HOLD on the day of surgery.    RECOMMENDATION:  APPROVAL GIVEN to proceed with proposed procedure, without further diagnostic evaluation.    Review of prior external note(s) from - Outside records from [facility], [facility]  Review of the result(s) of each unique test - [facility] labs  Independent interpretation of a test performed by another physician/other qualified health care professional (not separately reported) - PFTs        Subjective     HPI related to upcoming procedure:   Pt with stage 4 colon ca. Isolated pulm metastasis.         Preop Questions 2/19/2021   1. Have you ever had a heart attack or stroke? No   2. Have you ever had surgery on your heart or blood vessels, such as a stent placement, a coronary artery bypass, or surgery on an artery in your head, neck, heart, or legs? No   3. Do you have chest pain with activity? No   4. Do you have a history of  heart failure? No   5. Do you currently have a cold, bronchitis or symptoms of other infection? No   6. Do you have a cough, shortness of breath, or wheezing? No   7. Do you or anyone in your family have previous history of blood clots? No   8. Do you or does anyone in your family have a serious bleeding problem such as prolonged bleeding following surgeries or cuts? No   9. Have you ever had problems with anemia or been told to take iron pills? No   10. Have you had any abnormal blood loss such as black, tarry or bloody stools, or abnormal vaginal bleeding? No   11. Have you ever had a blood transfusion? No   12. Are you willing to have a blood transfusion if it is medically needed before, during, or after your surgery? Yes   13. Have you or any of your relatives ever had problems with anesthesia? No   14. Do you have sleep apnea, excessive snoring or daytime drowsiness? No   15. Do you have any artifical heart valves or other implanted medical devices like a pacemaker, defibrillator, or continuous glucose monitor? No   16. Do you have artificial joints? No   17. Are you allergic to latex? No   18. Is there any chance that you may be pregnant? -     Health Care Directive:  Patient has a Health Care Directive on file      Preoperative Review of :   reviewed - controlled substances prescribed by other outside provider(s).          Review of Systems  See above    Patient Active Problem List    Diagnosis Date Noted     Ventral incisional hernia 08/12/2020     Priority: Medium     Ventral hernia 06/30/2020     Priority: Medium     Added automatically from request for surgery  [id number]       PAD (peripheral artery disease) (H) 05/21/2020     Priority: Medium     Abdominal wall hernia 01/08/2020     Priority: Medium     Added automatically from request for surgery [id number]       CKD (chronic kidney disease) stage 3, GFR 30-59 ml/min 09/12/2019     Priority: Medium     Colostomy status (H) 03/28/2019     Priority: Medium     Status post colostomy (H) 04/06/2018     Priority: Medium     COPD- borderline obstruction on PFTs. long term smoker 01/19/2018     Priority: Medium     Colon cancer metastasized to liver (H) 03/17/2017     Priority: Medium     Advanced directives, counseling/discussion 09/15/2014     Priority: Medium     Advance Care Planning:   Receipt of ACP document:  Received: Health Care Directive which was witnessed or notarized on 08/20/2013.  Document not previously scanned.  Validation form completed and sent with document to be scanned.  .  Confirmed/documented designated decision maker(s). See permanent comments section of demographics in clinical tab. View document(s) and details by clicking on code status.   Added by Rach Olson on 1/19/2015.               Nicotine dependence 02/25/2014     Priority: Medium     Hypertension goal BP (blood pressure) < 140/90 02/24/2014     Priority: Medium     Restless leg syndrome 02/24/2014     Priority: Medium     CARDIOVASCULAR SCREENING; LDL GOAL LESS THAN 130 02/24/2014     Priority: Medium      Past Medical History:   Diagnosis Date     Cancer (H)     metastatic colorectal adenocarcinoma     Hypertension     No cardiologist     RLS (restless legs syndrome)      Past Surgical History:   Procedure Laterality Date     BIOPSY      liver     BREAST SURGERY      breast implants     COLONOSCOPY N/A 3/28/2019    Procedure: INTRAOPERATIVE COLONOSCOPY,;  Surgeon: Jesus Caba MD;  Location:  OR     COSMETIC SURGERY      facelift and tummy tuck     GYN SURGERY      tubal     INSERT PORT VASCULAR ACCESS N/A 3/24/2017    Procedure: INSERT  PORT VASCULAR ACCESS;  Surgeon: Yemi Ordaz MD;  Location: [facility] OR     IR LOWER EXTREMITY ANGIOGRAM BILATERAL  3/12/2020     LAPAROSCOPIC ASSISTED COLECTOMY LEFT (DESCENDING) N/A 1/25/2018    Procedure: LAPAROSCOPIC ASSISTED COLECTOMY LEFT (DESCENDING);;  Surgeon: Maddie Baron MD;  Location: [facility] OR     LAPAROSCOPIC ASSISTED COLOSTOMY TAKEDOWN N/A 3/28/2019    Procedure: LAPAROSCOPIC ASSISTED COLOSTOMY REVERSAL;  Surgeon: Jesus Caba MD;  Location: [facility] OR     LAPAROSCOPIC HERNIORRHAPHY VENTRAL N/A 8/12/2020    Procedure: COMBINED OPEN AND LAPAROSCOPIC VENTRAL HERNIA REPAIR;  Surgeon: Maddy Gonzales MD;  Location: [facility] OR     LAPAROSCOPY DIAGNOSTIC (GENERAL) N/A 3/28/2019    Procedure: DIAGNOSTIC LAPAROSCOPY;  Surgeon: Jesus Caba MD;  Location: [facility] OR     LAPAROTOMY EXPLORATORY N/A 1/25/2018    Procedure: LAPAROTOMY EXPLORATORY;  exploratory laparoscopy, left hemicolectomy, transverse colostomy;  Surgeon: Maddie Baron MD;  Location: [facility] OR     Current Outpatient Medications   Medication Sig Dispense Refill     acetaminophen (TYLENOL) 500 MG tablet Take 1,000 mg by mouth every 6 hours as needed for mild pain       amLODIPine (NORVASC) 5 MG tablet TAKE 2 TABLETS [=10MG]     DAILY 180 tablet 2     atorvastatin (LIPITOR) 40 MG tablet Take 1 tablet (40 mg) by mouth At Bedtime 90 tablet 3     Ergocalciferol (VITAMIN D2 PO) Take 60 mcg by mouth every morning       gabapentin (NEURONTIN) 400 MG capsule Take 1,200 mg by mouth every morning (takes 3 X 400 mg = 1,200 mg dose)       Homeopathic Products (LEG CRAMP RELIEF PO) Take 2-3 tablets by mouth daily as needed (leg cramps)        hydrochlorothiazide (HYDRODIURIL) 25 MG tablet Take 1 tablet (25 mg) by mouth daily 90 tablet 1     lidocaine-prilocaine (EMLA) 2.5-2.5 % external cream Apply topically daily as needed (Apply one hour prior to port acccess)        LORazepam (ATIVAN) 0.5 MG tablet Take 0.5 mg by mouth nightly as needed for sleep As  "needed for sleep  1     losartan (COZAAR) 100 MG tablet Take 1 tablet (100 mg) by mouth daily 90 tablet 2     Triprolidine-Pseudoephedrine (EQ ANTIHISTABS PO) Take 1 Dose by mouth daily as needed       aspirin (ASA) 81 MG tablet Take 1 tablet (81 mg) by mouth daily (Patient not taking: Reported on 2021)       cyclobenzaprine (FLEXERIL) 5 MG tablet Take 1 tablet (5 mg) by mouth 3 times daily as needed for muscle spasms (Patient not taking: Reported on 2021) 20 tablet 0     naproxen sodium (ANAPROX) 220 MG tablet Take 440 mg by mouth daily as needed for moderate pain         Allergies   Allergen Reactions     Lactose GI Disturbance     Sulfa Drugs Hives        Social History     Tobacco Use     Smoking status: Former Smoker     Packs/day: 0.50     Years: 50.00     Pack years: 25.00     Types: Cigarettes     Quit date: 2020     Years since quittin.6     Smokeless tobacco: Never Used   Substance Use Topics     Alcohol use: No     Alcohol/week: 0.0 standard drinks       History   Drug Use No         Objective     /64   Pulse 77   Temp 97.3  F (36.3  C) (Temporal)   Ht 1.549 m (5' 1\")   Wt 54.9 kg (121 lb)   SpO2 99%   BMI 22.86 kg/m      Physical Exam    GENERAL APPEARANCE: healthy, alert and no distress     EYES: EOMI, PERRL     HENT: normal cephalic/atraumatic     NECK: no adenopathy, no asymmetry, masses, or scars and thyroid normal to palpation     RESP: lungs clear to auscultation - no rales, rhonchi or wheezes     CV: regular rates and rhythm, normal S1 S2, no S3 or S4 and no murmur, click or rub     ABDOMEN:  soft, nontender, no HSM or masses and bowel sounds normal     MS: extremities normal- no gross deformities noted, no evidence of inflammation in joints, FROM in all extremities.     SKIN: no suspicious lesions or rashes     NEURO: Normal strength and tone, sensory exam grossly normal, mentation intact and speech normal     PSYCH: mentation appears normal. and affect " normal/bright     LYMPHATICS: No cervical adenopathy    Recent Labs   Lab Test 12/28/20  1222 03/12/20  0700 04/03/19  0400 04/03/19  0400 04/02/19  0555 04/02/19  0555   HGB 13.4 14.2  --   --    < >  --    * 187   < >  --   --   --    INR 0.89 0.88  --   --   --   --      --   --   --   --  135   POTASSIUM 4.2  --   --  4.0  --  3.2*   CR 0.75 0.80   < >  --   --  0.70   A1C  --  5.4  --   --   --   --     < > = values in this interval not displayed.      Diagnostics:  (jan 27 2021) - per [facility]:    hgb 10.8,   BMP - see 1/8 [facility] labs  EKG: reported as normal (dec 16 2020) at [facility] pre[facility]    Revised Cardiac Risk Index (RCRI):  The patient has the following serious cardiovascular risks for perioperative complications:   - High risk surgery (>5% cardiac complication risk) = 1 point   - Coronary Artery Disease (MI, positive stress test, angina, Qs on EKG) = 1 point     RCRI Interpretation: 2 points: Class III (moderate risk - 6.6% complication rate)     Estimated Functional Capacity: Performs 4 METS exercise without symptoms (e.g., light housework, stairs, 4 mph walk, 7 mph bike, slow step dance)           Signed Electronically by: Tello Finney MD  Copy of this evaluation report is provided to requesting physician.    Preop [facility] Preop Guidelines    Revised Cardiac Risk Index

## 2021-02-19 NOTE — H&P (VIEW-ONLY)
[address] [address]  Phone: [phone]  Primary Provider: Dick Finney  Pre-op Performing Provider: DICK FINNEY       PREOPERATIVE EVALUATION:  Today's date: 2/19/2021    Anna Dyer is a 75 year old female who presents for a preoperative evaluation.    Surgical Information:  Surgery/Procedure: LEFT VIEDEO ASSISTED THORACOSCOPIC SURGERY, POSSIBLE LIMITED LEFT THORACOTOMY, WEDGE RESECTION LEFT UPPER LOBE NODULE  Surgery Location: [facility]  Surgeon: Dr. KYLER Ordaz  Surgery Date: 02/23/2021  Time of Surgery: 1:50pm  Where patient plans to recover: At home alone  Fax number for surgical facility: Note does not need to be faxed, will be available electronically in Epic.    Type of Anesthesia Anticipated: General    Assessment & Plan     The proposed surgical procedure is considered INTERMEDIATE risk.    Preop general physical exam  Colon cancer metastasized to lung (H)  PAD (peripheral artery disease) (H)  COPD- borderline obstruction on PFTs. long term smoker    Risks and Recommendations:  The patient has the following additional risks and recommendations for perioperative complications:   - No identified additional risk factors other than previously addressed    Medication Instructions:  Patient is to take all scheduled medications on the day of surgery EXCEPT for modifications listed below:   - Diuretics: HOLD on the day of surgery.    RECOMMENDATION:  APPROVAL GIVEN to proceed with proposed procedure, without further diagnostic evaluation.    Review of prior external note(s) from - Outside records from [facility], [facility]  Review of the result(s) of each unique test - [facility] labs  Independent interpretation of a test performed by another physician/other qualified health care professional (not separately reported) - PFTs        Subjective     HPI related to upcoming procedure:   Pt with stage 4 colon ca. Isolated pulm metastasis.         Preop Questions 2/19/2021   1. Have you ever had a heart attack or stroke? No   2. Have you ever had surgery on your heart or blood vessels, such as a stent placement, a coronary artery bypass, or surgery on an artery in your head, neck, heart, or legs? No   3. Do you have chest pain with activity? No   4. Do you have a history of  heart failure? No   5. Do you currently have a cold, bronchitis or symptoms of other infection? No   6. Do you have a cough, shortness of breath, or wheezing? No   7. Do you or anyone in your family have previous history of blood clots? No   8. Do you or does anyone in your family have a serious bleeding problem such as prolonged bleeding following surgeries or cuts? No   9. Have you ever had problems with anemia or been told to take iron pills? No   10. Have you had any abnormal blood loss such as black, tarry or bloody stools, or abnormal vaginal bleeding? No   11. Have you ever had a blood transfusion? No   12. Are you willing to have a blood transfusion if it is medically needed before, during, or after your surgery? Yes   13. Have you or any of your relatives ever had problems with anesthesia? No   14. Do you have sleep apnea, excessive snoring or daytime drowsiness? No   15. Do you have any artifical heart valves or other implanted medical devices like a pacemaker, defibrillator, or continuous glucose monitor? No   16. Do you have artificial joints? No   17. Are you allergic to latex? No   18. Is there any chance that you may be pregnant? -     Health Care Directive:  Patient has a Health Care Directive on file      Preoperative Review of :   reviewed - controlled substances prescribed by other outside provider(s).          Review of Systems  See above    Patient Active Problem List    Diagnosis Date Noted     Ventral incisional hernia 08/12/2020     Priority: Medium     Ventral hernia 06/30/2020     Priority: Medium     Added automatically from request for surgery  [id number]       PAD (peripheral artery disease) (H) 05/21/2020     Priority: Medium     Abdominal wall hernia 01/08/2020     Priority: Medium     Added automatically from request for surgery [id number]       CKD (chronic kidney disease) stage 3, GFR 30-59 ml/min 09/12/2019     Priority: Medium     Colostomy status (H) 03/28/2019     Priority: Medium     Status post colostomy (H) 04/06/2018     Priority: Medium     COPD- borderline obstruction on PFTs. long term smoker 01/19/2018     Priority: Medium     Colon cancer metastasized to liver (H) 03/17/2017     Priority: Medium     Advanced directives, counseling/discussion 09/15/2014     Priority: Medium     Advance Care Planning:   Receipt of ACP document:  Received: Health Care Directive which was witnessed or notarized on 08/20/2013.  Document not previously scanned.  Validation form completed and sent with document to be scanned.  .  Confirmed/documented designated decision maker(s). See permanent comments section of demographics in clinical tab. View document(s) and details by clicking on code status.   Added by Rach Olson on 1/19/2015.               Nicotine dependence 02/25/2014     Priority: Medium     Hypertension goal BP (blood pressure) < 140/90 02/24/2014     Priority: Medium     Restless leg syndrome 02/24/2014     Priority: Medium     CARDIOVASCULAR SCREENING; LDL GOAL LESS THAN 130 02/24/2014     Priority: Medium      Past Medical History:   Diagnosis Date     Cancer (H)     metastatic colorectal adenocarcinoma     Hypertension     No cardiologist     RLS (restless legs syndrome)      Past Surgical History:   Procedure Laterality Date     BIOPSY      liver     BREAST SURGERY      breast implants     COLONOSCOPY N/A 3/28/2019    Procedure: INTRAOPERATIVE COLONOSCOPY,;  Surgeon: Jesus Caba MD;  Location:  OR     COSMETIC SURGERY      facelift and tummy tuck     GYN SURGERY      tubal     INSERT PORT VASCULAR ACCESS N/A 3/24/2017    Procedure: INSERT  PORT VASCULAR ACCESS;  Surgeon: Yemi Ordaz MD;  Location: [facility] OR     IR LOWER EXTREMITY ANGIOGRAM BILATERAL  3/12/2020     LAPAROSCOPIC ASSISTED COLECTOMY LEFT (DESCENDING) N/A 1/25/2018    Procedure: LAPAROSCOPIC ASSISTED COLECTOMY LEFT (DESCENDING);;  Surgeon: Maddie Baron MD;  Location: [facility] OR     LAPAROSCOPIC ASSISTED COLOSTOMY TAKEDOWN N/A 3/28/2019    Procedure: LAPAROSCOPIC ASSISTED COLOSTOMY REVERSAL;  Surgeon: Jesus Caba MD;  Location: [facility] OR     LAPAROSCOPIC HERNIORRHAPHY VENTRAL N/A 8/12/2020    Procedure: COMBINED OPEN AND LAPAROSCOPIC VENTRAL HERNIA REPAIR;  Surgeon: Maddy Gonzales MD;  Location: [facility] OR     LAPAROSCOPY DIAGNOSTIC (GENERAL) N/A 3/28/2019    Procedure: DIAGNOSTIC LAPAROSCOPY;  Surgeon: Jesus Caba MD;  Location: [facility] OR     LAPAROTOMY EXPLORATORY N/A 1/25/2018    Procedure: LAPAROTOMY EXPLORATORY;  exploratory laparoscopy, left hemicolectomy, transverse colostomy;  Surgeon: Maddie Baron MD;  Location: [facility] OR     Current Outpatient Medications   Medication Sig Dispense Refill     acetaminophen (TYLENOL) 500 MG tablet Take 1,000 mg by mouth every 6 hours as needed for mild pain       amLODIPine (NORVASC) 5 MG tablet TAKE 2 TABLETS [=10MG]     DAILY 180 tablet 2     atorvastatin (LIPITOR) 40 MG tablet Take 1 tablet (40 mg) by mouth At Bedtime 90 tablet 3     Ergocalciferol (VITAMIN D2 PO) Take 60 mcg by mouth every morning       gabapentin (NEURONTIN) 400 MG capsule Take 1,200 mg by mouth every morning (takes 3 X 400 mg = 1,200 mg dose)       Homeopathic Products (LEG CRAMP RELIEF PO) Take 2-3 tablets by mouth daily as needed (leg cramps)        hydrochlorothiazide (HYDRODIURIL) 25 MG tablet Take 1 tablet (25 mg) by mouth daily 90 tablet 1     lidocaine-prilocaine (EMLA) 2.5-2.5 % external cream Apply topically daily as needed (Apply one hour prior to port acccess)        LORazepam (ATIVAN) 0.5 MG tablet Take 0.5 mg by mouth nightly as needed for sleep As  "needed for sleep  1     losartan (COZAAR) 100 MG tablet Take 1 tablet (100 mg) by mouth daily 90 tablet 2     Triprolidine-Pseudoephedrine (EQ ANTIHISTABS PO) Take 1 Dose by mouth daily as needed       aspirin (ASA) 81 MG tablet Take 1 tablet (81 mg) by mouth daily (Patient not taking: Reported on 2021)       cyclobenzaprine (FLEXERIL) 5 MG tablet Take 1 tablet (5 mg) by mouth 3 times daily as needed for muscle spasms (Patient not taking: Reported on 2021) 20 tablet 0     naproxen sodium (ANAPROX) 220 MG tablet Take 440 mg by mouth daily as needed for moderate pain         Allergies   Allergen Reactions     Lactose GI Disturbance     Sulfa Drugs Hives        Social History     Tobacco Use     Smoking status: Former Smoker     Packs/day: 0.50     Years: 50.00     Pack years: 25.00     Types: Cigarettes     Quit date: 2020     Years since quittin.6     Smokeless tobacco: Never Used   Substance Use Topics     Alcohol use: No     Alcohol/week: 0.0 standard drinks       History   Drug Use No         Objective     /64   Pulse 77   Temp 97.3  F (36.3  C) (Temporal)   Ht 1.549 m (5' 1\")   Wt 54.9 kg (121 lb)   SpO2 99%   BMI 22.86 kg/m      Physical Exam    GENERAL APPEARANCE: healthy, alert and no distress     EYES: EOMI, PERRL     HENT: normal cephalic/atraumatic     NECK: no adenopathy, no asymmetry, masses, or scars and thyroid normal to palpation     RESP: lungs clear to auscultation - no rales, rhonchi or wheezes     CV: regular rates and rhythm, normal S1 S2, no S3 or S4 and no murmur, click or rub     ABDOMEN:  soft, nontender, no HSM or masses and bowel sounds normal     MS: extremities normal- no gross deformities noted, no evidence of inflammation in joints, FROM in all extremities.     SKIN: no suspicious lesions or rashes     NEURO: Normal strength and tone, sensory exam grossly normal, mentation intact and speech normal     PSYCH: mentation appears normal. and affect " normal/bright     LYMPHATICS: No cervical adenopathy    Recent Labs   Lab Test 12/28/20  1222 03/12/20  0700 04/03/19  0400 04/03/19  0400 04/02/19  0555 04/02/19  0555   HGB 13.4 14.2  --   --    < >  --    * 187   < >  --   --   --    INR 0.89 0.88  --   --   --   --      --   --   --   --  135   POTASSIUM 4.2  --   --  4.0  --  3.2*   CR 0.75 0.80   < >  --   --  0.70   A1C  --  5.4  --   --   --   --     < > = values in this interval not displayed.      Diagnostics:  (jan 27 2021) - per [facility]:    hgb 10.8,   BMP - see 1/8 [facility] labs  EKG: reported as normal (dec 16 2020) at [facility] preop clearance visit    Revised Cardiac Risk Index (RCRI):  The patient has the following serious cardiovascular risks for perioperative complications:   - High risk surgery (>5% cardiac complication risk) = 1 point   - Coronary Artery Disease (MI, positive stress test, angina, Qs on EKG) = 1 point     RCRI Interpretation: 2 points: Class III (moderate risk - 6.6% complication rate)     Estimated Functional Capacity: Performs 4 METS exercise without symptoms (e.g., light housework, stairs, 4 mph walk, 7 mph bike, slow step dance)           Signed Electronically by: Tello Finney MD  Copy of this evaluation report is provided to requesting physician.    Preop [facility] Preop Guidelines    Revised Cardiac Risk Index

## 2021-02-19 NOTE — PATIENT INSTRUCTIONS

## 2021-02-20 LAB
LABORATORY COMMENT REPORT: NORMAL
SARS-COV-2 RNA RESP QL NAA+PROBE: NEGATIVE
SPECIMEN SOURCE: NORMAL

## 2021-02-22 NOTE — PROGRESS NOTES
PTA medications updated by Medication Scribe prior to surgery via phone call with patient        Comments:    Medication history sources: Patient, Surescripts and H&P  Medication history source reliability: Good  Adherence assessment: N/A Not Observed    Significant changes made to the medication list:  None      Additional medication history information:   None        Prior to Admission medications    Medication Sig Last Dose Taking? Auth Provider   acetaminophen (TYLENOL) 500 MG tablet Take 1,000 mg by mouth every 6 hours as needed for mild pain  at PRN Yes Reported, Patient   amLODIPine (NORVASC) 5 MG tablet TAKE 2 TABLETS [=10MG]     DAILY  at am Yes Tello Finney MD   aspirin (ASA) 81 MG tablet Take 1 tablet (81 mg) by mouth daily 2/15/2021 Yes Tello Finney MD   atorvastatin (LIPITOR) 40 MG tablet Take 1 tablet (40 mg) by mouth At Bedtime  at PM Yes Maddy Roper PA-C   Ergocalciferol (VITAMIN D2 PO) Take 60 mcg by mouth every morning  at AM Yes Reported, Patient   gabapentin (NEURONTIN) 400 MG capsule Take 400 mg by mouth 3 times daily   Yes Reported, Patient   Homeopathic Products (LEG CRAMP RELIEF PO) Take 2-3 tablets by mouth daily as needed (leg cramps)  3 DAYS AGO at PRN Yes Reported, Patient   hydrochlorothiazide (HYDRODIURIL) 25 MG tablet Take 1 tablet (25 mg) by mouth daily 2/22/2021 at AM Yes Tello Finney MD   lidocaine-prilocaine (EMLA) 2.5-2.5 % external cream Apply topically daily as needed (Apply one hour prior to port acccess)  MORE THAN 1 WEEK at PRN Yes Reported, Patient   LORazepam (ATIVAN) 0.5 MG tablet Take 0.5 mg by mouth At Bedtime   at HS Yes Reported, Patient   losartan (COZAAR) 100 MG tablet Take 1 tablet (100 mg) by mouth daily  at AM Yes Tello Finney MD

## 2021-02-23 ENCOUNTER — HOSPITAL ENCOUNTER (INPATIENT)
Facility: CLINIC | Age: 76
LOS: 3 days | Discharge: HOME | DRG: 164 | End: 2021-02-26
Attending: THORACIC SURGERY (CARDIOTHORACIC VASCULAR SURGERY) | Admitting: THORACIC SURGERY (CARDIOTHORACIC VASCULAR SURGERY)
Payer: MEDICARE

## 2021-02-23 ENCOUNTER — ANESTHESIA EVENT (OUTPATIENT)
Dept: SURGERY | Facility: CLINIC | Age: 76
DRG: 164 | End: 2021-02-23
Payer: MEDICARE

## 2021-02-23 ENCOUNTER — APPOINTMENT (OUTPATIENT)
Dept: GENERAL RADIOLOGY | Facility: CLINIC | Age: 76
DRG: 164 | End: 2021-02-23
Attending: THORACIC SURGERY (CARDIOTHORACIC VASCULAR SURGERY)
Payer: MEDICARE

## 2021-02-23 ENCOUNTER — ANESTHESIA (OUTPATIENT)
Dept: SURGERY | Facility: CLINIC | Age: 76
DRG: 164 | End: 2021-02-23
Payer: MEDICARE

## 2021-02-23 DIAGNOSIS — K59.03 DRUG-INDUCED CONSTIPATION: ICD-10-CM

## 2021-02-23 DIAGNOSIS — G89.18 ACUTE POST-OPERATIVE PAIN: Primary | ICD-10-CM

## 2021-02-23 DIAGNOSIS — F17.210 CIGARETTE NICOTINE DEPENDENCE WITHOUT COMPLICATION: ICD-10-CM

## 2021-02-23 PROBLEM — R91.1 LEFT UPPER LOBE PULMONARY NODULE: Status: ACTIVE | Noted: 2021-02-23

## 2021-02-23 LAB
ABO + RH BLD: NORMAL
ABO + RH BLD: NORMAL
ANION GAP SERPL CALCULATED.3IONS-SCNC: 5 MMOL/L (ref 3–14)
BLD GP AB SCN SERPL QL: NORMAL
BLOOD BANK CMNT PATIENT-IMP: NORMAL
BUN SERPL-MCNC: 13 MG/DL (ref 7–30)
CALCIUM SERPL-MCNC: 9.7 MG/DL (ref 8.5–10.1)
CHLORIDE SERPL-SCNC: 102 MMOL/L (ref 94–109)
CO2 SERPL-SCNC: 27 MMOL/L (ref 20–32)
CREAT SERPL-MCNC: 0.74 MG/DL (ref 0.52–1.04)
ERYTHROCYTE [DISTWIDTH] IN BLOOD BY AUTOMATED COUNT: 15.8 % (ref 10–15)
GFR SERPL CREATININE-BSD FRML MDRD: 80 ML/MIN/{1.73_M2}
GLUCOSE SERPL-MCNC: 89 MG/DL (ref 70–99)
HCT VFR BLD AUTO: 37.6 % (ref 35–47)
HGB BLD-MCNC: 11.8 G/DL (ref 11.7–15.7)
INR PPP: 1.06 (ref 0.86–1.14)
MCH RBC QN AUTO: 28.3 PG (ref 26.5–33)
MCHC RBC AUTO-ENTMCNC: 31.4 G/DL (ref 31.5–36.5)
MCV RBC AUTO: 90 FL (ref 78–100)
PLATELET # BLD AUTO: 198 10E9/L (ref 150–450)
POTASSIUM SERPL-SCNC: 4.1 MMOL/L (ref 3.4–5.3)
RBC # BLD AUTO: 4.17 10E12/L (ref 3.8–5.2)
SODIUM SERPL-SCNC: 134 MMOL/L (ref 133–144)
SPECIMEN EXP DATE BLD: NORMAL
WBC # BLD AUTO: 6 10E9/L (ref 4–11)

## 2021-02-23 PROCEDURE — 86850 RBC ANTIBODY SCREEN: CPT | Performed by: THORACIC SURGERY (CARDIOTHORACIC VASCULAR SURGERY)

## 2021-02-23 PROCEDURE — 250N000009 HC RX 250: Performed by: THORACIC SURGERY (CARDIOTHORACIC VASCULAR SURGERY)

## 2021-02-23 PROCEDURE — 250N000011 HC RX IP 250 OP 636: Performed by: PHYSICIAN ASSISTANT

## 2021-02-23 PROCEDURE — 258N000003 HC RX IP 258 OP 636: Performed by: PHYSICIAN ASSISTANT

## 2021-02-23 PROCEDURE — 258N000003 HC RX IP 258 OP 636: Performed by: OTOLARYNGOLOGY

## 2021-02-23 PROCEDURE — 86901 BLOOD TYPING SEROLOGIC RH(D): CPT | Performed by: THORACIC SURGERY (CARDIOTHORACIC VASCULAR SURGERY)

## 2021-02-23 PROCEDURE — 250N000011 HC RX IP 250 OP 636: Performed by: NURSE ANESTHETIST, CERTIFIED REGISTERED

## 2021-02-23 PROCEDURE — 258N000003 HC RX IP 258 OP 636: Performed by: NURSE ANESTHETIST, CERTIFIED REGISTERED

## 2021-02-23 PROCEDURE — 360N000077 HC SURGERY LEVEL 4, PER MIN: Performed by: THORACIC SURGERY (CARDIOTHORACIC VASCULAR SURGERY)

## 2021-02-23 PROCEDURE — 88342 IMHCHEM/IMCYTCHM 1ST ANTB: CPT | Mod: 26 | Performed by: PATHOLOGY

## 2021-02-23 PROCEDURE — 88342 IMHCHEM/IMCYTCHM 1ST ANTB: CPT | Mod: TC | Performed by: THORACIC SURGERY (CARDIOTHORACIC VASCULAR SURGERY)

## 2021-02-23 PROCEDURE — 86900 BLOOD TYPING SEROLOGIC ABO: CPT | Performed by: THORACIC SURGERY (CARDIOTHORACIC VASCULAR SURGERY)

## 2021-02-23 PROCEDURE — 710N000009 HC RECOVERY PHASE 1, LEVEL 1, PER MIN: Performed by: THORACIC SURGERY (CARDIOTHORACIC VASCULAR SURGERY)

## 2021-02-23 PROCEDURE — 85027 COMPLETE CBC AUTOMATED: CPT | Performed by: THORACIC SURGERY (CARDIOTHORACIC VASCULAR SURGERY)

## 2021-02-23 PROCEDURE — 99222 1ST HOSP IP/OBS MODERATE 55: CPT | Performed by: PHYSICIAN ASSISTANT

## 2021-02-23 PROCEDURE — 88307 TISSUE EXAM BY PATHOLOGIST: CPT | Mod: 26 | Performed by: PATHOLOGY

## 2021-02-23 PROCEDURE — 272N000001 HC OR GENERAL SUPPLY STERILE: Performed by: THORACIC SURGERY (CARDIOTHORACIC VASCULAR SURGERY)

## 2021-02-23 PROCEDURE — 250N000009 HC RX 250: Performed by: OTOLARYNGOLOGY

## 2021-02-23 PROCEDURE — 999N000141 HC STATISTIC PRE-PROCEDURE NURSING ASSESSMENT: Performed by: THORACIC SURGERY (CARDIOTHORACIC VASCULAR SURGERY)

## 2021-02-23 PROCEDURE — 999N000063 XR CHEST PORT 1 VW

## 2021-02-23 PROCEDURE — 88307 TISSUE EXAM BY PATHOLOGIST: CPT | Mod: TC | Performed by: THORACIC SURGERY (CARDIOTHORACIC VASCULAR SURGERY)

## 2021-02-23 PROCEDURE — 88341 IMHCHEM/IMCYTCHM EA ADD ANTB: CPT | Mod: TC | Performed by: THORACIC SURGERY (CARDIOTHORACIC VASCULAR SURGERY)

## 2021-02-23 PROCEDURE — 0BBG0ZZ EXCISION OF LEFT UPPER LUNG LOBE, OPEN APPROACH: ICD-10-PCS | Performed by: THORACIC SURGERY (CARDIOTHORACIC VASCULAR SURGERY)

## 2021-02-23 PROCEDURE — 250N000013 HC RX MED GY IP 250 OP 250 PS 637: Performed by: PHYSICIAN ASSISTANT

## 2021-02-23 PROCEDURE — 120N000013 HC R&B IMCU

## 2021-02-23 PROCEDURE — 250N000011 HC RX IP 250 OP 636: Performed by: SURGERY

## 2021-02-23 PROCEDURE — 88341 IMHCHEM/IMCYTCHM EA ADD ANTB: CPT | Mod: 26 | Performed by: PATHOLOGY

## 2021-02-23 PROCEDURE — 99207 PR CONSULT E&M CHANGED TO INITIAL LEVEL: CPT | Performed by: PHYSICIAN ASSISTANT

## 2021-02-23 PROCEDURE — 370N000017 HC ANESTHESIA TECHNICAL FEE, PER MIN: Performed by: THORACIC SURGERY (CARDIOTHORACIC VASCULAR SURGERY)

## 2021-02-23 PROCEDURE — 36415 COLL VENOUS BLD VENIPUNCTURE: CPT | Performed by: THORACIC SURGERY (CARDIOTHORACIC VASCULAR SURGERY)

## 2021-02-23 PROCEDURE — 80048 BASIC METABOLIC PNL TOTAL CA: CPT | Performed by: THORACIC SURGERY (CARDIOTHORACIC VASCULAR SURGERY)

## 2021-02-23 PROCEDURE — 250N000009 HC RX 250: Performed by: NURSE ANESTHETIST, CERTIFIED REGISTERED

## 2021-02-23 PROCEDURE — 250N000025 HC SEVOFLURANE, PER MIN: Performed by: THORACIC SURGERY (CARDIOTHORACIC VASCULAR SURGERY)

## 2021-02-23 PROCEDURE — 250N000011 HC RX IP 250 OP 636: Performed by: THORACIC SURGERY (CARDIOTHORACIC VASCULAR SURGERY)

## 2021-02-23 PROCEDURE — 85610 PROTHROMBIN TIME: CPT | Performed by: THORACIC SURGERY (CARDIOTHORACIC VASCULAR SURGERY)

## 2021-02-23 RX ORDER — FAMOTIDINE 20 MG/1
20 TABLET, FILM COATED ORAL 2 TIMES DAILY
Status: DISCONTINUED | OUTPATIENT
Start: 2021-02-23 | End: 2021-02-26 | Stop reason: HOSPADM

## 2021-02-23 RX ORDER — ONDANSETRON 4 MG/1
4 TABLET, ORALLY DISINTEGRATING ORAL EVERY 6 HOURS PRN
Status: DISCONTINUED | OUTPATIENT
Start: 2021-02-23 | End: 2021-02-26 | Stop reason: HOSPADM

## 2021-02-23 RX ORDER — DIPHENHYDRAMINE HCL 12.5MG/5ML
12.5 LIQUID (ML) ORAL EVERY 6 HOURS PRN
Status: DISCONTINUED | OUTPATIENT
Start: 2021-02-23 | End: 2021-02-26 | Stop reason: HOSPADM

## 2021-02-23 RX ORDER — NITROGLYCERIN 0.4 MG/1
0.4 TABLET SUBLINGUAL EVERY 5 MIN PRN
Status: DISCONTINUED | OUTPATIENT
Start: 2021-02-23 | End: 2021-02-24

## 2021-02-23 RX ORDER — LIDOCAINE 40 MG/G
CREAM TOPICAL
Status: DISCONTINUED | OUTPATIENT
Start: 2021-02-23 | End: 2021-02-26 | Stop reason: HOSPADM

## 2021-02-23 RX ORDER — AMOXICILLIN 250 MG
1 CAPSULE ORAL 2 TIMES DAILY PRN
Status: DISCONTINUED | OUTPATIENT
Start: 2021-02-23 | End: 2021-02-26 | Stop reason: HOSPADM

## 2021-02-23 RX ORDER — DEXAMETHASONE SODIUM PHOSPHATE 4 MG/ML
INJECTION, SOLUTION INTRA-ARTICULAR; INTRALESIONAL; INTRAMUSCULAR; INTRAVENOUS; SOFT TISSUE PRN
Status: DISCONTINUED | OUTPATIENT
Start: 2021-02-23 | End: 2021-02-23

## 2021-02-23 RX ORDER — LIDOCAINE 40 MG/G
CREAM TOPICAL
Status: DISCONTINUED | OUTPATIENT
Start: 2021-02-23 | End: 2021-02-23

## 2021-02-23 RX ORDER — AMOXICILLIN 250 MG
1 CAPSULE ORAL 2 TIMES DAILY
Status: DISCONTINUED | OUTPATIENT
Start: 2021-02-23 | End: 2021-02-23

## 2021-02-23 RX ORDER — HYDROMORPHONE HYDROCHLORIDE 1 MG/ML
.3-.5 INJECTION, SOLUTION INTRAMUSCULAR; INTRAVENOUS; SUBCUTANEOUS EVERY 5 MIN PRN
Status: DISCONTINUED | OUTPATIENT
Start: 2021-02-23 | End: 2021-02-23 | Stop reason: HOSPADM

## 2021-02-23 RX ORDER — GINSENG 100 MG
CAPSULE ORAL DAILY
Status: DISCONTINUED | OUTPATIENT
Start: 2021-02-23 | End: 2021-02-26 | Stop reason: HOSPADM

## 2021-02-23 RX ORDER — DIPHENHYDRAMINE HYDROCHLORIDE 50 MG/ML
12.5 INJECTION INTRAMUSCULAR; INTRAVENOUS EVERY 6 HOURS PRN
Status: DISCONTINUED | OUTPATIENT
Start: 2021-02-23 | End: 2021-02-26 | Stop reason: HOSPADM

## 2021-02-23 RX ORDER — NICOTINE 21 MG/24HR
1 PATCH, TRANSDERMAL 24 HOURS TRANSDERMAL DAILY
Status: DISCONTINUED | OUTPATIENT
Start: 2021-02-24 | End: 2021-02-26 | Stop reason: HOSPADM

## 2021-02-23 RX ORDER — FENTANYL CITRATE 50 UG/ML
INJECTION, SOLUTION INTRAMUSCULAR; INTRAVENOUS PRN
Status: DISCONTINUED | OUTPATIENT
Start: 2021-02-23 | End: 2021-02-23

## 2021-02-23 RX ORDER — AMOXICILLIN 250 MG
1 CAPSULE ORAL 2 TIMES DAILY
Status: DISCONTINUED | OUTPATIENT
Start: 2021-02-23 | End: 2021-02-26 | Stop reason: HOSPADM

## 2021-02-23 RX ORDER — ONDANSETRON 4 MG/1
4 TABLET, ORALLY DISINTEGRATING ORAL EVERY 30 MIN PRN
Status: DISCONTINUED | OUTPATIENT
Start: 2021-02-23 | End: 2021-02-23 | Stop reason: HOSPADM

## 2021-02-23 RX ORDER — ACETAMINOPHEN 325 MG/1
650 TABLET ORAL EVERY 4 HOURS PRN
Status: DISCONTINUED | OUTPATIENT
Start: 2021-02-26 | End: 2021-02-26 | Stop reason: HOSPADM

## 2021-02-23 RX ORDER — SODIUM CHLORIDE, SODIUM LACTATE, POTASSIUM CHLORIDE, CALCIUM CHLORIDE 600; 310; 30; 20 MG/100ML; MG/100ML; MG/100ML; MG/100ML
INJECTION, SOLUTION INTRAVENOUS CONTINUOUS
Status: DISCONTINUED | OUTPATIENT
Start: 2021-02-23 | End: 2021-02-23 | Stop reason: HOSPADM

## 2021-02-23 RX ORDER — AMLODIPINE BESYLATE 10 MG/1
10 TABLET ORAL DAILY
Status: DISCONTINUED | OUTPATIENT
Start: 2021-02-24 | End: 2021-02-26 | Stop reason: HOSPADM

## 2021-02-23 RX ORDER — CALCIUM CARBONATE 500 MG/1
500 TABLET, CHEWABLE ORAL 4 TIMES DAILY PRN
Status: DISCONTINUED | OUTPATIENT
Start: 2021-02-23 | End: 2021-02-26 | Stop reason: HOSPADM

## 2021-02-23 RX ORDER — HYDROCHLOROTHIAZIDE 25 MG/1
25 TABLET ORAL DAILY
Status: DISCONTINUED | OUTPATIENT
Start: 2021-02-24 | End: 2021-02-26 | Stop reason: HOSPADM

## 2021-02-23 RX ORDER — MAGNESIUM HYDROXIDE 1200 MG/15ML
LIQUID ORAL PRN
Status: DISCONTINUED | OUTPATIENT
Start: 2021-02-23 | End: 2021-02-23 | Stop reason: HOSPADM

## 2021-02-23 RX ORDER — ATORVASTATIN CALCIUM 40 MG/1
40 TABLET, FILM COATED ORAL AT BEDTIME
Status: DISCONTINUED | OUTPATIENT
Start: 2021-02-23 | End: 2021-02-26 | Stop reason: HOSPADM

## 2021-02-23 RX ORDER — PROPOFOL 10 MG/ML
INJECTION, EMULSION INTRAVENOUS PRN
Status: DISCONTINUED | OUTPATIENT
Start: 2021-02-23 | End: 2021-02-23

## 2021-02-23 RX ORDER — CEFAZOLIN SODIUM 2 G/100ML
2 INJECTION, SOLUTION INTRAVENOUS
Status: DISCONTINUED | OUTPATIENT
Start: 2021-02-23 | End: 2021-02-23 | Stop reason: HOSPADM

## 2021-02-23 RX ORDER — LOSARTAN POTASSIUM 100 MG/1
100 TABLET ORAL DAILY
Status: DISCONTINUED | OUTPATIENT
Start: 2021-02-24 | End: 2021-02-26 | Stop reason: HOSPADM

## 2021-02-23 RX ORDER — CEFAZOLIN SODIUM 2 G/100ML
2 INJECTION, SOLUTION INTRAVENOUS SEE ADMIN INSTRUCTIONS
Status: DISCONTINUED | OUTPATIENT
Start: 2021-02-23 | End: 2021-02-23 | Stop reason: HOSPADM

## 2021-02-23 RX ORDER — AMOXICILLIN 250 MG
2 CAPSULE ORAL 2 TIMES DAILY PRN
Status: DISCONTINUED | OUTPATIENT
Start: 2021-02-23 | End: 2021-02-26 | Stop reason: HOSPADM

## 2021-02-23 RX ORDER — NALOXONE HYDROCHLORIDE 0.4 MG/ML
0.2 INJECTION, SOLUTION INTRAMUSCULAR; INTRAVENOUS; SUBCUTANEOUS
Status: ACTIVE | OUTPATIENT
Start: 2021-02-23 | End: 2021-02-24

## 2021-02-23 RX ORDER — ACETAMINOPHEN 325 MG/1
975 TABLET ORAL EVERY 8 HOURS
Status: COMPLETED | OUTPATIENT
Start: 2021-02-23 | End: 2021-02-26

## 2021-02-23 RX ORDER — AMOXICILLIN 250 MG
2 CAPSULE ORAL 2 TIMES DAILY
Status: DISCONTINUED | OUTPATIENT
Start: 2021-02-23 | End: 2021-02-23

## 2021-02-23 RX ORDER — NALOXONE HYDROCHLORIDE 0.4 MG/ML
0.4 INJECTION, SOLUTION INTRAMUSCULAR; INTRAVENOUS; SUBCUTANEOUS
Status: ACTIVE | OUTPATIENT
Start: 2021-02-23 | End: 2021-02-24

## 2021-02-23 RX ORDER — LIDOCAINE HYDROCHLORIDE 20 MG/ML
INJECTION, SOLUTION INFILTRATION; PERINEURAL PRN
Status: DISCONTINUED | OUTPATIENT
Start: 2021-02-23 | End: 2021-02-23

## 2021-02-23 RX ORDER — ONDANSETRON 2 MG/ML
INJECTION INTRAMUSCULAR; INTRAVENOUS PRN
Status: DISCONTINUED | OUTPATIENT
Start: 2021-02-23 | End: 2021-02-23

## 2021-02-23 RX ORDER — LORAZEPAM 0.5 MG/1
0.5 TABLET ORAL AT BEDTIME
Status: DISCONTINUED | OUTPATIENT
Start: 2021-02-23 | End: 2021-02-26 | Stop reason: HOSPADM

## 2021-02-23 RX ORDER — AMOXICILLIN 250 MG
2 CAPSULE ORAL 2 TIMES DAILY
Status: DISCONTINUED | OUTPATIENT
Start: 2021-02-23 | End: 2021-02-26 | Stop reason: HOSPADM

## 2021-02-23 RX ORDER — ONDANSETRON 2 MG/ML
4 INJECTION INTRAMUSCULAR; INTRAVENOUS EVERY 30 MIN PRN
Status: DISCONTINUED | OUTPATIENT
Start: 2021-02-23 | End: 2021-02-23 | Stop reason: HOSPADM

## 2021-02-23 RX ORDER — HYDROMORPHONE HYDROCHLORIDE 1 MG/ML
.2-.3 INJECTION, SOLUTION INTRAMUSCULAR; INTRAVENOUS; SUBCUTANEOUS
Status: DISCONTINUED | OUTPATIENT
Start: 2021-02-23 | End: 2021-02-26 | Stop reason: HOSPADM

## 2021-02-23 RX ORDER — FENTANYL CITRATE 50 UG/ML
25-50 INJECTION, SOLUTION INTRAMUSCULAR; INTRAVENOUS
Status: DISCONTINUED | OUTPATIENT
Start: 2021-02-23 | End: 2021-02-23 | Stop reason: HOSPADM

## 2021-02-23 RX ORDER — KETOROLAC TROMETHAMINE 15 MG/ML
15 INJECTION, SOLUTION INTRAMUSCULAR; INTRAVENOUS EVERY 6 HOURS
Status: COMPLETED | OUTPATIENT
Start: 2021-02-23 | End: 2021-02-24

## 2021-02-23 RX ORDER — POLYETHYLENE GLYCOL 3350 17 G/17G
17 POWDER, FOR SOLUTION ORAL DAILY PRN
Status: DISCONTINUED | OUTPATIENT
Start: 2021-02-23 | End: 2021-02-26 | Stop reason: HOSPADM

## 2021-02-23 RX ORDER — PROCHLORPERAZINE MALEATE 5 MG
5 TABLET ORAL EVERY 6 HOURS PRN
Status: DISCONTINUED | OUTPATIENT
Start: 2021-02-23 | End: 2021-02-26 | Stop reason: HOSPADM

## 2021-02-23 RX ORDER — BISACODYL 10 MG
10 SUPPOSITORY, RECTAL RECTAL DAILY PRN
Status: DISCONTINUED | OUTPATIENT
Start: 2021-02-23 | End: 2021-02-26 | Stop reason: HOSPADM

## 2021-02-23 RX ORDER — SODIUM CHLORIDE 9 MG/ML
INJECTION, SOLUTION INTRAVENOUS CONTINUOUS
Status: DISCONTINUED | OUTPATIENT
Start: 2021-02-23 | End: 2021-02-26 | Stop reason: HOSPADM

## 2021-02-23 RX ORDER — ONDANSETRON 2 MG/ML
4 INJECTION INTRAMUSCULAR; INTRAVENOUS EVERY 6 HOURS PRN
Status: DISCONTINUED | OUTPATIENT
Start: 2021-02-23 | End: 2021-02-26 | Stop reason: HOSPADM

## 2021-02-23 RX ADMIN — PROPOFOL 40 MG: 10 INJECTION, EMULSION INTRAVENOUS at 15:43

## 2021-02-23 RX ADMIN — DEXMEDETOMIDINE HYDROCHLORIDE 12 MCG: 100 INJECTION, SOLUTION INTRAVENOUS at 16:39

## 2021-02-23 RX ADMIN — ONDANSETRON 4 MG: 2 INJECTION INTRAMUSCULAR; INTRAVENOUS at 16:00

## 2021-02-23 RX ADMIN — SUGAMMADEX 150 MG: 100 INJECTION, SOLUTION INTRAVENOUS at 16:35

## 2021-02-23 RX ADMIN — ONDANSETRON 4 MG: 2 INJECTION INTRAMUSCULAR; INTRAVENOUS at 21:57

## 2021-02-23 RX ADMIN — ACETAMINOPHEN 975 MG: 325 TABLET, FILM COATED ORAL at 19:57

## 2021-02-23 RX ADMIN — DOCUSATE SODIUM 50 MG AND SENNOSIDES 8.6 MG 1 TABLET: 8.6; 5 TABLET, FILM COATED ORAL at 21:58

## 2021-02-23 RX ADMIN — FAMOTIDINE 20 MG: 20 TABLET ORAL at 21:58

## 2021-02-23 RX ADMIN — ATORVASTATIN CALCIUM 40 MG: 40 TABLET, FILM COATED ORAL at 21:58

## 2021-02-23 RX ADMIN — PHENYLEPHRINE HYDROCHLORIDE 100 MCG: 10 INJECTION INTRAVENOUS at 15:52

## 2021-02-23 RX ADMIN — LIDOCAINE HYDROCHLORIDE 1 ML: 10 INJECTION, SOLUTION EPIDURAL; INFILTRATION; INTRACAUDAL; PERINEURAL at 12:39

## 2021-02-23 RX ADMIN — HYDROMORPHONE HYDROCHLORIDE 1 MG: 2 TABLET ORAL at 19:57

## 2021-02-23 RX ADMIN — HYDROMORPHONE HYDROCHLORIDE 0.5 MG: 1 INJECTION, SOLUTION INTRAMUSCULAR; INTRAVENOUS; SUBCUTANEOUS at 17:19

## 2021-02-23 RX ADMIN — LORAZEPAM 0.5 MG: 0.5 TABLET ORAL at 21:58

## 2021-02-23 RX ADMIN — HYDROMORPHONE HYDROCHLORIDE 0.5 MG: 1 INJECTION, SOLUTION INTRAMUSCULAR; INTRAVENOUS; SUBCUTANEOUS at 17:38

## 2021-02-23 RX ADMIN — SODIUM CHLORIDE: 9 INJECTION, SOLUTION INTRAVENOUS at 21:16

## 2021-02-23 RX ADMIN — ROCURONIUM BROMIDE 20 MG: 10 INJECTION INTRAVENOUS at 15:56

## 2021-02-23 RX ADMIN — PROPOFOL 100 MG: 10 INJECTION, EMULSION INTRAVENOUS at 15:32

## 2021-02-23 RX ADMIN — ROCURONIUM BROMIDE 50 MG: 10 INJECTION INTRAVENOUS at 15:32

## 2021-02-23 RX ADMIN — DEXAMETHASONE SODIUM PHOSPHATE 4 MG: 4 INJECTION, SOLUTION INTRA-ARTICULAR; INTRALESIONAL; INTRAMUSCULAR; INTRAVENOUS; SOFT TISSUE at 16:00

## 2021-02-23 RX ADMIN — FENTANYL CITRATE 50 MCG: 50 INJECTION, SOLUTION INTRAMUSCULAR; INTRAVENOUS at 17:07

## 2021-02-23 RX ADMIN — CEFAZOLIN SODIUM 2 G: 2 INJECTION, SOLUTION INTRAVENOUS at 15:40

## 2021-02-23 RX ADMIN — LIDOCAINE HYDROCHLORIDE 80 MG: 20 INJECTION, SOLUTION INFILTRATION; PERINEURAL at 15:32

## 2021-02-23 RX ADMIN — KETOROLAC TROMETHAMINE 15 MG: 15 INJECTION, SOLUTION INTRAMUSCULAR; INTRAVENOUS at 21:16

## 2021-02-23 RX ADMIN — SUGAMMADEX 50 MG: 100 INJECTION, SOLUTION INTRAVENOUS at 16:37

## 2021-02-23 RX ADMIN — SODIUM CHLORIDE, POTASSIUM CHLORIDE, SODIUM LACTATE AND CALCIUM CHLORIDE: 600; 310; 30; 20 INJECTION, SOLUTION INTRAVENOUS at 12:39

## 2021-02-23 RX ADMIN — GABAPENTIN 400 MG: 300 CAPSULE ORAL at 21:58

## 2021-02-23 RX ADMIN — FENTANYL CITRATE 100 MCG: 50 INJECTION, SOLUTION INTRAMUSCULAR; INTRAVENOUS at 15:32

## 2021-02-23 RX ADMIN — FENTANYL CITRATE 50 MCG: 50 INJECTION, SOLUTION INTRAMUSCULAR; INTRAVENOUS at 16:57

## 2021-02-23 ASSESSMENT — MIFFLIN-ST. JEOR: SCORE: 979.71

## 2021-02-23 ASSESSMENT — ENCOUNTER SYMPTOMS: DYSRHYTHMIAS: 0

## 2021-02-23 ASSESSMENT — ACTIVITIES OF DAILY LIVING (ADL)
CONCENTRATING,_REMEMBERING_OR_MAKING_DECISIONS_DIFFICULTY: NO
ADLS_ACUITY_SCORE: 14
DRESSING/BATHING_DIFFICULTY: NO

## 2021-02-23 ASSESSMENT — LIFESTYLE VARIABLES: TOBACCO_USE: 1

## 2021-02-23 ASSESSMENT — COPD QUESTIONNAIRES: COPD: 1

## 2021-02-23 NOTE — BRIEF OP NOTE
[facility]    Brief Operative Note    Pre-operative diagnosis: Lung nodule [R91.1]  Post-operative diagnosis left upper lobe lung nodule    Procedure: Procedure(s):  LEFT THORACOTOMY, ANATOMICAL POSTERIOR SEGMENTECTOMY LEFT UPPER LOBE   Surgeon: Surgeon(s) and Role:     * Yemi Ordaz MD - Primary     * Ofelia Lockwood PA-C - Assisting  Anesthesia: General   Estimated blood loss: Minimal-- 5 cc  Drains:  One 28 straight CT to left apex  Specimens:   ID Type Source Tests Collected by Time Destination   A : LEFT LUNG UPPER LOBE NODULE  Tissue Lung SURGICAL PATHOLOGY EXAM Yemi Ordaz MD 2/23/2021  4:03 PM      Findings:   Left upper lobe lung nodule  Complications: None.  Implants: * No implants in log *

## 2021-02-23 NOTE — ANESTHESIA PREPROCEDURE EVALUATION
Anesthesia Pre-Procedure Evaluation    Patient: Anna Dyer   MRN: [id number] : 1945        Preoperative Diagnosis: Lung nodule [R91.1]   Procedure : Procedure(s):  LEFT VIDEO ASSISTED THORACOSCOPIC SURGERY, POSSIBLE LIMITED LEFT THORACOTOMY, WEDGE RESECTION LEFT UPPER LOBE NODULE     Past Medical History:   Diagnosis Date     Cancer (H)     metastatic colorectal adenocarcinoma     Hypertension     No cardiologist     RLS (restless legs syndrome)       Past Surgical History:   Procedure Laterality Date     BIOPSY      liver     BREAST SURGERY      breast implants     COLONOSCOPY N/A 3/28/2019    Procedure: INTRAOPERATIVE COLONOSCOPY,;  Surgeon: Jesus Caba MD;  Location: [facility] OR     COSMETIC SURGERY      facelift and tummy tuck     GYN SURGERY      tubal     INSERT PORT VASCULAR ACCESS N/A 3/24/2017    Procedure: INSERT PORT VASCULAR ACCESS;  Surgeon: Yemi Ordaz MD;  Location:  OR     IR LOWER EXTREMITY ANGIOGRAM BILATERAL  3/12/2020     LAPAROSCOPIC ASSISTED COLECTOMY LEFT (DESCENDING) N/A 2018    Procedure: LAPAROSCOPIC ASSISTED COLECTOMY LEFT (DESCENDING);;  Surgeon: Maddie Baron MD;  Location:  OR     LAPAROSCOPIC ASSISTED COLOSTOMY TAKEDOWN N/A 3/28/2019    Procedure: LAPAROSCOPIC ASSISTED COLOSTOMY REVERSAL;  Surgeon: Jesus Caba MD;  Location:  OR     LAPAROSCOPIC HERNIORRHAPHY VENTRAL N/A 2020    Procedure: COMBINED OPEN AND LAPAROSCOPIC VENTRAL HERNIA REPAIR;  Surgeon: Maddy Gonzales MD;  Location:  OR     LAPAROSCOPY DIAGNOSTIC (GENERAL) N/A 3/28/2019    Procedure: DIAGNOSTIC LAPAROSCOPY;  Surgeon: Jesus Caba MD;  Location:  OR     LAPAROTOMY EXPLORATORY N/A 2018    Procedure: LAPAROTOMY EXPLORATORY;  exploratory laparoscopy, left hemicolectomy, transverse colostomy;  Surgeon: Maddie Baron MD;  Location: [facility] OR      Allergies   Allergen Reactions     Lactose GI Disturbance     Sulfa Drugs Hives      Social History      Tobacco Use     Smoking status: Current Every Day Smoker     Packs/day: 1.00     Years: 58.00     Pack years: 58.00     Types: Cigarettes     Last attempt to quit: 2020     Years since quittin.6     Smokeless tobacco: Never Used   Substance Use Topics     Alcohol use: No     Alcohol/week: 0.0 standard drinks      Wt Readings from Last 1 Encounters:   21 54.2 kg (119 lb 9 oz)        Anesthesia Evaluation            ROS/MED HX  ENT/Pulmonary:     (+) tobacco use, COPD,     Neurologic: Comment: RLS      Cardiovascular:     (+) hypertension-Peripheral Vascular Disease-- Other and Symptomatic. --- (-) CHF and arrhythmias   METS/Exercise Tolerance: 3 - Able to walk 1-2 blocks without stopping Comment: Significant claudication   Hematologic:       Musculoskeletal:       GI/Hepatic:     (+) liver disease,     Renal/Genitourinary:     (+) renal disease, type: CRI,     Endo:       Psychiatric/Substance Use:  - neg psychiatric ROS     Infectious Disease:       Malignancy:   (+) Malignancy, History of GI.    Other:            Physical Exam    Airway        Mallampati: I   TM distance: > 3 FB   Neck ROM: full   Mouth opening: > 3 cm    Respiratory Devices and Support         Dental  no notable dental history         Cardiovascular   cardiovascular exam normal          Pulmonary   pulmonary exam normal                OUTSIDE LABS:  CBC:   Lab Results   Component Value Date    WBC 6.0 2021    WBC 6.6 2020    HGB 11.8 2021    HGB 13.4 2020    HCT 37.6 2021    HCT 42.4 2020     2021     (L) 2020     BMP:   Lab Results   Component Value Date     2021     2020    POTASSIUM 4.1 2021    POTASSIUM 4.2 2020    CHLORIDE 102 2021    CHLORIDE 106 2020    CO2 27 2021    CO2 26 2020    BUN 13 2021    BUN 28 2020    CR 0.74 2021    CR 0.75 2020    GLC 89 2021    GLC 91  12/28/2020     COAGS:   Lab Results   Component Value Date    PTT 32 03/12/2020    INR 1.06 02/23/2021     POC:   Lab Results   Component Value Date    BGM 94 08/14/2020     HEPATIC:   Lab Results   Component Value Date    ALBUMIN 3.9 12/28/2020    PROTTOTAL 7.9 12/28/2020    ALT 17 12/28/2020    AST 25 12/28/2020     (H) 05/08/2009    ALKPHOS 86 12/28/2020    BILITOTAL 0.6 12/28/2020     OTHER:   Lab Results   Component Value Date    A1C 5.4 03/12/2020    CAITY 9.7 02/23/2021    PHOS 3.4 04/01/2019    MAG 2.9 (H) 04/01/2019    TSH 2.88 05/08/2009       Anesthesia Plan    ASA Status:  3   NPO Status:  NPO Appropriate    Anesthesia Type: General.     - Airway: ETT   Induction: Intravenous, Propofol.   Maintenance: Balanced.   Techniques and Equipment:     - Airway: Double lumen ETT (35F NOE)         Consents    Anesthesia Plan(s) and associated risks, benefits, and realistic alternatives discussed. Questions answered and patient/representative(s) expressed understanding.     - Discussed with:  Patient         Postoperative Care    Pain management: IV analgesics, Multi-modal analgesia.   PONV prophylaxis: Ondansetron (or other 5HT-3), Background Propofol Infusion, Dexamethasone or Solumedrol     Comments:                Troy Vale MD

## 2021-02-23 NOTE — ANESTHESIA CARE TRANSFER NOTE
Patient: Anna Dyer    Procedure(s):  LEFT THORACOTOMY, ANATOMICAL POSTERIOR SEGMENTECTOMY LEFT UPPER LOBE     Diagnosis: Lung nodule [R91.1]  Diagnosis Additional Information: No value filed.    Anesthesia Type:   General     Note:    Oropharynx: oropharynx clear of all foreign objects and spontaneously breathing  Level of Consciousness: awake  Oxygen Supplementation: face mask  Level of Supplemental Oxygen (L/min / FiO2): 6  Independent Airway: airway patency satisfactory and stable  Dentition: dentition unchanged  Vital Signs Stable: post-procedure vital signs reviewed and stable  Report to RN Given: handoff report given  Patient transferred to: PACU  Comments: Neuromuscular blockade reversed after TOF 3/4, spontaneous respirations, adequate tidal volumes, followed commands to voice, oropharynx suctioned with soft flexible catheter, extubated atraumatically, airway patent after extubation.  Oxygen via facemask at 6 liters per minute to PACU. Oxygen tubing connected to wall O2 in PACU, SpO2, NiBP, and EKG monitors and alarms on and functioning, Anay Hugger warmer connected to patient gown, report on patient's clinical status given to PACU RN, RN questions answered.     Handoff Report: Identifed the Patient, Identified the Reponsible Provider, Reviewed the pertinent medical history, Discussed the surgical course, Reviewed Intra-OP anesthesia mangement and issues during anesthesia, Set expectations for post-procedure period and Allowed opportunity for questions and acknowledgement of understanding      Vitals: (Last set prior to Anesthesia Care Transfer)  CRNA VITALS  2/23/2021 1612 - 2/23/2021 1648      2/23/2021             NIBP:  134/66    Ht Rate:  86    SpO2:  100 %    Resp Rate (observed):  20        Electronically Signed By: ILENE Alonso CRNA  February 23, 2021  4:48 PM

## 2021-02-23 NOTE — ANESTHESIA PROCEDURE NOTES
Airway   Date/Time: 2/23/2021 3:36 PM   Patient location during procedure: OR  Staff -   Anesthesiologist:  Troy Vale MD  CRNA: Sri Sandhu APRN CRNA  Other Anesthesia Staff: Angela Doyle  Performed By: HORACIO    Indications and Patient Condition  Indications for airway management: josesito-procedural  Induction type:intravenousMask difficulty assessment: 1 - vent by mask    Final Airway Details  Final airway type: endotracheal airway  Successful airway:ETT - double lumen left  Endotracheal Airway Details   Cuffed: yes  Successful intubation technique: direct laryngoscopy  Grade View of Cords: 1  Adjucts: stylet  Measured from: gums/teeth  Secured at (cm): 29  Secured with: pink tape  Bite block used: None  ETT Double lumen (fr): 35    Post intubation assessment   Placement verified by: capnometry, equal breath sounds and chest rise   Number of attempts at approach: 1  Number of other approaches attempted: 0  Secured with:pink tape  Ease of procedure: easy  Dentition: Intact and Unchanged

## 2021-02-24 ENCOUNTER — APPOINTMENT (OUTPATIENT)
Dept: GENERAL RADIOLOGY | Facility: CLINIC | Age: 76
DRG: 164 | End: 2021-02-24
Attending: PHYSICIAN ASSISTANT
Payer: MEDICARE

## 2021-02-24 LAB
ANION GAP SERPL CALCULATED.3IONS-SCNC: 7 MMOL/L (ref 3–14)
BASOPHILS # BLD AUTO: 0 10E9/L (ref 0–0.2)
BASOPHILS NFR BLD AUTO: 0.1 %
BUN SERPL-MCNC: 18 MG/DL (ref 7–30)
CALCIUM SERPL-MCNC: 9.7 MG/DL (ref 8.5–10.1)
CHLORIDE SERPL-SCNC: 101 MMOL/L (ref 94–109)
CO2 SERPL-SCNC: 25 MMOL/L (ref 20–32)
CREAT SERPL-MCNC: 0.66 MG/DL (ref 0.52–1.04)
DIFFERENTIAL METHOD BLD: ABNORMAL
EOSINOPHIL # BLD AUTO: 0 10E9/L (ref 0–0.7)
EOSINOPHIL NFR BLD AUTO: 0 %
ERYTHROCYTE [DISTWIDTH] IN BLOOD BY AUTOMATED COUNT: 15.4 % (ref 10–15)
GFR SERPL CREATININE-BSD FRML MDRD: 86 ML/MIN/{1.73_M2}
GLUCOSE SERPL-MCNC: 138 MG/DL (ref 70–99)
HCT VFR BLD AUTO: 36.8 % (ref 35–47)
HGB BLD-MCNC: 11.6 G/DL (ref 11.7–15.7)
IMM GRANULOCYTES # BLD: 0 10E9/L (ref 0–0.4)
IMM GRANULOCYTES NFR BLD: 0.4 %
LYMPHOCYTES # BLD AUTO: 0.7 10E9/L (ref 0.8–5.3)
LYMPHOCYTES NFR BLD AUTO: 10 %
MCH RBC QN AUTO: 28.5 PG (ref 26.5–33)
MCHC RBC AUTO-ENTMCNC: 31.5 G/DL (ref 31.5–36.5)
MCV RBC AUTO: 90 FL (ref 78–100)
MONOCYTES # BLD AUTO: 0.3 10E9/L (ref 0–1.3)
MONOCYTES NFR BLD AUTO: 4.6 %
NEUTROPHILS # BLD AUTO: 6.1 10E9/L (ref 1.6–8.3)
NEUTROPHILS NFR BLD AUTO: 84.9 %
NRBC # BLD AUTO: 0 10*3/UL
NRBC BLD AUTO-RTO: 0 /100
PLATELET # BLD AUTO: 177 10E9/L (ref 150–450)
POTASSIUM SERPL-SCNC: 4.5 MMOL/L (ref 3.4–5.3)
RBC # BLD AUTO: 4.07 10E12/L (ref 3.8–5.2)
SODIUM SERPL-SCNC: 133 MMOL/L (ref 133–144)
WBC # BLD AUTO: 7.2 10E9/L (ref 4–11)

## 2021-02-24 PROCEDURE — 71045 X-RAY EXAM CHEST 1 VIEW: CPT

## 2021-02-24 PROCEDURE — 120N000013 HC R&B IMCU

## 2021-02-24 PROCEDURE — 85025 COMPLETE CBC W/AUTO DIFF WBC: CPT | Performed by: PHYSICIAN ASSISTANT

## 2021-02-24 PROCEDURE — 250N000009 HC RX 250: Performed by: PHYSICIAN ASSISTANT

## 2021-02-24 PROCEDURE — 36415 COLL VENOUS BLD VENIPUNCTURE: CPT | Performed by: PHYSICIAN ASSISTANT

## 2021-02-24 PROCEDURE — 250N000011 HC RX IP 250 OP 636: Performed by: PHYSICIAN ASSISTANT

## 2021-02-24 PROCEDURE — 250N000013 HC RX MED GY IP 250 OP 250 PS 637: Performed by: PHYSICIAN ASSISTANT

## 2021-02-24 PROCEDURE — 80048 BASIC METABOLIC PNL TOTAL CA: CPT | Performed by: PHYSICIAN ASSISTANT

## 2021-02-24 PROCEDURE — 99232 SBSQ HOSP IP/OBS MODERATE 35: CPT | Performed by: INTERNAL MEDICINE

## 2021-02-24 RX ADMIN — ACETAMINOPHEN 975 MG: 325 TABLET, FILM COATED ORAL at 21:02

## 2021-02-24 RX ADMIN — ATORVASTATIN CALCIUM 40 MG: 40 TABLET, FILM COATED ORAL at 21:02

## 2021-02-24 RX ADMIN — ENOXAPARIN SODIUM 40 MG: 40 INJECTION SUBCUTANEOUS at 15:49

## 2021-02-24 RX ADMIN — AMLODIPINE BESYLATE 10 MG: 10 TABLET ORAL at 09:27

## 2021-02-24 RX ADMIN — ACETAMINOPHEN 975 MG: 325 TABLET, FILM COATED ORAL at 04:11

## 2021-02-24 RX ADMIN — DOCUSATE SODIUM 50 MG AND SENNOSIDES 8.6 MG 1 TABLET: 8.6; 5 TABLET, FILM COATED ORAL at 09:27

## 2021-02-24 RX ADMIN — GABAPENTIN 400 MG: 300 CAPSULE ORAL at 09:27

## 2021-02-24 RX ADMIN — KETOROLAC TROMETHAMINE 15 MG: 15 INJECTION, SOLUTION INTRAMUSCULAR; INTRAVENOUS at 15:49

## 2021-02-24 RX ADMIN — GABAPENTIN 400 MG: 300 CAPSULE ORAL at 21:02

## 2021-02-24 RX ADMIN — GABAPENTIN 400 MG: 300 CAPSULE ORAL at 15:49

## 2021-02-24 RX ADMIN — DOCUSATE SODIUM 50 MG AND SENNOSIDES 8.6 MG 1 TABLET: 8.6; 5 TABLET, FILM COATED ORAL at 21:06

## 2021-02-24 RX ADMIN — KETOROLAC TROMETHAMINE 15 MG: 15 INJECTION, SOLUTION INTRAMUSCULAR; INTRAVENOUS at 09:29

## 2021-02-24 RX ADMIN — HYDROMORPHONE HYDROCHLORIDE 2 MG: 2 TABLET ORAL at 00:46

## 2021-02-24 RX ADMIN — FAMOTIDINE 20 MG: 20 TABLET ORAL at 09:28

## 2021-02-24 RX ADMIN — KETOROLAC TROMETHAMINE 15 MG: 15 INJECTION, SOLUTION INTRAMUSCULAR; INTRAVENOUS at 04:11

## 2021-02-24 RX ADMIN — NICOTINE 1 PATCH: 21 PATCH, EXTENDED RELEASE TRANSDERMAL at 09:28

## 2021-02-24 RX ADMIN — LORAZEPAM 0.5 MG: 0.5 TABLET ORAL at 21:03

## 2021-02-24 RX ADMIN — ACETAMINOPHEN 975 MG: 325 TABLET, FILM COATED ORAL at 12:33

## 2021-02-24 RX ADMIN — FAMOTIDINE 20 MG: 20 TABLET ORAL at 21:03

## 2021-02-24 RX ADMIN — BACITRACIN: 500 OINTMENT TOPICAL at 09:30

## 2021-02-24 ASSESSMENT — ACTIVITIES OF DAILY LIVING (ADL)
ADLS_ACUITY_SCORE: 15
ADLS_ACUITY_SCORE: 14

## 2021-02-24 NOTE — PLAN OF CARE
A&Ox4, VSS on RA. LS clear. Thoracotomy incisions with steri strips, On-q in place with sensors taped. Chest tube to water seal, bright red output, intermittent air leak noted, no crepitus. Ax1. Ambulating well. Regular diet. Pain controlled with scheduled Toradol and Tylenol.

## 2021-02-24 NOTE — PROGRESS NOTES
THORACIC SURGERY POD # 1    Discussed findings and procedure  AVSS on RA  No air leak  No bleeding  CXR looks good    Satisfactory    Ambulate  resp care ++  CT to water seal    KHARI GUERRA MD [facility] ONCOLOGY THORACIC SURGERY  CELL:  [phone]  OFFICE: [phone]

## 2021-02-24 NOTE — ANESTHESIA POSTPROCEDURE EVALUATION
Patient: Anna Dyer    Procedure(s):  LEFT THORACOTOMY, ANATOMICAL POSTERIOR SEGMENTECTOMY LEFT UPPER LOBE     Diagnosis:Lung nodule [R91.1]  Diagnosis Additional Information: No value filed.    Anesthesia Type:  General    Note:  Disposition: Inpatient   Postop Pain Control: Uneventful            Sign Out: Well controlled pain   PONV: No   Neuro/Psych: Uneventful            Sign Out: Acceptable/Baseline neuro status   Airway/Respiratory: Uneventful            Sign Out: Acceptable/Baseline resp. status   CV/Hemodynamics: Uneventful            Sign Out: Acceptable CV status   Other NRE: NONE   DID A NON-ROUTINE EVENT OCCUR? No         Last vitals:  Vitals:    02/23/21 1740 02/23/21 1745 02/23/21 1750   BP: 103/60  112/65   Pulse: 75  73   Resp: 13 8 12   Temp: 36.8  C (98.2  F)     SpO2:  100%        Last vitals prior to Anesthesia Care Transfer:  CRNA VITALS  2/23/2021 1612 - 2/23/2021 1712      2/23/2021             NIBP:  134/66    Ht Rate:  86    SpO2:  100 %    Resp Rate (observed):  20          Electronically Signed By: Troy Vale MD  February 23, 2021  6:00 PM

## 2021-02-24 NOTE — PROGRESS NOTES
[facility]    Medicine Progress Note - Hospitalist Service       Date of Admission:  2/23/2021  Assessment & Plan       Anna Dyer is a 75 year old female with PMHx of metastatic colorectal adenocarcinoma on active chemotherapy (last treatment ~3 months ago), chemo related anemia, pancytopenia and neuropathy, PAD, hypertension, and ongoing tobacco use admitted on 2/23/2021 for below thoracic surgery. Hospitalist service was consulted for medical co-management.      Lung cancer S/P left thoracotomy and posterior segmentectomy GARO (2/22/21)  Surgery performed by Dr. Ordaz. EBL 5 ccs. General anesthesia used.   - Defer routine post-operative cares, IVF, DVT prophylaxis and pain control to primary service   - Continue to encourage pulmonary toilet; incentive spirometer at bedside     Metastatic colorectal adenocarcinoma  Follows with Dr. Gerard of [facility]. Refer to recent clinic note from 1/27/21. Primary tumor originating from the splenic flexure, KRAS-mutated. Multiple liver elham, unresectable at presentation in 4/2017. Treated with FOLOX and bevacizumab. S/P left hemicolectomy in 1/2018. CT C/A/P in 11/2020 with increase in hepatic lesion and slight increase in GARO nodule.Underwent lap resection of liver elham 1/5/21 and referred for thoracic surgery as above.   - Oncology follow-up as scheduled, chemotherapy currently on hold     Anemia  Thrombocytopenia  Related to prior chemotherapy. Stable on admission.   - Monitor      Peripheral neuropathy  Related to Oxaliplatin  - PTA Neurontin      CKD II  Baseline creatinine 0.7-0.8. Stable on admission.   - Monitor        H/o PAD  Follows with Dr. Arambula [facility]. Resting left sided ANITHA 0.47. Note arteriogram 3/2020 with noted severe disease of the entire left superficial femoral artery with occlusion of the left above-knee popliteal artery. Reconstitution ofthe left below-knee popliteal artery with single vessel  runoff to the foot via the peroneal artery. Reconstitution of the dorsalis pedis artery via peroneal collaterals, although little pedal flow is observed on this exam. Somewhat of a mirror image picture in the right leg with diffuse disease of a small caliber right superficial femoral artery. High-grade nearly occlusive stenosis of the right above-knee popliteal artery.   - Continued observation recommended at last appointment   - Stopped aspirin about 10 days prior to surgery. Resume per surgery   - Continue statin       Hypertension  - PTA Norvasc  - Restarted Losartan   - Still holding hydrochlorothiazide as pressures are well contrplled      COPD, non-oxygen dependent, not in acute exacerbation  Tobacco use disorder  Smokes 1 ppd.  No desire to quit at this time   - Nicotine patch 21 mcg/day          Diet: Regular Diet Adult    DVT Prophylaxis: Defer to primary service  Montesinos Catheter: not present  Code Status: Full Code           Disposition Plan   Expected discharge: Defer to primary service   Entered: Preston Inman DO 02/24/2021, 10:11 AM       The patient's care was discussed with the Bedside Nurse, Care Coordinator/ and Patient.    Perston Inman DO  Hospitalist Service  [facility]  Contact information available via [facility] Paging/Directory    ______________________________________________________________________    Interval History   Patient seen and examined.  No acute events over night.  No fevers or chills.  Post-op pain is well controlled.  On difficulty breathing.  No nausea    Data reviewed today: I reviewed all medications, new labs and imaging results over the last 24 hours. I personally reviewed no images or EKG's today.    Physical Exam   Vital Signs: Temp: 98.1  F (36.7  C) Temp src: Oral BP: 125/56 Pulse: 81   Resp: 16 SpO2: 94 % O2 Device: None (Room air) Oxygen Delivery: 1 LPM  Weight: 119 lbs 9 oz  General Appearance: Resting comfortably. NAD    Respiratory: Clear to auscultation.  No respiratory distress  Cardiovascular: RRR.  No obvious murmurs  GI: Soft. Non-distended  Skin: No obvious rashes or cyanosis to exposed skin  Other: Chest tube in place.  No lower extremity edema     Data   Recent Labs   Lab 02/24/21  0539 02/23/21  1228   WBC 7.2 6.0   HGB 11.6* 11.8   MCV 90 90    198   INR  --  1.06    134   POTASSIUM 4.5 4.1   CHLORIDE 101 102   CO2 25 27   BUN 18 13   CR 0.66 0.74   ANIONGAP 7 5   CAITY 9.7 9.7   * 89     Recent Results (from the past 24 hour(s))   XR Chest Port 1 View    Narrative    CHEST ONE VIEW PORTABLE    2/23/2021 5:00 PM     HISTORY: Postoperative left lung VATS procedure.    COMPARISON: Chest x-ray 3/24/2017.      Impression    IMPRESSION: Portable chest. Right lung is clear. Left lung is  partially atelectatic. Left chest tube is in place. Left chest port  appears in good position, unchanged. Heart is normal in size. No  definite evidence of pneumothorax or pleural effusions. Surgical clips  noted at the left lung apex.    KIA PAULA MD   XR Chest Port 1 View    Narrative    EXAM: XR CHEST PORTABLE 1 VIEW  LOCATION: [facility]  DATE/TIME: 02/24/2021, 5:43 AM    INDICATION: Status post left lung surgery.  COMPARISON: 02/23/2021.      Impression    IMPRESSION: Left chest tube with tip at the left lung apex is unchanged. No pneumothorax. Left-sided Port-A-Cath in place with tip over the SVC. Normal heart size and pulmonary vascularity. Postoperative changes left thoracotomy with partial left lung   resection. No acute appearing infiltrates or consolidation. Aortic calcification. Bilateral breast implants. No significant bony abnormalities.

## 2021-02-24 NOTE — CONSULTS
[facility]  Consult Note - Hospitalist Service     Date of Admission:  2/23/2021  Consult Requested by: Ofelia Lockwood PA-C  Reason for Consult: Post-op medical co-management     Assessment & Plan   Anna Dyer is a 75 year old female with PMHx of metastatic colorectal adenocarcinoma on active chemotherapy (last treatment ~3 months ago), chemo related anemia, pancytopenia and neuropathy, PAD, hypertension, and ongoing tobacco use admitted on 2/23/2021 for below thoracic surgery. Hospitalist service was consulted for medical co-management.     Lung cancer S/P left thoracotomy and posterior segmentectomy GARO (2/22/21): Surgery performed by Dr. Ordaz. EBL 5 ccs. General anesthesia used.   -- Defer routine post-operative cares, IVF, DVT prophylaxis and pain control to primary service   -- Encourage pulmonary toilet; incentive spirometer at bedside   -- Bowel regimen in place while on narcotics   -- PT and OT in the AM   -- CBC and BMP in AM     Metastatic colorectal adenocarcinoma: Follows with Dr. Gerard of [facility]. Refer to recent clinic note from 1/27/21. Primary tumor originating from the splenic flexure, KRAS-mutated. Multiple liver elham, unresectable at presentation in 4/2017. Treated with FOLOX and bevacizumab. S/P left hemicolectomy in 1/2018. CT C/A/P in 11/2020 with increase in hepatic lesion and slight increase in GARO nodule.Underwent lap resection of liver elham 1/5/21 and referred for thoracic surgery as above.   - Oncology follow-up as scheduled, chemotherapy currently on hold    Anemia  Thrombocytopenia: Related to prior chemotherapy. Stable on admission.   - Monitor     Recent Labs   Lab 02/23/21  1228   HGB 11.8     Peripheral neuropathy: Related to Oxaliplatin. Maintained on gabapentin.     CKD II: Baseline creatinine 0.7-0.8. Stable on admission.   - Monitor       PAD: Follows with Dr. Arambula [facility]. Resting left sided ANITHA 0.47. Note  arteriogram 3/2020 with noted severe disease of the entire left superficial femoral artery with occlusion of the left above-knee popliteal artery. Reconstitution ofthe left below-knee popliteal artery with single vessel runoff to the foot via the peroneal artery. Reconstitution of the dorsalis pedis artery via peroneal collaterals, although little pedal flow is observed on this exam. Somewhat of a mirror image picture in the right leg with diffuse disease of a small caliber right superficial femoral artery. High-grade nearly occlusive stenosis of the right above-knee popliteal artery.   - Continued observation recommended at last appointment. Pt unwilling to quit smoking at this time.   - Stopped aspirin about 10 days prior to surgery  - Continue statin      Hypertension: Continue Norvasc. Resume Losartan and hydrochlorothiazide in the AM pending renal fxn. Pt did take Norvasc and Losartan the AM of surgery. Hydrochlorothiazide held.     COPD, non-oxygen dependent, not in acute exacerbation  Tobacco use disorder: Smokes 1 ppd. Currently has nicotine patch, 21 mcg in place on left arm. Placed this AM.       The patient's care was discussed with the Attending Physician, Dr. Rivers, Bedside Nurse and Patient.    Gisell Milligan PA-C  [facility]  Contact information available via [facility] Paging/Directory  ______________________________________________________________________    Chief Complaint   S/P left thoracotomy and posterior segmentectomy GARO    History is obtained from the patient and chart review     History of Present Illness   Anna Dyer is a 75 year old female with PMHx of metastatic colorectal adenocarcinoma on active chemotherapy (last treatment ~3 months ago), chemo related anemia, pancytopenia and neuropathy, PAD, hypertension, and ongoing tobacco use admitted on 2/23/2021 for below thoracic surgery. Hospitalist service was consulted for medical  co-management.     Resting comfortably in bed on my interview. No acute concerns. Stopped ASA about 10 days prior to procedure. Took Norvasc and Losartan the AM of surgery, but held hydrochlorothiazide. Denies any concerns at present. Pre-op H&P reviewed.     Review of Systems   The 10 point Review of Systems is negative other than noted in the HPI.    Past Medical History    1. RLS  2. Hypertension   3. PAD   4. Metastatic colorectal adenocarcinoma   5. Anemia   6. Pancytopenia   7. Neuropathy   8. CKD II     Past Surgical History   I have reviewed this patient's surgical history and updated it with pertinent information if needed.  Past Surgical History:   Procedure Laterality Date     BIOPSY      liver     BREAST SURGERY      breast implants     COLONOSCOPY N/A 3/28/2019    Procedure: INTRAOPERATIVE COLONOSCOPY,;  Surgeon: Jesus Caba MD;  Location:  OR     COSMETIC SURGERY      facelift and tummy tuck     GYN SURGERY      tubal     INSERT PORT VASCULAR ACCESS N/A 3/24/2017    Procedure: INSERT PORT VASCULAR ACCESS;  Surgeon: Yemi Ordaz MD;  Location:  OR     IR LOWER EXTREMITY ANGIOGRAM BILATERAL  3/12/2020     LAPAROSCOPIC ASSISTED COLECTOMY LEFT (DESCENDING) N/A 1/25/2018    Procedure: LAPAROSCOPIC ASSISTED COLECTOMY LEFT (DESCENDING);;  Surgeon: Maddie Baron MD;  Location: [facility] OR     LAPAROSCOPIC ASSISTED COLOSTOMY TAKEDOWN N/A 3/28/2019    Procedure: LAPAROSCOPIC ASSISTED COLOSTOMY REVERSAL;  Surgeon: Jesus Caba MD;  Location:  OR     LAPAROSCOPIC HERNIORRHAPHY VENTRAL N/A 8/12/2020    Procedure: COMBINED OPEN AND LAPAROSCOPIC VENTRAL HERNIA REPAIR;  Surgeon: Maddy Gonzales MD;  Location:  OR     LAPAROSCOPY DIAGNOSTIC (GENERAL) N/A 3/28/2019    Procedure: DIAGNOSTIC LAPAROSCOPY;  Surgeon: Jesus Caba MD;  Location:  OR     LAPAROTOMY EXPLORATORY N/A 1/25/2018    Procedure: LAPAROTOMY EXPLORATORY;  exploratory laparoscopy, left hemicolectomy, transverse  colostomy;  Surgeon: Maddie Baron MD;  Location: [facility] OR     Social History   I have reviewed this patient's social history and updated it with pertinent information if needed.  Social History     Tobacco Use     Smoking status: Current Every Day Smoker     Packs/day: 1.00     Years: 58.00     Pack years: 58.00     Types: Cigarettes     Last attempt to quit: 2020     Years since quittin.6     Smokeless tobacco: Never Used   Substance Use Topics     Alcohol use: No     Alcohol/week: 0.0 standard drinks     Drug use: No     Family History   I have reviewed this patient's family history and updated it with pertinent information if needed.  Family History   Problem Relation Age of Onset     Family History Negative Father      Family History Negative Mother      Psoriasis Brother         half-siblings     Family History Negative Sister      Hypothyroidism Son      Medications   Medications Prior to Admission   Medication Sig Dispense Refill Last Dose     acetaminophen (TYLENOL) 500 MG tablet Take 1,000 mg by mouth every 6 hours as needed for mild pain   2021 at PRN     amLODIPine (NORVASC) 5 MG tablet TAKE 2 TABLETS [=10MG]     DAILY 180 tablet 2  at am     aspirin (ASA) 81 MG tablet Take 1 tablet (81 mg) by mouth daily   Past Week at Unknown time     atorvastatin (LIPITOR) 40 MG tablet Take 1 tablet (40 mg) by mouth At Bedtime 90 tablet 3 2021 at PM     Ergocalciferol (VITAMIN D2 PO) Take 60 mcg by mouth every morning   2021 at AM     gabapentin (NEURONTIN) 400 MG capsule Take 400 mg by mouth 3 times daily    2021 at Unknown time     Homeopathic Products (LEG CRAMP RELIEF PO) Take 2-3 tablets by mouth daily as needed (leg cramps)    Past Week at PRN     hydrochlorothiazide (HYDRODIURIL) 25 MG tablet Take 1 tablet (25 mg) by mouth daily 90 tablet 1 2021 at AM     lidocaine-prilocaine (EMLA) 2.5-2.5 % external cream Apply topically daily as needed (Apply one hour prior to port  acccess)    MORE THAN 1 WEEK at PRN     LORazepam (ATIVAN) 0.5 MG tablet Take 0.5 mg by mouth At Bedtime   1 2/22/2021 at HS     losartan (COZAAR) 100 MG tablet Take 1 tablet (100 mg) by mouth daily 90 tablet 2 2/23/2021 at AM       Allergies   Allergies   Allergen Reactions     Lactose GI Disturbance     Sulfa Drugs Hives       Physical Exam   Vital Signs: Temp: 97.8  F (36.6  C) Temp src: Axillary BP: 112/65 Pulse: 73   Resp: 12 SpO2: 100 % O2 Device: Nasal cannula Oxygen Delivery: 2 LPM  Weight: 119 lbs 9 oz    CONSTITUTIONAL: Pt laying in bed, dressed in hospital garb. Appears comfortable. Cooperative with interview.   HEENT: Normocephalic, atraumatic.   CARDIOVASCULAR: RRR, no murmurs, rubs, or extra heart sounds appreciated. Pulses +2/4 and regular in upper and lower extremities, bilaterally.   RESPIRATORY: No increased work of breathing. CT in place, diminished lung sounds on left.   GASTROINTESTINAL:  Abdomen soft, non-distended. BS auscultated in all four quadrants. Negative for tenderness to palpation.  No masses or organomegaly noted.  MUSCULOSKELETAL: No gross deformities noted. Normal muscle tone.   HEMATOLOGIC/LYMPHATIC/IMMUNOLOGIC: No anterior or posterior cervical LAD, bilaterally. Negative for lower extremity edema, bilaterally.  NEUROLOGIC: Alert and oriented to person, place, and time.  strength intact. No focal neuro deficits.   SKIN: Warm, dry, intact. No jaundice noted. Negative for suspicious lesions, rashes, bruising, open sores or abrasions.     Data   Results for orders placed or performed during the hospital encounter of 02/23/21 (from the past 24 hour(s))   CBC with platelets   Result Value Ref Range    WBC 6.0 4.0 - 11.0 10e9/L    RBC Count 4.17 3.8 - 5.2 10e12/L    Hemoglobin 11.8 11.7 - 15.7 g/dL    Hematocrit 37.6 35.0 - 47.0 %    MCV 90 78 - 100 fl    MCH 28.3 26.5 - 33.0 pg    MCHC 31.4 (L) 31.5 - 36.5 g/dL    RDW 15.8 (H) 10.0 - 15.0 %    Platelet Count 198 150 - 450 10e9/L    Basic metabolic panel   Result Value Ref Range    Sodium 134 133 - 144 mmol/L    Potassium 4.1 3.4 - 5.3 mmol/L    Chloride 102 94 - 109 mmol/L    Carbon Dioxide 27 20 - 32 mmol/L    Anion Gap 5 3 - 14 mmol/L    Glucose 89 70 - 99 mg/dL    Urea Nitrogen 13 7 - 30 mg/dL    Creatinine 0.74 0.52 - 1.04 mg/dL    GFR Estimate 80 >60 mL/min/[1.73_m2]    GFR Estimate If Black >90 >60 mL/min/[1.73_m2]    Calcium 9.7 8.5 - 10.1 mg/dL   INR   Result Value Ref Range    INR 1.06 0.86 - 1.14   ABO/Rh type and screen   Result Value Ref Range    ABO O     RH(D) Pos     Antibody Screen Neg     Test Valid Only At [facility]        Specimen Expires 02/26/2021    XR Chest Port 1 View    Narrative    CHEST ONE VIEW PORTABLE    2/23/2021 5:00 PM     HISTORY: Postoperative left lung VATS procedure.    COMPARISON: Chest x-ray 3/24/2017.      Impression    IMPRESSION: Portable chest. Right lung is clear. Left lung is  partially atelectatic. Left chest tube is in place. Left chest port  appears in good position, unchanged. Heart is normal in size. No  definite evidence of pneumothorax or pleural effusions. Surgical clips  noted at the left lung apex.    KIA PAULA MD

## 2021-02-24 NOTE — PLAN OF CARE
A/Ox4. VSS. Tele SR with BBB. LS clear. +bs, +flatus, +bm. Voiding adequately. Thoracotomy incision with steri strips. On q with sensors taped, clamps open. Chest tube to suction, patent with bright red output. No crepitus, no air leak. Up with assist x1 gb. Tolerating diet. Off IMC at 0600.

## 2021-02-24 NOTE — OP NOTE
Procedure Date: 02/23/2021      SURGEON:  Yemi Ordaz MD      FIRST ASSISTANT:  Ofelia Lockwood PA-C      PREOPERATIVE DIAGNOSES:   1.  Left upper lobe lung nodule.   2.  History of metastatic colorectal cancer.      POSTOPERATIVE DIAGNOSES:   1.  Left upper lobe lung nodule.   2.  History of metastatic colorectal cancer.      PROCEDURE:  Limited left thoracotomy with anatomical posterior segmentectomy, left upper lobe lung.      ANESTHESIA:  General with double-lumen endotracheal tube.      INDICATIONS:  A 75-year-old woman with a history of metastatic colorectal cancer.  She recently underwent an EUS resection of the liver lesion.  The current CT scan shows enlarging hypermetabolic nodule in the posterior segment of the left upper lobe lung.  This is the only area of residual disease.  Based on the findings, a limited thoracotomy and resection is indicated for diagnosis and treatment.      DESCRIPTION OF PROCEDURE:  The patient was brought to the operating room and placed in a supine position.  Under general anesthesia with double endotracheal tube, the patient was placed in the right lateral decubitus position.  The left chest was prepared and draped in sterile fashion using ChloraPrep.  Ventilation of left lung was continued.  A limited thoracotomy was made.  Pleural space was entered in the fifth intercostal space, preserving the integrity of the rib.  The lung nodule was identified.  IT was extending close to the pulmonary artery.  It was elected to proceed with a segmentectomy.  There were 2 branches of the artery proximal to the lingular branch, which were dissected circumferentially and individually divided with application of Dyess 35 vascular stapling device.  This allowed us to complete the segmentectomy with multiple applications of Dyess 60 gold stapling device with good margins.  The staple line was hemostatic.  Careful palpation of the lung was otherwise unremarkable.  There was no  pleural fluid or pleural nodule.  Hilum, mediastinum and diaphragm were normal.  Through a separate stab wound, a 28 straight chest tube was placed with tip directed toward the apex posteriorly and sutured to skin with #2 silk suture. On-Q catheters were placed, and 30 mL Marcaine 0.5% without epinephrine was injected as costal blocks.  Incision was closed with pericostal suture of Vicryl #1, running #1 Vicryl in muscular layer, running 2-0 Vicryl in the subcutaneous tissue, and skin closed with Insorb staples.  Estimated blood loss minimal.  Needle and sponge count was correct.      Ofelia Lockwood PA-C, was the first assistant during the procedure.  Her role as first assistant was essential and necessary in accomplishing the steps of the procedure as described above, providing exposure and retraction.         KHARI GUERRA MD             D: 2021   T: 2021   MT: MARY      Name:     ANTONY HALEY   MRN:      [id number]        Account:        [id number]   :      1945           Procedure Date: 2021      Document: [id number]

## 2021-02-24 NOTE — PROVIDER NOTIFICATION
MD Notification    Notified Person: MD    Notified Person Name: Dr. Ordaz    Notification Date/Time: 1200 2/24/21    Notification Interaction:     Purpose of Notification: new intermittent air leak noted with exhalation. No crepitus.    Orders Received:    Comments:

## 2021-02-25 ENCOUNTER — APPOINTMENT (OUTPATIENT)
Dept: GENERAL RADIOLOGY | Facility: CLINIC | Age: 76
DRG: 164 | End: 2021-02-25
Attending: PHYSICIAN ASSISTANT
Payer: MEDICARE

## 2021-02-25 LAB — GLUCOSE BLDC GLUCOMTR-MCNC: 86 MG/DL (ref 70–99)

## 2021-02-25 PROCEDURE — 99232 SBSQ HOSP IP/OBS MODERATE 35: CPT | Performed by: HOSPITALIST

## 2021-02-25 PROCEDURE — 71045 X-RAY EXAM CHEST 1 VIEW: CPT

## 2021-02-25 PROCEDURE — 250N000009 HC RX 250: Performed by: PHYSICIAN ASSISTANT

## 2021-02-25 PROCEDURE — 999N001017 HC STATISTIC GLUCOSE BY METER IP

## 2021-02-25 PROCEDURE — 250N000013 HC RX MED GY IP 250 OP 250 PS 637: Performed by: PHYSICIAN ASSISTANT

## 2021-02-25 PROCEDURE — 120N000013 HC R&B IMCU

## 2021-02-25 PROCEDURE — 250N000011 HC RX IP 250 OP 636: Performed by: PHYSICIAN ASSISTANT

## 2021-02-25 RX ORDER — AMOXICILLIN 250 MG
1-3 CAPSULE ORAL 2 TIMES DAILY PRN
Qty: 30 TABLET | Refills: 0 | Status: SHIPPED | OUTPATIENT
Start: 2021-02-25 | End: 2022-09-16

## 2021-02-25 RX ORDER — HYDROMORPHONE HYDROCHLORIDE 2 MG/1
1-2 TABLET ORAL EVERY 6 HOURS PRN
Qty: 18 TABLET | Refills: 0 | Status: SHIPPED | OUTPATIENT
Start: 2021-02-25 | End: 2022-09-16

## 2021-02-25 RX ORDER — IBUPROFEN 600 MG/1
600 TABLET, FILM COATED ORAL EVERY 6 HOURS PRN
Qty: 100 TABLET | Refills: 0 | Status: SHIPPED | OUTPATIENT
Start: 2021-02-25 | End: 2022-09-16

## 2021-02-25 RX ADMIN — LORAZEPAM 0.5 MG: 0.5 TABLET ORAL at 21:02

## 2021-02-25 RX ADMIN — GABAPENTIN 400 MG: 300 CAPSULE ORAL at 21:02

## 2021-02-25 RX ADMIN — ACETAMINOPHEN 975 MG: 325 TABLET, FILM COATED ORAL at 19:21

## 2021-02-25 RX ADMIN — AMLODIPINE BESYLATE 10 MG: 10 TABLET ORAL at 09:10

## 2021-02-25 RX ADMIN — FAMOTIDINE 20 MG: 20 TABLET ORAL at 21:02

## 2021-02-25 RX ADMIN — ACETAMINOPHEN 975 MG: 325 TABLET, FILM COATED ORAL at 11:02

## 2021-02-25 RX ADMIN — ACETAMINOPHEN 975 MG: 325 TABLET, FILM COATED ORAL at 04:15

## 2021-02-25 RX ADMIN — HYDROMORPHONE HYDROCHLORIDE 2 MG: 2 TABLET ORAL at 04:56

## 2021-02-25 RX ADMIN — ENOXAPARIN SODIUM 40 MG: 40 INJECTION SUBCUTANEOUS at 15:05

## 2021-02-25 RX ADMIN — ATORVASTATIN CALCIUM 40 MG: 40 TABLET, FILM COATED ORAL at 21:02

## 2021-02-25 RX ADMIN — HYDROMORPHONE HYDROCHLORIDE 2 MG: 2 TABLET ORAL at 19:21

## 2021-02-25 RX ADMIN — DOCUSATE SODIUM 50 MG AND SENNOSIDES 8.6 MG 2 TABLET: 8.6; 5 TABLET, FILM COATED ORAL at 21:02

## 2021-02-25 RX ADMIN — NICOTINE 1 PATCH: 21 PATCH, EXTENDED RELEASE TRANSDERMAL at 09:10

## 2021-02-25 RX ADMIN — DOCUSATE SODIUM 50 MG AND SENNOSIDES 8.6 MG 2 TABLET: 8.6; 5 TABLET, FILM COATED ORAL at 09:10

## 2021-02-25 RX ADMIN — BACITRACIN: 500 OINTMENT TOPICAL at 10:54

## 2021-02-25 RX ADMIN — FAMOTIDINE 20 MG: 20 TABLET ORAL at 09:10

## 2021-02-25 RX ADMIN — GABAPENTIN 400 MG: 300 CAPSULE ORAL at 09:10

## 2021-02-25 RX ADMIN — LOSARTAN POTASSIUM 100 MG: 100 TABLET, FILM COATED ORAL at 09:10

## 2021-02-25 RX ADMIN — GABAPENTIN 400 MG: 300 CAPSULE ORAL at 16:03

## 2021-02-25 ASSESSMENT — ACTIVITIES OF DAILY LIVING (ADL)
ADLS_ACUITY_SCORE: 15

## 2021-02-25 NOTE — PLAN OF CARE
A&O x4. VSS. Up with stand by assist. Regular diet, tolerating well. CT to water seal. Small air leak on exhale noted, no crepitus. Sanguinous output. Lung sounds diminished/course. Bowel sounds hypo. Voiding adequately. Pain controlled with oral dilaudid and ON-Q pump.

## 2021-02-25 NOTE — PROGRESS NOTES
THORACIC SURGERY POD # 2    Doing well  AVSS on RA  No air leak, minimal CT output    CXR looks good    resp care ++  Ambulate  Path pending    Clamp CT tonight  Most likely D/C tomorrow    KHARI GUERRA MD [facility] ONCOLOGY THORACIC SURGERY  CELL:  [phone]  OFFICE: [phone]

## 2021-02-25 NOTE — PROGRESS NOTES
[facility]    Medicine Progress Note - Hospitalist Service       Date of Admission:  2/23/2021  Assessment & Plan       Anna Dyer is a 75 year old female with PMHx of metastatic colorectal adenocarcinoma on active chemotherapy (last treatment ~3 months ago), chemo related anemia, pancytopenia and neuropathy, PAD, hypertension, and ongoing tobacco use admitted on 2/23/2021 for below thoracic surgery. Hospitalist service was consulted for medical co-management.      Lung cancer S/P left thoracotomy and posterior segmentectomy GARO (2/22/21)  Surgery performed by Dr. Ordaz. EBL 5 ccs. General anesthesia used.   - Defer routine post-operative cares, IVF, DVT prophylaxis and pain control to primary service   - Continue to encourage pulmonary toilet; incentive spirometer at bedside  -No O2 desat, reports cough and secretions with ability to clear secretions, compliant to I-S.    Metastatic colorectal adenocarcinoma  Follows with Dr. Gerard of [facility]. Refer to recent clinic note from 1/27/21. Primary tumor originating from the splenic flexure, KRAS-mutated. Multiple liver elham, unresectable at presentation in 4/2017. Treated with FOLOX and bevacizumab. S/P left hemicolectomy in 1/2018. CT C/A/P in 11/2020 with increase in hepatic lesion and slight increase in GARO nodule.Underwent lap resection of liver elham 1/5/21 and referred for thoracic surgery as above.   - Oncology follow-up as scheduled, chemotherapy currently on hold     Anemia  Thrombocytopenia  Related to prior chemotherapy. Stable on admission.   - Monitor      Peripheral neuropathy  Related to Oxaliplatin  - PTA Neurontin      CKD II  Baseline creatinine 0.7-0.8. Stable on admission.   - Monitor        H/o PAD  Follows with Dr. Arambula of Vascular Surgery. Resting left sided ANITHA 0.47. Note arteriogram 3/2020 with noted severe disease of the entire left superficial femoral artery with occlusion of the left  above-knee popliteal artery. Reconstitution ofthe left below-knee popliteal artery with single vessel runoff to the foot via the peroneal artery. Reconstitution of the dorsalis pedis artery via peroneal collaterals, although little pedal flow is observed on this exam. Somewhat of a mirror image picture in the right leg with diffuse disease of a small caliber right superficial femoral artery. High-grade nearly occlusive stenosis of the right above-knee popliteal artery.   - Continued observation recommended at last appointment   - Stopped aspirin about 10 days prior to surgery. Resume per surgery   - Continue statin       Hypertension  - PTA Norvasc  - Restarted Losartan   - Still holding hydrochlorothiazide as pressures are well controlled  -BP WNL      COPD, non-oxygen dependent, not in acute exacerbation  Tobacco use disorder  Smokes 1 ppd.  No desire to quit at this time   - Nicotine patch 21 mcg/day        Diet: Regular Diet Adult  Diet    DVT Prophylaxis: Defer to primary service  Montesinos Catheter: not present  Code Status: Full Code           Disposition Plan   Expected discharge: Defer to primary service   Entered: Espinoza Rolle MD 02/25/2021, 11:12 AM     ______________________________________________________________________    Interval History   Patient reports cough and secretions, compliant I-S, able to clear secretions.  No dyspnea.  No O2 desat.  Nursing reports no wound issues, afebrile.  Pathology pending.    Data reviewed today: I reviewed all medications, new labs and imaging results over the last 24 hours.      Physical Exam   Vital Signs: Temp: 98.1  F (36.7  C) Temp src: Oral BP: 134/70 Pulse: 80   Resp: 16 SpO2: 94 % O2 Device: None (Room air)    Weight: 119 lbs 9 oz  General Appearance:  NAD, alert, calm, cooperative    Respiratory: Clear to auscultation.  Rations nonlabored on RA.  Cardiovascular: RRR.  No obvious murmurs  GI: Soft. Non-distended  Skin: No obvious rashes or cyanosis to  exposed skin  Other: Chest tube in place.  No lower extremity edema   Neuro.  Gross motor tested, nonfocal, sensory intact  Psych oriented, affect calm     Data   Recent Labs   Lab 02/24/21  0539 02/23/21  1228   WBC 7.2 6.0   HGB 11.6* 11.8   MCV 90 90    198   INR  --  1.06    134   POTASSIUM 4.5 4.1   CHLORIDE 101 102   CO2 25 27   BUN 18 13   CR 0.66 0.74   ANIONGAP 7 5   CAITY 9.7 9.7   * 89     Recent Results (from the past 24 hour(s))   XR Chest Port 1 View    Narrative    EXAM: XR CHEST PORT 1 VW  LOCATION: [facility]  DATE/TIME: 2/25/2021 5:50 AM    INDICATION: Status post left lung surgery.  COMPARISON: 2/24/2021.      Impression    IMPRESSION: Left chest tube with tip at the left lung apex unchanged. Left-sided Port-A-Cath tip over the SVC. Postoperative changes in the left lung. No acute-appearing infiltrates or consolidation. No pneumothorax. Aortic calcification. Bilateral   breast implants. Surgical clips in the upper abdomen. Remainder unremarkable.

## 2021-02-25 NOTE — DISCHARGE INSTRUCTIONS
"[facility]  Discharge Orders & Follow-up Care  Video-Assisted Thoracoscopy or Thoracotomy    You already have your follow-up appointments scheduled for you:  Please go to [facility] for a chest x-ray at _1:15 pm on Monday, March 8_. [facility] is located in the same building ([facility] [place], [address] [place] [address]) as Dr. Ordaz' office. They are in [address].  Please then go for your post-operative follow-up appointment in Dr. Ordaz' office, on the second floor of the [facility] [place], [address] at 1:40 pm on Monday, March 8__. You will see either Ofelia Lockwood PA-C or Dr. Ordaz during your appointment.      Please call Harmony at Dr. Ordaz  office at [phone] with any scheduling questions      A. Patient Care:  Call Dr Ordaz  office @ [phone] if you experience:  *Severe chills or a fever or 101 F or higher on two occasions  *Increased incisional pain that cannot be relieved with rest or pain medications  *Presence of unusual incisional or chest tube site drainage that is odorous, green or yellow in color, or if your incision is warm, red or swollen  *Coughing up bright red blood or greenish-yellow secretions  *Chest pain that gets worse with deep breathing or a significant increase in shortness of breath  *Inability to urinate or have a bowel movement  *New pain or swelling in your legs    In an emergency, call [phone] or have someone drive you to the nearest Emergency Department    Pain Relief:  You may have been given a prescription for narcotic pain medicine.  You may also take ibuprofen and acetaminophen.  Recommended dosages are:  600 mg Ibuprofen every 6 hours as needed and 500-1000 mg Acetaminophen every 6 hours as needed.  Many patients get good pain relief by \"staggering\" between these three medications.     Constipation:  Narcotic pain medication, general anesthesia, and time in the hospital with less activity than normal " can all cause constipation. Please take a stool softener (what you have at home or one that was prescribed during hospital discharge, such as Senokot-S, docusate sodium, Miralax, Milk of Magnesia) while you are taking narcotics to prevent constipation. Stop taking the stool softener once you are done taking narcotics or if you begin having loose stools/diarrhea. Please call our clinic nurse, Stephania, at [phone] if you are not having success (not having BMs) with your current stool softener.     No driving while on narcotics.     Activity:  _XXX__ No heavy lifting greater than 8-10 pounds on the operative side for 4 weeks for a thoracotomy    Wound Care:  *You should look at your incision each day and keep it clean while it heals  *Do not apply any creams, salves such as Bacitracin, or ointments on the incision while it is healing  * Steri strips (thin white paper strips) will be present on the incision(s) and they will peel off as your incision heals-- otherwise, they will be removed at your post-op appointment.    *Remove the dressings covering your chest tube site 48 hours after your discharge from the hospital. You may then shower.  Wash the incision and chest tube site(s) daily with soap and water. No bathing or immersing incision underwater for approximately 2 weeks or until the chest tube sites are completely healed.     Place a dry gauze dressing (and tape) over the chest tube site because it is normal to have some drainage for a few days after the chest tube is removed.  Do not be alarmed if a large amount of fluid drains (should be pink or yellow) either spontaneously or with coughing or exertion. If this happens, just place a larger dry gauze dressing over the chest tube site-- it will stop and scab over in about a week or so. Once the drainage stops, you can stop covering the chest tube site with a dressing. Call our office if the drainage is milky or green in color or foul-smelling.    BMartin  Respiratory:  _XXX__ Utilize Incentive spirometer and flutter valve/acapella (if you received one) 10 times in a row every four hours while awake for a few weeks after discharging home from the hospital    C. Activity:  It may take a few weeks-months to regain your normal energy level/stamina. It is important during your recovery to get regular physical activity:  *walk each day at a comfortable pace  *climb stairs as tolerated  *take some rest periods each day but try not to take too many long naps, as this can affect your sleep at night    D. Returning to Work:  Time away from work will depend on your situation. In general, you will need between 1-6 weeks to recover from surgery. Specific dates for returning to work can be discussed at your post-op appointments.       Directions for On-Q Pain Pump Removal:  1. Remove the dressing covering the catheter site.  2. Grasp the catheter close to the skin and gently pull on the catheter. It should be easy to remove and not painful. If it becomes hard to remove or stretches, then stop and call   office.  3. Do not cut or pull hard to remove the catheter.  4. After you remove the catheter, check the catheter tip for a black marking to ensure the entire catheter was removed. Call our office if you don't see the black marking.  5. Place a band-aid over the catheter site if needed.  6. Call our office is you have redness, warmth or excessive bleeding from the catheter site or if there is a large bruise or swollen area around the site.    Revised August 2020

## 2021-02-25 NOTE — PLAN OF CARE
A&O x4. VSS on room air. CMS w/baseline numbness and tingling in BLEs. Lungs coarse and diminished w/rhonchi. BS+, BM-, flatus+. Incisions w/steri-strips. CT to water seal, clamp at midnight. Intermittent air-leak with coughing, no crepitus, On-q infusing. Tolerating regular diet. Denies N/V. Voiding adequately. Tylenol for pain. Up w/1.

## 2021-02-26 ENCOUNTER — APPOINTMENT (OUTPATIENT)
Dept: GENERAL RADIOLOGY | Facility: CLINIC | Age: 76
DRG: 164 | End: 2021-02-26
Attending: PHYSICIAN ASSISTANT
Payer: MEDICARE

## 2021-02-26 VITALS
TEMPERATURE: 97.7 F | WEIGHT: 119.56 LBS | DIASTOLIC BLOOD PRESSURE: 64 MMHG | HEIGHT: 61 IN | OXYGEN SATURATION: 97 % | BODY MASS INDEX: 22.57 KG/M2 | RESPIRATION RATE: 20 BRPM | SYSTOLIC BLOOD PRESSURE: 120 MMHG | HEART RATE: 92 BPM

## 2021-02-26 LAB — PLATELET # BLD AUTO: 180 10E9/L (ref 150–450)

## 2021-02-26 PROCEDURE — 250N000013 HC RX MED GY IP 250 OP 250 PS 637: Performed by: PHYSICIAN ASSISTANT

## 2021-02-26 PROCEDURE — 36415 COLL VENOUS BLD VENIPUNCTURE: CPT | Performed by: PHYSICIAN ASSISTANT

## 2021-02-26 PROCEDURE — 71045 X-RAY EXAM CHEST 1 VIEW: CPT

## 2021-02-26 PROCEDURE — 99232 SBSQ HOSP IP/OBS MODERATE 35: CPT | Performed by: HOSPITALIST

## 2021-02-26 PROCEDURE — 85049 AUTOMATED PLATELET COUNT: CPT | Performed by: PHYSICIAN ASSISTANT

## 2021-02-26 RX ORDER — NICOTINE 21 MG/24HR
1 PATCH, TRANSDERMAL 24 HOURS TRANSDERMAL DAILY
COMMUNITY
Start: 2021-02-27

## 2021-02-26 RX ADMIN — LOSARTAN POTASSIUM 100 MG: 100 TABLET, FILM COATED ORAL at 08:55

## 2021-02-26 RX ADMIN — ACETAMINOPHEN 975 MG: 325 TABLET, FILM COATED ORAL at 11:55

## 2021-02-26 RX ADMIN — GABAPENTIN 400 MG: 300 CAPSULE ORAL at 08:55

## 2021-02-26 RX ADMIN — NICOTINE 1 PATCH: 21 PATCH, EXTENDED RELEASE TRANSDERMAL at 08:54

## 2021-02-26 RX ADMIN — FAMOTIDINE 20 MG: 20 TABLET ORAL at 08:55

## 2021-02-26 RX ADMIN — HYDROMORPHONE HYDROCHLORIDE 2 MG: 2 TABLET ORAL at 14:22

## 2021-02-26 RX ADMIN — HYDROMORPHONE HYDROCHLORIDE 2 MG: 2 TABLET ORAL at 10:08

## 2021-02-26 RX ADMIN — DOCUSATE SODIUM 50 MG AND SENNOSIDES 8.6 MG 2 TABLET: 8.6; 5 TABLET, FILM COATED ORAL at 08:55

## 2021-02-26 RX ADMIN — HYDROMORPHONE HYDROCHLORIDE 2 MG: 2 TABLET ORAL at 02:56

## 2021-02-26 RX ADMIN — AMLODIPINE BESYLATE 10 MG: 10 TABLET ORAL at 08:55

## 2021-02-26 RX ADMIN — ACETAMINOPHEN 975 MG: 325 TABLET, FILM COATED ORAL at 03:01

## 2021-02-26 ASSESSMENT — ACTIVITIES OF DAILY LIVING (ADL)
ADLS_ACUITY_SCORE: 15

## 2021-02-26 NOTE — PLAN OF CARE
A/OX4. Can be forgetful at times. VSS. Up SBA. Chest tubes out early this morning. Site CDI. Pain managed with oral dilaudid and tylenol. On Q pump infusing. Sensors taped clamps open. Son at bedside for discharge instructed on how to remove on Q pump when empty was able to verbalize understanding

## 2021-02-26 NOTE — PLAN OF CARE
Date & Time: 2/25 [id number]  Diagnosis: GARO nodule   Procedures: 2/23 - L thoracotomy & GARO segmentectomy w/ Orlin  Orientation/Cognitive: AOx4, forgetful  VS/O2: VSS, RA   Mobility: SBA + gb  Diet: Regular  Pain Management: Scheduled tylenol, PRN dilaudid   Bowel & Bladder: Continent, c/o constipation - meds given  Skin: Incision & CT site - CDI   Abnormal Labs: WDL  Tele: NA  IV Access/Drips/Fluids: R PIVx2 SL, On-Q pump   Drains: CT to water seal (clamp @ midnight)   Tests: Xray in AM   Consults: Thoracic surgery, hospitalist   Discharge Plan: Possibly 2/26  Other: Slight air leak with intermittent coughing

## 2021-02-26 NOTE — PROGRESS NOTES
"Thoracic Surgery POD #3:  /65 (BP Location: Left arm)   Pulse 89   Temp 97.7  F (36.5  C) (Oral)   Resp 18   Ht 1.549 m (5' 1\")   Wt 54.2 kg (119 lb 9 oz)   SpO2 90%   BMI 22.59 kg/m    CXR: no significant PTX with CT clamped overnight  CT: minimal serous output    Final path: pending    S: Doing well- pain managed, not SOB. Fully instructed on wound care, On-Q pump removal, follow up.   O: Inc: benign, dry, steris intact  On-Q: infusing  CT: DCed without complication.  Occlusive dressing applied.  P: OK to discharge home today  See me with CXR for follow up on March 8 as scheduled    Ofelia Lockwood PA-C with Dr. Yemi Ordaz  [facility]  Cell [phone]      "

## 2021-02-26 NOTE — PLAN OF CARE
A&O x4. VSS. Lung sounds course/diminished/rhonchi. Reg diet, tolerating well. Bowel sounds active, constipated. Voiding adequately. Back incision WDL. CT to water seal. No crepitus, air leak present while coughing. CT dressing CDI, changed this morning. Pain controlled with Dilaudid. Up with stand by assist.

## 2021-02-26 NOTE — PROGRESS NOTES
[facility]    Medicine Progress Note - Hospitalist Service       Date of Admission:  2/23/2021  Assessment & Plan       Anna Dyer is a 75 year old female with PMHx of metastatic colorectal adenocarcinoma on active chemotherapy (last treatment ~3 months ago), chemo related anemia, pancytopenia and neuropathy, PAD, hypertension, and ongoing tobacco use admitted on 2/23/2021 for below thoracic surgery. Hospitalist service was consulted for medical co-management.      Lung cancer S/P left thoracotomy and posterior segmentectomy GARO (2/22/21)  Surgery performed by Dr. Ordaz. EBL 5 ccs. General anesthesia used.   - Defer routine post-operative cares, IVF, DVT prophylaxis and pain control to primary service.  Pump removed per cardiothoracic.  - Continue to encourage pulmonary toilet; incentive spirometer at bedside  -No O2 desat, reports cough and secretions with ability to clear secretions, compliant to I-S.  No dyspnea.    Metastatic colorectal adenocarcinoma  Follows with Dr. Gerard of [facility]. Refer to recent clinic note from 1/27/21. Primary tumor originating from the splenic flexure, KRAS-mutated. Multiple liver elham, unresectable at presentation in 4/2017. Treated with FOLOX and bevacizumab. S/P left hemicolectomy in 1/2018. CT C/A/P in 11/2020 with increase in hepatic lesion and slight increase in GARO nodule.Underwent lap resection of liver elham 1/5/21 and referred for thoracic surgery as above.   - Oncology follow-up as scheduled, chemotherapy currently on hold     Anemia  Thrombocytopenia  Related to prior chemotherapy. Stable on admission.   - Monitor      Peripheral neuropathy  Related to Oxaliplatin  - PTA Neurontin      CKD II  Baseline creatinine 0.7-0.8. Stable on admission.   - Monitor        H/o PAD  Follows with Dr. Arambula of [facility]. Resting left sided ANITHA 0.47. Note arteriogram 3/2020 with noted severe disease of the entire left superficial  femoral artery with occlusion of the left above-knee popliteal artery. Reconstitution ofthe left below-knee popliteal artery with single vessel runoff to the foot via the peroneal artery. Reconstitution of the dorsalis pedis artery via peroneal collaterals, although little pedal flow is observed on this exam. Somewhat of a mirror image picture in the right leg with diffuse disease of a small caliber right superficial femoral artery. High-grade nearly occlusive stenosis of the right above-knee popliteal artery.   - Stopped aspirin about 10 days prior to surgery. Resume per surgery.  Discussed with Nursing to address with [facility] on discharge, 2/26/2021 if OK to resume PTA ASA 81 mg.   - Continue statin       Hypertension  - PTA Norvasc  - Restarted Losartan   - Still holding hydrochlorothiazide as pressures are well controlled  -BP WNL  -follow-up BP with PCP on discharge with change in PTA antihypertensives.          COPD, non-oxygen dependent, not in acute exacerbation  Tobacco use disorder  Smokes 1 ppd.  No desire to quit at this time   - Nicotine patch 21 mcg/day        Diet: Regular Diet Adult  Diet    DVT Prophylaxis: Defer to primary service  Montesinos Catheter: not present  Code Status: Full Code           Disposition Plan   Expected discharge: Defer to primary service   Entered: Espinoza Rolle MD 02/26/2021, 12:20 PM     ______________________________________________________________________    Interval History   Decreased cough and secretions, able to raise secretions.  Compliant to I-S.  No O2 desat.  Nursing reports no wound issues, afebrile.  Pathology pending.  Nursing will address with primary service, cardiothoracic surgery whether to resume PTA ASA which she used for PAD.  PTA patient lived independently, she feels safe with plans to discharge to home as PTA, has family members to assist.    Data reviewed today: I reviewed all medications, new labs and imaging results over the last 24  hours.      Physical Exam   Vital Signs: Temp: 97.7  F (36.5  C) Temp src: Oral BP: 120/64 Pulse: 92   Resp: 20 SpO2: 97 % O2 Device: None (Room air)    Weight: 119 lbs 9 oz  General Appearance:   NAD, alert, calm, cooperative     Respiratory: Clear to auscultation.  Rations nonlabored on RA.  Cardiovascular: RRR.  No obvious murmurs  GI: Soft. Non-distended  Skin: No obvious rashes or cyanosis to exposed skin  Other: Chest tube in place.  No lower extremity edema   Neuro.  Gross motor tested, nonfocal, sensory intact  Psych oriented, affect calm     Data   Recent Labs   Lab 02/26/21  0623 02/24/21  0539 02/23/21  1228   WBC  --  7.2 6.0   HGB  --  11.6* 11.8   MCV  --  90 90    177 198   INR  --   --  1.06   NA  --  133 134   POTASSIUM  --  4.5 4.1   CHLORIDE  --  101 102   CO2  --  25 27   BUN  --  18 13   CR  --  0.66 0.74   ANIONGAP  --  7 5   CAITY  --  9.7 9.7   GLC  --  138* 89     Recent Results (from the past 24 hour(s))   XR Chest Port 1 View    Narrative    EXAM: XR CHEST PORT 1 VW  LOCATION: [facility]  DATE/TIME: 2/26/2021 5:15 AM    INDICATION: Status post left upper lobe wedge resection.  COMPARISON: 02/25/2021      Impression    IMPRESSION: Stable left apically oriented chest tube. Small left apical pneumothorax measures about 8 mm from the apical parietal pleura. Suture material noted in the left perihilar and suprahilar regions. Small amount of potential patchy airspace   infiltrate at the left lung base, though evaluation compromised by overlying peripherally calcified breast implant. Left chest port catheter tip at cavoatrial junction. Heart size normal. Calcified aortic arch. Right upper quadrant surgical clips.

## 2021-03-01 ENCOUNTER — TELEPHONE (OUTPATIENT)
Dept: INTERNAL MEDICINE | Facility: CLINIC | Age: 76
End: 2021-03-01

## 2021-03-01 ENCOUNTER — PATIENT OUTREACH (OUTPATIENT)
Dept: NURSING | Facility: CLINIC | Age: 76
End: 2021-03-01
Payer: MEDICARE

## 2021-03-01 DIAGNOSIS — Z71.89 OTHER SPECIFIED COUNSELING: ICD-10-CM

## 2021-03-01 LAB — COPATH REPORT: NORMAL

## 2021-03-01 NOTE — PROGRESS NOTES
Clinic Care Coordination Contact  Community Health Worker Initial Outreach    CHW Initial Information Gathering:  Referral Source: IP Report  [facility]  [phone]  Current living arrangement:: I live alone  Type of residence:: Private home - stairs  Community Resources: None  Supplies used at home:: Incontinence Supplies  Equipment Currently Used at Home: shower chair  Informal Support system:: Friends, Family  No PCP office visit in Past Year: No  Transportation means:: Regular car  CHW Additional Questions  If ED/Hospital discharge, follow-up appointment scheduled as recommended?: N/A  Medication changes made following ED/Hospital discharge?: N/A  MyChart active?: Yes  Patient sent Social Determinants of Health questionnaire?: Yes    Patient accepts CC: Yes. Patient scheduled for assessment with KUNAL Rashid , on 03/02/21 at 10AM. Patient noted desire to discuss CC.     Spoke to Anna  CHW introduced self and intent of call regarding recent admission.  Anna states that she's not doing so well and stated that she would like some assistance or help with light house cleaning around the home.  CHW introduced roles with CC and offered follow up with CC to assess for support and resources.  Anna agreed for assessment, thanked writer for assistance.    SUNIL Roberts  Clinic Care Coordination  [facility] : [place], [place], and [place]  Phone: [phone]

## 2021-03-02 ENCOUNTER — PATIENT OUTREACH (OUTPATIENT)
Dept: NURSING | Facility: CLINIC | Age: 76
End: 2021-03-02
Attending: INTERNAL MEDICINE
Payer: MEDICARE

## 2021-03-02 ASSESSMENT — ACTIVITIES OF DAILY LIVING (ADL): DEPENDENT_IADLS:: COOKING;CLEANING;LAUNDRY;SHOPPING

## 2021-03-02 NOTE — LETTER
[facility] CARE COORDINATION  [facility]  [address] [address]  Tel. [phone]  Fax [phone]    March 2, 2021    Anna Alexus Manisha  [address] [place] [id number]      Dear Anna,    I am a clinic care coordinator who works with Tello Finney MD at [facility]. I wanted to thank you for spending the time to talk with me.  Below is a description of clinic care coordination and how I can further assist you.      The clinic care coordination team is made up of a registered nurse,  and community health worker who understand the health care system. The goal of clinic care coordination is to help you manage your health and improve access to the health care system in the most efficient manner. The team can assist you in meeting your health care goals by providing education, coordinating services, strengthening the communication among your providers and supporting you with any resource needs.    Please feel free to contact the Community Health Worker at [phone] with any questions or concerns. We are focused on providing you with the highest-quality healthcare experience possible and that all starts with you.     Sincerely,     JASIEL Rashid   Social Work Clinic Care Coordinator   [facility] [place], [facility], and [facility]  PH: [phone]  [email or web address]

## 2021-03-02 NOTE — LETTER
Health Care Home - Access Care Plan    About Me:    Patient Name:  Anna Dyer    YOB: 1945  Age:                      75 year old   [place] MRN:     [id number] Telephone Information:   Home Phone [phone]   Mobile Not on file.       Address:  [address]  [facility] [place] [facility] [facility] [place] [address] Email address:  [email or web address]      Emergency Contact(s)   Name Relationship Lgl Grd Work Phone Home Phone Mobile Phone   1. HABERMANN,JOE Son  none [phone]   2. EL HUBBARD* Son    [phone]             Health Maintenance: Routine Health maintenance Reviewed: Due/Overdue   Health Maintenance Due   Topic Date Due     COPD ACTION PLAN  1945     MEDICARE ANNUAL WELLNESS VISIT  12/02/2020     PHQ-2  01/01/2021     My Access Plan  Medical Emergency 911   Questions or concerns during clinic hours Primary Clinic Line, I will call the clinic directly: [facility] - [phone]   24 Hour Appointment Line [phone] or  [phone] [facility] [phone]) (toll free)   24 Hour Nurse Line [phone] (toll free)   Questions or concerns outside clinic hours 24 Hour Appointment Line, I will call the after-hours on-call line:   [facility] [phone] or [phone]) (toll-free)   Preferred Urgent Care [facility], [phone]   Preferred Hospital [facility]  [phone]   Preferred Pharmacy [facility] [address] AT Portal to Registered CareHiram Sites     Behavioral Health Crisis Line The National Suicide Prevention Lifeline at 1-558.685.6024 or 354       My Care Team Members  Patient Care Team       Relationship Specialty Notifications Start End    Tello Finney MD PCP - General Internal Medicine  1/12/17     Phone: [phone] Pager: [phone] Fax: [phone] [address] [place] [address]    Sharmin  Tello BHAKTA MD Assigned PCP   1/28/18     Phone: [phone] Pager: [phone] Fax: [phone] [address]  [address] [facility] [address]    Moshe Arambula MD Assigned Heart and Vascular Provider   10/23/20     Phone: [phone] Fax: [phone] [address] [place] [address]    Maddy Gonzales MD Assigned Surgical Provider   10/23/20     Phone: [phone] Fax: [phone] [address] [place] [address]           My Medical and Care Information  Problem List   Patient Active Problem List   Diagnosis     Hypertension goal BP (blood pressure) < 140/90     Restless leg syndrome     CARDIOVASCULAR SCREENING; LDL GOAL LESS THAN 130     Nicotine dependence     Advanced directives, counseling/discussion     Colon cancer metastasized to liver (H)     COPD- borderline obstruction on PFTs. long term smoker     Status post colostomy (H)     Colostomy status (H)     CKD (chronic kidney disease) stage 3, GFR 30-59 ml/min     Abdominal wall hernia     PAD (peripheral artery disease) (H)     Ventral hernia     Ventral incisional hernia     Left upper lobe pulmonary nodule      Current Medications and Allergies:    Current Outpatient Medications   Medication     acetaminophen (TYLENOL) 500 MG tablet     amLODIPine (NORVASC) 5 MG tablet     aspirin (ASA) 81 MG tablet     atorvastatin (LIPITOR) 40 MG tablet     Ergocalciferol (VITAMIN D2 PO)     gabapentin (NEURONTIN) 400 MG capsule     Homeopathic Products (LEG CRAMP RELIEF PO)     HYDROmorphone (DILAUDID) 2 MG tablet     ibuprofen (ADVIL/MOTRIN) 600 MG tablet     lidocaine-prilocaine (EMLA) 2.5-2.5 % external cream     LORazepam (ATIVAN) 0.5 MG tablet     losartan (COZAAR) 100 MG tablet     nicotine (NICODERM CQ) 21 MG/24HR 24 hr patch     senna-docusate (SENOKOT-S/PERICOLACE) 8.6-50 MG tablet     No current facility-administered medications for this visit.

## 2021-03-04 NOTE — DISCHARGE SUMMARY
THORACIC SURGERY HOSPITAL DISCHARGE SUMMARY  [facility] - [facility] ONCOLOGY - THORACIC SURGERY  [address], [address]  [place] [address]  Phone [phone]  [email or web address]    3/4/2021     Tello Finney   [address] / [facility] [address]  Phone: [phone]   Fax: [phone]        Re: Anna Dyer             1945             [id number]              Dates of Hospitalization: 2/23/2021 - 2/26/2021   Date of Service (when I saw the patient): 2/26/21    Dear Dr. Finney,    As you are aware, we had the pleasure of caring for your patient,  Anna Dyer here at [facility].  She is a 75-year-old woman with a history of metastatic colorectal cancer.  She recently underwent an EUS resection of the liver lesion.  The current CT scan shows enlarging hypermetabolic nodule in the posterior segment of the left upper lobe lung.  This is the only area of residual disease.  Based on the findings, a limited thoracotomy and resection is indicated for diagnosis and treatment.    On 2/23/2021, Dr. Yemi Ordaz performed the following:    Procedure/Surgery Information   Procedure: Procedure(s):  LEFT THORACOTOMY, ANATOMICAL POSTERIOR SEGMENTECTOMY LEFT UPPER LOBE    Surgeon(s): Surgeon(s) and Role:     * Yemi Ordaz MD - Primary     * Ofelia Lockwood PA-C - Assisting   Specimens: ID Type Source Tests Collected by Time Destination   A : LEFT LUNG UPPER LOBE NODULE  Tissue Lung SURGICAL PATHOLOGY EXAM Yemi Ordaz MD 2/23/2021  4:03 PM             Final Surgical Pathology Revealed:  Left upper lobe lung 1.8 cm metastatic adenocarcinoma with focal necrosis, morphologically consistent with spread from the patient's previously diagnosed colonic adenocarcinoma and supported by immunostains. No extension of tumor to the margins.    Her post-operative course was unremarkable.      Consultations This  Hospital Stay   HOSPITALIST IP CONSULT    Anna Dyer has otherwise recovered sufficiently to be discharged to home today, 2/26/2021, on post-operative day number three for further convalescence.  Her incisions are healing well with no signs or symptoms of infection.  Her bowels have moved sufficiently and she is tolerating diet and activity, ambulating and transferring independently.  She is currently afebrile with stable vital signs.     Below, you will find a full discharge medication list and instructions.  We have arranged for Anna Dyer to follow-up with us in our [facility] in 7-10 days with a Chest X-ray prior to that appointment.  We thank you for allowing us to participate in the care of Anna Dyer here at [facility].  Please feel free to contact our office at [phone] with any questions or concerns or if we can be of any further assistance in the care of this patient.    Sincerely,    Dr. Yemi Ordaz MD    D/C Summary Prepared by: Ofelia Lockwood PA-C    Discharge Medications:  Discharge Medication List as of 2/26/2021  2:05 PM      START taking these medications    Details   HYDROmorphone (DILAUDID) 2 MG tablet Take 0.5-1 tablets (1-2 mg) by mouth every 6 hours as needed (pain control or improvement in physical function. Hold dose for analgesic side effects.), Disp-18 tablet, R-0, E-Prescribe      ibuprofen (ADVIL/MOTRIN) 600 MG tablet Take 1 tablet (600 mg) by mouth every 6 hours as needed for moderate pain, Disp-100 tablet, R-0, E-Prescribe      nicotine (NICODERM CQ) 21 MG/24HR 24 hr patch Place 1 patch onto the skin daily, OTC      senna-docusate (SENOKOT-S/PERICOLACE) 8.6-50 MG tablet Take 1-3 tablets by mouth 2 times daily as needed for constipation, Disp-30 tablet, R-0, E-Prescribe         CONTINUE these medications which have NOT CHANGED    Details   acetaminophen (TYLENOL) 500 MG tablet Take 1,000 mg by mouth every 6 hours as needed for  mild pain, Historical      amLODIPine (NORVASC) 5 MG tablet TAKE 2 TABLETS [=10MG]     DAILY, Disp-180 tablet, R-2, E-Prescribe      aspirin (ASA) 81 MG tablet Take 1 tablet (81 mg) by mouth daily, OTC      atorvastatin (LIPITOR) 40 MG tablet Take 1 tablet (40 mg) by mouth At Bedtime, Disp-90 tablet,R-3, E-Prescribe      Ergocalciferol (VITAMIN D2 PO) Take 60 mcg by mouth every morning, Historical      gabapentin (NEURONTIN) 400 MG capsule Take 400 mg by mouth 3 times daily , Historical      Homeopathic Products (LEG CRAMP RELIEF PO) Take 2-3 tablets by mouth daily as needed (leg cramps) , Historical      lidocaine-prilocaine (EMLA) 2.5-2.5 % external cream Apply topically daily as needed (Apply one hour prior to port acccess) Historical      LORazepam (ATIVAN) 0.5 MG tablet Take 0.5 mg by mouth At Bedtime , R-1, Historical      losartan (COZAAR) 100 MG tablet Take 1 tablet (100 mg) by mouth daily, Disp-90 tablet, R-2, E-Prescribe         STOP taking these medications       hydrochlorothiazide (HYDRODIURIL) 25 MG tablet Comments:   Reason for Stopping:                 Discharge Instructions:  1) Remove chest tube dressing on 2/28/21 and then it is Ok to shower.  Please wash both incision and chest tube site daily with soap and water.  You may cover the chest tube site daily with a clean band-aid or dry gauze if it continues to drain.  Once it stops draining, leave the site open to air and it will form a scab.  2) Steri-strips can be removed in 1 week or they will fall off when they are ready.  3) Continue daily use of your Incentive Spirometer, set of 10x in a row, every 1-2 hours while you are awake during the day. Also use your flutter valve if you received one during your stay.   4) No lifting, pushing or pulling >8 lbs for 3-4 weeks from the day of your surgery.  No driving while on narcotic pain medications.    Follow-Up Care:  1) Follow up with Ofelia Lockwood PA-C/Dr. Ordaz at the [place] [facility] in [place]  ([address], [address], [place] [address]).  Call [facility] at [phone] to schedule the appointment.  2) Follow up with Primary Care Provider, Tello Finney within 1 month of discharge for routine post-surgical care, wound check and follow up.  Please call [phone] to arrange this appointment.       CC  Patient Care Team:  Tello Finney MD as PCP - General (Internal Medicine)  Tello Finney MD as Assigned PCP  Moshe Arambula MD as Assigned Heart and Vascular Provider  Maddy Gonzales MD as Assigned Surgical Provider

## 2021-03-08 ENCOUNTER — TRANSFERRED RECORDS (OUTPATIENT)
Dept: HEALTH INFORMATION MANAGEMENT | Facility: CLINIC | Age: 76
End: 2021-03-08

## 2021-03-17 ENCOUNTER — IMMUNIZATION (OUTPATIENT)
Dept: NURSING | Facility: CLINIC | Age: 76
End: 2021-03-17
Payer: MEDICARE

## 2021-03-17 PROCEDURE — 0001A PR COVID VAC PFIZER DIL RECON 30 MCG/0.3 ML IM: CPT

## 2021-03-17 PROCEDURE — 91300 PR COVID VAC PFIZER DIL RECON 30 MCG/0.3 ML IM: CPT

## 2021-03-18 ENCOUNTER — TRANSFERRED RECORDS (OUTPATIENT)
Dept: HEALTH INFORMATION MANAGEMENT | Facility: CLINIC | Age: 76
End: 2021-03-18

## 2021-04-07 ENCOUNTER — IMMUNIZATION (OUTPATIENT)
Dept: NURSING | Facility: CLINIC | Age: 76
End: 2021-04-07
Attending: INTERNAL MEDICINE
Payer: MEDICARE

## 2021-04-07 PROCEDURE — 91300 PR COVID VAC PFIZER DIL RECON 30 MCG/0.3 ML IM: CPT

## 2021-04-07 PROCEDURE — 0002A PR COVID VAC PFIZER DIL RECON 30 MCG/0.3 ML IM: CPT

## 2021-04-08 DIAGNOSIS — E78.5 HYPERLIPIDEMIA LDL GOAL <70: ICD-10-CM

## 2021-04-08 RX ORDER — ATORVASTATIN CALCIUM 40 MG/1
40 TABLET, FILM COATED ORAL AT BEDTIME
Qty: 90 TABLET | Refills: 1 | Status: SHIPPED | OUTPATIENT
Start: 2021-04-08 | End: 2021-09-20

## 2021-04-08 NOTE — LETTER
April 9, 2021    Anna Dyer  [address] [facility] [address]        Dear Anna,    While refilling your prescription today, we noticed that you are due to have LABS DRAWN.  We will refill your prescription for 90 days plus one additional refill, but that appointment must be made before any additional refills can be approved.     Taking care of your health is important to us and we look forward to seeing you in the near future.  Please call us at [phone] or [phone] (or use [employer]) to schedule.  Please disregard this notice if you have already made an appointment.        Sincerely,          [facility] Team

## 2021-04-08 NOTE — TELEPHONE ENCOUNTER
[facility]    Who is the name of the provider?:  Jacquelin      What is the location you see this provider at?: [facility]    Reason for call: Calling for refill of Atorvastatin 40mg     REf #:  [id number]    Can we leave a detailed message on this number?  YES

## 2021-04-08 NOTE — TELEPHONE ENCOUNTER
atorvastatin (LIPITOR) 40 MG tablet  Last Written Prescription Date:  3/12/20  Last Fill Quantity: 90,  # refills: 3     Hospital consult: 3/12/20    Routing request to primary care physician of record:  Dr. Finney.    Susana Yanez RN BSN  [facility]  [phone]

## 2021-04-19 ENCOUNTER — TRANSFERRED RECORDS (OUTPATIENT)
Dept: HEALTH INFORMATION MANAGEMENT | Facility: CLINIC | Age: 76
End: 2021-04-19

## 2021-06-14 DIAGNOSIS — I10 ESSENTIAL HYPERTENSION WITH GOAL BLOOD PRESSURE LESS THAN 140/90: ICD-10-CM

## 2021-06-14 RX ORDER — AMLODIPINE BESYLATE 5 MG/1
TABLET ORAL
Qty: 180 TABLET | Refills: 2 | Status: SHIPPED | OUTPATIENT
Start: 2021-06-14 | End: 2021-12-22

## 2021-07-14 ENCOUNTER — TRANSFERRED RECORDS (OUTPATIENT)
Dept: HEALTH INFORMATION MANAGEMENT | Facility: CLINIC | Age: 76
End: 2021-07-14

## 2021-08-11 ENCOUNTER — TRANSFERRED RECORDS (OUTPATIENT)
Dept: HEALTH INFORMATION MANAGEMENT | Facility: CLINIC | Age: 76
End: 2021-08-11

## 2021-09-01 ENCOUNTER — IMMUNIZATION (OUTPATIENT)
Dept: NURSING | Facility: CLINIC | Age: 76
End: 2021-09-01
Payer: MEDICARE

## 2021-09-01 PROCEDURE — 0003A PR COVID VAC PFIZER DIL RECON 30 MCG/0.3 ML IM: CPT

## 2021-09-01 PROCEDURE — 91300 PR COVID VAC PFIZER DIL RECON 30 MCG/0.3 ML IM: CPT

## 2021-09-08 ENCOUNTER — TRANSFERRED RECORDS (OUTPATIENT)
Dept: HEALTH INFORMATION MANAGEMENT | Facility: CLINIC | Age: 76
End: 2021-09-08

## 2021-09-19 DIAGNOSIS — E78.5 HYPERLIPIDEMIA LDL GOAL <70: ICD-10-CM

## 2021-09-20 RX ORDER — ATORVASTATIN CALCIUM 40 MG/1
TABLET, FILM COATED ORAL
Qty: 90 TABLET | Refills: 0 | Status: SHIPPED | OUTPATIENT
Start: 2021-09-20 | End: 2021-12-21

## 2021-09-20 NOTE — TELEPHONE ENCOUNTER
Routing refill request to provider for review/approval because:  Labs not current:    LDL Cholesterol Calculated   Date Value Ref Range Status   03/12/2020 72 <100 mg/dL Final     Comment:     Desirable:       <100 mg/dl   Alma Dickens RN

## 2021-09-22 ENCOUNTER — TRANSFERRED RECORDS (OUTPATIENT)
Dept: HEALTH INFORMATION MANAGEMENT | Facility: CLINIC | Age: 76
End: 2021-09-22

## 2021-10-06 ENCOUNTER — TRANSFERRED RECORDS (OUTPATIENT)
Dept: HEALTH INFORMATION MANAGEMENT | Facility: CLINIC | Age: 76
End: 2021-10-06

## 2021-10-23 ENCOUNTER — HEALTH MAINTENANCE LETTER (OUTPATIENT)
Age: 76
End: 2021-10-23

## 2021-10-24 ENCOUNTER — MYC MEDICAL ADVICE (OUTPATIENT)
Dept: INTERNAL MEDICINE | Facility: CLINIC | Age: 76
End: 2021-10-24

## 2021-10-24 DIAGNOSIS — Z13.1 SCREENING FOR DIABETES MELLITUS: ICD-10-CM

## 2021-10-24 DIAGNOSIS — I70.8 ATHEROSCLEROSIS OF OTHER ARTERIES: ICD-10-CM

## 2021-10-24 DIAGNOSIS — I73.9 PAD (PERIPHERAL ARTERY DISEASE) (H): Primary | ICD-10-CM

## 2021-11-03 ENCOUNTER — TRANSFERRED RECORDS (OUTPATIENT)
Dept: HEALTH INFORMATION MANAGEMENT | Facility: CLINIC | Age: 76
End: 2021-11-03
Payer: MEDICARE

## 2021-12-18 DIAGNOSIS — I10 ESSENTIAL HYPERTENSION WITH GOAL BLOOD PRESSURE LESS THAN 140/90: ICD-10-CM

## 2021-12-18 DIAGNOSIS — E78.5 HYPERLIPIDEMIA LDL GOAL <70: ICD-10-CM

## 2021-12-21 RX ORDER — ATORVASTATIN CALCIUM 40 MG/1
TABLET, FILM COATED ORAL
Qty: 90 TABLET | Refills: 0 | Status: SHIPPED | OUTPATIENT
Start: 2021-12-21 | End: 2022-03-20

## 2021-12-21 RX ORDER — LOSARTAN POTASSIUM 100 MG/1
TABLET ORAL
Qty: 90 TABLET | Refills: 0 | Status: SHIPPED | OUTPATIENT
Start: 2021-12-21 | End: 2022-03-11

## 2021-12-22 DIAGNOSIS — I10 ESSENTIAL HYPERTENSION WITH GOAL BLOOD PRESSURE LESS THAN 140/90: ICD-10-CM

## 2021-12-23 ENCOUNTER — DOCUMENTATION ONLY (OUTPATIENT)
Dept: LAB | Facility: CLINIC | Age: 76
End: 2021-12-23
Payer: MEDICARE

## 2021-12-23 RX ORDER — AMLODIPINE BESYLATE 5 MG/1
TABLET ORAL
Qty: 180 TABLET | Refills: 1 | Status: SHIPPED | OUTPATIENT
Start: 2021-12-23 | End: 2022-09-16

## 2021-12-23 NOTE — TELEPHONE ENCOUNTER
Please enter SIG. 5 mg or 10 mg needs to be added to RX.    Please refill as appropriate.  Thank you,    Dunia Guerrero RN  [facility]

## 2021-12-23 NOTE — PROGRESS NOTES
PATIENT COMING IN FOR LABS 12/28/2021. HMPO ORDERS WERE PLACED BUT COULD NOT PLACE HEMOGLOBIN DUE TO NO DX. PLEASE PLACE PENDED ORDER IF YOU WANT THIS DONE. THANK YOU.

## 2021-12-28 ENCOUNTER — TRANSFERRED RECORDS (OUTPATIENT)
Dept: HEALTH INFORMATION MANAGEMENT | Facility: CLINIC | Age: 76
End: 2021-12-28

## 2021-12-28 ENCOUNTER — DOCUMENTATION ONLY (OUTPATIENT)
Dept: LAB | Facility: CLINIC | Age: 76
End: 2021-12-28

## 2021-12-28 ENCOUNTER — LAB (OUTPATIENT)
Dept: LAB | Facility: CLINIC | Age: 76
End: 2021-12-28
Payer: MEDICARE

## 2021-12-28 DIAGNOSIS — I73.9 PAD (PERIPHERAL ARTERY DISEASE) (H): ICD-10-CM

## 2021-12-28 DIAGNOSIS — N18.30 CKD (CHRONIC KIDNEY DISEASE) STAGE 3, GFR 30-59 ML/MIN (H): Primary | ICD-10-CM

## 2021-12-28 DIAGNOSIS — Z13.1 SCREENING FOR DIABETES MELLITUS: ICD-10-CM

## 2021-12-28 DIAGNOSIS — I70.8 ATHEROSCLEROSIS OF OTHER ARTERIES: ICD-10-CM

## 2021-12-28 DIAGNOSIS — N18.31 STAGE 3A CHRONIC KIDNEY DISEASE (H): Primary | ICD-10-CM

## 2021-12-28 LAB
CHOLEST SERPL-MCNC: 167 MG/DL
FASTING STATUS PATIENT QL REPORTED: YES
FASTING STATUS PATIENT QL REPORTED: YES
GLUCOSE BLD-MCNC: 100 MG/DL (ref 70–99)
HDLC SERPL-MCNC: 79 MG/DL
HOLD SPECIMEN: NORMAL
LDLC SERPL CALC-MCNC: 59 MG/DL
NONHDLC SERPL-MCNC: 88 MG/DL
TRIGL SERPL-MCNC: 145 MG/DL

## 2021-12-28 PROCEDURE — 80061 LIPID PANEL: CPT

## 2021-12-28 PROCEDURE — 36415 COLL VENOUS BLD VENIPUNCTURE: CPT

## 2021-12-28 PROCEDURE — 85018 HEMOGLOBIN: CPT | Performed by: INTERNAL MEDICINE

## 2021-12-28 PROCEDURE — 82947 ASSAY GLUCOSE BLOOD QUANT: CPT

## 2021-12-28 NOTE — PROGRESS NOTES
Please place or confirm orders for upcoming lab appointment on 12/28/2021.    Please DX pending [facility] Order. Thank you.

## 2021-12-29 LAB — HGB BLD-MCNC: 14.5 G/DL (ref 11.7–15.7)

## 2022-01-10 ENCOUNTER — TRANSFERRED RECORDS (OUTPATIENT)
Dept: HEALTH INFORMATION MANAGEMENT | Facility: CLINIC | Age: 77
End: 2022-01-10
Payer: MEDICARE

## 2022-01-12 ENCOUNTER — TRANSFERRED RECORDS (OUTPATIENT)
Dept: HEALTH INFORMATION MANAGEMENT | Facility: CLINIC | Age: 77
End: 2022-01-12
Payer: MEDICARE

## 2022-01-26 ENCOUNTER — TRANSFERRED RECORDS (OUTPATIENT)
Dept: HEALTH INFORMATION MANAGEMENT | Facility: CLINIC | Age: 77
End: 2022-01-26
Payer: MEDICARE

## 2022-02-09 ENCOUNTER — TRANSFERRED RECORDS (OUTPATIENT)
Dept: HEALTH INFORMATION MANAGEMENT | Facility: CLINIC | Age: 77
End: 2022-02-09
Payer: MEDICARE

## 2022-02-12 ENCOUNTER — HEALTH MAINTENANCE LETTER (OUTPATIENT)
Age: 77
End: 2022-02-12

## 2022-03-11 DIAGNOSIS — I10 ESSENTIAL HYPERTENSION WITH GOAL BLOOD PRESSURE LESS THAN 140/90: ICD-10-CM

## 2022-03-11 RX ORDER — LOSARTAN POTASSIUM 100 MG/1
100 TABLET ORAL DAILY
Qty: 90 TABLET | Refills: 0 | Status: SHIPPED | OUTPATIENT
Start: 2022-03-11 | End: 2022-06-02

## 2022-03-11 NOTE — TELEPHONE ENCOUNTER
Routing refill request to provider for review/approval because:  Failed protocol due to:    Last blood pressure under 140/90 in past 12 months    Recent (12 mo) or future (30 days) visit within the authorizing provider's specialty    Normal serum creatinine on file in past 12 months    Normal serum potassium on file in past 12 months     Janeth Toro RN

## 2022-03-20 DIAGNOSIS — E78.5 HYPERLIPIDEMIA LDL GOAL <70: ICD-10-CM

## 2022-03-22 RX ORDER — ATORVASTATIN CALCIUM 40 MG/1
40 TABLET, FILM COATED ORAL AT BEDTIME
Qty: 90 TABLET | Refills: 0 | Status: SHIPPED | OUTPATIENT
Start: 2022-03-22 | End: 2022-06-23

## 2022-03-22 NOTE — TELEPHONE ENCOUNTER
Medication filled 1 time as pt is due for a follow-up in clinic. Pharmacy has been notified to inform patient to call clinic and schedule appointment. and , Please reach out to patient to schedule appointment.    Prescription approved per [facility] Refill Protocol.  Elva Page RN

## 2022-03-23 ENCOUNTER — TRANSFERRED RECORDS (OUTPATIENT)
Dept: HEALTH INFORMATION MANAGEMENT | Facility: CLINIC | Age: 77
End: 2022-03-23
Payer: MEDICARE

## 2022-04-06 ENCOUNTER — TRANSFERRED RECORDS (OUTPATIENT)
Dept: HEALTH INFORMATION MANAGEMENT | Facility: CLINIC | Age: 77
End: 2022-04-06
Payer: MEDICARE

## 2022-04-06 LAB
ALT SERPL-CCNC: 8 U/L (ref 7–10)
AST SERPL-CCNC: 19 U/L (ref 13–40)
CREATININE (EXTERNAL): 0.75 MG/DL (ref 0.5–1.2)
GFR ESTIMATED (EXTERNAL): 82.5 ML/MIN/1.73M^2
GLUCOSE (EXTERNAL): 92 MG/DL (ref 73–126)
POTASSIUM (EXTERNAL): 4 MMOL/L (ref 3.5–5.1)
TSH SERPL-ACNC: 8.39 MU/L (ref 0.32–5)

## 2022-04-13 ENCOUNTER — TRANSFERRED RECORDS (OUTPATIENT)
Dept: HEALTH INFORMATION MANAGEMENT | Facility: CLINIC | Age: 77
End: 2022-04-13
Payer: MEDICARE

## 2022-04-21 ENCOUNTER — IMMUNIZATION (OUTPATIENT)
Dept: NURSING | Facility: CLINIC | Age: 77
End: 2022-04-21
Payer: MEDICARE

## 2022-04-21 PROCEDURE — 0054A COVID-19,PF,PFIZER (12+ YRS): CPT

## 2022-04-21 PROCEDURE — 91305 COVID-19,PF,PFIZER (12+ YRS): CPT

## 2022-05-18 ENCOUNTER — TRANSFERRED RECORDS (OUTPATIENT)
Dept: HEALTH INFORMATION MANAGEMENT | Facility: CLINIC | Age: 77
End: 2022-05-18
Payer: MEDICARE

## 2022-06-01 ENCOUNTER — TRANSFERRED RECORDS (OUTPATIENT)
Dept: HEALTH INFORMATION MANAGEMENT | Facility: CLINIC | Age: 77
End: 2022-06-01
Payer: MEDICARE

## 2022-06-04 NOTE — PROGRESS NOTES
Clinic Care Coordination Contact    Clinic Care Coordination Contact  OUTREACH    Referral Information:  Referral Source: IP Report    Primary Diagnosis: Psychosocial    Chief Complaint   Patient presents with     Clinic Care Coordination - Initial     Needs assessment        Universal Utilization:   Clinic Utilization  No PCP office visit in Past Year: No    Utilization    Last refreshed: 3/2/2021  9:41 AM: Hospital Admissions 3           Last refreshed: 3/2/2021  9:41 AM: ED Visits 0           Last refreshed: 3/2/2021  9:41 AM: No Show Count (past year) 0              Current as of: 3/2/2021  9:41 AM              Clinical Concerns:  CHW: light housekeeping, hasn't been doing well   Order: Hospital follow up     AVS:  Flutter Valve:  -Continue to use flutter valve 4 times a day Until return to normal activity    Incentive Spirometry:  -Continue to use incentive spirometer 4 times a day Until return to normal activity    Wound care and dressings:  -Instructions to care for your wound at home: Keep the dressing over your chest tube site(s) for two days after you discharge from the hospital. Then remove the dressing and take a regular shower (no baths or hot tubs).  -Use regular soap and water and pat the site(s) dry after your shower. Re-apply a dry bandage to the site(s) after your shower and once each day (or more often if lots of drainage). Stop bandaging the site(s) once there is no drainage for one day.  -Please leave the white steri strip tapes in place that are applied to your surgical incision(s). They will peel off on their own or we will remove them in clinic.    -Your activity upon discharge: No lifting greater than 8-10 pounds with your left arm for the next 4 weeks  -Regular diet   -Reason for your hospital stay - Left lung surgery with Dr. Orlin SYED outreach to pt for initial assessment. Discussed pt's need for light housekeeping resources. Pt reported she has not used these services before.       Pt declined having additional questions regarding AVS and recent hospitalization.     Pt will call CC if need more resources or support in the future. CC will send information via [employer].     Medication Management:  No questions.     START taking:  HYDROmorphone (DILAUDID)  ibuprofen (ADVIL/MOTRIN)  nicotine (NICODERM CQ)  Start taking on: 2021  senna-docusate (SENOKOT-S/PERICOLACE)    STOP taking:  hydrochlorothiazide 25 MG tablet (HYDRODIURIL)    Post-discharge medication reconciliation status: Discharge medications reviewed and reconciled.  Changed medications per note/orders (see AVS). Inpatient staff updated medication list accordingly.      Functional Status:  Dependent IADLs: Cooking, Cleaning, Laundry, Shopping  Bed or wheelchair confined: No    Living Situation:  Current living arrangement: I live alone  Type of residence: Private home     Lifestyle & Psychosocial Needs:  Diet: Regular  Transportation means: Regular car  Islam or spiritual beliefs that impact treatment: No  Mental health DX: No  Mental health management concern: No  Chemical Dependency Status: No Current Concerns  Informal Support system: Friends, Family     Socioeconomic History     Marital status:      Spouse name: Not on file     Number of children: Not on file     Years of education: Not on file     Highest education level: Not on file   Occupational History     Occupation:      Comment: family       Tobacco Use     Smoking status: Current Every Day Smoker     Packs/day: 1.00     Years: 58.00     Pack years: 58.00     Types: Cigarettes     Last attempt to quit: 2020     Years since quittin.6     Smokeless tobacco: Never Used   Substance and Sexual Activity     Alcohol use: No     Alcohol/week: 0.0 standard drinks     Drug use: No     Sexual activity: Not Currently     Partners: Male       Care Coordinator has reviewed patient's Social Determinants of Health (SDoH) on this date. Upon  review, changes were not made. Sent to pt after call.      Resources and Interventions:  Current Resources:   Community Resources: DME  Supplies used at home: Incontinence Supplies  Equipment Currently Used at Home: shower chair  Employment Status: retired    Advance Care Plan/Directive  Advanced Care Plans/Directives on file: Yes  Type Advanced Care Plans/Directives: Advanced Directive - On File    Referrals Placed: Community Resources, County Resources, Veterans Linkage Line, Senior Linkage Line, Disability Linkage line, Housekeeping or Chore Agency     Patient/Caregiver understanding: Pt reports understanding and denies any additional questions or concerns at this times. ARIELLA SYED engaged in AIDET communication during encounter.    Plan: No further outreaches will be made at this time unless a new referral is made or a change in the pt's status occurs.     Patient was provided with this writer's contact information and encouraged to call with any questions or concerns.     ARIELLA SYED will send care coordination introduction letter and 24 hr access plan to patient. ARIELLA SYED sent resources via [employer].     JASIEL Rashid   Social Work Clinic Care Coordinator   [facility]  [facility], [facility]  PH: [phone]  [email or web address]    No

## 2022-06-20 DIAGNOSIS — E78.5 HYPERLIPIDEMIA LDL GOAL <70: ICD-10-CM

## 2022-06-22 NOTE — TELEPHONE ENCOUNTER
Routing refill request to provider for review/approval because:  Patient needs to be seen because it has been more than 1 year since last office visit.    Last visit: 2/19/2021      Deborah Pedroza RN  [facility]

## 2022-06-23 RX ORDER — ATORVASTATIN CALCIUM 40 MG/1
TABLET, FILM COATED ORAL
Qty: 90 TABLET | Refills: 0 | Status: SHIPPED | OUTPATIENT
Start: 2022-06-23 | End: 2022-08-18

## 2022-06-29 ENCOUNTER — TRANSFERRED RECORDS (OUTPATIENT)
Dept: HEALTH INFORMATION MANAGEMENT | Facility: CLINIC | Age: 77
End: 2022-06-29

## 2022-07-13 ENCOUNTER — TELEPHONE (OUTPATIENT)
Dept: INTERNAL MEDICINE | Facility: CLINIC | Age: 77
End: 2022-07-13

## 2022-07-13 NOTE — TELEPHONE ENCOUNTER
Patient called, stated she had received a letter saying she needs to make an appointment in order to continue receiving medications.     Patient asked if she can skip making the annual wellness since she has cancer and is seeing somone who does a physical for her every two weeks.     Spoke to provider, provider stated that if he is prescribing medications to the patient he still would like to see her. He did say that if patient would like her endocrinologist to take over refilling her medication, he would be okay with that and she would not need an appointment.     Relayed providers message to patient, patient will ask her endocrinologist if he is willing to refill her medications. If needed, patient will call back and schedule an annual wellness visit with pcp.

## 2022-07-19 ENCOUNTER — VIRTUAL VISIT (OUTPATIENT)
Dept: ENDOCRINOLOGY | Facility: CLINIC | Age: 77
End: 2022-07-19
Payer: MEDICARE

## 2022-07-19 DIAGNOSIS — Z83.49 FAMILY HISTORY OF HYPOTHYROIDISM: ICD-10-CM

## 2022-07-19 DIAGNOSIS — E03.9 HYPOTHYROIDISM, UNSPECIFIED TYPE: Primary | ICD-10-CM

## 2022-07-19 PROCEDURE — 99204 OFFICE O/P NEW MOD 45 MIN: CPT | Mod: 95 | Performed by: INTERNAL MEDICINE

## 2022-07-19 NOTE — PROGRESS NOTES
Patient is being evaluated via a billable video visit.      How would you like to obtain your AVS? Reviewed verbally  If the video visit is dropped, the invitation should be resent by cell phone  Will anyone else be joining your video visit? no      Video Start Time:  3:05 pm    Video-Visit Details    Type of service:  Video Visit    Video End Time: 3:25 pm    Originating Location (pt. Location): home    Distant Location (provider location):  [facility]    Platform used for Video Visit:  [employer]        Name: Anna Dyer is a 77 year old woman, seen at the request of Dr. Patrick Dreyer for evaluation of     Chief Complaint   Patient presents with     Thyroid Problem       HPI:  Recent issues:  Here for evaluation of elevated TSH level  Reviewed medical history from patient and Epic chart record        2017. History of colon cancer- stage 4 metastatic adenocarcinoma with primary tumor originating from splenic flexure,    mets to liver, unresectable presentation per med oncology records  Treatment with chemotherapy, then left hemicolectomy, and subsequent chemotherapy    Lap resection of liver met, limited left thoracotomy and biopsy    History of thrombocytopenia, hypercalcemia, peripheral neuropathy   Medical oncology evaluations with Dr. Patrick Dreyer/[facility]      4/6/22 (medical oncology) labs: TSH 8.39  4/13/22 CT abd/pelvis:   Pulmonary nodule    Thyroid gland unremarkable, per radiologist    Additional health history:  Neck radiation treatment: None known   Thyroid med use:  None  Fam Hx thyroid disease: Brother, son- hypothyroidism    Symptoms include fatigue, mild wt gain?, dry skin, constipation  Recent [facility] labs include:  Lab Results   Component Value Date    TSH 2.88 05/08/2009         Lives in [place], [place]  Sees Dr. Tello Finney/[facility] for general medicine evaluations.  Also sees Dr. Patrick Dreyer/[facility]    PMH/PSH:  Past Medical History:   Diagnosis Date     Cancer (H)     metastatic colorectal adenocarcinoma     Cataract      Chronic kidney disease      COPD (chronic obstructive pulmonary disease) (H)      Hypertension     No cardiologist     Nicotine dependence      PAD (peripheral artery disease) (H)      Peripheral neuropathy      RLS (restless legs syndrome)      Thrombocytopenia (H)      Ventral hernia      Past Surgical History:   Procedure Laterality Date     BIOPSY      liver     BREAST SURGERY      breast implants     COLONOSCOPY N/A 3/28/2019    Procedure: INTRAOPERATIVE COLONOSCOPY,;  Surgeon: Jesus Caba MD;  Location:  OR     COSMETIC SURGERY      facelift and tummy tuck     GYN SURGERY      tubal     INSERT PORT VASCULAR ACCESS N/A 3/24/2017    Procedure: INSERT PORT VASCULAR ACCESS;  Surgeon: Yemi Ordaz MD;  Location:  OR     IR LOWER EXTREMITY ANGIOGRAM BILATERAL  3/12/2020     LAPAROSCOPIC ASSISTED COLECTOMY LEFT (DESCENDING) N/A 1/25/2018    Procedure: LAPAROSCOPIC ASSISTED COLECTOMY LEFT (DESCENDING);;  Surgeon: Maddie Baron MD;  Location: [facility] OR     LAPAROSCOPIC ASSISTED COLOSTOMY TAKEDOWN N/A 3/28/2019    Procedure: LAPAROSCOPIC ASSISTED COLOSTOMY REVERSAL;  Surgeon: Jesus Caba MD;  Location:  OR     LAPAROSCOPIC HERNIORRHAPHY VENTRAL N/A 8/12/2020    Procedure: COMBINED OPEN AND LAPAROSCOPIC VENTRAL HERNIA REPAIR;  Surgeon: Maddy Gonzales MD;  Location:  OR     LAPAROSCOPY DIAGNOSTIC (GENERAL) N/A 3/28/2019    Procedure: DIAGNOSTIC LAPAROSCOPY;  Surgeon: Jesus Caba MD;  Location:  OR     LAPAROTOMY EXPLORATORY N/A 1/25/2018    Procedure: LAPAROTOMY EXPLORATORY;  exploratory laparoscopy, left hemicolectomy, transverse colostomy;  Surgeon: Maddie Baron MD;  Location:  OR     THORACOSCOPY Left 2/23/2021    Procedure: LEFT THORACOTOMY, ANATOMICAL POSTERIOR SEGMENTECTOMY LEFT UPPER LOBE ;  Surgeon: Yemi Ordaz MD;   Location:  OR       Family Hx:  Family History   Problem Relation Age of Onset     Family History Negative Father      Family History Negative Mother      Psoriasis Brother         half-siblings     Family History Negative Sister      Hypothyroidism Son          Social Hx:  Social History     Socioeconomic History     Marital status:      Spouse name: Not on file     Number of children: Not on file     Years of education: Not on file     Highest education level: Not on file   Occupational History     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 No   Social History Narrative     Not on file     Social Determinants of Health     Financial Resource Strain: Not on file   Food Insecurity: Not on file   Transportation Needs: Not on file   Physical Activity: Not on file   Stress: Not on file   Social Connections: Not on file   Intimate Partner Violence: Not on file   Housing Stability: Not on file          MEDICATIONS:  has a current medication list which includes the following prescription(s): acetaminophen, amlodipine, aspirin, atorvastatin, ergocalciferol, gabapentin, homeopathic products, hydromorphone, ibuprofen, lidocaine-prilocaine, lorazepam, losartan, nicotine, and senna-docusate.    ROS:     ROS: 10 point ROS neg other than the symptoms noted above in the HPI.    GENERAL:  fatigue, mild wt gain?; denies fevers, chills, night sweats.   HEENT: no dysphagia, odonophagia, diplopia, neck pain  THYROID:  no apparent hyper or  hypothyroid symptoms  CV: no chest pain, pressure, palpitations  LUNGS: no SOB, HENRY, cough, wheezing   ABDOMEN: constipation; denies diarrhea, abdominal pain  EXTREMITIES: no rashes, ulcers, edema  NEUROLOGY: no headaches, denies changes in vision, tingling, extremitiy numbness   MSK: no muscle aches or pains, weakness  SKIN: no rashes or lesions  : no menses  PSYCH:  stable mood, no significant anxiety or depression  ENDOCRINE: no heat or cold intolerance    Physical Exam (visual exam)  VS:  no vital signs taken for video visit  CONSTITUTIONAL: healthy, alert and NAD, well dressed, answering questions appropriately  ENT: no nose swelling or nasal discharge, mouth redness or gum changes.  EYES: eyes grossly normal to inspection, conjunctivae and sclerae normal, no exophthalmos or proptosis  THYROID:  no apparent nodules or goiter  LUNGS: no audible wheeze, cough or visible cyanosis, no visible retractions or increased work of breathing  ABDOMEN: abdomen not evaluated  EXTREMITIES: no hand tremors, limited exam  NEUROLOGY: CN grossly intact, mentation intact and speech normal   SKIN:  no apparent skin lesions, rash, or edema with visualized skin appearance  PSYCH: mentation appears normal, affect normal/bright, judgement and insight intact,   normal speech and appearance well groomed    LABS:    All pertinent notes, labs, and images personally reviewed by me.     A/P:  Encounter Diagnoses   Name Primary?     Hypothyroidism, unspecified type Yes     Family history of hypothyroidism        Comments:  Reviewed health history and thyroid issues.  Previous 4/2022 labs at [facility] included mildly elevated TSH, indicating mild hypothyroidism  Needs repeat thyroid lab testing, then consideration for use of low dose levothyroxine medication    Plan:  Discussed general issues with the hypothyroidism diagnosis and management  Reviewed the anatomy and hormone physiology of the thyroid gland  Discussed lab tests used to  assess patient thyroid hormone levels  Reviewed thyroid medication treatment with levothyroxine medication, dosing.    Recommend:  Plan repeat lab tests soon   Discussed the nearby [facility] clinic   Will try to coordinate the thyroid lab testing with her next [facility] appt    Lab orders placed  Monitor for symptom changes  Consider starting levothyroxine medication  No thyroid U/S imaging needed at this time    Discussed importance of seeing her PCP for general medicine appointments and also acute care visits.  Addressed patient questions today     There are no Patient Instructions on file for this visit.    Future labs ordered today:   Orders Placed This Encounter   Procedures     TSH     T4 free     Thyroid peroxidase antibody     Radiology/Consults ordered today: None    Total time spent in with the patient evaluation:  20 min  Additional time spent reviewing pertinent lab tests and chart notes, and documentation:  15 min    Follow-up:  1/2023    GARY Rausch MD, MS  Endocrinology  [facility]    CC: Dreyer, Patrick and Tello Finney

## 2022-07-19 NOTE — LETTER
7/19/2022         RE: Anna Dyer  [address]  [facility] [address]        Dear Colleague,    Thank you for referring your patient, Anna Dyer, to the [facility] SPECIALTY [facility]. Please see a copy of my visit note below.    Patient is being evaluated via a billable video visit.      How would you like to obtain your AVS? Reviewed verbally  If the video visit is dropped, the invitation should be resent by cell phone  Will anyone else be joining your video visit? no      Video Start Time:  3:05 pm    Video-Visit Details    Type of service:  Video Visit    Video End Time: 3:25 pm    Originating Location (pt. Location): home    Distant Location (provider location):  [facility]/home    Platform used for Video Visit:  [employer]        Name: Anna Dyer is a 77 year old woman, seen at the request of Dr. Patrick Dreyer for evaluation of     Chief Complaint   Patient presents with     Thyroid Problem       HPI:  Recent issues:  Here for evaluation of elevated TSH level  Reviewed medical history from patient and Epic chart record        2017. History of colon cancer- stage 4 metastatic adenocarcinoma with primary tumor originating from splenic flexure,    mets to liver, unresectable presentation per med oncology records  Treatment with chemotherapy, then left hemicolectomy, and subsequent chemotherapy    Lap resection of liver met, limited left thoracotomy and biopsy    History of thrombocytopenia, hypercalcemia, peripheral neuropathy   Medical oncology evaluations with Dr. Patrick [facility]er[facility]      4/6/22 (medical oncology) labs: TSH 8.39  4/13/22 CT abd/pelvis:   Pulmonary nodule    Thyroid gland unremarkable, per radiologist    Additional health history:  Neck radiation treatment: None known   Thyroid med use:  None  Fam Hx thyroid disease: Brother, son- hypothyroidism    Symptoms include fatigue, mild wt gain?, dry skin,  constipation  Recent [facility] labs include:  Lab Results   Component Value Date    TSH 2.88 05/08/2009         Lives in [place], [place]  Sees Dr. Tello Finney/[facility] for general medicine evaluations.  Also sees Dr. Patrick Dreyer/[facility]    PMH/PSH:  Past Medical History:   Diagnosis Date     Cancer (H)     metastatic colorectal adenocarcinoma     Cataract      Chronic kidney disease      COPD (chronic obstructive pulmonary disease) (H)      Hypertension     No cardiologist     Nicotine dependence      PAD (peripheral artery disease) (H)      Peripheral neuropathy      RLS (restless legs syndrome)      Thrombocytopenia (H)      Ventral hernia      Past Surgical History:   Procedure Laterality Date     BIOPSY      liver     BREAST SURGERY      breast implants     COLONOSCOPY N/A 3/28/2019    Procedure: INTRAOPERATIVE COLONOSCOPY,;  Surgeon: Jesus Caba MD;  Location:  OR     COSMETIC SURGERY      facelift and tummy tuck     GYN SURGERY      tubal     INSERT PORT VASCULAR ACCESS N/A 3/24/2017    Procedure: INSERT PORT VASCULAR ACCESS;  Surgeon: Yemi Ordaz MD;  Location:  OR     IR LOWER EXTREMITY ANGIOGRAM BILATERAL  3/12/2020     LAPAROSCOPIC ASSISTED COLECTOMY LEFT (DESCENDING) N/A 1/25/2018    Procedure: LAPAROSCOPIC ASSISTED COLECTOMY LEFT (DESCENDING);;  Surgeon: Maddie Baron MD;  Location: [facility] OR     LAPAROSCOPIC ASSISTED COLOSTOMY TAKEDOWN N/A 3/28/2019    Procedure: LAPAROSCOPIC ASSISTED COLOSTOMY REVERSAL;  Surgeon: Jesus Caba MD;  Location:  OR     LAPAROSCOPIC HERNIORRHAPHY VENTRAL N/A 8/12/2020    Procedure: COMBINED OPEN AND LAPAROSCOPIC VENTRAL HERNIA REPAIR;  Surgeon: Maddy Gonzales MD;  Location:  OR     LAPAROSCOPY DIAGNOSTIC (GENERAL) N/A 3/28/2019    Procedure: DIAGNOSTIC LAPAROSCOPY;  Surgeon: Jesus Caba MD;  Location:  OR     LAPAROTOMY EXPLORATORY N/A 1/25/2018    Procedure: LAPAROTOMY EXPLORATORY;  exploratory laparoscopy,  left hemicolectomy, transverse colostomy;  Surgeon: Maddie Baron MD;  Location: [facility] OR     THORACOSCOPY Left 2021    Procedure: LEFT THORACOTOMY, ANATOMICAL POSTERIOR SEGMENTECTOMY LEFT UPPER LOBE ;  Surgeon: Yemi Ordaz MD;  Location: [facility] OR       Family Hx:  Family History   Problem Relation Age of Onset     Family History Negative Father      Family History Negative Mother      Psoriasis Brother         half-siblings     Family History Negative Sister      Hypothyroidism Son          Social Hx:  Social History     Socioeconomic History     Marital status:      Spouse name: Not on file     Number of children: Not on file     Years of education: Not on file     Highest education level: Not on file   Occupational History     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 No   Social History Narrative     Not on file     Social Determinants of Health     Financial Resource Strain: Not on file   Food Insecurity: Not on file   Transportation Needs: Not on file   Physical Activity: Not on file   Stress: Not on file   Social Connections: Not on file   Intimate Partner Violence: Not on file   Housing Stability: Not on file          MEDICATIONS:  has a current medication list which includes the following prescription(s): acetaminophen, amlodipine, aspirin, atorvastatin, ergocalciferol, gabapentin, homeopathic products, hydromorphone, ibuprofen, lidocaine-prilocaine, lorazepam, losartan, nicotine, and  senna-docusate.    ROS:     ROS: 10 point ROS neg other than the symptoms noted above in the HPI.    GENERAL:  fatigue, mild wt gain?; denies fevers, chills, night sweats.   HEENT: no dysphagia, odonophagia, diplopia, neck pain  THYROID:  no apparent hyper or hypothyroid symptoms  CV: no chest pain, pressure, palpitations  LUNGS: no SOB, HENRY, cough, wheezing   ABDOMEN: constipation; denies diarrhea, abdominal pain  EXTREMITIES: no rashes, ulcers, edema  NEUROLOGY: no headaches, denies changes in vision, tingling, extremitiy numbness   MSK: no muscle aches or pains, weakness  SKIN: no rashes or lesions  : no menses  PSYCH:  stable mood, no significant anxiety or depression  ENDOCRINE: no heat or cold intolerance    Physical Exam (visual exam)  VS:  no vital signs taken for video visit  CONSTITUTIONAL: healthy, alert and NAD, well dressed, answering questions appropriately  ENT: no nose swelling or nasal discharge, mouth redness or gum changes.  EYES: eyes grossly normal to inspection, conjunctivae and sclerae normal, no exophthalmos or proptosis  THYROID:  no apparent nodules or goiter  LUNGS: no audible wheeze, cough or visible cyanosis, no visible retractions or increased work of breathing  ABDOMEN: abdomen not evaluated  EXTREMITIES: no hand tremors, limited exam  NEUROLOGY: CN grossly intact, mentation intact and speech normal   SKIN:  no apparent skin lesions, rash, or edema with visualized skin appearance  PSYCH: mentation appears normal, affect normal/bright, judgement and insight intact,   normal speech and appearance well groomed    LABS:    All pertinent notes, labs, and images personally reviewed by me.     A/P:  Encounter Diagnoses   Name Primary?     Hypothyroidism, unspecified type Yes     Family history of hypothyroidism        Comments:  Reviewed health history and thyroid issues.  Previous 4/2022 labs at [facility] included mildly elevated TSH, indicating mild hypothyroidism  Needs repeat thyroid  lab testing, then consideration for use of low dose levothyroxine medication    Plan:  Discussed general issues with the hypothyroidism diagnosis and management  Reviewed the anatomy and hormone physiology of the thyroid gland  Discussed lab tests used to assess patient thyroid hormone levels  Reviewed thyroid medication treatment with levothyroxine medication, dosing.    Recommend:  Plan repeat lab tests soon   Discussed the nearby [facility] clinic   Will try to coordinate the thyroid lab testing with her next [facility] appt    Lab orders placed  Monitor for symptom changes  Consider starting levothyroxine medication  No thyroid U/S imaging needed at this time    Discussed importance of seeing her PCP for general medicine appointments and also acute care visits.  Addressed patient questions today     There are no Patient Instructions on file for this visit.    Future labs ordered today:   Orders Placed This Encounter   Procedures     TSH     T4 free     Thyroid peroxidase antibody     Radiology/Consults ordered today: None    Total time spent in with the patient evaluation:  20 min  Additional time spent reviewing pertinent lab tests and chart notes, and documentation:  15 min    Follow-up:  1/2023    GARY Rausch MD, MS  Endocrinology  [facility]    CC: Dreyer, Patrick and Tello Finney       Again, thank you for allowing me to participate in the care of your patient.        Sincerely,        Aashish Rausch MD
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
## 2022-07-27 ENCOUNTER — LAB (OUTPATIENT)
Dept: LAB | Facility: CLINIC | Age: 77
End: 2022-07-27
Payer: MEDICARE

## 2022-07-27 ENCOUNTER — TRANSFERRED RECORDS (OUTPATIENT)
Dept: INTERNAL MEDICINE | Facility: CLINIC | Age: 77
End: 2022-07-27

## 2022-07-27 DIAGNOSIS — E03.9 HYPOTHYROIDISM, UNSPECIFIED TYPE: ICD-10-CM

## 2022-07-27 PROCEDURE — 36415 COLL VENOUS BLD VENIPUNCTURE: CPT

## 2022-07-27 PROCEDURE — 86376 MICROSOMAL ANTIBODY EACH: CPT

## 2022-07-27 PROCEDURE — 84439 ASSAY OF FREE THYROXINE: CPT

## 2022-07-27 PROCEDURE — 84443 ASSAY THYROID STIM HORMONE: CPT

## 2022-07-28 LAB
T4 FREE SERPL-MCNC: 1.16 NG/DL (ref 0.76–1.46)
THYROPEROXIDASE AB SERPL-ACNC: <10 IU/ML
TSH SERPL DL<=0.005 MIU/L-ACNC: 6.65 MU/L (ref 0.4–4)

## 2022-07-31 DIAGNOSIS — E03.9 HYPOTHYROIDISM, UNSPECIFIED TYPE: Primary | ICD-10-CM

## 2022-07-31 RX ORDER — LEVOTHYROXINE SODIUM 50 UG/1
50 TABLET ORAL DAILY
Qty: 90 TABLET | Refills: 1 | Status: SHIPPED | OUTPATIENT
Start: 2022-07-31 | End: 2022-10-01

## 2022-08-05 ENCOUNTER — ANCILLARY PROCEDURE (OUTPATIENT)
Dept: CT IMAGING | Facility: CLINIC | Age: 77
End: 2022-08-05
Attending: INTERNAL MEDICINE
Payer: MEDICARE

## 2022-08-05 DIAGNOSIS — C18.5: ICD-10-CM

## 2022-08-05 LAB
CREAT BLD-MCNC: 0.7 MG/DL (ref 0.5–1)
GFR SERPL CREATININE-BSD FRML MDRD: >60 ML/MIN/1.73M2

## 2022-08-05 PROCEDURE — 250N000011 HC RX IP 250 OP 636: Performed by: INTERNAL MEDICINE

## 2022-08-05 PROCEDURE — 255N000002 HC RX 255 OP 636: Performed by: INTERNAL MEDICINE

## 2022-08-05 PROCEDURE — 74177 CT ABD & PELVIS W/CONTRAST: CPT

## 2022-08-05 PROCEDURE — 82565 ASSAY OF CREATININE: CPT

## 2022-08-05 RX ADMIN — IOHEXOL 100 ML: 350 INJECTION, SOLUTION INTRAVENOUS at 14:04

## 2022-08-05 RX ADMIN — Medication 5 ML: at 14:07

## 2022-08-16 DIAGNOSIS — E78.5 HYPERLIPIDEMIA LDL GOAL <70: ICD-10-CM

## 2022-08-16 DIAGNOSIS — I10 ESSENTIAL HYPERTENSION WITH GOAL BLOOD PRESSURE LESS THAN 140/90: ICD-10-CM

## 2022-08-18 RX ORDER — LOSARTAN POTASSIUM 100 MG/1
TABLET ORAL
Qty: 90 TABLET | Refills: 0 | Status: SHIPPED | OUTPATIENT
Start: 2022-08-18 | End: 2022-09-16

## 2022-08-18 RX ORDER — ATORVASTATIN CALCIUM 40 MG/1
TABLET, FILM COATED ORAL
Qty: 90 TABLET | Refills: 0 | Status: SHIPPED | OUTPATIENT
Start: 2022-08-18 | End: 2022-09-16

## 2022-08-18 NOTE — TELEPHONE ENCOUNTER
Routing refill request to provider for review/approval because:  Will need one more manjit to get to 10/4 appointment     Baldemar Sanchez RN  [facility] Triage Nurse

## 2022-08-18 NOTE — TELEPHONE ENCOUNTER
Routing refill request to provider for review/approval because:  Will need one more manjit to get to 10/4 appointment     Baldemar Sanchez RN  [facility] Triage Nurse

## 2022-08-22 ENCOUNTER — TRANSFERRED RECORDS (OUTPATIENT)
Dept: HEALTH INFORMATION MANAGEMENT | Facility: CLINIC | Age: 77
End: 2022-08-22

## 2022-08-22 ENCOUNTER — TELEPHONE (OUTPATIENT)
Dept: ENDOCRINOLOGY | Facility: CLINIC | Age: 77
End: 2022-08-22

## 2022-08-22 NOTE — TELEPHONE ENCOUNTER
LVM for PT to call [phone] to reschedule the 11:30 Dr. Rausch appt.  Can not be at 11:30 as there is a schedule template change.  I did mention 1.23 or 1.24 for reschedule.

## 2022-08-24 ENCOUNTER — TRANSFERRED RECORDS (OUTPATIENT)
Dept: HEALTH INFORMATION MANAGEMENT | Facility: CLINIC | Age: 77
End: 2022-08-24

## 2022-08-29 ENCOUNTER — TELEPHONE (OUTPATIENT)
Dept: INTERNAL MEDICINE | Facility: CLINIC | Age: 77
End: 2022-08-29

## 2022-09-16 ENCOUNTER — OFFICE VISIT (OUTPATIENT)
Dept: INTERNAL MEDICINE | Facility: CLINIC | Age: 77
End: 2022-09-16
Payer: MEDICARE

## 2022-09-16 ENCOUNTER — LAB (OUTPATIENT)
Dept: URGENT CARE | Facility: URGENT CARE | Age: 77
End: 2022-09-16
Payer: MEDICARE

## 2022-09-16 VITALS
OXYGEN SATURATION: 97 % | SYSTOLIC BLOOD PRESSURE: 128 MMHG | BODY MASS INDEX: 21.26 KG/M2 | DIASTOLIC BLOOD PRESSURE: 80 MMHG | WEIGHT: 112.5 LBS | TEMPERATURE: 98.7 F | HEART RATE: 92 BPM

## 2022-09-16 DIAGNOSIS — C18.9 COLON CANCER METASTASIZED TO MULTIPLE SITES (H): ICD-10-CM

## 2022-09-16 DIAGNOSIS — Z23 HIGH PRIORITY FOR 2019-NCOV VACCINE: ICD-10-CM

## 2022-09-16 DIAGNOSIS — Z20.822 ENCOUNTER FOR LABORATORY TESTING FOR COVID-19 VIRUS: ICD-10-CM

## 2022-09-16 DIAGNOSIS — R91.1 LEFT UPPER LOBE PULMONARY NODULE: ICD-10-CM

## 2022-09-16 DIAGNOSIS — Z93.3 COLOSTOMY STATUS (H): ICD-10-CM

## 2022-09-16 DIAGNOSIS — E78.5 HYPERLIPIDEMIA LDL GOAL <70: ICD-10-CM

## 2022-09-16 DIAGNOSIS — Z23 NEED FOR PROPHYLACTIC VACCINATION AND INOCULATION AGAINST INFLUENZA: ICD-10-CM

## 2022-09-16 DIAGNOSIS — I73.9 PAD (PERIPHERAL ARTERY DISEASE) (H): ICD-10-CM

## 2022-09-16 DIAGNOSIS — N18.31 STAGE 3A CHRONIC KIDNEY DISEASE (H): ICD-10-CM

## 2022-09-16 DIAGNOSIS — I10 ESSENTIAL HYPERTENSION WITH GOAL BLOOD PRESSURE LESS THAN 140/90: ICD-10-CM

## 2022-09-16 DIAGNOSIS — C78.00 COLON CANCER METASTASIZED TO LUNG (H): ICD-10-CM

## 2022-09-16 DIAGNOSIS — Z01.818 PREOP GENERAL PHYSICAL EXAM: Primary | ICD-10-CM

## 2022-09-16 DIAGNOSIS — C18.9 COLON CANCER METASTASIZED TO LUNG (H): ICD-10-CM

## 2022-09-16 DIAGNOSIS — J43.9 PULMONARY EMPHYSEMA, UNSPECIFIED EMPHYSEMA TYPE (H): ICD-10-CM

## 2022-09-16 LAB — HGB BLD-MCNC: 15.4 G/DL (ref 11.7–15.7)

## 2022-09-16 PROCEDURE — U0003 INFECTIOUS AGENT DETECTION BY NUCLEIC ACID (DNA OR RNA); SEVERE ACUTE RESPIRATORY SYNDROME CORONAVIRUS 2 (SARS-COV-2) (CORONAVIRUS DISEASE [COVID-19]), AMPLIFIED PROBE TECHNIQUE, MAKING USE OF HIGH THROUGHPUT TECHNOLOGIES AS DESCRIBED BY CMS-2020-01-R: HCPCS

## 2022-09-16 PROCEDURE — 99214 OFFICE O/P EST MOD 30 MIN: CPT | Mod: 25 | Performed by: INTERNAL MEDICINE

## 2022-09-16 PROCEDURE — 85018 HEMOGLOBIN: CPT | Performed by: INTERNAL MEDICINE

## 2022-09-16 PROCEDURE — 90662 IIV NO PRSV INCREASED AG IM: CPT | Performed by: INTERNAL MEDICINE

## 2022-09-16 PROCEDURE — 80048 BASIC METABOLIC PNL TOTAL CA: CPT | Performed by: INTERNAL MEDICINE

## 2022-09-16 PROCEDURE — U0005 INFEC AGEN DETEC AMPLI PROBE: HCPCS

## 2022-09-16 PROCEDURE — 80061 LIPID PANEL: CPT | Performed by: INTERNAL MEDICINE

## 2022-09-16 PROCEDURE — 0124A COVID-19,PF,PFIZER BOOSTER BIVALENT: CPT | Performed by: INTERNAL MEDICINE

## 2022-09-16 PROCEDURE — 91312 COVID-19,PF,PFIZER BOOSTER BIVALENT: CPT | Performed by: INTERNAL MEDICINE

## 2022-09-16 PROCEDURE — 36415 COLL VENOUS BLD VENIPUNCTURE: CPT | Performed by: INTERNAL MEDICINE

## 2022-09-16 PROCEDURE — G0008 ADMIN INFLUENZA VIRUS VAC: HCPCS | Mod: 59 | Performed by: INTERNAL MEDICINE

## 2022-09-16 PROCEDURE — 99207 PR NO CHARGE LOS: CPT

## 2022-09-16 PROCEDURE — 93000 ELECTROCARDIOGRAM COMPLETE: CPT | Performed by: INTERNAL MEDICINE

## 2022-09-16 RX ORDER — ATORVASTATIN CALCIUM 40 MG/1
40 TABLET, FILM COATED ORAL AT BEDTIME
Qty: 90 TABLET | Refills: 3 | Status: SHIPPED | OUTPATIENT
Start: 2022-09-16 | End: 2022-12-07

## 2022-09-16 RX ORDER — AMLODIPINE BESYLATE 10 MG/1
10 TABLET ORAL DAILY
Qty: 90 TABLET | Refills: 3 | Status: SHIPPED | OUTPATIENT
Start: 2022-09-16 | End: 2023-10-06

## 2022-09-16 RX ORDER — LOSARTAN POTASSIUM 100 MG/1
100 TABLET ORAL DAILY
Qty: 90 TABLET | Refills: 3 | Status: SHIPPED | OUTPATIENT
Start: 2022-09-16 | End: 2022-12-08

## 2022-09-16 NOTE — PATIENT INSTRUCTIONS
Preparing for Your Surgery  Getting started  A nurse will call you to review your health history and instructions. They will give you an arrival time based on your scheduled surgery time. Please be ready to share:    Your doctor's clinic name and phone number    Your medical, surgical and anesthesia history    A list of allergies and sensitivities    A list of medicines, including herbal treatments and over-the-counter drugs    Whether the patient has a legal guardian (ask how to send us the papers in advance)  Please tell us if you're pregnant--or if there's any chance you might be pregnant. Some surgeries may injure a fetus (unborn baby), so they require a pregnancy test. Surgeries that are safe for a fetus don't always need a test, and you can choose whether to have one.   If you have a child who's having surgery, please ask for a copy of Preparing for Your Child's Surgery.    Preparing for surgery    Within 30 days of surgery: Have a pre-op exam (sometimes called an H&P, or History and Physical). This can be done at a clinic or pre-operative center.  ? If you're having a , you may not need this exam. Talk to your care team.    At your pre-op exam, talk to your care team about all medicines you take. If you need to stop any medicines before surgery, ask when to start taking them again.  ? We do this for your safety. Many medicines can make you bleed too much during surgery. Some change how well surgery (anesthesia) drugs work.    Call your insurance company to let them know you're having surgery. (If you don't have insurance, call [phone].)    Call your clinic if there's any change in your health. This includes signs of a cold or flu (sore throat, runny nose, cough, rash, fever). It also includes a scrape or scratch near the surgery site.    If you have questions on the day of surgery, call your hospital or surgery center.  COVID testing  You may need to be tested for COVID-19 before having  surgery. If so, we will give you instructions.  Eating and drinking guidelines  For your safety: Unless your surgeon tells you otherwise, follow the guidelines below.    Eat and drink as usual until 8 hours before surgery. After that, no food or milk.    Drink clear liquids until 2 hours before surgery. These are liquids you can see through, like water, Gatorade and Propel Water. You may also have black coffee and tea (no cream or milk).    Nothing by mouth within 2 hours of surgery. This includes gum, candy and breath mints.    If you drink alcohol: Stop drinking it the night before surgery.    If your care team tells you to take medicine on the morning of surgery, it's okay to take it with a sip of water.  Preventing infection    Shower or bathe the night before and morning of your surgery. Follow the instructions your clinic gave you. (If no instructions, use regular soap.)    Don't shave or clip hair near your surgery site. We'll remove the hair if needed.    Don't smoke or vape the morning of surgery. You may chew nicotine gum up to 2 hours before surgery. A nicotine patch is okay.  ? Note: Some surgeries require you to completely quit smoking and nicotine. Check with your surgeon.    Your care team will make every effort to keep you safe from infection. We will:  ? Clean our hands often with soap and water (or an alcohol-based hand rub).  ? Clean the skin at your surgery site with a special soap that kills germs.  ? Give you a special gown to keep you warm. (Cold raises the risk of infection.)  ? Wear special hair covers, masks, gowns and gloves during surgery.  ? Give antibiotic medicine, if prescribed. Not all surgeries need antibiotics.  What to bring on the day of surgery    Photo ID and insurance card    Copy of your health care directive, if you have one    Glasses and hearing aides (bring cases)  ? You can't wear contacts during surgery    Inhaler and eye drops, if you use them (tell us about these when  you arrive)    CPAP machine or breathing device, if you use them    A few personal items, if spending the night    If you have . . .  ? A pacemaker, ICD (cardiac defibrillator) or other implant: Bring the ID card.  ? An implanted stimulator: Bring the remote control.  ? A legal guardian: Bring a copy of the certified (court-stamped) guardianship papers.  Please remove any jewelry, including body piercings. Leave jewelry and other valuables at home.  If you're going home the day of surgery    You must have a responsible adult drive you home. They should stay with you overnight as well.    If you don't have someone to stay with you, and you aren't safe to go home alone, we may keep you overnight. Insurance often won't pay for this.  After surgery  If it's hard to control your pain or you need more pain medicine, please call your surgeon's office.  Questions?   If you have any questions for your care team, list them here: _________________________________________________________________________________________________________________________________________________________________________ ____________________________________ ____________________________________ ____________________________________  For informational purposes only. Not to replace the advice of your health care provider. Copyright   2003, 2019 [facility]. All rights reserved. Clinically reviewed by Estelle Gomes MD. Bit Cauldron [id number] - REV 07/21.    How to Take Your Medication Before Surgery  - Take all of your medications before surgery except as noted below  - HOLD (do not take) Aspirin for 7 days  - STOP taking all vitamins and herbal supplements 14 days before surgery.

## 2022-09-16 NOTE — H&P (VIEW-ONLY)
[address] [address]  Phone: [phone]  Primary Provider: Dick Finney  Pre-op Performing Provider: DICK FINNEY      PREOPERATIVE EVALUATION:  Today's date: 9/16/2022    Anna Dyer is a 77 year old female who presents for a preoperative evaluation.    Surgical Information:  Surgery/Procedure:   Procedure Laterality Anesthesia   RIGHT VIDEO ASSISTED THORACOSCOPY Right General   WEDGE RESECTION RIGHT UPPER LOBE LUNG NODULE       Surgery Location: [facility]  Surgeon: Yemi Ordaz MD  Surgery Date: 9/20/22  Time of Surgery: 8 am  Where patient plans to recover: At home with family  Fax number for surgical facility: Note does not need to be faxed, will be available electronically in Epic.    Type of Anesthesia Anticipated: General    Assessment & Plan     The proposed surgical procedure is considered INTERMEDIATE risk.    Encounter Diagnoses   Name Primary?     Preop general physical exam Yes     Left upper lobe pulmonary nodule      Colon cancer metastasized to multiple sites (H)      PAD (peripheral artery disease) (H)      Stage 3a chronic kidney disease (H)      Essential hypertension with goal blood pressure less than 140/90      COPD- borderline obstruction on PFTs. long term smoker      Hyperlipidemia LDL goal <70      Colon cancer metastasized to lung (H)      Colostomy status (H)      Need for prophylactic vaccination and inoculation against influenza      High priority for 2019-nCoV vaccine         Risks and Recommendations:  The patient has the following additional risks and recommendations for perioperative complications:  Pulmonary:    - Incentive spirometry post-op   - Active nicotine user, advised smoking cessation    Medication Instructions:  Patient is to take all scheduled medications on the day of surgery EXCEPT for modifications listed below:   - aspirin: Discontinue aspirin 7-10 days  prior to procedure to reduce bleeding risk. It should be resumed postoperatively.     RECOMMENDATION:  APPROVAL GIVEN to proceed with proposed procedure, without further diagnostic evaluation.    Review of prior external note(s) from - Outside records from oncology  Review of the result(s) of each unique test - labs                  Subjective     HPI related to upcoming procedure: Pt with hx of colon ca metastatic to multiple sites. S/p resections for mets to liver and lung in past. Has had an isolated pulmonary nodule in RUL which has increased in size from 3 to 6 mm.     Preop Questions 9/16/2022   1. Have you ever had a heart attack or stroke? No   2. Have you ever had surgery on your heart or blood vessels, such as a stent placement, a coronary artery bypass, or surgery on an artery in your head, neck, heart, or legs? No   3. Do you have chest pain with activity? No   4. Do you have a history of  heart failure? No   5. Do you currently have a cold, bronchitis or symptoms of other infection? No   6. Do you have a cough, shortness of breath, or wheezing? No   7. Do you or anyone in your family have previous history of blood clots? No   8. Do you or does anyone in your family have a serious bleeding problem such as prolonged bleeding following surgeries or cuts? No   9. Have you ever had problems with anemia or been told to take iron pills? No   10. Have you had any abnormal blood loss such as black, tarry or bloody stools, or abnormal vaginal bleeding? No   11. Have you ever had a blood transfusion? No   12. Are you willing to have a blood transfusion if it is medically needed before, during, or after your surgery? Yes   13. Have you or any of your relatives ever had problems with anesthesia? No   14. Do you have sleep apnea, excessive snoring or daytime drowsiness? No   15. Do you have any artifical heart valves or other implanted medical devices like a pacemaker, defibrillator, or continuous glucose monitor? No    16. Do you have artificial joints? No   17. Are you allergic to latex? No   18. Is there any chance that you may be pregnant? -       Health Care Directive:  Patient has a Health Care Directive on file      Preoperative Review of :   reviewed - controlled substances prescribed by other outside provider(s).          Review of Systems  Constitutional, neuro, ENT, endocrine, pulmonary, cardiac, gastrointestinal, genitourinary, musculoskeletal, integument and psychiatric systems are negative, except as otherwise noted.    Patient Active Problem List    Diagnosis Date Noted     Left upper lobe pulmonary nodule 02/23/2021     Priority: Medium     Ventral incisional hernia 08/12/2020     Priority: Medium     Ventral hernia 06/30/2020     Priority: Medium     Added automatically from request for surgery [id number]       PAD (peripheral artery disease) (H) 05/21/2020     Priority: Medium     Abdominal wall hernia 01/08/2020     Priority: Medium     Added automatically from request for surgery [id number]       CKD (chronic kidney disease) stage 3, GFR 30-59 ml/min (H) 09/12/2019     Priority: Medium     Colostomy status (H) 03/28/2019     Priority: Medium     Status post colostomy (H) 04/06/2018     Priority: Medium     COPD- borderline obstruction on PFTs. long term smoker 01/19/2018     Priority: Medium     Colon cancer metastasized to liver (H) 03/17/2017     Priority: Medium     Advanced directives, counseling/discussion 09/15/2014     Priority: Medium     Advance Care Planning:   Receipt of ACP document:  Received: Health Care Directive which was witnessed or notarized on 08/20/2013.  Document not previously scanned.  Validation form completed and sent with document to be scanned.  .  Confirmed/documented designated decision maker(s). See permanent comments section of demographics in clinical tab. View document(s) and details by clicking on code status.   Added by Rach Olson on 1/19/2015.               Nicotine  dependence 02/25/2014     Priority: Medium     Hypertension goal BP (blood pressure) < 140/90 02/24/2014     Priority: Medium     Restless leg syndrome 02/24/2014     Priority: Medium     CARDIOVASCULAR SCREENING; LDL GOAL LESS THAN 130 02/24/2014     Priority: Medium      Past Medical History:   Diagnosis Date     Cancer (H)     metastatic colorectal adenocarcinoma     Cataract      Chronic kidney disease      COPD (chronic obstructive pulmonary disease) (H)      Hypertension     No cardiologist     Nicotine dependence      PAD (peripheral artery disease) (H)      Peripheral neuropathy      RLS (restless legs syndrome)      Thrombocytopenia (H)      Ventral hernia      Past Surgical History:   Procedure Laterality Date     BIOPSY      liver     BREAST SURGERY      breast implants     COLONOSCOPY N/A 3/28/2019    Procedure: INTRAOPERATIVE COLONOSCOPY,;  Surgeon: Jesus Caba MD;  Location:  OR     COSMETIC SURGERY      facelift and tummy tuck     GYN SURGERY      tubal     INSERT PORT VASCULAR ACCESS N/A 3/24/2017    Procedure: INSERT PORT VASCULAR ACCESS;  Surgeon: Yemi Ordaz MD;  Location:  OR     IR LOWER EXTREMITY ANGIOGRAM BILATERAL  3/12/2020     LAPAROSCOPIC ASSISTED COLECTOMY LEFT (DESCENDING) N/A 1/25/2018    Procedure: LAPAROSCOPIC ASSISTED COLECTOMY LEFT (DESCENDING);;  Surgeon: Maddie Baron MD;  Location: [facility] OR     LAPAROSCOPIC ASSISTED COLOSTOMY TAKEDOWN N/A 3/28/2019    Procedure: LAPAROSCOPIC ASSISTED COLOSTOMY REVERSAL;  Surgeon: Jesus Caba MD;  Location:  OR     LAPAROSCOPIC HERNIORRHAPHY VENTRAL N/A 8/12/2020    Procedure: COMBINED OPEN AND LAPAROSCOPIC VENTRAL HERNIA REPAIR;  Surgeon: Maddy Gonzales MD;  Location:  OR     LAPAROSCOPY DIAGNOSTIC (GENERAL) N/A 3/28/2019    Procedure: DIAGNOSTIC LAPAROSCOPY;  Surgeon: Jesus Caba MD;  Location:  OR     LAPAROTOMY EXPLORATORY N/A 1/25/2018    Procedure: LAPAROTOMY EXPLORATORY;  exploratory laparoscopy,  left hemicolectomy, transverse colostomy;  Surgeon: Maddie Baron MD;  Location: [facility] OR     THORACOSCOPY Left 2021    Procedure: LEFT THORACOTOMY, ANATOMICAL POSTERIOR SEGMENTECTOMY LEFT UPPER LOBE ;  Surgeon: Yemi Ordaz MD;  Location:  OR     Current Outpatient Medications   Medication Sig Dispense Refill     acetaminophen (TYLENOL) 500 MG tablet Take 1,000 mg by mouth every 6 hours as needed for mild pain       amLODIPine (NORVASC) 10 MG tablet Take 1 tablet (10 mg) by mouth daily 90 tablet 3     aspirin (ASA) 81 MG tablet Take 1 tablet (81 mg) by mouth daily       atorvastatin (LIPITOR) 40 MG tablet Take 1 tablet (40 mg) by mouth At Bedtime 90 tablet 3     Ergocalciferol (VITAMIN D2 PO) Take 60 mcg by mouth every morning       gabapentin (NEURONTIN) 400 MG capsule Take 400 mg by mouth 3 times daily        Homeopathic Products (LEG CRAMP RELIEF PO) Take 2-3 tablets by mouth daily as needed (leg cramps)        levothyroxine (SYNTHROID/LEVOTHROID) 50 MCG tablet Take 1 tablet (50 mcg) by mouth daily 90 tablet 1     lidocaine-prilocaine (EMLA) 2.5-2.5 % external cream Apply topically daily as needed (Apply one hour prior to port acccess)        LORazepam (ATIVAN) 0.5 MG tablet Take 0.5 mg by mouth At Bedtime  1     losartan (COZAAR) 100 MG tablet Take 1 tablet (100 mg) by mouth daily 90 tablet 3     nicotine (NICODERM CQ) 21 MG/24HR 24 hr patch Place 1 patch onto the skin daily         Allergies   Allergen Reactions     Lactose GI Disturbance     Sulfa Drugs Hives        Social History     Tobacco Use     Smoking status: Former Smoker     Packs/day: 1.00     Years: 58.00     Pack years: 58.00     Types: Cigarettes     Quit date: 2020     Years since quittin.2     Smokeless tobacco: Never Used   Substance Use Topics     Alcohol use: No     Alcohol/week: 0.0 standard drinks     Family History   Problem Relation Age of Onset     Family History Negative Father      Family History  Negative Mother      Psoriasis Brother         half-siblings     Family History Negative Sister      Hypothyroidism Son      History   Drug Use No         Objective     /80   Pulse 92   Temp 98.7  F (37.1  C) (Tympanic)   Wt 51 kg (112 lb 8 oz)   LMP  (LMP Unknown)   SpO2 97%   Breastfeeding No   BMI 21.26 kg/m      Physical Exam    GENERAL APPEARANCE: alert and no distress     EYES: EOMI,  PERRL     HENT: normal cephalic/atraumatic     NECK: no adenopathy, no asymmetry, masses, or scars and thyroid normal to palpation     RESP: lungs clear to auscultation - no rales, rhonchi or wheezes     CV: regular rates and rhythm, normal S1 S2, no S3 or S4 and no murmur, click or rub     ABDOMEN:  soft, nontender, no HSM or masses and bowel sounds normal     MS: extremities normal- no gross deformities noted, no evidence of inflammation in joints, FROM in all extremities.     SKIN: no suspicious lesions or rashes     NEURO: Normal strength and tone, sensory exam grossly normal, mentation intact and speech normal     PSYCH: mentation appears normal. and affect normal/bright     LYMPHATICS: No cervical adenopathy    Recent Labs   Lab Test 08/05/22  1401 12/28/21  1100 02/26/21  0623 02/24/21  0539 02/23/21  1228 12/28/20  1222   HGB  --  14.5  --  11.6* 11.8 13.4   PLT  --   --  180 177 198 147*   INR  --   --   --   --  1.06 0.89   NA  --   --   --  133 134 138   POTASSIUM  --   --   --  4.5 4.1 4.2   CR 0.7  --   --  0.66 0.74 0.75        Diagnostics:  Recent Results (from the past 168 hour(s))   BASIC METABOLIC PANEL    Collection Time: 09/16/22 12:05 PM   Result Value Ref Range    Sodium 137 133 - 144 mmol/L    Potassium 4.3 3.4 - 5.3 mmol/L    Chloride 105 94 - 109 mmol/L    Carbon Dioxide (CO2) 24 20 - 32 mmol/L    Anion Gap 8 3 - 14 mmol/L    Urea Nitrogen 13 7 - 30 mg/dL    Creatinine 0.71 0.52 - 1.04 mg/dL    Calcium 10.4 (H) 8.5 - 10.1 mg/dL    Glucose 105 (H) 70 - 99 mg/dL    GFR Estimate 88 >60  mL/min/1.73m2   Lipid panel reflex to direct LDL Non-fasting    Collection Time: 09/16/22 12:05 PM   Result Value Ref Range    Cholesterol 156 <200 mg/dL    Triglycerides 112 <150 mg/dL    Direct Measure HDL 79 >=50 mg/dL    LDL Cholesterol Calculated 55 <=100 mg/dL    Non HDL Cholesterol 77 <130 mg/dL    Patient Fasting > 8hrs? No    Hemoglobin    Collection Time: 09/16/22 12:05 PM   Result Value Ref Range    Hemoglobin 15.4 11.7 - 15.7 g/dL   Asymptomatic COVID-19 Virus (Coronavirus) by PCR Nose    Collection Time: 09/16/22 12:46 PM    Specimen: Nose; Swab   Result Value Ref Range    SARS CoV2 PCR Negative Negative      EKG: Normal Sinus Rhythm, lead III T wave inversion    Revised Cardiac Risk Index (RCRI):  The patient has the following serious cardiovascular risks for perioperative complications:   - PAD    RCRI Interpretation: 1 point: Class II (low risk - 0.9% complication rate)         Signed Electronically by: Tello Finney MD  Copy of this evaluation report is provided to requesting physician.

## 2022-09-16 NOTE — PROGRESS NOTES
[address] [address]  Phone: [phone]  Primary Provider: Dick Finney  Pre-op Performing Provider: DICK FINNEY      PREOPERATIVE EVALUATION:  Today's date: 9/16/2022    Anna Dyer is a 77 year old female who presents for a preoperative evaluation.    Surgical Information:  Surgery/Procedure:   Procedure Laterality Anesthesia   RIGHT VIDEO ASSISTED THORACOSCOPY Right General   WEDGE RESECTION RIGHT UPPER LOBE LUNG NODULE       Surgery Location: [facility]  Surgeon: Yemi Ordaz MD  Surgery Date: 9/20/22  Time of Surgery: 8 am  Where patient plans to recover: At home with family  Fax number for surgical facility: Note does not need to be faxed, will be available electronically in Epic.    Type of Anesthesia Anticipated: General    Assessment & Plan     The proposed surgical procedure is considered INTERMEDIATE risk.    Encounter Diagnoses   Name Primary?     Preop general physical exam Yes     Left upper lobe pulmonary nodule      Colon cancer metastasized to multiple sites (H)      PAD (peripheral artery disease) (H)      Stage 3a chronic kidney disease (H)      Essential hypertension with goal blood pressure less than 140/90      COPD- borderline obstruction on PFTs. long term smoker      Hyperlipidemia LDL goal <70      Colon cancer metastasized to lung (H)      Colostomy status (H)      Need for prophylactic vaccination and inoculation against influenza      High priority for 2019-nCoV vaccine         Risks and Recommendations:  The patient has the following additional risks and recommendations for perioperative complications:  Pulmonary:    - Incentive spirometry post-op   - Active nicotine user, advised smoking cessation    Medication Instructions:  Patient is to take all scheduled medications on the day of surgery EXCEPT for modifications listed below:   - aspirin: Discontinue aspirin 7-10 days  prior to procedure to reduce bleeding risk. It should be resumed postoperatively.     RECOMMENDATION:  APPROVAL GIVEN to proceed with proposed procedure, without further diagnostic evaluation.    Review of prior external note(s) from - Outside records from oncology  Review of the result(s) of each unique test - labs                  Subjective     HPI related to upcoming procedure: Pt with hx of colon ca metastatic to multiple sites. S/p resections for mets to liver and lung in past. Has had an isolated pulmonary nodule in RUL which has increased in size from 3 to 6 mm.     Preop Questions 9/16/2022   1. Have you ever had a heart attack or stroke? No   2. Have you ever had surgery on your heart or blood vessels, such as a stent placement, a coronary artery bypass, or surgery on an artery in your head, neck, heart, or legs? No   3. Do you have chest pain with activity? No   4. Do you have a history of  heart failure? No   5. Do you currently have a cold, bronchitis or symptoms of other infection? No   6. Do you have a cough, shortness of breath, or wheezing? No   7. Do you or anyone in your family have previous history of blood clots? No   8. Do you or does anyone in your family have a serious bleeding problem such as prolonged bleeding following surgeries or cuts? No   9. Have you ever had problems with anemia or been told to take iron pills? No   10. Have you had any abnormal blood loss such as black, tarry or bloody stools, or abnormal vaginal bleeding? No   11. Have you ever had a blood transfusion? No   12. Are you willing to have a blood transfusion if it is medically needed before, during, or after your surgery? Yes   13. Have you or any of your relatives ever had problems with anesthesia? No   14. Do you have sleep apnea, excessive snoring or daytime drowsiness? No   15. Do you have any artifical heart valves or other implanted medical devices like a pacemaker, defibrillator, or continuous glucose monitor? No    16. Do you have artificial joints? No   17. Are you allergic to latex? No   18. Is there any chance that you may be pregnant? -       Health Care Directive:  Patient has a Health Care Directive on file      Preoperative Review of :   reviewed - controlled substances prescribed by other outside provider(s).          Review of Systems  Constitutional, neuro, ENT, endocrine, pulmonary, cardiac, gastrointestinal, genitourinary, musculoskeletal, integument and psychiatric systems are negative, except as otherwise noted.    Patient Active Problem List    Diagnosis Date Noted     Left upper lobe pulmonary nodule 02/23/2021     Priority: Medium     Ventral incisional hernia 08/12/2020     Priority: Medium     Ventral hernia 06/30/2020     Priority: Medium     Added automatically from request for surgery [id number]       PAD (peripheral artery disease) (H) 05/21/2020     Priority: Medium     Abdominal wall hernia 01/08/2020     Priority: Medium     Added automatically from request for surgery [id number]       CKD (chronic kidney disease) stage 3, GFR 30-59 ml/min (H) 09/12/2019     Priority: Medium     Colostomy status (H) 03/28/2019     Priority: Medium     Status post colostomy (H) 04/06/2018     Priority: Medium     COPD- borderline obstruction on PFTs. long term smoker 01/19/2018     Priority: Medium     Colon cancer metastasized to liver (H) 03/17/2017     Priority: Medium     Advanced directives, counseling/discussion 09/15/2014     Priority: Medium     Advance Care Planning:   Receipt of ACP document:  Received: Health Care Directive which was witnessed or notarized on 08/20/2013.  Document not previously scanned.  Validation form completed and sent with document to be scanned.  .  Confirmed/documented designated decision maker(s). See permanent comments section of demographics in clinical tab. View document(s) and details by clicking on code status.   Added by Rach Olson on 1/19/2015.               Nicotine  dependence 02/25/2014     Priority: Medium     Hypertension goal BP (blood pressure) < 140/90 02/24/2014     Priority: Medium     Restless leg syndrome 02/24/2014     Priority: Medium     CARDIOVASCULAR SCREENING; LDL GOAL LESS THAN 130 02/24/2014     Priority: Medium      Past Medical History:   Diagnosis Date     Cancer (H)     metastatic colorectal adenocarcinoma     Cataract      Chronic kidney disease      COPD (chronic obstructive pulmonary disease) (H)      Hypertension     No cardiologist     Nicotine dependence      PAD (peripheral artery disease) (H)      Peripheral neuropathy      RLS (restless legs syndrome)      Thrombocytopenia (H)      Ventral hernia      Past Surgical History:   Procedure Laterality Date     BIOPSY      liver     BREAST SURGERY      breast implants     COLONOSCOPY N/A 3/28/2019    Procedure: INTRAOPERATIVE COLONOSCOPY,;  Surgeon: Jesus Caba MD;  Location:  OR     COSMETIC SURGERY      facelift and tummy tuck     GYN SURGERY      tubal     INSERT PORT VASCULAR ACCESS N/A 3/24/2017    Procedure: INSERT PORT VASCULAR ACCESS;  Surgeon: Yemi Ordaz MD;  Location:  OR     IR LOWER EXTREMITY ANGIOGRAM BILATERAL  3/12/2020     LAPAROSCOPIC ASSISTED COLECTOMY LEFT (DESCENDING) N/A 1/25/2018    Procedure: LAPAROSCOPIC ASSISTED COLECTOMY LEFT (DESCENDING);;  Surgeon: Maddie Baron MD;  Location: [facility] OR     LAPAROSCOPIC ASSISTED COLOSTOMY TAKEDOWN N/A 3/28/2019    Procedure: LAPAROSCOPIC ASSISTED COLOSTOMY REVERSAL;  Surgeon: Jesus Caba MD;  Location:  OR     LAPAROSCOPIC HERNIORRHAPHY VENTRAL N/A 8/12/2020    Procedure: COMBINED OPEN AND LAPAROSCOPIC VENTRAL HERNIA REPAIR;  Surgeon: Maddy Gonzales MD;  Location:  OR     LAPAROSCOPY DIAGNOSTIC (GENERAL) N/A 3/28/2019    Procedure: DIAGNOSTIC LAPAROSCOPY;  Surgeon: Jesus Caba MD;  Location:  OR     LAPAROTOMY EXPLORATORY N/A 1/25/2018    Procedure: LAPAROTOMY EXPLORATORY;  exploratory laparoscopy,  left hemicolectomy, transverse colostomy;  Surgeon: Maddie Baron MD;  Location: [facility] OR     THORACOSCOPY Left 2021    Procedure: LEFT THORACOTOMY, ANATOMICAL POSTERIOR SEGMENTECTOMY LEFT UPPER LOBE ;  Surgeon: Yemi Ordaz MD;  Location:  OR     Current Outpatient Medications   Medication Sig Dispense Refill     acetaminophen (TYLENOL) 500 MG tablet Take 1,000 mg by mouth every 6 hours as needed for mild pain       amLODIPine (NORVASC) 10 MG tablet Take 1 tablet (10 mg) by mouth daily 90 tablet 3     aspirin (ASA) 81 MG tablet Take 1 tablet (81 mg) by mouth daily       atorvastatin (LIPITOR) 40 MG tablet Take 1 tablet (40 mg) by mouth At Bedtime 90 tablet 3     Ergocalciferol (VITAMIN D2 PO) Take 60 mcg by mouth every morning       gabapentin (NEURONTIN) 400 MG capsule Take 400 mg by mouth 3 times daily        Homeopathic Products (LEG CRAMP RELIEF PO) Take 2-3 tablets by mouth daily as needed (leg cramps)        levothyroxine (SYNTHROID/LEVOTHROID) 50 MCG tablet Take 1 tablet (50 mcg) by mouth daily 90 tablet 1     lidocaine-prilocaine (EMLA) 2.5-2.5 % external cream Apply topically daily as needed (Apply one hour prior to port acccess)        LORazepam (ATIVAN) 0.5 MG tablet Take 0.5 mg by mouth At Bedtime  1     losartan (COZAAR) 100 MG tablet Take 1 tablet (100 mg) by mouth daily 90 tablet 3     nicotine (NICODERM CQ) 21 MG/24HR 24 hr patch Place 1 patch onto the skin daily         Allergies   Allergen Reactions     Lactose GI Disturbance     Sulfa Drugs Hives        Social History     Tobacco Use     Smoking status: Former Smoker     Packs/day: 1.00     Years: 58.00     Pack years: 58.00     Types: Cigarettes     Quit date: 2020     Years since quittin.2     Smokeless tobacco: Never Used   Substance Use Topics     Alcohol use: No     Alcohol/week: 0.0 standard drinks     Family History   Problem Relation Age of Onset     Family History Negative Father      Family History  Negative Mother      Psoriasis Brother         half-siblings     Family History Negative Sister      Hypothyroidism Son      History   Drug Use No         Objective     /80   Pulse 92   Temp 98.7  F (37.1  C) (Tympanic)   Wt 51 kg (112 lb 8 oz)   LMP  (LMP Unknown)   SpO2 97%   Breastfeeding No   BMI 21.26 kg/m      Physical Exam    GENERAL APPEARANCE: alert and no distress     EYES: EOMI,  PERRL     HENT: normal cephalic/atraumatic     NECK: no adenopathy, no asymmetry, masses, or scars and thyroid normal to palpation     RESP: lungs clear to auscultation - no rales, rhonchi or wheezes     CV: regular rates and rhythm, normal S1 S2, no S3 or S4 and no murmur, click or rub     ABDOMEN:  soft, nontender, no HSM or masses and bowel sounds normal     MS: extremities normal- no gross deformities noted, no evidence of inflammation in joints, FROM in all extremities.     SKIN: no suspicious lesions or rashes     NEURO: Normal strength and tone, sensory exam grossly normal, mentation intact and speech normal     PSYCH: mentation appears normal. and affect normal/bright     LYMPHATICS: No cervical adenopathy    Recent Labs   Lab Test 08/05/22  1401 12/28/21  1100 02/26/21  0623 02/24/21  0539 02/23/21  1228 12/28/20  1222   HGB  --  14.5  --  11.6* 11.8 13.4   PLT  --   --  180 177 198 147*   INR  --   --   --   --  1.06 0.89   NA  --   --   --  133 134 138   POTASSIUM  --   --   --  4.5 4.1 4.2   CR 0.7  --   --  0.66 0.74 0.75        Diagnostics:  Recent Results (from the past 168 hour(s))   BASIC METABOLIC PANEL    Collection Time: 09/16/22 12:05 PM   Result Value Ref Range    Sodium 137 133 - 144 mmol/L    Potassium 4.3 3.4 - 5.3 mmol/L    Chloride 105 94 - 109 mmol/L    Carbon Dioxide (CO2) 24 20 - 32 mmol/L    Anion Gap 8 3 - 14 mmol/L    Urea Nitrogen 13 7 - 30 mg/dL    Creatinine 0.71 0.52 - 1.04 mg/dL    Calcium 10.4 (H) 8.5 - 10.1 mg/dL    Glucose 105 (H) 70 - 99 mg/dL    GFR Estimate 88 >60  mL/min/1.73m2   Lipid panel reflex to direct LDL Non-fasting    Collection Time: 09/16/22 12:05 PM   Result Value Ref Range    Cholesterol 156 <200 mg/dL    Triglycerides 112 <150 mg/dL    Direct Measure HDL 79 >=50 mg/dL    LDL Cholesterol Calculated 55 <=100 mg/dL    Non HDL Cholesterol 77 <130 mg/dL    Patient Fasting > 8hrs? No    Hemoglobin    Collection Time: 09/16/22 12:05 PM   Result Value Ref Range    Hemoglobin 15.4 11.7 - 15.7 g/dL   Asymptomatic COVID-19 Virus (Coronavirus) by PCR Nose    Collection Time: 09/16/22 12:46 PM    Specimen: Nose; Swab   Result Value Ref Range    SARS CoV2 PCR Negative Negative      EKG: Normal Sinus Rhythm, lead III T wave inversion    Revised Cardiac Risk Index (RCRI):  The patient has the following serious cardiovascular risks for perioperative complications:   - PAD    RCRI Interpretation: 1 point: Class II (low risk - 0.9% complication rate)         Signed Electronically by: Tello Finney MD  Copy of this evaluation report is provided to requesting physician.

## 2022-09-17 LAB
ANION GAP SERPL CALCULATED.3IONS-SCNC: 8 MMOL/L (ref 3–14)
BUN SERPL-MCNC: 13 MG/DL (ref 7–30)
CALCIUM SERPL-MCNC: 10.4 MG/DL (ref 8.5–10.1)
CHLORIDE BLD-SCNC: 105 MMOL/L (ref 94–109)
CHOLEST SERPL-MCNC: 156 MG/DL
CO2 SERPL-SCNC: 24 MMOL/L (ref 20–32)
CREAT SERPL-MCNC: 0.71 MG/DL (ref 0.52–1.04)
FASTING STATUS PATIENT QL REPORTED: NO
GFR SERPL CREATININE-BSD FRML MDRD: 88 ML/MIN/1.73M2
GLUCOSE BLD-MCNC: 105 MG/DL (ref 70–99)
HDLC SERPL-MCNC: 79 MG/DL
LDLC SERPL CALC-MCNC: 55 MG/DL
NONHDLC SERPL-MCNC: 77 MG/DL
POTASSIUM BLD-SCNC: 4.3 MMOL/L (ref 3.4–5.3)
SARS-COV-2 RNA RESP QL NAA+PROBE: NEGATIVE
SODIUM SERPL-SCNC: 137 MMOL/L (ref 133–144)
TRIGL SERPL-MCNC: 112 MG/DL

## 2022-09-19 ENCOUNTER — ANESTHESIA EVENT (OUTPATIENT)
Dept: SURGERY | Facility: CLINIC | Age: 77
DRG: 164 | End: 2022-09-19
Payer: MEDICARE

## 2022-09-20 ENCOUNTER — APPOINTMENT (OUTPATIENT)
Dept: GENERAL RADIOLOGY | Facility: CLINIC | Age: 77
DRG: 164 | End: 2022-09-20
Attending: THORACIC SURGERY (CARDIOTHORACIC VASCULAR SURGERY)
Payer: MEDICARE

## 2022-09-20 ENCOUNTER — ANESTHESIA (OUTPATIENT)
Dept: SURGERY | Facility: CLINIC | Age: 77
DRG: 164 | End: 2022-09-20
Payer: MEDICARE

## 2022-09-20 ENCOUNTER — HOSPITAL ENCOUNTER (INPATIENT)
Facility: CLINIC | Age: 77
LOS: 1 days | Discharge: HOME | DRG: 164 | End: 2022-09-21
Attending: THORACIC SURGERY (CARDIOTHORACIC VASCULAR SURGERY) | Admitting: THORACIC SURGERY (CARDIOTHORACIC VASCULAR SURGERY)
Payer: MEDICARE

## 2022-09-20 DIAGNOSIS — C79.9 METASTASIS FROM COLON CANCER (H): ICD-10-CM

## 2022-09-20 DIAGNOSIS — C18.9 METASTASIS FROM COLON CANCER (H): ICD-10-CM

## 2022-09-20 DIAGNOSIS — C78.00 LUNG METASTASIS: ICD-10-CM

## 2022-09-20 PROBLEM — R91.1 NODULE OF LOWER LOBE OF RIGHT LUNG: Status: ACTIVE | Noted: 2022-09-20

## 2022-09-20 LAB
ABO/RH(D): NORMAL
ANION GAP SERPL CALCULATED.3IONS-SCNC: 7 MMOL/L (ref 3–14)
ANTIBODY SCREEN: NEGATIVE
BUN SERPL-MCNC: 14 MG/DL (ref 7–30)
CALCIUM SERPL-MCNC: 9.6 MG/DL (ref 8.5–10.1)
CHLORIDE BLD-SCNC: 106 MMOL/L (ref 94–109)
CO2 SERPL-SCNC: 24 MMOL/L (ref 20–32)
CREAT SERPL-MCNC: 0.7 MG/DL (ref 0.52–1.04)
ERYTHROCYTE [DISTWIDTH] IN BLOOD BY AUTOMATED COUNT: 15.9 % (ref 10–15)
GFR SERPL CREATININE-BSD FRML MDRD: 89 ML/MIN/1.73M2
GLUCOSE BLD-MCNC: 106 MG/DL (ref 70–99)
HCT VFR BLD AUTO: 43 % (ref 35–47)
HGB BLD-MCNC: 14.2 G/DL (ref 11.7–15.7)
INR PPP: 1.01 (ref 0.85–1.15)
MCH RBC QN AUTO: 32.6 PG (ref 26.5–33)
MCHC RBC AUTO-ENTMCNC: 33 G/DL (ref 31.5–36.5)
MCV RBC AUTO: 99 FL (ref 78–100)
PLATELET # BLD AUTO: 111 10E3/UL (ref 150–450)
POTASSIUM BLD-SCNC: 4 MMOL/L (ref 3.4–5.3)
RBC # BLD AUTO: 4.36 10E6/UL (ref 3.8–5.2)
SODIUM SERPL-SCNC: 137 MMOL/L (ref 133–144)
SPECIMEN EXPIRATION DATE: NORMAL
WBC # BLD AUTO: 5.5 10E3/UL (ref 4–11)

## 2022-09-20 PROCEDURE — 250N000011 HC RX IP 250 OP 636: Performed by: THORACIC SURGERY (CARDIOTHORACIC VASCULAR SURGERY)

## 2022-09-20 PROCEDURE — 360N000077 HC SURGERY LEVEL 4, PER MIN: Performed by: THORACIC SURGERY (CARDIOTHORACIC VASCULAR SURGERY)

## 2022-09-20 PROCEDURE — 0BBC4ZZ EXCISION OF RIGHT UPPER LUNG LOBE, PERCUTANEOUS ENDOSCOPIC APPROACH: ICD-10-PCS | Performed by: THORACIC SURGERY (CARDIOTHORACIC VASCULAR SURGERY)

## 2022-09-20 PROCEDURE — 250N000013 HC RX MED GY IP 250 OP 250 PS 637: Performed by: PHYSICIAN ASSISTANT

## 2022-09-20 PROCEDURE — 250N000025 HC SEVOFLURANE, PER MIN: Performed by: THORACIC SURGERY (CARDIOTHORACIC VASCULAR SURGERY)

## 2022-09-20 PROCEDURE — 85027 COMPLETE CBC AUTOMATED: CPT | Performed by: THORACIC SURGERY (CARDIOTHORACIC VASCULAR SURGERY)

## 2022-09-20 PROCEDURE — 86850 RBC ANTIBODY SCREEN: CPT | Performed by: THORACIC SURGERY (CARDIOTHORACIC VASCULAR SURGERY)

## 2022-09-20 PROCEDURE — 250N000009 HC RX 250: Performed by: THORACIC SURGERY (CARDIOTHORACIC VASCULAR SURGERY)

## 2022-09-20 PROCEDURE — 710N000009 HC RECOVERY PHASE 1, LEVEL 1, PER MIN: Performed by: THORACIC SURGERY (CARDIOTHORACIC VASCULAR SURGERY)

## 2022-09-20 PROCEDURE — 250N000009 HC RX 250

## 2022-09-20 PROCEDURE — 80048 BASIC METABOLIC PNL TOTAL CA: CPT | Performed by: THORACIC SURGERY (CARDIOTHORACIC VASCULAR SURGERY)

## 2022-09-20 PROCEDURE — 258N000003 HC RX IP 258 OP 636: Performed by: ANESTHESIOLOGY

## 2022-09-20 PROCEDURE — 999N000141 HC STATISTIC PRE-PROCEDURE NURSING ASSESSMENT: Performed by: THORACIC SURGERY (CARDIOTHORACIC VASCULAR SURGERY)

## 2022-09-20 PROCEDURE — 272N000001 HC OR GENERAL SUPPLY STERILE: Performed by: THORACIC SURGERY (CARDIOTHORACIC VASCULAR SURGERY)

## 2022-09-20 PROCEDURE — 258N000003 HC RX IP 258 OP 636: Performed by: PHYSICIAN ASSISTANT

## 2022-09-20 PROCEDURE — 120N000001 HC R&B MED SURG/OB

## 2022-09-20 PROCEDURE — 370N000017 HC ANESTHESIA TECHNICAL FEE, PER MIN: Performed by: THORACIC SURGERY (CARDIOTHORACIC VASCULAR SURGERY)

## 2022-09-20 PROCEDURE — 85610 PROTHROMBIN TIME: CPT | Performed by: THORACIC SURGERY (CARDIOTHORACIC VASCULAR SURGERY)

## 2022-09-20 PROCEDURE — 88331 PATH CONSLTJ SURG 1 BLK 1SPC: CPT | Mod: TC | Performed by: THORACIC SURGERY (CARDIOTHORACIC VASCULAR SURGERY)

## 2022-09-20 PROCEDURE — 36415 COLL VENOUS BLD VENIPUNCTURE: CPT | Performed by: THORACIC SURGERY (CARDIOTHORACIC VASCULAR SURGERY)

## 2022-09-20 PROCEDURE — 250N000011 HC RX IP 250 OP 636

## 2022-09-20 PROCEDURE — 86901 BLOOD TYPING SEROLOGIC RH(D): CPT | Performed by: THORACIC SURGERY (CARDIOTHORACIC VASCULAR SURGERY)

## 2022-09-20 PROCEDURE — 999N000063 XR CHEST PORT 1 VIEW

## 2022-09-20 RX ORDER — HYDROMORPHONE HYDROCHLORIDE 2 MG/1
4 TABLET ORAL
Status: DISCONTINUED | OUTPATIENT
Start: 2022-09-20 | End: 2022-09-21 | Stop reason: HOSPADM

## 2022-09-20 RX ORDER — PROPOFOL 10 MG/ML
INJECTION, EMULSION INTRAVENOUS CONTINUOUS PRN
Status: DISCONTINUED | OUTPATIENT
Start: 2022-09-20 | End: 2022-09-20

## 2022-09-20 RX ORDER — AMOXICILLIN 250 MG
1 CAPSULE ORAL 2 TIMES DAILY
Status: DISCONTINUED | OUTPATIENT
Start: 2022-09-20 | End: 2022-09-21 | Stop reason: HOSPADM

## 2022-09-20 RX ORDER — CEFAZOLIN SODIUM/WATER 2 G/20 ML
2 SYRINGE (ML) INTRAVENOUS
Status: DISCONTINUED | OUTPATIENT
Start: 2022-09-20 | End: 2022-09-20 | Stop reason: HOSPADM

## 2022-09-20 RX ORDER — CALCIUM CARBONATE 500 MG/1
500 TABLET, CHEWABLE ORAL 4 TIMES DAILY PRN
Status: DISCONTINUED | OUTPATIENT
Start: 2022-09-20 | End: 2022-09-21 | Stop reason: HOSPADM

## 2022-09-20 RX ORDER — HYDROMORPHONE HYDROCHLORIDE 1 MG/ML
INJECTION, SOLUTION INTRAMUSCULAR; INTRAVENOUS; SUBCUTANEOUS PRN
Status: DISCONTINUED | OUTPATIENT
Start: 2022-09-20 | End: 2022-09-20

## 2022-09-20 RX ORDER — EPHEDRINE SULFATE 50 MG/ML
INJECTION, SOLUTION INTRAMUSCULAR; INTRAVENOUS; SUBCUTANEOUS PRN
Status: DISCONTINUED | OUTPATIENT
Start: 2022-09-20 | End: 2022-09-20

## 2022-09-20 RX ORDER — NALOXONE HYDROCHLORIDE 0.4 MG/ML
0.4 INJECTION, SOLUTION INTRAMUSCULAR; INTRAVENOUS; SUBCUTANEOUS
Status: DISCONTINUED | OUTPATIENT
Start: 2022-09-20 | End: 2022-09-21 | Stop reason: HOSPADM

## 2022-09-20 RX ORDER — SODIUM CHLORIDE 9 MG/ML
INJECTION, SOLUTION INTRAVENOUS CONTINUOUS
Status: DISCONTINUED | OUTPATIENT
Start: 2022-09-20 | End: 2022-09-21 | Stop reason: HOSPADM

## 2022-09-20 RX ORDER — SODIUM CHLORIDE, SODIUM LACTATE, POTASSIUM CHLORIDE, CALCIUM CHLORIDE 600; 310; 30; 20 MG/100ML; MG/100ML; MG/100ML; MG/100ML
INJECTION, SOLUTION INTRAVENOUS CONTINUOUS
Status: DISCONTINUED | OUTPATIENT
Start: 2022-09-20 | End: 2022-09-20 | Stop reason: HOSPADM

## 2022-09-20 RX ORDER — ONDANSETRON 2 MG/ML
4 INJECTION INTRAMUSCULAR; INTRAVENOUS EVERY 30 MIN PRN
Status: DISCONTINUED | OUTPATIENT
Start: 2022-09-20 | End: 2022-09-20 | Stop reason: HOSPADM

## 2022-09-20 RX ORDER — DIPHENHYDRAMINE HYDROCHLORIDE 50 MG/ML
12.5 INJECTION INTRAMUSCULAR; INTRAVENOUS EVERY 6 HOURS PRN
Status: DISCONTINUED | OUTPATIENT
Start: 2022-09-20 | End: 2022-09-21 | Stop reason: HOSPADM

## 2022-09-20 RX ORDER — ONDANSETRON 2 MG/ML
INJECTION INTRAMUSCULAR; INTRAVENOUS PRN
Status: DISCONTINUED | OUTPATIENT
Start: 2022-09-20 | End: 2022-09-20

## 2022-09-20 RX ORDER — MAGNESIUM HYDROXIDE 1200 MG/15ML
LIQUID ORAL PRN
Status: DISCONTINUED | OUTPATIENT
Start: 2022-09-20 | End: 2022-09-20 | Stop reason: HOSPADM

## 2022-09-20 RX ORDER — ACETAMINOPHEN 325 MG/1
975 TABLET ORAL EVERY 8 HOURS
Status: DISCONTINUED | OUTPATIENT
Start: 2022-09-20 | End: 2022-09-21 | Stop reason: HOSPADM

## 2022-09-20 RX ORDER — HYDROMORPHONE HCL IN WATER/PF 6 MG/30 ML
0.2 PATIENT CONTROLLED ANALGESIA SYRINGE INTRAVENOUS EVERY 5 MIN PRN
Status: DISCONTINUED | OUTPATIENT
Start: 2022-09-20 | End: 2022-09-20 | Stop reason: HOSPADM

## 2022-09-20 RX ORDER — CEFAZOLIN SODIUM/WATER 2 G/20 ML
2 SYRINGE (ML) INTRAVENOUS SEE ADMIN INSTRUCTIONS
Status: DISCONTINUED | OUTPATIENT
Start: 2022-09-20 | End: 2022-09-20 | Stop reason: HOSPADM

## 2022-09-20 RX ORDER — PROCHLORPERAZINE MALEATE 5 MG
5 TABLET ORAL EVERY 6 HOURS PRN
Status: DISCONTINUED | OUTPATIENT
Start: 2022-09-20 | End: 2022-09-21 | Stop reason: HOSPADM

## 2022-09-20 RX ORDER — HEPARIN SODIUM 5000 [USP'U]/.5ML
5000 INJECTION, SOLUTION INTRAVENOUS; SUBCUTANEOUS EVERY 8 HOURS
Status: DISCONTINUED | OUTPATIENT
Start: 2022-09-21 | End: 2022-09-21 | Stop reason: HOSPADM

## 2022-09-20 RX ORDER — POLYETHYLENE GLYCOL 3350 17 G/17G
17 POWDER, FOR SOLUTION ORAL DAILY PRN
Status: DISCONTINUED | OUTPATIENT
Start: 2022-09-20 | End: 2022-09-21 | Stop reason: HOSPADM

## 2022-09-20 RX ORDER — ONDANSETRON 4 MG/1
4 TABLET, ORALLY DISINTEGRATING ORAL EVERY 30 MIN PRN
Status: DISCONTINUED | OUTPATIENT
Start: 2022-09-20 | End: 2022-09-20 | Stop reason: HOSPADM

## 2022-09-20 RX ORDER — LIDOCAINE HYDROCHLORIDE 20 MG/ML
INJECTION, SOLUTION INFILTRATION; PERINEURAL PRN
Status: DISCONTINUED | OUTPATIENT
Start: 2022-09-20 | End: 2022-09-20

## 2022-09-20 RX ORDER — NALOXONE HYDROCHLORIDE 0.4 MG/ML
0.2 INJECTION, SOLUTION INTRAMUSCULAR; INTRAVENOUS; SUBCUTANEOUS
Status: DISCONTINUED | OUTPATIENT
Start: 2022-09-20 | End: 2022-09-21 | Stop reason: HOSPADM

## 2022-09-20 RX ORDER — DIPHENHYDRAMINE HCL 12.5MG/5ML
12.5 LIQUID (ML) ORAL EVERY 6 HOURS PRN
Status: DISCONTINUED | OUTPATIENT
Start: 2022-09-20 | End: 2022-09-21 | Stop reason: HOSPADM

## 2022-09-20 RX ORDER — AMLODIPINE BESYLATE 10 MG/1
10 TABLET ORAL DAILY
Status: DISCONTINUED | OUTPATIENT
Start: 2022-09-21 | End: 2022-09-21 | Stop reason: HOSPADM

## 2022-09-20 RX ORDER — FAMOTIDINE 20 MG/1
20 TABLET, FILM COATED ORAL 2 TIMES DAILY
Status: DISCONTINUED | OUTPATIENT
Start: 2022-09-20 | End: 2022-09-21 | Stop reason: HOSPADM

## 2022-09-20 RX ORDER — IBUPROFEN 600 MG/1
600 TABLET, FILM COATED ORAL EVERY 6 HOURS PRN
Status: DISCONTINUED | OUTPATIENT
Start: 2022-09-20 | End: 2022-09-21 | Stop reason: HOSPADM

## 2022-09-20 RX ORDER — ONDANSETRON 2 MG/ML
4 INJECTION INTRAMUSCULAR; INTRAVENOUS EVERY 6 HOURS PRN
Status: DISCONTINUED | OUTPATIENT
Start: 2022-09-20 | End: 2022-09-21 | Stop reason: HOSPADM

## 2022-09-20 RX ORDER — GINSENG 100 MG
CAPSULE ORAL
Status: DISCONTINUED | OUTPATIENT
Start: 2022-09-20 | End: 2022-09-21 | Stop reason: HOSPADM

## 2022-09-20 RX ORDER — HYDROMORPHONE HCL IN WATER/PF 6 MG/30 ML
0.2 PATIENT CONTROLLED ANALGESIA SYRINGE INTRAVENOUS
Status: DISCONTINUED | OUTPATIENT
Start: 2022-09-20 | End: 2022-09-21 | Stop reason: HOSPADM

## 2022-09-20 RX ORDER — FENTANYL CITRATE 50 UG/ML
INJECTION, SOLUTION INTRAMUSCULAR; INTRAVENOUS PRN
Status: DISCONTINUED | OUTPATIENT
Start: 2022-09-20 | End: 2022-09-20

## 2022-09-20 RX ORDER — ATORVASTATIN CALCIUM 40 MG/1
40 TABLET, FILM COATED ORAL AT BEDTIME
Status: DISCONTINUED | OUTPATIENT
Start: 2022-09-20 | End: 2022-09-21 | Stop reason: HOSPADM

## 2022-09-20 RX ORDER — ONDANSETRON 4 MG/1
4 TABLET, ORALLY DISINTEGRATING ORAL EVERY 6 HOURS PRN
Status: DISCONTINUED | OUTPATIENT
Start: 2022-09-20 | End: 2022-09-21 | Stop reason: HOSPADM

## 2022-09-20 RX ORDER — HYDROMORPHONE HCL IN WATER/PF 6 MG/30 ML
0.4 PATIENT CONTROLLED ANALGESIA SYRINGE INTRAVENOUS
Status: DISCONTINUED | OUTPATIENT
Start: 2022-09-20 | End: 2022-09-21 | Stop reason: HOSPADM

## 2022-09-20 RX ORDER — OXYCODONE HYDROCHLORIDE 5 MG/1
5 TABLET ORAL EVERY 4 HOURS PRN
Status: DISCONTINUED | OUTPATIENT
Start: 2022-09-20 | End: 2022-09-20 | Stop reason: HOSPADM

## 2022-09-20 RX ORDER — LORAZEPAM 0.5 MG/1
0.5 TABLET ORAL
Status: DISCONTINUED | OUTPATIENT
Start: 2022-09-20 | End: 2022-09-21 | Stop reason: HOSPADM

## 2022-09-20 RX ORDER — HYDROMORPHONE HYDROCHLORIDE 2 MG/1
2 TABLET ORAL
Status: DISCONTINUED | OUTPATIENT
Start: 2022-09-20 | End: 2022-09-21 | Stop reason: HOSPADM

## 2022-09-20 RX ORDER — DEXAMETHASONE SODIUM PHOSPHATE 4 MG/ML
INJECTION, SOLUTION INTRA-ARTICULAR; INTRALESIONAL; INTRAMUSCULAR; INTRAVENOUS; SOFT TISSUE PRN
Status: DISCONTINUED | OUTPATIENT
Start: 2022-09-20 | End: 2022-09-20

## 2022-09-20 RX ORDER — ACETAMINOPHEN 325 MG/1
650 TABLET ORAL EVERY 4 HOURS PRN
Status: DISCONTINUED | OUTPATIENT
Start: 2022-09-23 | End: 2022-09-21 | Stop reason: HOSPADM

## 2022-09-20 RX ORDER — PROPOFOL 10 MG/ML
INJECTION, EMULSION INTRAVENOUS PRN
Status: DISCONTINUED | OUTPATIENT
Start: 2022-09-20 | End: 2022-09-20

## 2022-09-20 RX ORDER — LIDOCAINE 40 MG/G
CREAM TOPICAL
Status: DISCONTINUED | OUTPATIENT
Start: 2022-09-20 | End: 2022-09-21 | Stop reason: HOSPADM

## 2022-09-20 RX ORDER — NICOTINE 21 MG/24HR
1 PATCH, TRANSDERMAL 24 HOURS TRANSDERMAL DAILY
Status: DISCONTINUED | OUTPATIENT
Start: 2022-09-20 | End: 2022-09-21 | Stop reason: HOSPADM

## 2022-09-20 RX ORDER — FENTANYL CITRATE 50 UG/ML
25 INJECTION, SOLUTION INTRAMUSCULAR; INTRAVENOUS EVERY 5 MIN PRN
Status: DISCONTINUED | OUTPATIENT
Start: 2022-09-20 | End: 2022-09-20 | Stop reason: HOSPADM

## 2022-09-20 RX ORDER — LEVOTHYROXINE SODIUM 50 UG/1
50 TABLET ORAL
Status: DISCONTINUED | OUTPATIENT
Start: 2022-09-21 | End: 2022-09-21 | Stop reason: HOSPADM

## 2022-09-20 RX ADMIN — SODIUM CHLORIDE: 9 INJECTION, SOLUTION INTRAVENOUS at 12:00

## 2022-09-20 RX ADMIN — ACETAMINOPHEN 975 MG: 325 TABLET, FILM COATED ORAL at 13:52

## 2022-09-20 RX ADMIN — FAMOTIDINE 20 MG: 20 TABLET ORAL at 11:56

## 2022-09-20 RX ADMIN — HYDROMORPHONE HYDROCHLORIDE 2 MG: 2 TABLET ORAL at 11:56

## 2022-09-20 RX ADMIN — FENTANYL CITRATE 100 MCG: 50 INJECTION, SOLUTION INTRAMUSCULAR; INTRAVENOUS at 08:10

## 2022-09-20 RX ADMIN — SODIUM CHLORIDE, POTASSIUM CHLORIDE, SODIUM LACTATE AND CALCIUM CHLORIDE: 600; 310; 30; 20 INJECTION, SOLUTION INTRAVENOUS at 08:03

## 2022-09-20 RX ADMIN — LIDOCAINE HYDROCHLORIDE 60 MG: 20 INJECTION, SOLUTION INFILTRATION; PERINEURAL at 08:10

## 2022-09-20 RX ADMIN — HYDROMORPHONE HYDROCHLORIDE 2 MG: 2 TABLET ORAL at 17:38

## 2022-09-20 RX ADMIN — HYDROMORPHONE HYDROCHLORIDE 0.25 MG: 1 INJECTION, SOLUTION INTRAMUSCULAR; INTRAVENOUS; SUBCUTANEOUS at 08:51

## 2022-09-20 RX ADMIN — SUGAMMADEX 100 MG: 100 INJECTION, SOLUTION INTRAVENOUS at 08:49

## 2022-09-20 RX ADMIN — ATORVASTATIN CALCIUM 40 MG: 40 TABLET, FILM COATED ORAL at 21:47

## 2022-09-20 RX ADMIN — PROPOFOL 150 MG: 10 INJECTION, EMULSION INTRAVENOUS at 08:10

## 2022-09-20 RX ADMIN — Medication 10 MG: at 08:54

## 2022-09-20 RX ADMIN — HYDROMORPHONE HYDROCHLORIDE 0.25 MG: 1 INJECTION, SOLUTION INTRAMUSCULAR; INTRAVENOUS; SUBCUTANEOUS at 08:53

## 2022-09-20 RX ADMIN — ONDANSETRON 4 MG: 2 INJECTION INTRAMUSCULAR; INTRAVENOUS at 08:39

## 2022-09-20 RX ADMIN — ACETAMINOPHEN 975 MG: 325 TABLET, FILM COATED ORAL at 21:47

## 2022-09-20 RX ADMIN — ROCURONIUM BROMIDE 50 MG: 50 INJECTION, SOLUTION INTRAVENOUS at 08:10

## 2022-09-20 RX ADMIN — NICOTINE 1 PATCH: 21 PATCH, EXTENDED RELEASE TRANSDERMAL at 11:56

## 2022-09-20 RX ADMIN — PROPOFOL 30 MCG/KG/MIN: 10 INJECTION, EMULSION INTRAVENOUS at 08:23

## 2022-09-20 RX ADMIN — HYDROMORPHONE HYDROCHLORIDE 2 MG: 2 TABLET ORAL at 21:47

## 2022-09-20 RX ADMIN — Medication 2 G: at 08:17

## 2022-09-20 RX ADMIN — DEXAMETHASONE SODIUM PHOSPHATE 4 MG: 4 INJECTION, SOLUTION INTRA-ARTICULAR; INTRALESIONAL; INTRAMUSCULAR; INTRAVENOUS; SOFT TISSUE at 08:15

## 2022-09-20 RX ADMIN — SUGAMMADEX 100 MG: 100 INJECTION, SOLUTION INTRAVENOUS at 08:45

## 2022-09-20 ASSESSMENT — ACTIVITIES OF DAILY LIVING (ADL)
ADLS_ACUITY_SCORE: 20

## 2022-09-20 ASSESSMENT — COPD QUESTIONNAIRES: COPD: 1

## 2022-09-20 ASSESSMENT — ENCOUNTER SYMPTOMS
DYSRHYTHMIAS: 0
SEIZURES: 0

## 2022-09-20 ASSESSMENT — LIFESTYLE VARIABLES: TOBACCO_USE: 1

## 2022-09-20 NOTE — INTERVAL H&P NOTE
I have reviewed the surgical (or preoperative) H&P that is linked to this encounter, and examined the patient. There are no significant changes    Clinical Conditions Present on Arrival:  Clinically Significant Risk Factors Present on Admission                  # Thrombocytopenia: Plts = 111 10e3/uL (Ref range: 150 - 450 10e3/uL) on admission, will monitor for bleeding

## 2022-09-20 NOTE — ANESTHESIA PROCEDURE NOTES
Airway       Patient location during procedure: OR       Procedure Start/Stop Times: 9/20/2022 8:13 AM  Staff -        Anesthesiologist:  Petar Rogers MD       CRNA: Aaron Monson APRN CRNA       Performed By: CRNA  Consent for Airway        Urgency: elective  Indications and Patient Condition       Indications for airway management: josesito-procedural       Induction type:intravenous       Mask difficulty assessment: 1 - vent by mask    Final Airway Details       Final airway type: endotracheal airway       Successful airway: ETT - double lumen left  Endotracheal Airway Details        Cuffed: yes       Successful intubation technique: video laryngoscopy       VL Blade Size: Glidescope 3       Grade View of Cords: 1       Adjucts: stylet       Position: Right       Measured from: lips       Secured at (cm): 30       Bite block used: None       ETT Double lumen (fr): 35    Post intubation assessment        Placement verified by: capnometry, equal breath sounds and chest rise        Number of attempts at approach: 1       Secured with: pink tape       Ease of procedure: easy       Dentition: Intact and Unchanged    Medication(s) Administered   Medication Administration Time: 9/20/2022 8:13 AM

## 2022-09-20 NOTE — ANESTHESIA POSTPROCEDURE EVALUATION
Patient: Anna Dyer    Procedure: Procedure(s):  RIGHT VIDEO ASSISTED THORACOSCOPY  WEDGE RESECTION RIGHT UPPER LOBE LUNG NODULE       Anesthesia Type:  General    Note:     Postop Pain Control: Uneventful            Sign Out: Well controlled pain   PONV: No   Neuro/Psych: Uneventful            Sign Out: Acceptable/Baseline neuro status   Airway/Respiratory: Uneventful            Sign Out: Acceptable/Baseline resp. status   CV/Hemodynamics: Uneventful            Sign Out: Acceptable CV status; No obvious hypovolemia; No obvious fluid overload   Other NRE: NONE   DID A NON-ROUTINE EVENT OCCUR? No           Last vitals:  Vitals Value Taken Time   /50 09/20/22 1027   Temp 36.6  C (97.8  F) 09/20/22 1027   Pulse 73 09/20/22 1027   Resp 14 09/20/22 1027   SpO2 95 % 09/20/22 1027       Electronically Signed By: Petar Rogers MD  September 20, 2022  3:28 PM

## 2022-09-20 NOTE — PROGRESS NOTES
Pt arrived on unit at 1100 following right VAT    Orientations: AOx4  Diet: Regular  Vitals/Pain: VSS, occasional pain with movement PRN dilaudid given at 1200 and at 1800  Lines/Drains: Right chest tube to -20 suction, no crepitus or air leak,  left PIV infusing NS 75 mL/hr  Skin/Wounds: Three small incisions on right side WDL  GI/: Bowel sounds active, passing flatus, UOA   Ambulation/Assist: Assist of 1, Pt was able to ambulate in hallway using IV pole with nurse

## 2022-09-20 NOTE — ANESTHESIA PREPROCEDURE EVALUATION
Anesthesia Pre-Procedure Evaluation    Patient: Anna Dyer   MRN: [id number] : 1945        Procedure : Procedure(s):  RIGHT VIDEO ASSISTED THORACOSCOPY  WEDGE RESECTION RIGHT UPPER LOBE LUNG NODULE          Past Medical History:   Diagnosis Date     Cancer (H)     metastatic colorectal adenocarcinoma     Cataract      Chronic kidney disease      COPD (chronic obstructive pulmonary disease) (H)      Hypertension     No cardiologist     Nicotine dependence      PAD (peripheral artery disease) (H)      Peripheral neuropathy      RLS (restless legs syndrome)      Thrombocytopenia (H)      Ventral hernia       Past Surgical History:   Procedure Laterality Date     BIOPSY      liver     BREAST SURGERY      breast implants     COLONOSCOPY N/A 3/28/2019    Procedure: INTRAOPERATIVE COLONOSCOPY,;  Surgeon: Jesus Caba MD;  Location:  OR     COSMETIC SURGERY      facelift and tummy tuck     GYN SURGERY      tubal     INSERT PORT VASCULAR ACCESS N/A 3/24/2017    Procedure: INSERT PORT VASCULAR ACCESS;  Surgeon: Yemi Ordaz MD;  Location:  OR     IR LOWER EXTREMITY ANGIOGRAM BILATERAL  3/12/2020     LAPAROSCOPIC ASSISTED COLECTOMY LEFT (DESCENDING) N/A 2018    Procedure: LAPAROSCOPIC ASSISTED COLECTOMY LEFT (DESCENDING);;  Surgeon: Maddie Baron MD;  Location: [facility] OR     LAPAROSCOPIC ASSISTED COLOSTOMY TAKEDOWN N/A 3/28/2019    Procedure: LAPAROSCOPIC ASSISTED COLOSTOMY REVERSAL;  Surgeon: Jesus Caba MD;  Location:  OR     LAPAROSCOPIC HERNIORRHAPHY VENTRAL N/A 2020    Procedure: COMBINED OPEN AND LAPAROSCOPIC VENTRAL HERNIA REPAIR;  Surgeon: Maddy Gonzales MD;  Location:  OR     LAPAROSCOPY DIAGNOSTIC (GENERAL) N/A 3/28/2019    Procedure: DIAGNOSTIC LAPAROSCOPY;  Surgeon: Jesus Caba MD;  Location:  OR     LAPAROTOMY EXPLORATORY N/A 2018    Procedure: LAPAROTOMY EXPLORATORY;  exploratory laparoscopy, left hemicolectomy, transverse colostomy;  Surgeon:  Maddie Baron MD;  Location: [facility] OR     THORACOSCOPY Left 2021    Procedure: LEFT THORACOTOMY, ANATOMICAL POSTERIOR SEGMENTECTOMY LEFT UPPER LOBE ;  Surgeon: Yemi Ordaz MD;  Location: [facility] OR      Allergies   Allergen Reactions     Lactose GI Disturbance     Sulfa Drugs Hives      Social History     Tobacco Use     Smoking status: Current Every Day Smoker     Packs/day: 1.00     Years: 58.00     Pack years: 58.00     Types: Cigarettes     Last attempt to quit: 2020     Years since quittin.2     Smokeless tobacco: Never Used   Substance Use Topics     Alcohol use: No     Alcohol/week: 0.0 standard drinks      Wt Readings from Last 1 Encounters:   22 51.5 kg (113 lb 8 oz)        Anesthesia Evaluation   Pt has had prior anesthetic.     No history of anesthetic complications       ROS/MED HX  ENT/Pulmonary:     (+) tobacco use, COPD,  (-) sleep apnea   Neurologic: Comment: RLS   (-) no seizures and no CVA   Cardiovascular:     (+) hypertension-Peripheral Vascular Disease-- Other and Symptomatic. --- (-) CHF and arrhythmias   METS/Exercise Tolerance: 3 - Able to walk 1-2 blocks without stopping Comment: Significant claudication   Hematologic:       Musculoskeletal:       GI/Hepatic:     (+) liver disease,  (-) GERD   Renal/Genitourinary:     (+) renal disease, type: CRI,     Endo:    (-) Type II DM and thyroid disease   Psychiatric/Substance Use:  - neg psychiatric ROS     Infectious Disease:       Malignancy:   (+) Malignancy, History of GI.    Other:            Physical Exam    Airway        Mallampati: III   TM distance: > 3 FB   Neck ROM: full   Mouth opening: > 3 cm    Respiratory Devices and Support         Dental       (+) partials      Cardiovascular   cardiovascular exam normal          Pulmonary   pulmonary exam normal                OUTSIDE LABS:  CBC:   Lab Results   Component Value Date    WBC 5.5 2022    WBC 7.2 2021    HGB 14.2 2022    HGB 15.4  09/16/2022    HCT 43.0 09/20/2022    HCT 36.8 02/24/2021     (L) 09/20/2022     02/26/2021     BMP:   Lab Results   Component Value Date     09/20/2022     09/16/2022    POTASSIUM 4.0 09/20/2022    POTASSIUM 4.3 09/16/2022    CHLORIDE 106 09/20/2022    CHLORIDE 105 09/16/2022    CO2 24 09/20/2022    CO2 24 09/16/2022    BUN 14 09/20/2022    BUN 13 09/16/2022    CR 0.70 09/20/2022    CR 0.71 09/16/2022     (H) 09/20/2022     (H) 09/16/2022     COAGS:   Lab Results   Component Value Date    PTT 32 03/12/2020    INR 1.01 09/20/2022     POC:   Lab Results   Component Value Date    BGM 86 02/25/2021     HEPATIC:   Lab Results   Component Value Date    ALBUMIN 3.9 12/28/2020    PROTTOTAL 7.9 12/28/2020    ALT 17 12/28/2020    AST 25 12/28/2020     (H) 05/08/2009    ALKPHOS 86 12/28/2020    BILITOTAL 0.6 12/28/2020     OTHER:   Lab Results   Component Value Date    A1C 5.4 03/12/2020    CAITY 9.6 09/20/2022    PHOS 3.4 04/01/2019    MAG 2.9 (H) 04/01/2019    TSH 6.65 (H) 07/27/2022    T4 1.16 07/27/2022       Anesthesia Plan    ASA Status:  3   NPO Status:  NPO Appropriate    Anesthesia Type: General.     - Airway: ETT   Induction: Intravenous.   Maintenance: Balanced.   Techniques and Equipment:     - Airway: Video-Laryngoscope, Fiberoptic Bronchoscope, Double lumen ETT         Consents    Anesthesia Plan(s) and associated risks, benefits, and realistic alternatives discussed. Questions answered and patient/representative(s) expressed understanding.    - Discussed:     - Discussed with:  Patient         Postoperative Care    Pain management: Multi-modal analgesia.   PONV prophylaxis: Ondansetron (or other 5HT-3), Dexamethasone or Solumedrol, Background Propofol Infusion     Comments:                Petar Rogers MD

## 2022-09-20 NOTE — BRIEF OP NOTE
[facility]    Brief Operative Note    Pre-operative diagnosis: Nodule of upper lobe of right lung [R91.1]  Post-operative diagnosis Right upper lobe lung nodule    Procedure: Procedure(s):  RIGHT VIDEO ASSISTED THORACOSCOPY  WEDGE RESECTION RIGHT UPPER LOBE LUNG NODULE     Surgeon: Surgeon(s) and Role:     * Yemi Ordaz MD - Primary     * Chandni Kraft PA-C - Assisting     Anesthesia: General   Estimated Blood Loss: Less than 10 ml    Drains:  One 28 straight chest tube to right lung apex  Specimens:   ID Type Source Tests Collected by Time Destination   1 : RIGHT UPPER LOBE LUNG NODULE Tissue Lung, Upper Lobe, Right SURGICAL PATHOLOGY EXAM Yemi Ordaz MD 9/20/2022  8:32 AM      Findings:   Right upper lobe lung nodule identified in the posterior lung and resected with wedge resection. Preliminary path consistent with metastatic colorectal cancer.  Complications: None.  Implants: * No implants in log *    Gisell Kraft PA-C with Dr. Yemi [facility]  Office: [phone]  Cell: [phone]

## 2022-09-20 NOTE — OR NURSING
Platelets 111, Dr Rogers and Dr Ordaz notified.     Pt has nicotine patch in place on left upper arm, she is a current everyday smoker. Pt declines smoking cessation counseling, not planning on quitting smoking at this time.

## 2022-09-20 NOTE — ANESTHESIA CARE TRANSFER NOTE
Patient: Anna Dyer    Procedure: Procedure(s):  RIGHT VIDEO ASSISTED THORACOSCOPY  WEDGE RESECTION RIGHT UPPER LOBE LUNG NODULE       Diagnosis: Nodule of upper lobe of right lung [R91.1]  Diagnosis Additional Information: No value filed.    Anesthesia Type:   General     Note:    Oropharynx: oropharynx clear of all foreign objects and spontaneously breathing  Level of Consciousness: awake  Oxygen Supplementation: face mask  Level of Supplemental Oxygen (L/min / FiO2): 6  Independent Airway: airway patency satisfactory and stable  Dentition: dentition unchanged  Vital Signs Stable: post-procedure vital signs reviewed and stable  Report to RN Given: handoff report given  Patient transferred to: PACU    Handoff Report: Identifed the Patient, Identified the Reponsible Provider, Reviewed the pertinent medical history, Discussed the surgical course, Reviewed Intra-OP anesthesia mangement and issues during anesthesia, Set expectations for post-procedure period and Allowed opportunity for questions and acknowledgement of understanding      Vitals:  Vitals Value Taken Time   /79    Temp 36.4    Pulse 75 09/20/22 0912   Resp 19 09/20/22 0912   SpO2 100 % 09/20/22 0912   Vitals shown include unvalidated device data.    Electronically Signed By: ILENE Neal CRNA  September 20, 2022  9:14 AM

## 2022-09-20 NOTE — OP NOTE
Procedure Date: 09/20/2022    SURGEON:  Yemi Ordaz M.D.     FIRST ASSISTANT:  Gisell Kraft PA-C.    PREOPERATIVE DIAGNOSIS:  Right upper lobe lung nodule.  History of metastatic colorectal cancer.    POSTOPERATIVE DIAGNOSIS:  Right upper lobe lung nodule.  History of metastatic colorectal cancer.    PROCEDURE:  Right video-assisted thoracoscopy and wedge resection of right upper lobe lung nodule.    ANESTHESIA:  General with a double-lumen endotracheal tube.    INDICATIONS FOR PROCEDURE:  A 77-year-old woman with a history of metastatic colorectal cancer.  On followup scan, there is a slowly enlarging small right upper lobe lung nodule posteriorly in the right upper lobe lung.  This nodule was suspicious for a solitary metastasis.  Options of diagnostic procedure and treatment reviewed.  It was elected to proceed with video thoracoscopy and wedge resection.    DESCRIPTION OF PROCEDURE:  The patient was brought and placed in supine position under general anesthesia with a double-lumen endotracheal tube, the patient was placed in a left lateral decubitus position.  The right chest was prepared and draped in sterile fashion with ChloraPrep.  Ventilation of the right lung was continued.  Three thoracoscopic incisions were made in usual fashion.  The lung was examined.  The nodule was clearly identified and it was located as expected in the posterior right upper lobe lung.  Using multiple application of [place] 60 gold stapling device, a generous wedge resection was done with excellent margins.  Staple line was hemostatic.  Specimen was placed in an EndoCatch bag and retrieved through the anterior incision and submitted for frozen section.  Hemostasis was verified and was excellent.  30 mL of Marcaine 0.5% without epinephrine was injected as costal blocks.  Through a separate stab wound, a 28 straight chest tube was placed and sutured to skin with 2-0 silk suture.  The three thoracoscopic incisions were  closed in usual fashion.    ESTIMATED BLOOD LOSS:  Minimal.    COUNTS:  Sponge count is correct.    Gisell Kraft PA-C, was the first assistant during the procedure.  Her role as first assistant was essential and necessary in accomplishing the steps of the procedure as described above providing exposure, retraction and handling of the scope.    Yemi Ordaz MD        D: 2022   T: 2022   MT: HARDEEP    Name:     ANTONY HALEYMartin  MRN:      [id number]        Account:        [id number]   :      1945           Procedure Date: 2022     Document: [id number]

## 2022-09-21 ENCOUNTER — APPOINTMENT (OUTPATIENT)
Dept: GENERAL RADIOLOGY | Facility: CLINIC | Age: 77
DRG: 164 | End: 2022-09-21
Attending: PHYSICIAN ASSISTANT
Payer: MEDICARE

## 2022-09-21 VITALS
OXYGEN SATURATION: 94 % | WEIGHT: 113.5 LBS | HEIGHT: 60 IN | DIASTOLIC BLOOD PRESSURE: 56 MMHG | TEMPERATURE: 97.4 F | HEART RATE: 58 BPM | SYSTOLIC BLOOD PRESSURE: 108 MMHG | RESPIRATION RATE: 18 BRPM | BODY MASS INDEX: 22.28 KG/M2

## 2022-09-21 LAB — GLUCOSE BLDC GLUCOMTR-MCNC: 129 MG/DL (ref 70–99)

## 2022-09-21 PROCEDURE — 250N000009 HC RX 250: Performed by: PHYSICIAN ASSISTANT

## 2022-09-21 PROCEDURE — 71045 X-RAY EXAM CHEST 1 VIEW: CPT

## 2022-09-21 PROCEDURE — 250N000011 HC RX IP 250 OP 636: Performed by: PHYSICIAN ASSISTANT

## 2022-09-21 PROCEDURE — 250N000013 HC RX MED GY IP 250 OP 250 PS 637: Performed by: PHYSICIAN ASSISTANT

## 2022-09-21 RX ADMIN — ACETAMINOPHEN 975 MG: 325 TABLET, FILM COATED ORAL at 05:32

## 2022-09-21 RX ADMIN — BACITRACIN: 500 OINTMENT TOPICAL at 07:40

## 2022-09-21 RX ADMIN — FAMOTIDINE 20 MG: 20 TABLET ORAL at 09:45

## 2022-09-21 RX ADMIN — IBUPROFEN 600 MG: 600 TABLET ORAL at 09:46

## 2022-09-21 RX ADMIN — AMLODIPINE BESYLATE 10 MG: 10 TABLET ORAL at 09:47

## 2022-09-21 RX ADMIN — HYDROMORPHONE HYDROCHLORIDE 2 MG: 2 TABLET ORAL at 00:16

## 2022-09-21 RX ADMIN — NICOTINE 1 PATCH: 21 PATCH, EXTENDED RELEASE TRANSDERMAL at 09:46

## 2022-09-21 RX ADMIN — HEPARIN SODIUM 5000 UNITS: 5000 INJECTION, SOLUTION INTRAVENOUS; SUBCUTANEOUS at 05:32

## 2022-09-21 RX ADMIN — LEVOTHYROXINE SODIUM 50 MCG: 50 TABLET ORAL at 05:46

## 2022-09-21 ASSESSMENT — ACTIVITIES OF DAILY LIVING (ADL)
ADLS_ACUITY_SCORE: 20

## 2022-09-21 NOTE — PLAN OF CARE
Orientations:Alert and oriented x 4  Vitals/Pain:Vital signs stable,afebrile,on room air.Pain controlled with Dilaudid and Tylenol  Diet: Regular  Lines/Drains:Chest tube in placed on Right side,CDI,clamped at 12 Midnight,no air leak,crepitus noted.Clamped released after Xray was done  Skin/Wounds: Skin was intact,surgical site unable to assess,CDI  GI/:Voiding freely,no BM on this shift  Ambulation/Assist: Standby assist  Sleep Quality: Good

## 2022-09-21 NOTE — PLAN OF CARE
Pt AO4. VS stable. Rt side chest tube -20 suction. POD0:   .  Oral dilaudid for pain with activity at surgical site. Dilaudid PO given 1x.   Loose stool for last few days: pt does not want any bowel medications.  Regular diet. SBA.

## 2022-09-21 NOTE — DISCHARGE INSTRUCTIONS
"[facility]   Discharge instructions and Follow-Up Care for Dr. Ordaz' Thoracoscopy and Thoracotomy patients:    You already have a post-op chest x-ray and post-op appointment scheduled as follows:  Go to [facility] ([address] [place] [address], [address], [place] [address]) on Monday, October 3rd at 12:45 PM for a post-op chest x-ray.  Then go upstairs to the [facility] office in [address] at 1:20 PM the same day for your post-op appointment. You will see either Ofelia Lockwood PA-C or Dr. Ordaz for your post-op appointment.     If you need to call to schedule your post-op chest x-ray and appointment: [facility] [phone]    Patient care:  Call Dr. Ordaz' office @[phone] if you experience:   *Severe chills or fever of 101 F or frieda on two occasions   *Severely increased incisional pain that cannot be relieved with rest or pain medications   *Presence of unusual incisional or chest tube site drainage that is odorous, green or yellow in color, tristin bright red blood or if your incision is warm/red/swollen   *Coughing up bright red blood or greenish-yellow secretions   *Inability to urinate or have a bowel movement   *New pain or swelling in your legs    In an emergency, call [phone] or have someone drive you to an Emergency Department    Pain Relief:  You may take acetaminophen according to the directions on the medication bottle. Typically, patients can take Tylenol up to 1000 mg every 6 hours as needed for pain. You may also have been prescribed a narcotic pain medicine. Most people get good pain relief by \"staggering\" these medications. Many patients keep a log of when they take the medications including the date, time, medication name and dose. Lastly, it is important to take these medications with food.    No driving while on narcotic pain medicines.    Activity:  _XXX_ No activity restrictions    Wound Care:   *Please look at your incisions daily and keep " them clean while they heal   *Do not apply creams, salves such as Bacitracin, or ointments on the incisions while they heal   *Steri strips (thin white pieces of tape) will be present on the incision(s) and will peel off as your incision heals-- otherwise, they will be removed at your post-op appointment   *Remove the dressing covering your chest tube site 48 hours after your discharge from the hospital. You may then shower. Wash the incision and chest tube sites daily with soap and water. No bathing/immersing incisions under water for two weeks or until the chest tube sites are completely healed.   *After your shower, pat the chest tube sites dry and place a dry gauze dressing (and tape) over the sites. It is normal to have some drainage from the sites for a few days. Do not be alarmed if a large amount of fluid drains (should be pink or yellow) either spontaneously or with coughing or exertion. Should this happen, just place a larger, thicker gauze dressing over the chest tube sites. In about a week, drainage should stop and a scab will form. Once drainage stops, you can stop covering the sites. Call our office if the drainage is milky or green in color or foul-smelling.    Respiratory Care:  Utilize the incentive spirometer and flutter valve/acapella (if you received one) several times in a row every few hours while awake for a few weeks after discharging home from the hospital.    Activity:  It may take a few weeks to regain your normal energy level/stamina. It is important during your recovery to get regular physical activity such as walking each day, climbing stairs as tolerated, and avoiding prolonged daytime naps    Return to Work:  Time away from work will depend on your specific situation. In general, you will need between 2-4 weeks to fully recover from surgery. Specific dates for retuning to work can be discussed at your post-op appointment.

## 2022-09-21 NOTE — PROGRESS NOTES
Thoracic Surgery POD #1:    S: Doing well  AVS on RA  Pain well managed    O:  /56 (BP Location: Left arm)   Pulse 65   Temp 97.8  F (36.6  C) (Oral)   Resp 18   Ht 1.524 m (5')   Wt 51.5 kg (113 lb 8 oz)   SpO2 90%   BMI 22.17 kg/m    General: Sitting up in bed, alert  Incisions: Removed bandaids, incisions intact with steri-strips  CT: No air leak, serosanguinous drainage. CT removed without complication.  Occlusive dressing applied.    CXR: Lungs clear, no pneumothorax or pleural effusion    Plan:  - CT removed, occlusive dressing applied  - Pulm toilet  - Ambulate  - Regular diet  - Final path pending   - Home later today  - Follow up in clinic in 7-10 days      Gisell Kraft PA-C with Dr. Yemi Ordaz  [facility] Thoracic [facility]  Office: [phone]  Cell: [phone]

## 2022-09-22 LAB
PATH REPORT.COMMENTS IMP SPEC: ABNORMAL
PATH REPORT.COMMENTS IMP SPEC: ABNORMAL
PATH REPORT.COMMENTS IMP SPEC: YES
PATH REPORT.FINAL DX SPEC: ABNORMAL
PATH REPORT.GROSS SPEC: ABNORMAL
PATH REPORT.INTRAOP OBS SPEC DOC: ABNORMAL
PATH REPORT.MICROSCOPIC SPEC OTHER STN: ABNORMAL
PATH REPORT.RELEVANT HX SPEC: ABNORMAL
PHOTO IMAGE: ABNORMAL

## 2022-09-22 PROCEDURE — 88341 IMHCHEM/IMCYTCHM EA ADD ANTB: CPT | Mod: 26 | Performed by: PATHOLOGY

## 2022-09-22 PROCEDURE — 88331 PATH CONSLTJ SURG 1 BLK 1SPC: CPT | Mod: 26 | Performed by: PATHOLOGY

## 2022-09-22 PROCEDURE — 88307 TISSUE EXAM BY PATHOLOGIST: CPT | Mod: 26 | Performed by: PATHOLOGY

## 2022-09-22 PROCEDURE — 88342 IMHCHEM/IMCYTCHM 1ST ANTB: CPT | Mod: 26 | Performed by: PATHOLOGY

## 2022-09-23 NOTE — PLAN OF CARE
A&O, able to communicate needs. VSS  CT removed, occlusive dressing in place.  Incisions with steri strips intact.  Deep breathing and ambulation encouraged. Pt unable to do IS d/t recent multi tooth extractions.    AVS reviewed.   Per Gisell TRACY discharge dilaudid and tylenol RX sent to [facility] in [place] (not listed on AVS as sent from clinic)  Educated pt and her son, not to take dilaudid on top of any narcotics she may have left from tooth extraction recently. They stated understanding.  Pt discharged with transport home per her son.

## 2022-09-26 ENCOUNTER — PATIENT OUTREACH (OUTPATIENT)
Dept: INTERNAL MEDICINE | Facility: CLINIC | Age: 77
End: 2022-09-26

## 2022-09-26 ENCOUNTER — LAB (OUTPATIENT)
Dept: LAB | Facility: CLINIC | Age: 77
End: 2022-09-26
Payer: MEDICARE

## 2022-09-26 DIAGNOSIS — E03.9 HYPOTHYROIDISM, UNSPECIFIED TYPE: ICD-10-CM

## 2022-09-26 PROCEDURE — 84443 ASSAY THYROID STIM HORMONE: CPT

## 2022-09-26 PROCEDURE — 84439 ASSAY OF FREE THYROXINE: CPT

## 2022-09-26 PROCEDURE — 36415 COLL VENOUS BLD VENIPUNCTURE: CPT

## 2022-09-26 NOTE — TELEPHONE ENCOUNTER
What type of discharge? Inpatient  Risk of Hospital admission or ED visit: 40%  Is a TCM episode required? Yes  When should the patient follow up with PCP? 7 days of discharge.    Elva Page RN  [facility]

## 2022-09-26 NOTE — DISCHARGE SUMMARY
THORACIC SURGERY HOSPITAL DISCHARGE SUMMARY  [facility] ONCOLOGY - THORACIC SURGERY  [address], [address]  [place] [address]  Phone [phone]  [email or web address]    9/26/2022     Tello Finney   [address] / [facility] [address]  Phone: [phone]   Fax: [phone]        Re: Anna Dyer             1945             [id number]              Dates of Hospitalization: 9/20/2022 - 9/21/2022   Date of Service (when I saw the patient): 9/21/22    Dear Dr. Finney:     As you are aware, we had the pleasure of caring for your patient,  Anna Dyer here at [facility].  She is a 77-year-old woman with a history of metastatic colorectal cancer.  On followup scan, there is a slowly enlarging small right upper lobe lung nodule posteriorly in the right upper lobe lung.  This nodule was suspicious for a solitary metastasis.  Options of diagnostic procedure and treatment reviewed.  It was elected to proceed with video thoracoscopy and wedge resection.    On 9/20/2022, Dr. Yemi Ordaz performed the following:    Procedure/Surgery Information   Procedure: Right video-assisted thoracoscopy and wedge resection of right upper lobe lung nodule.   Surgeon(s): Surgeon(s) and Role:     * Yemi Ordaz MD - Primary     * Chandni Kraft PA-C - Assisting   Specimens: ID Type Source Tests Collected by Time Destination   1 : RIGHT UPPER LOBE LUNG NODULE Tissue Lung, Upper Lobe, Right SURGICAL PATHOLOGY EXAM Yemi Ordaz MD 9/20/2022  8:32 AM             Final Surgical Pathology Revealed:  Right upper lobe lung adenocarcinoma, consistent with colorectal metastasis. Surgical margins negative for malignancy.     Her post-operative course was unremarkable.      Consultations This Hospital Stay   None    Anna Dyer has otherwise recovered sufficiently to be discharged to home today, 9/21/2022, on  post-operative day number one for further convalescence.  Her incisions are healing well with no signs or symptoms of infection.  Her bowels have moved sufficiently and she is tolerating diet and activity, ambulating and transferring independently.  She is currently afebrile with stable vital signs.     Below, you will find a full discharge medication list and instructions.  We have arranged for Anna Dyer to follow-up with us in our [facility] in 7-10 days with a Chest X-ray prior to that appointment.  We thank you for allowing us to participate in the care of Anna Dyer here at [facility].  Please feel free to contact our office at [phone] with any questions or concerns or if we can be of any further assistance in the care of this patient.    Sincerely,    Dr. Yemi Ordaz MD    D/C Summary Prepared by: Ofelia Lockwood PA-C    Discharge Medications:  Discharge Medication List as of 9/21/2022  1:44 PM      CONTINUE these medications which have NOT CHANGED    Details   acetaminophen (TYLENOL) 500 MG tablet Take 1,000 mg by mouth every 6 hours as needed for mild pain, Historical      amLODIPine (NORVASC) 10 MG tablet Take 1 tablet (10 mg) by mouth daily, Disp-90 tablet, R-3, E-PrescribeNew tablet size.  PROFILE FOR FUTURE REFILLS      aspirin (ASA) 81 MG tablet Take 1 tablet (81 mg) by mouth daily, OTC      atorvastatin (LIPITOR) 40 MG tablet Take 1 tablet (40 mg) by mouth At Bedtime, Disp-90 tablet, R-3, E-PrescribePROFILE FOR FUTURE REFILLS      Ergocalciferol (VITAMIN D2 PO) Take 60 mcg by mouth every morning, Historical      gabapentin (NEURONTIN) 400 MG capsule Take 400 mg by mouth 3 times daily , Historical      levothyroxine (SYNTHROID/LEVOTHROID) 50 MCG tablet Take 1 tablet (50 mcg) by mouth daily, Disp-90 tablet, R-1, E-Prescribe      LORazepam (ATIVAN) 0.5 MG tablet Take 0.5 mg by mouth At Bedtime, R-1, Historical      losartan (COZAAR) 100 MG tablet Take 1 tablet (100  mg) by mouth daily, Disp-90 tablet, R-3, E-PrescribePROFILE FOR FUTURE REFILLS      nicotine (NICODERM CQ) 21 MG/24HR 24 hr patch Place 1 patch onto the skin daily, OTC      Homeopathic Products (LEG CRAMP RELIEF PO) Take 2-3 tablets by mouth daily as needed (leg cramps) , Historical      lidocaine-prilocaine (EMLA) 2.5-2.5 % external cream Apply topically daily as needed (Apply one hour prior to port acccess) Historical               Discharge Instructions:  1) Remove chest tube dressing on 9/23 and then it is Ok to shower.  Please wash both incision and chest tube site daily with soap and water.  You may cover the chest tube site daily with a clean band-aid or dry gauze if it continues to drain.  Once it stops draining, leave the site open to air and it will form a scab.  2) Steri-strips can be removed in 1 week or they will fall off when they are ready.  3) Continue daily use of your Incentive Spirometer, set of 10x in a row, every 1-2 hours while you are awake during the day. Also use your flutter valve if you received one during your stay.   4) No driving while on narcotic pain medications.    Follow-Up Care:  1) Follow up with Ofelia Lockwood PA-C/Dr. Ordaz at the [facility] in [place] ([address] [place] [address], [address], [place] [address]).  Call [facility] at [phone] to schedule the appointment.  2) Follow up with Primary Care Provider, Tello Finney within 1 month of discharge for routine post-surgical care, wound check and follow up.  Please call [phone] to arrange this appointment.       CC  Patient Care Team:  Tello Finney MD as PCP - General (Internal Medicine)  Tello Finney MD as Assigned PCP  Aashish Rausch MD as Assigned Endocrinology Provider

## 2022-09-27 LAB
T4 FREE SERPL-MCNC: 1.51 NG/DL (ref 0.76–1.46)
TSH SERPL DL<=0.005 MIU/L-ACNC: 2.21 MU/L (ref 0.4–4)

## 2022-09-27 NOTE — TELEPHONE ENCOUNTER
"Hospital/TCU/ED for chronic condition Discharge Protocol    \"Hi, my name is Elva Page RN, a registered nurse, and I am calling from [facility].  I am calling to follow up and see how things are going for you after your recent emergency visit/hospital/TCU stay.\"    Tell me how you are doing now that you are home?\"    I'm doing good      Discharge Instructions    \"Let's review your discharge instructions.  What is/are the follow-up recommendations?  Pt. Response: No     \"Has an appointment with your primary care provider been scheduled?\"   No (schedule appointment)   Patient declines appointment at this time. Has Annual Physical scheduled 10/4/22 with PCP    \"When you see the provider, I would recommend that you bring your medications with you.\"    Medications    \"Tell me what changed about your medicines when you discharged?\"    Changes to chronic meds?    0-1    \"What questions do you have about your medications?\"    None     New diagnoses of heart failure, COPD, diabetes, or MI?    No              Post Discharge Medication Reconciliation Status: discharge medications reconciled, continue medications without change.    Was MTM referral placed (*Make sure to put transitions as reason for referral)?   No    Call Summary    \"What questions or concerns do you have about your recent visit and your follow-up care?\"     none    \"If you have questions or things don't continue to improve, we encourage you contact us through the main clinic number (give number).  Even if the clinic is not open, triage nurses are available 24/7 to help you.     We would like you to know that our clinic has extended hours (provide information).  We also have urgent care (provide details on closest location and hours/contact info)\"      \"Thank you for your time and take care!\"             "

## 2022-10-01 DIAGNOSIS — E03.9 HYPOTHYROIDISM, UNSPECIFIED TYPE: ICD-10-CM

## 2022-10-01 RX ORDER — LEVOTHYROXINE SODIUM 50 UG/1
50 TABLET ORAL DAILY
Qty: 90 TABLET | Refills: 1 | Status: SHIPPED | OUTPATIENT
Start: 2022-10-01 | End: 2023-02-12

## 2022-10-03 ENCOUNTER — ANCILLARY PROCEDURE (OUTPATIENT)
Dept: GENERAL RADIOLOGY | Facility: CLINIC | Age: 77
End: 2022-10-03
Attending: THORACIC SURGERY (CARDIOTHORACIC VASCULAR SURGERY)
Payer: MEDICARE

## 2022-10-03 ENCOUNTER — TRANSFERRED RECORDS (OUTPATIENT)
Dept: HEALTH INFORMATION MANAGEMENT | Facility: CLINIC | Age: 77
End: 2022-10-03

## 2022-10-03 PROCEDURE — 71046 X-RAY EXAM CHEST 2 VIEWS: CPT

## 2022-10-04 ENCOUNTER — OFFICE VISIT (OUTPATIENT)
Dept: INTERNAL MEDICINE | Facility: CLINIC | Age: 77
End: 2022-10-04
Payer: MEDICARE

## 2022-10-04 VITALS
WEIGHT: 116 LBS | HEART RATE: 88 BPM | BODY MASS INDEX: 22.78 KG/M2 | TEMPERATURE: 97.1 F | OXYGEN SATURATION: 99 % | HEIGHT: 60 IN | DIASTOLIC BLOOD PRESSURE: 74 MMHG | SYSTOLIC BLOOD PRESSURE: 147 MMHG

## 2022-10-04 DIAGNOSIS — Z78.0 POSTMENOPAUSAL STATUS: ICD-10-CM

## 2022-10-04 DIAGNOSIS — C18.9 COLON CANCER METASTASIZED TO LUNG (H): ICD-10-CM

## 2022-10-04 DIAGNOSIS — J43.9 PULMONARY EMPHYSEMA, UNSPECIFIED EMPHYSEMA TYPE (H): ICD-10-CM

## 2022-10-04 DIAGNOSIS — I10 HYPERTENSION GOAL BP (BLOOD PRESSURE) < 140/90: ICD-10-CM

## 2022-10-04 DIAGNOSIS — C78.00 COLON CANCER METASTASIZED TO LUNG (H): ICD-10-CM

## 2022-10-04 DIAGNOSIS — Z00.00 ENCOUNTER FOR MEDICARE ANNUAL WELLNESS EXAM: Primary | ICD-10-CM

## 2022-10-04 DIAGNOSIS — I73.9 PAD (PERIPHERAL ARTERY DISEASE) (H): ICD-10-CM

## 2022-10-04 PROBLEM — N18.30 CKD (CHRONIC KIDNEY DISEASE) STAGE 3, GFR 30-59 ML/MIN (H): Status: RESOLVED | Noted: 2019-09-12 | Resolved: 2022-10-04

## 2022-10-04 PROCEDURE — 99214 OFFICE O/P EST MOD 30 MIN: CPT | Mod: 25 | Performed by: INTERNAL MEDICINE

## 2022-10-04 PROCEDURE — G0439 PPPS, SUBSEQ VISIT: HCPCS | Performed by: INTERNAL MEDICINE

## 2022-10-04 RX ORDER — HYDROCHLOROTHIAZIDE 12.5 MG/1
12.5 TABLET ORAL DAILY
Qty: 90 TABLET | Refills: 1 | Status: SHIPPED | OUTPATIENT
Start: 2022-10-04 | End: 2023-03-13

## 2022-10-04 ASSESSMENT — ENCOUNTER SYMPTOMS
EYE PAIN: 0
ABDOMINAL PAIN: 0
WEAKNESS: 0
FEVER: 0
HEARTBURN: 0
DYSURIA: 0
CHILLS: 0
FREQUENCY: 0
HEADACHES: 0
COUGH: 0
NERVOUS/ANXIOUS: 0
DIARRHEA: 0
CONSTIPATION: 0
DIZZINESS: 0
BREAST MASS: 0
PALPITATIONS: 0
JOINT SWELLING: 0
SORE THROAT: 0
PARESTHESIAS: 0
ARTHRALGIAS: 0
HEMATOCHEZIA: 0
NAUSEA: 0
SHORTNESS OF BREATH: 0
HEMATURIA: 0
MYALGIAS: 0

## 2022-10-04 ASSESSMENT — ACTIVITIES OF DAILY LIVING (ADL): CURRENT_FUNCTION: NO ASSISTANCE NEEDED

## 2022-10-04 NOTE — PATIENT INSTRUCTIONS
Patient Education   Personalized Prevention Plan  You are due for the preventive services outlined below.  Your care team is available to assist you in scheduling these services.  If you have already completed any of these items, please share that information with your care team to update in your medical record.  Health Maintenance Due   Topic Date Due     COPD Action Plan  Never done     Kidney Microalbumin Urine Test  06/10/2016     Your Health Risk Assessment indicates you feel you are not in good health    A healthy lifestyle helps keep the body fit and the mind alert. It helps protect you from disease, helps you fight disease, and helps prevent chronic disease (disease that doesn't go away) from getting worse. This is important as you get older and begin to notice twinges in muscles and joints and a decline in the strength and stamina you once took for granted. A healthy lifestyle includes good healthcare, good nutrition, weight control, recreation, and regular exercise. Avoid harmful substances and do what you can to keep safe. Another part of a healthy lifestyle is stay mentally active and socially involved.    Good healthcare     Have a wellness visit every year.     If you have new symptoms, let us know right away. Don't wait until the next checkup.     Take medicines exactly as prescribed and keep your medicines in a safe place. Tell us if your medicine causes problems.   Healthy diet and weight control     Eat 3 or 4 small, nutritious, low-fat, high-fiber meals a day. Include a variety of fruits, vegetables, and whole-grain foods.     Make sure you get enough calcium in your diet. Calcium, vitamin D, and exercise help prevent osteoporosis (bone thinning).     If you live alone, try eating with others when you can. That way you get a good meal and have company while you eat it.     Try to keep a healthy weight. If you eat more calories than your body uses for energy, it will be stored as fat and you will  gain weight.     Recreation   Recreation is not limited to sports and team events. It includes any activity that provides relaxation, interest, enjoyment, and exercise. Recreation provides an outlet for physical, mental, and social energy. It can give a sense of worth and achievement. It can help you stay healthy.    Mental Exercise and Social Involvement  Mental and emotional health is as important as physical health. Keep in touch with friends and family. Stay as active as possible. Continue to learn and challenge yourself.   Things you can do to stay mentally active are:    Learn something new, like a foreign language or musical instrument.     Play SCRABBLE or do crossword puzzles. If you cannot find people to play these games with you at home, you can play them with others on your computer through the Internet.     Join a games club--anything from card games to chess or checkers or lawn bowling.     Start a new hobby.     Go back to school.     Volunteer.     Read.   Keep up with world events.    Exercise for a Healthier Heart  You may wonder how you can improve the health of your heart. If you re thinking about exercise, you re on the right track. You don t need to become an athlete. But you do need a certain amount of brisk exercise to help strengthen your heart. If you have been diagnosed with a heart condition, your healthcare provider may advise exercise to help stabilize your condition. To help make exercise a habit, choose safe, fun activities.      Exercise with a friend. When activity is fun, you're more likely to stick with it.   Before you start  Check with your healthcare provider before starting an exercise program. This is especially important if you have not been active for a while. It's also important if you have a long-term (chronic) health problem such as heart disease, diabetes, or obesity. Or if you are at high risk for having these problems.   Why exercise?  Exercising regularly offers many  healthy rewards. It can help you do all of the following:     Improve your blood cholesterol level to help prevent further heart trouble    Lower your blood pressure to help prevent a stroke or heart attack    Control diabetes, or reduce your risk of getting this disease    Improve your heart and lung function    Reach and stay at a healthy weight    Make your muscles stronger so you can stay active    Prevent falls and fractures by slowing the loss of bone mass (osteoporosis)    Manage stress better    Reduce your blood pressure    Improve your sense of self and your body image  Exercise tips      Ease into your routine. Set small goals. Then build on them. If you are not sure what your activity level should be, talk with your healthcare provider first before starting an exercise routine.    Exercise on most days. Aim for a total of 150 minutes (2 hours and 30 minutes) or more of moderate-intensity aerobic activity each week. Or 75 minutes (1 hour and 15 minutes) or more of vigorous-intensity aerobic activity each week. Or try for a combination of both. Moderate activity means that you breathe heavier and your heart rate increases but you can still talk. Think about doing 40 minutes of moderate exercise, 3 to 4 times a week. For best results, activity should last for about 40 minutes to lower blood pressure and cholesterol. It's OK to work up to the 40-minute period over time. Examples of moderate-intensity activity are walking 1 mile in 15 minutes. Or doing 30 to 45 minutes of yard work.    Step up your daily activity level.  Along with your exercise program, try being more active the whole day. Walk instead of drive. Or park further away so that you take more steps each day. Do more household tasks or yard work. You may not be able to meet the advised mount of physical activity. But doing some moderate- or vigorous-intensity aerobic activity can help reduce your risk for heart disease. Your healthcare provider  can help you figure out what is best for you.    Choose 1 or more activities you enjoy.  Walking is one of the easiest things you can do. You can also try swimming, riding a bike, dancing, or taking an exercise class.    When to call your healthcare provider  Call your healthcare provider if you have any of these:     Chest pain or feel dizzy or lightheaded    Burning, tightness, pressure, or heaviness in your chest, neck, shoulders, back, or arms    Abnormal shortness of breath    More joint or muscle pain    A very fast or irregular heartbeat (palpitations)  [employer] last reviewed this educational content on 7/1/2019 2000-2021 [employer]. All rights reserved. This information is not intended as a substitute for professional medical care. Always follow your healthcare professional's instructions.          Understanding USDA MyPlate  The USDA has guidelines to help you make healthy food choices. These are called MyPlate. MyPlate shows the food groups that make up healthy meals using the image of a place setting. Before you eat, think about the healthiest choices for what to put on your plate or in your cup or bowl. To learn more about building a healthy plate, visit www.choosemyplate.gov.    The food groups    Fruits. Any fruit or 100% fruit juice counts as part of the Fruit Group. Fruits may be fresh, canned, frozen, or dried, and may be whole, cut-up, or pureed. Make 1/2 of your plate fruits and vegetables.    Vegetables. Any vegetable or 100% vegetable juice counts as a member of the Vegetable Group. Vegetables may be fresh, frozen, canned, or dried. They can be served raw or cooked and may be whole, cut-up, or mashed. Make 1/2 of your plate fruits and vegetables.    Grains. All foods made from grains are part of the Grains Group. These include wheat, rice, oats, cornmeal, and barley. Grains are often used to make foods such as bread, pasta, oatmeal, cereal, tortillas, and grits. Grains should  be no more than 1/4 of your plate. At least half of your grains should be whole grains.    Protein. This group includes meat, poultry, seafood, beans and peas, eggs, processed soy products (such as tofu), nuts (including nut butters), and seeds. Make protein choices no more than 1/4 of your plate. Meat and poultry choices should be lean or low fat.    Dairy. The Dairy Group includes all fluid milk products and foods made from milk that contain calcium, such as yogurt and cheese. (Foods that have little calcium, such as cream, butter, and cream cheese, are not part of this group.) Most dairy choices should be low-fat or fat-free.    Oils. Oils aren't a food group, but they do contain essential nutrients. However it's important to watch your intake of oils. These are fats that are liquid at room temperature. They include canola, corn, olive, soybean, vegetable, and sunflower oil. Foods that are mainly oil include mayonnaise, certain salad dressings, and soft margarines. You likely already get your daily oil allowance from the foods you eat.  Things to limit  Eating healthy also means limiting these things in your diet:       Salt (sodium). Many processed foods have a lot of sodium. To keep sodium intake down, eat fresh vegetables, meats, poultry, and seafood when possible. Purchase low-sodium, reduced-sodium, or no-salt-added food products at the store. And don't add salt to your meals at home. Instead, season them with herbs and spices such as dill, oregano, cumin, and paprika. Or try adding flavor with lemon or lime zest and juice.    Saturated fat. Saturated fats are most often found in animal products such as beef, pork, and chicken. They are often solid at room temperature, such as butter. To reduce your saturated fat intake, choose leaner cuts of meat and poultry. And try healthier cooking methods such as grilling, broiling, roasting, or baking. For a simple lower-fat swap, use plain nonfat yogurt instead of  mayonnaise when making potato salad or macaroni salad.    Added sugars. These are sugars added to foods. They are in foods such as ice cream, candy, soda, fruit drinks, sports drinks, energy drinks, cookies, pastries, jams, and syrups. Cut down on added sugars by sharing sweet treats with a family member or friend. You can also choose fruit for dessert, and drink water or other unsweetened beverages.     [employer] last reviewed this educational content on 6/1/2020 2000-2021 [employer]. All rights reserved. This information is not intended as a substitute for professional medical care. Always follow your healthcare professional's instructions.          Signs of Hearing Loss      Hearing much better with one ear can be a sign of hearing loss.   Hearing loss is a problem shared by many people. In fact, it is one of the most common health problems, particularly as people age. Most people age 65 and older have some hearing loss. By age 80, almost everyone does. Hearing loss often occurs slowly over the years. So you may not realize your hearing has gotten worse.  Have your hearing checked  Call your healthcare provider if you:    Have to strain to hear normal conversation    Have to watch other people s faces very carefully to follow what they re saying    Need to ask people to repeat what they ve said    Often misunderstand what people are saying    Turn the volume of the television or radio up so high that others complain    Feel that people are mumbling when they re talking to you    Find that the effort to hear leaves you feeling tired and irritated    Notice, when using the phone, that you hear better with one ear than the other  [employer] last reviewed this educational content on 1/1/2020 2000-2021 [employer]. All rights reserved. This information is not intended as a substitute for professional medical care. Always follow your healthcare professional's  instructions.          Urinary Incontinence, Female (Adult)   Urinary incontinence means loss of bladder control. This problem affects many women, especially as they get older. If you have incontinence, you may be embarrassed to ask for help. But know that this problem can be treated.   Types of Incontinence  There are different types of incontinence. Two of the main types are described here. You can have more than one type.     Stress incontinence. With this type, urine leaks when pressure (stress) is put on the bladder. This may happen when you cough, sneeze, or laugh. Stress incontinence most often occurs because the pelvic floor muscles that support the bladder and urethra are weak. This can happen after pregnancy and vaginal childbirth or a hysterectomy. It can also be due to excess body weight or hormone changes.    Urge incontinence (also called overactive bladder). With this type, a sudden urge to urinate is felt often. This may happen even though there may not be much urine in the bladder. The need to urinate often during the night is common. Urge incontinence most often occurs because of bladder spasms. This may be due to bladder irritation or infection. Damage to bladder nerves or pelvic muscles, constipation, and certain medicines can also lead to urge incontinence.  Treatment depends on the cause. Further evaluation is needed to find the type you have. This will likely include an exam and certain tests. Based on the results, you and your healthcare provider can then plan treatment. Until a diagnosis is made, the home care tips below can help ease symptoms.   Home care    Do pelvic floor muscle exercises, if they are prescribed. The pelvic floor muscles help support the bladder and urethra. Many women find that their symptoms improve when doing special exercises that strengthen these muscles. To do the exercises, contract the muscles you would use to stop your stream of urine. But do this when you re not  urinating. Hold for 10 seconds, then relax. Repeat 10 to 20 times in a row, at least 3 times a day. Your healthcare provider may give you other instructions for how to do the exercises and how often.    Keep a bladder diary. This helps track how often and how much you urinate over a set period of time. Bring this diary with you to your next visit with the provider. The information can help your provider learn more about your bladder problem.    Lose weight, if advised to by your provider. Extra weight puts pressure on the bladder. Your provider can help you create a weight-loss plan that s right for you. This may include exercising more and making certain diet changes.    Don't have foods and drinks that may irritate the bladder. These can include alcohol and caffeinated drinks.    Quit smoking. Smoking and other tobacco use can lead to a long-term (chronic) cough that strains the pelvic floor muscles. Smoking may also damage the bladder and urethra. Talk with your provider about treatments or methods you can use to quit smoking.    If drinking large amounts of fluid makes you have symptoms, you may be advised to limit your fluid intake. You may also be advised to drink most of your fluids during the day and to limit fluids at night.    If you re worried about urine leakage or accidents, you may wear absorbent pads to catch urine. Change the pads often. This helps reduce discomfort. It may also reduce the risk of skin or bladder infections.    Follow-up care  Follow up with your healthcare provider, or as directed. It may take some to find the right treatment for your problem. But healthy lifestyle changes can be made right away. These include such things as exercising on a regular basis, eating a healthy diet, losing weight (if needed), and quitting smoking. Your treatment plan may include special therapies or medicines. Certain procedures or surgery may also be options. Talk about any questions you have with your  provider.   When to seek medical advice  Call the healthcare provider right away if any of these occur:    Fever of 100.4 F (38 C) or higher, or as directed by your provider    Bladder pain or fullness    Belly swelling    Nausea or vomiting    Back pain    Weakness, dizziness, or fainting  [employer] last reviewed this educational content on 1/1/2020 2000-2021 [employer]. All rights reserved. This information is not intended as a substitute for professional medical care. Always follow your healthcare professional's instructions.

## 2022-10-04 NOTE — PROGRESS NOTES
"SUBJECTIVE:   Anna is a 77 year old who presents for Preventive Visit.    Patient has been advised of split billing requirements and indicates understanding: Yes  Are you in the first 12 months of your Medicare coverage?  No    Healthy Habits:     In general, how would you rate your overall health?  Fair    Frequency of exercise:  None    Duration of exercise:  Less than 15 minutes    Do you usually eat at least 4 servings of fruit and vegetables a day, include whole grains    & fiber and avoid regularly eating high fat or \"junk\" foods?  No    Taking medications regularly:  Yes    Barriers to taking medications:  None    Medication side effects:  None    Ability to successfully perform activities of daily living:  No assistance needed    Home Safety:  No safety concerns identified    Hearing Impairment:  Difficulty understanding soft or whispered speech    In the past 6 months, have you been bothered by leaking of urine? Yes    In general, how would you rate your overall mental or emotional health?  Good      PHQ-2 Total Score: 0    Additional concerns today:  No    Do you feel safe in your environment? Yes    Have you ever done Advance Care Planning? (For example, a Health Directive, POLST, or a discussion with a medical provider or your loved ones about your wishes): Yes, advance care planning is on file.       Fall risk  Fallen 2 or more times in the past year?: No  Any fall with injury in the past year?: No    Cognitive Screening   1) Repeat 3 items (Leader, Season, Table)    2) Clock draw: ABNORMAL    3) 3 item recall: Recalls 2 objects   Results: ABNORMAL clock, 1-2 items recalled: PROBABLE COGNITIVE IMPAIRMENT, **INFORM PROVIDER**    Mini-CogTM Copyright JACINTO Lagunas. Licensed by the author for use in [facility]; reprinted with permission (rosio@.[facility]). All rights reserved.      Do you have sleep apnea, excessive snoring or daytime drowsiness?: no    Reviewed and updated as needed this visit by " clinical staff   Tobacco  Allergies  Meds                Reviewed and updated as needed this visit by Provider                   Social History     Tobacco Use     Smoking status: Current Every Day Smoker     Packs/day: 1.00     Years: 58.00     Pack years: 58.00     Types: Cigarettes     Last attempt to quit: 2020     Years since quittin.2     Smokeless tobacco: Never Used   Substance Use Topics     Alcohol use: No     Alcohol/week: 0.0 standard drinks       Alcohol Use 10/4/2022   Prescreen: >3 drinks/day or >7 drinks/week? Not Applicable               Current providers sharing in care for this patient include:   Patient Care Team:  Tello Finney MD as PCP - General (Internal Medicine)  Tello Finney MD as Assigned PCP  Aashish Rausch MD as Assigned Endocrinology Provider    The following health maintenance items are reviewed in Epic and correct as of today:  Health Maintenance   Topic Date Due     COPD ACTION PLAN  Never done     MICROALBUMIN  06/10/2016     LIPID  2023     BMP  2023     HEMOGLOBIN  2023     TSH W/FREE T4 REFLEX  2023     NICOTINE/TOBACCO CESSATION COUNSELING Q 1 YR  10/04/2023     MEDICARE ANNUAL WELLNESS VISIT  10/04/2023     ANNUAL REVIEW OF HM ORDERS  10/04/2023     FALL RISK ASSESSMENT  10/04/2023     DTAP/TDAP/TD IMMUNIZATION (2 - Td or Tdap) 2026     ADVANCE CARE PLANNING  10/04/2027     DEXA  2030     SPIROMETRY  Completed     PHQ-2 (once per calendar year)  Completed     INFLUENZA VACCINE  Completed     Pneumococcal Vaccine: 65+ Years  Completed     URINALYSIS  Completed     ZOSTER IMMUNIZATION  Completed     COVID-19 Vaccine  Completed     IPV IMMUNIZATION  Aged Out     MENINGITIS IMMUNIZATION  Aged Out     HEPATITIS B IMMUNIZATION  Aged Out     HEPATITIS C SCREENING  Discontinued     LUNG CANCER SCREENING  Discontinued     Labs reviewed in EPIC    Mammogram Screening - Patient over age 75, has elected to discontinue  screenings.  Pertinent mammograms are reviewed under the imaging tab.    Review of Systems  Constitutional, HEENT, cardiovascular, pulmonary, gi and gu systems are negative, except as otherwise noted.    OBJECTIVE:   BP (!) 147/74   Pulse 88   Temp 97.1  F (36.2  C)   Ht 1.524 m (5')   Wt 52.6 kg (116 lb)   LMP  (LMP Unknown)   SpO2 99%   BMI 22.65 kg/m   Estimated body mass index is 22.65 kg/m  as calculated from the following:    Height as of this encounter: 1.524 m (5').    Weight as of this encounter: 52.6 kg (116 lb).  Physical Exam  GENERAL APPEARANCE: alert, no distress and frail  EYES: Eyes grossly normal to inspection  HENT: ear canals and TM's normal  NECK: no adenopathy, no asymmetry, masses, or scars and thyroid normal to palpation  RESP: lungs clear to auscultation - no rales, rhonchi or wheezes  CV: regular rate and rhythm, normal S1 S2, no S3 or S4, no murmur, click or rub, no peripheral edema and peripheral pulses strong  MS: no musculoskeletal defects are noted and gait is age appropriate without ataxia  SKIN: no suspicious lesions or rashes  NEURO: Normal strength and tone, sensory exam grossly normal, mentation intact and speech normal  PSYCH: mentation appears normal and affect normal/bright    Labs reviewed in Epic    ASSESSMENT / PLAN:       ICD-10-CM    1. Encounter for Medicare annual wellness exam  Z00.00    2. PAD (peripheral artery disease) (H)  I73.9 PAD Rehab Referral     Basic metabolic panel  (Ca, Cl, CO2, Creat, Gluc, K, Na, BUN)   3. Hypertension goal BP (blood pressure) < 140/90  I10 hydrochlorothiazide (HYDRODIURIL) 12.5 MG tablet     Basic metabolic panel  (Ca, Cl, CO2, Creat, Gluc, K, Na, BUN)   4. COPD- borderline obstruction on PFTs. long term smoker  J43.9    5. Colon cancer metastasized to lung (H)  C18.9     C78.00    6. Postmenopausal status  Z78.0 DX Hip/Pelvis/Spine     -Updated screening, immunizations, prevention.  Please see health maintenance list, care  gaps  -won't quit smoking.  Rec'd PAD exercise program  -add hydrochlorothiazide for BP.     Patient has been advised of split billing requirements and indicates understanding: Yes    COUNSELING:  Reviewed preventive health counseling, as reflected in patient instructions    Estimated body mass index is 22.65 kg/m  as calculated from the following:    Height as of this encounter: 1.524 m (5').    Weight as of this encounter: 52.6 kg (116 lb).        She reports that she has been smoking cigarettes. She has a 58.00 pack-year smoking history. She has never used smokeless tobacco.  Nicotine/Tobacco Cessation Plan:         Appropriate preventive services were discussed with this patient, including applicable screening as appropriate for cardiovascular disease, diabetes, osteopenia/osteoporosis, and glaucoma.  As appropriate for age/gender, discussed screening for colorectal cancer, prostate cancer, breast cancer, and cervical cancer. Checklist reviewing preventive services available has been given to the patient.    Reviewed patients plan of care and provided an AVS. The Basic Care Plan (routine screening as documented in Health Maintenance) for Anna meets the Care Plan requirement. This Care Plan has been established and reviewed with the Patient.    Counseling Resources:  ATP IV Guidelines  Pooled Cohorts Equation Calculator  Breast Cancer Risk Calculator  Breast Cancer: Medication to Reduce Risk  FRAX Risk Assessment  ICSI Preventive Guidelines  Dietary Guidelines for Americans, 2010  USDA's MyPlate  ASA Prophylaxis  Lung CA Screening    Tello Finney MD  [facility]    Identified Health Risks:    The patient was provided with suggestions to help her develop a healthy physical lifestyle.  She is at risk for lack of exercise and has been provided with information to increase physical activity for the benefit of her well-being.  The patient was counseled and encouraged to consider  modifying their diet and eating habits. She was provided with information on recommended healthy diet options.  The patient was provided with written information regarding signs of hearing loss.  Information on urinary incontinence and treatment options given to patient.

## 2022-10-18 ENCOUNTER — TRANSFERRED RECORDS (OUTPATIENT)
Dept: INTERNAL MEDICINE | Facility: CLINIC | Age: 77
End: 2022-10-18

## 2022-10-25 ENCOUNTER — ANCILLARY PROCEDURE (OUTPATIENT)
Dept: CT IMAGING | Facility: CLINIC | Age: 77
End: 2022-10-25
Attending: INTERNAL MEDICINE
Payer: MEDICARE

## 2022-10-25 DIAGNOSIS — C18.5: ICD-10-CM

## 2022-10-25 LAB
CREAT BLD-MCNC: 0.5 MG/DL (ref 0.5–1)
GFR SERPL CREATININE-BSD FRML MDRD: >60 ML/MIN/1.73M2

## 2022-10-25 PROCEDURE — 255N000002 HC RX 255 OP 636: Performed by: INTERNAL MEDICINE

## 2022-10-25 PROCEDURE — 82565 ASSAY OF CREATININE: CPT

## 2022-10-25 PROCEDURE — 74177 CT ABD & PELVIS W/CONTRAST: CPT

## 2022-10-25 RX ADMIN — IOHEXOL 75 ML: 350 INJECTION, SOLUTION INTRAVENOUS at 14:07

## 2022-11-01 ENCOUNTER — TRANSFERRED RECORDS (OUTPATIENT)
Dept: HEALTH INFORMATION MANAGEMENT | Facility: CLINIC | Age: 77
End: 2022-11-01

## 2022-11-11 ENCOUNTER — TRANSFERRED RECORDS (OUTPATIENT)
Dept: HEALTH INFORMATION MANAGEMENT | Facility: CLINIC | Age: 77
End: 2022-11-11

## 2022-11-29 ENCOUNTER — TRANSFERRED RECORDS (OUTPATIENT)
Dept: HEALTH INFORMATION MANAGEMENT | Facility: CLINIC | Age: 77
End: 2022-11-29

## 2022-11-30 NOTE — DISCHARGE INSTRUCTIONS
Liver Biopsy Discharge Instructions     After you go home:      You may resume your normal diet    Have an adult stay with you for 6 hours if you received sedation       For 24 hours - due to the sedation you received:    Relax and take it easy    Do NOT make any important or legal decisions    Do NOT drive or operate machines at home or at work    Do NOT drink alcohol    Care of Puncture Site:      For the first 48 hrs, check your puncture site every couple hours while you are awake     You may remove/change the bandaid tomorrow    You may shower tomorrow    No tub baths, whirlpools or swimming until your puncture site has fully healed    Activity       You may go back to normal activity in 24 hours     Wait 48 hours before lifting, straining, exercise or other strenuous activity    Medicines:      You may resume all medications    Resume your Warfarin/Coumadin at your regular dose today. Follow up with your provider to have your INR rechecked    Resume your Platelet Inhibitors and Aspirin tomorrow at your regular dose    For minor pain, you may take Acetaminophen (Tylenol) or Ibuprofen (Advil)            Call the provider who ordered this test if:      Increased pain or a large or growing hard lump around the site    Blood or fluid is draining from the site    The site is red, swollen, hot or tender    Chills or a fever greater than 101 F (38 C)    Pain that is getting worse    Any questions or concerns    Call  911 or go to the Emergency Room if:      Severe pain or trouble breathing    Bleeding that you cannot control        If you have questions call:          [facility] @ [phone]      The provider who performed your procedure was _________________.           97

## 2022-12-06 DIAGNOSIS — E78.5 HYPERLIPIDEMIA LDL GOAL <70: ICD-10-CM

## 2022-12-06 DIAGNOSIS — I10 ESSENTIAL HYPERTENSION WITH GOAL BLOOD PRESSURE LESS THAN 140/90: ICD-10-CM

## 2022-12-07 RX ORDER — ATORVASTATIN CALCIUM 40 MG/1
TABLET, FILM COATED ORAL
Qty: 90 TABLET | Refills: 2 | Status: SHIPPED | OUTPATIENT
Start: 2022-12-07 | End: 2023-09-06

## 2022-12-07 NOTE — TELEPHONE ENCOUNTER
Routing refill request to provider for review/approval because:  Labs out of range:    BP Readings from Last 3 Encounters:   10/04/22 (!) 147/74   09/21/22 108/56   09/16/22 128/80         Justice L. Phoenix, RN

## 2022-12-08 RX ORDER — LOSARTAN POTASSIUM 100 MG/1
TABLET ORAL
Qty: 90 TABLET | Refills: 0 | Status: SHIPPED | OUTPATIENT
Start: 2022-12-08 | End: 2023-03-06

## 2022-12-27 ENCOUNTER — TRANSFERRED RECORDS (OUTPATIENT)
Dept: HEALTH INFORMATION MANAGEMENT | Facility: CLINIC | Age: 77
End: 2022-12-27

## 2023-01-19 ENCOUNTER — HOSPITAL ENCOUNTER (OUTPATIENT)
Dept: BONE DENSITY | Facility: CLINIC | Age: 78
Discharge: HOME | End: 2023-01-19
Attending: INTERNAL MEDICINE | Admitting: INTERNAL MEDICINE
Payer: MEDICARE

## 2023-01-19 DIAGNOSIS — Z78.0 POSTMENOPAUSAL STATUS: ICD-10-CM

## 2023-01-19 PROCEDURE — 77080 DXA BONE DENSITY AXIAL: CPT

## 2023-01-20 ENCOUNTER — ANCILLARY PROCEDURE (OUTPATIENT)
Dept: CT IMAGING | Facility: CLINIC | Age: 78
End: 2023-01-20
Attending: INTERNAL MEDICINE
Payer: MEDICARE

## 2023-01-20 DIAGNOSIS — C18.5 MALIGNANT NEOPLASM OF SPLENIC FLEXURE (H): ICD-10-CM

## 2023-01-20 LAB
CREAT BLD-MCNC: 0.7 MG/DL (ref 0.5–1)
GFR SERPL CREATININE-BSD FRML MDRD: >60 ML/MIN/1.73M2

## 2023-01-20 PROCEDURE — 74177 CT ABD & PELVIS W/CONTRAST: CPT

## 2023-01-20 PROCEDURE — 250N000011 HC RX IP 250 OP 636: Performed by: INTERNAL MEDICINE

## 2023-01-20 PROCEDURE — 82565 ASSAY OF CREATININE: CPT

## 2023-01-20 RX ORDER — IOPAMIDOL 755 MG/ML
100 INJECTION, SOLUTION INTRAVASCULAR ONCE
Status: COMPLETED | OUTPATIENT
Start: 2023-01-20 | End: 2023-01-20

## 2023-01-20 RX ADMIN — Medication 5 ML: at 14:53

## 2023-01-20 RX ADMIN — IOPAMIDOL 75 ML: 755 INJECTION, SOLUTION INTRAVENOUS at 14:53

## 2023-01-24 ENCOUNTER — TRANSFERRED RECORDS (OUTPATIENT)
Dept: HEALTH INFORMATION MANAGEMENT | Facility: CLINIC | Age: 78
End: 2023-01-24

## 2023-01-26 ENCOUNTER — OFFICE VISIT (OUTPATIENT)
Dept: ENDOCRINOLOGY | Facility: CLINIC | Age: 78
End: 2023-01-26
Payer: MEDICARE

## 2023-01-26 VITALS
WEIGHT: 115.2 LBS | HEIGHT: 60 IN | SYSTOLIC BLOOD PRESSURE: 136 MMHG | BODY MASS INDEX: 22.62 KG/M2 | DIASTOLIC BLOOD PRESSURE: 71 MMHG | HEART RATE: 82 BPM

## 2023-01-26 DIAGNOSIS — E03.9 HYPOTHYROIDISM, UNSPECIFIED TYPE: Primary | ICD-10-CM

## 2023-01-26 PROCEDURE — 99213 OFFICE O/P EST LOW 20 MIN: CPT | Performed by: INTERNAL MEDICINE

## 2023-01-26 NOTE — LETTER
1/26/2023         RE: Anna Dyer  [address]  [facility] [address]        Dear Colleague,    Thank you for referring your patient, Anna Dyer, to the [facility] [place]. Please see a copy of my visit note below.      Recent issues:  Hypothyroidism follow-up evaluation  Recent minor injury to medial left ankle  Reviewed medical history from patient and Epic chart record        2017. History of colon cancer- stage 4 metastatic adenocarcinoma with primary tumor originating from splenic flexure,    mets to liver, unresectable presentation per med oncology records  Treatment with chemotherapy, then left hemicolectomy, and subsequent chemotherapy    Lap resection of liver met, limited left thoracotomy and biopsy    History of thrombocytopenia, hypercalcemia, peripheral neuropathy   Medical oncology evaluations with Dr. Patrick Dreyer/[facility]      4/6/22 (medical oncology) labs: TSH 8.39  4/13/22 CT abd/pelvis:   Pulmonary nodule    Thyroid gland unremarkable, per radiologist    Neck radiation treatment: None known   Thyroid med use:  None  Fam Hx thyroid disease: Brother, son- hypothyroidism      7/19/22. Initial endocrinology evaluation with me at [place]  Reviewed health history and thyroid issues  Began levothyroxine medication treatment  Recent [facility] labs include:  Lab Results   Component Value Date    TSH 2.21 09/26/2022    T4 1.51 (H) 09/26/2022    TPO <10 07/27/2022     Current dose:  Levothyroxine 0.05 mg daily      Lives in [place], [place]  Sees Dr. Tello Finney/[facility] for general medicine evaluations.  Also sees Dr. Patrick Dreyer/[facility]    PMH/PSH:  Past Medical History:   Diagnosis Date     Cancer (H)     metastatic colorectal adenocarcinoma     Cataract      Chronic kidney disease      COPD (chronic obstructive pulmonary disease) (H)      Hypertension     No cardiologist     Hypothyroidism      Nicotine dependence      PAD (peripheral  artery disease) (H)      Peripheral neuropathy      RLS (restless legs syndrome)      Thrombocytopenia (H)      Ventral hernia      Past Surgical History:   Procedure Laterality Date     BIOPSY      liver     BREAST SURGERY      breast implants     COLONOSCOPY N/A 3/28/2019    Procedure: INTRAOPERATIVE COLONOSCOPY,;  Surgeon: Jesus Caba MD;  Location:  OR     COSMETIC SURGERY      facelift and tummy tuck     GYN SURGERY      tubal     INSERT PORT VASCULAR ACCESS N/A 3/24/2017    Procedure: INSERT PORT VASCULAR ACCESS;  Surgeon: Yemi Ordaz MD;  Location:  OR     IR LOWER EXTREMITY ANGIOGRAM BILATERAL  3/12/2020     LAPAROSCOPIC ASSISTED COLECTOMY LEFT (DESCENDING) N/A 1/25/2018    Procedure: LAPAROSCOPIC ASSISTED COLECTOMY LEFT (DESCENDING);;  Surgeon: Maddie Baron MD;  Location:  OR     LAPAROSCOPIC ASSISTED COLOSTOMY TAKEDOWN N/A 3/28/2019    Procedure: LAPAROSCOPIC ASSISTED COLOSTOMY REVERSAL;  Surgeon: Jesus Caba MD;  Location:  OR     LAPAROSCOPIC HERNIORRHAPHY VENTRAL N/A 8/12/2020    Procedure: COMBINED OPEN AND LAPAROSCOPIC VENTRAL HERNIA REPAIR;  Surgeon: Maddy Gonzales MD;  Location:  OR     LAPAROSCOPY DIAGNOSTIC (GENERAL) N/A 3/28/2019    Procedure: DIAGNOSTIC LAPAROSCOPY;  Surgeon: Jesus Caba MD;  Location:  OR     LAPAROTOMY EXPLORATORY N/A 1/25/2018    Procedure: LAPAROTOMY EXPLORATORY;  exploratory laparoscopy, left hemicolectomy, transverse colostomy;  Surgeon: Maddie Baron MD;  Location: [facility] OR     THORACOSCOPIC WEDGE RESECTION LUNG Right 9/20/2022    Procedure: WEDGE RESECTION RIGHT UPPER LOBE LUNG NODULE;  Surgeon: Yemi Ordaz MD;  Location:  OR     THORACOSCOPY Left 2/23/2021    Procedure: LEFT THORACOTOMY, ANATOMICAL POSTERIOR SEGMENTECTOMY LEFT UPPER LOBE ;  Surgeon: Yemi Ordaz MD;  Location:  OR     THORACOSCOPY Right 9/20/2022    Procedure: RIGHT VIDEO ASSISTED THORACOSCOPY;  Surgeon: Yemi Ordaz  MD Chung;  Location:  OR       Family Hx:  Family History   Problem Relation Age of Onset     Family History Negative Father      Family History Negative Mother      Psoriasis Brother         half-siblings     Family History Negative Sister      Hypothyroidism Son          Social Hx:  Social History     Socioeconomic History     Marital status:      Spouse name: Not on file     Number of children: Not on file     Years of education: Not on file     Highest education level: Not on file   Occupational History     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 No   Social History Narrative     Not on file     Social Determinants of Health     Financial Resource Strain: Not on file   Food Insecurity: Not on file   Transportation Needs: Not on file   Physical Activity: Not on file   Stress: Not on file   Social Connections: Not on file   Intimate Partner Violence: Not on file   Housing Stability: Not on file          MEDICATIONS:  has a current medication list which includes the following prescription(s): acetaminophen, amlodipine, aspirin, atorvastatin, ergocalciferol, gabapentin, homeopathic products, hydrochlorothiazide, levothyroxine, lidocaine-prilocaine, lorazepam, losartan, and nicotine.    ROS:     ROS: 10 point ROS neg other than the symptoms noted above in the HPI.    GENERAL:  fatigue, mild wt gain?; denies fevers, chills, night sweats.   HEENT: no dysphagia, odonophagia, diplopia, neck  pain  THYROID:  no apparent hyper or hypothyroid symptoms  CV: no chest pain, pressure, palpitations  LUNGS: no SOB, HENRY, cough, wheezing   ABDOMEN: constipation; denies diarrhea, abdominal pain  EXTREMITIES: no rashes, ulcers, edema  NEUROLOGY: no headaches, denies changes in vision, tingling, extremitiy numbness   MSK: no muscle aches or pains, weakness  SKIN: no rashes or lesions  : no menses  PSYCH:  stable mood, no significant anxiety or depression  ENDOCRINE: no heat or cold intolerance      Physical Exam   VS: /71   Pulse 82   Ht 1.524 m (5')   Wt 52.3 kg (115 lb 3.2 oz)   LMP  (LMP Unknown)   BMI 22.50 kg/m    GENERAL: AXOX3, NAD, well dressed, answering questions appropriately, appears stated age.  ENT: no nose swelling or nasal discharge, mouth redness or gum changes.  EYES: eyes grossly normal to inspection, conjunctivae and sclerae normal, no exophthalmos or proptosis  THYROID:  no apparent nodules or goiter  LUNGS: no audible wheeze, cough or visible cyanosis, or increased work of breathing  ABDOMEN: abdomen normal size  EXTREMITIES: no edema noted  NEUROLOGY: CN grossly intact, no tremors  MSK: grossly intact  SKIN:  Anterior chest central line with chemo bag; no apparent skin lesions, rash, or edema with visualized skin appearance  PSYCH: mentation appears normal, affect normal/bright, judgement and insight intact,   normal speech and appearance well groomed      LABS:    All pertinent notes, labs, and images personally reviewed by me.     A/P:  Encounter Diagnosis   Name Primary?     Hypothyroidism, unspecified type Yes       Comments:  Reviewed health history and thyroid issues.  Previous 4/2022 labs at [facility] included mildly elevated TSH, indicating mild hypothyroidism  Reviewed and interpreted tests that I previously ordered.   Ordered appropriate tests for the endocrinology disease management.    Management options discussed and implemented after shared medical decision making  with the patient.  Hypothyroidism problem is chronic-stable    Plan:  Reviewed general issues with the hypothyroidism diagnosis and management  Discussed lab tests used to assess patient thyroid hormone levels  Reviewed thyroid medication treatment with levothyroxine medication, dosing.    Recommend:  Continue current levothyroxine 0.05 mg daily dose plan  Plan repeat lab tests soon   Discussed the nearby [facility] clinic   Will coordinate the thyroid lab testing with her next [facility] appt    Lab orders placed  Monitor for symptom changes  No thyroid U/S imaging needed at this time  Will summarize results and recommendations when labs available    Discussed importance of seeing her PCP for general medicine appointments and also acute care visits.  Addressed patient questions today     There are no Patient Instructions on file for this visit.    Future labs ordered today:   Orders Placed This Encounter   Procedures     Thyroxine Free by [facility]     TSH     Radiology/Consults ordered today: None    Total time spent on day of encounter:  22 min    Follow-up:  1/2024, Return    GARY Rausch MD, MS  Endocrinology  [facility]    CC: Tello Finney       Again, thank you for allowing me to participate in the care of your patient.        Sincerely,        Aashish Rausch MD

## 2023-01-26 NOTE — PROGRESS NOTES
Recent issues:  Hypothyroidism follow-up evaluation  Recent minor injury to medial left ankle  Reviewed medical history from patient and Epic chart record        2017. History of colon cancer- stage 4 metastatic adenocarcinoma with primary tumor originating from splenic flexure,    mets to liver, unresectable presentation per med oncology records  Treatment with chemotherapy, then left hemicolectomy, and subsequent chemotherapy    Lap resection of liver met, limited left thoracotomy and biopsy    History of thrombocytopenia, hypercalcemia, peripheral neuropathy   Medical oncology evaluations with Dr. Patrick Dreyer/[facility]      4/6/22 (medical oncology) labs: TSH 8.39  4/13/22 CT abd/pelvis:   Pulmonary nodule    Thyroid gland unremarkable, per radiologist    Neck radiation treatment: None known   Thyroid med use:  None  Fam Hx thyroid disease: Brother, son- hypothyroidism      7/19/22. Initial endocrinology evaluation with me at [place]  Reviewed health history and thyroid issues  Began levothyroxine medication treatment  Recent [facility] labs include:  Lab Results   Component Value Date    TSH 2.21 09/26/2022    T4 1.51 (H) 09/26/2022    TPO <10 07/27/2022     Current dose:  Levothyroxine 0.05 mg daily      Lives in [place], [place]  Sees Dr. Tello Finney/[facility] for general medicine evaluations.  Also sees Dr. Patrick Dreyer/[facility]    PMH/PSH:  Past Medical History:   Diagnosis Date     Cancer (H)     metastatic colorectal adenocarcinoma     Cataract      Chronic kidney disease      COPD (chronic obstructive pulmonary disease) (H)      Hypertension     No cardiologist     Hypothyroidism      Nicotine dependence      PAD (peripheral artery disease) (H)      Peripheral neuropathy      RLS (restless legs syndrome)      Thrombocytopenia (H)      Ventral hernia      Past Surgical History:   Procedure Laterality Date     BIOPSY      liver     BREAST SURGERY      breast implants      COLONOSCOPY N/A 3/28/2019    Procedure: INTRAOPERATIVE COLONOSCOPY,;  Surgeon: Jesus Caba MD;  Location:  OR     COSMETIC SURGERY      facelift and tummy tuck     GYN SURGERY      tubal     INSERT PORT VASCULAR ACCESS N/A 3/24/2017    Procedure: INSERT PORT VASCULAR ACCESS;  Surgeon: Yemi Ordaz MD;  Location:  OR     IR LOWER EXTREMITY ANGIOGRAM BILATERAL  3/12/2020     LAPAROSCOPIC ASSISTED COLECTOMY LEFT (DESCENDING) N/A 1/25/2018    Procedure: LAPAROSCOPIC ASSISTED COLECTOMY LEFT (DESCENDING);;  Surgeon: Maddie Baron MD;  Location: [facility] OR     LAPAROSCOPIC ASSISTED COLOSTOMY TAKEDOWN N/A 3/28/2019    Procedure: LAPAROSCOPIC ASSISTED COLOSTOMY REVERSAL;  Surgeon: Jesus Caba MD;  Location:  OR     LAPAROSCOPIC HERNIORRHAPHY VENTRAL N/A 8/12/2020    Procedure: COMBINED OPEN AND LAPAROSCOPIC VENTRAL HERNIA REPAIR;  Surgeon: Maddy Gonzales MD;  Location:  OR     LAPAROSCOPY DIAGNOSTIC (GENERAL) N/A 3/28/2019    Procedure: DIAGNOSTIC LAPAROSCOPY;  Surgeon: Jesus Caba MD;  Location:  OR     LAPAROTOMY EXPLORATORY N/A 1/25/2018    Procedure: LAPAROTOMY EXPLORATORY;  exploratory laparoscopy, left hemicolectomy, transverse colostomy;  Surgeon: Maddie Baron MD;  Location:  OR     THORACOSCOPIC WEDGE RESECTION LUNG Right 9/20/2022    Procedure: WEDGE RESECTION RIGHT UPPER LOBE LUNG NODULE;  Surgeon: Yemi Ordaz MD;  Location:  OR     THORACOSCOPY Left 2/23/2021    Procedure: LEFT THORACOTOMY, ANATOMICAL POSTERIOR SEGMENTECTOMY LEFT UPPER LOBE ;  Surgeon: Yemi Ordaz MD;  Location:  OR     THORACOSCOPY Right 9/20/2022    Procedure: RIGHT VIDEO ASSISTED THORACOSCOPY;  Surgeon: Yemi Ordaz MD;  Location:  OR       Family Hx:  Family History   Problem Relation Age of Onset     Family History Negative Father      Family History Negative Mother      Psoriasis Brother         half-siblings     Family History Negative Sister       Hypothyroidism Son          Social Hx:  Social History     Socioeconomic History     Marital status:      Spouse name: Not on file     Number of children: Not on file     Years of education: Not on file     Highest education level: Not on file   Occupational History     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 No   Social History Narrative     Not on file     Social Determinants of Health     Financial Resource Strain: Not on file   Food Insecurity: Not on file   Transportation Needs: Not on file   Physical Activity: Not on file   Stress: Not on file   Social Connections: Not on file   Intimate Partner Violence: Not on file   Housing Stability: Not on file          MEDICATIONS:  has a current medication list which includes the following prescription(s): acetaminophen, amlodipine, aspirin, atorvastatin, ergocalciferol, gabapentin, homeopathic products, hydrochlorothiazide, levothyroxine, lidocaine-prilocaine, lorazepam, losartan, and nicotine.    ROS:     ROS: 10 point ROS neg other than the symptoms noted above in the HPI.    GENERAL:  fatigue, mild wt gain?; denies fevers, chills, night sweats.   HEENT: no dysphagia, odonophagia, diplopia, neck pain  THYROID:  no apparent hyper or hypothyroid symptoms  CV: no chest pain, pressure, palpitations  LUNGS: no SOB, HENRY, cough, wheezing   ABDOMEN: constipation; denies diarrhea, abdominal pain  EXTREMITIES: no rashes, ulcers, edema  NEUROLOGY: no headaches,  denies changes in vision, tingling, extremitiy numbness   MSK: no muscle aches or pains, weakness  SKIN: no rashes or lesions  : no menses  PSYCH:  stable mood, no significant anxiety or depression  ENDOCRINE: no heat or cold intolerance      Physical Exam   VS: /71   Pulse 82   Ht 1.524 m (5')   Wt 52.3 kg (115 lb 3.2 oz)   LMP  (LMP Unknown)   BMI 22.50 kg/m    GENERAL: AXOX3, NAD, well dressed, answering questions appropriately, appears stated age.  ENT: no nose swelling or nasal discharge, mouth redness or gum changes.  EYES: eyes grossly normal to inspection, conjunctivae and sclerae normal, no exophthalmos or proptosis  THYROID:  no apparent nodules or goiter  LUNGS: no audible wheeze, cough or visible cyanosis, or increased work of breathing  ABDOMEN: abdomen normal size  EXTREMITIES: no edema noted  NEUROLOGY: CN grossly intact, no tremors  MSK: grossly intact  SKIN:  Anterior chest central line with chemo bag; no apparent skin lesions, rash, or edema with visualized skin appearance  PSYCH: mentation appears normal, affect normal/bright, judgement and insight intact,   normal speech and appearance well groomed      LABS:    All pertinent notes, labs, and images personally reviewed by me.     A/P:  Encounter Diagnosis   Name Primary?     Hypothyroidism, unspecified type Yes       Comments:  Reviewed health history and thyroid issues.  Previous 4/2022 labs at [facility] included mildly elevated TSH, indicating mild hypothyroidism  Reviewed and interpreted tests that I previously ordered.   Ordered appropriate tests for the endocrinology disease management.    Management options discussed and implemented after shared medical decision making with the patient.  Hypothyroidism problem is chronic-stable    Plan:  Reviewed general issues with the hypothyroidism diagnosis and management  Discussed lab tests used to assess patient thyroid hormone levels  Reviewed thyroid medication treatment with  levothyroxine medication, dosing.    Recommend:  Continue current levothyroxine 0.05 mg daily dose plan  Plan repeat lab tests soon   Discussed the nearby [facility] clinic   Will coordinate the thyroid lab testing with her next [facility] appt    Lab orders placed  Monitor for symptom changes  No thyroid U/S imaging needed at this time  Will summarize results and recommendations when labs available    Discussed importance of seeing her PCP for general medicine appointments and also acute care visits.  Addressed patient questions today     There are no Patient Instructions on file for this visit.    Future labs ordered today:   Orders Placed This Encounter   Procedures     Thyroxine Free by [facility]     TSH     Radiology/Consults ordered today: None    Total time spent on day of encounter:  22 min    Follow-up:  1/2024, Return    GARY Rausch MD, MS  Endocrinology  [facility]    CC: Tello Finney

## 2023-02-07 ENCOUNTER — LAB (OUTPATIENT)
Dept: LAB | Facility: CLINIC | Age: 78
End: 2023-02-07
Payer: MEDICARE

## 2023-02-07 DIAGNOSIS — I73.9 PAD (PERIPHERAL ARTERY DISEASE) (H): ICD-10-CM

## 2023-02-07 DIAGNOSIS — I10 HYPERTENSION GOAL BP (BLOOD PRESSURE) < 140/90: ICD-10-CM

## 2023-02-07 DIAGNOSIS — E03.9 HYPOTHYROIDISM, UNSPECIFIED TYPE: ICD-10-CM

## 2023-02-07 LAB
ANION GAP SERPL CALCULATED.3IONS-SCNC: 14 MMOL/L (ref 7–15)
BUN SERPL-MCNC: 16.6 MG/DL (ref 8–23)
CALCIUM SERPL-MCNC: 9.8 MG/DL (ref 8.8–10.2)
CHLORIDE SERPL-SCNC: 97 MMOL/L (ref 98–107)
CREAT SERPL-MCNC: 0.76 MG/DL (ref 0.51–0.95)
DEPRECATED HCO3 PLAS-SCNC: 24 MMOL/L (ref 22–29)
GFR SERPL CREATININE-BSD FRML MDRD: 81 ML/MIN/1.73M2
GLUCOSE SERPL-MCNC: 96 MG/DL (ref 70–99)
POTASSIUM SERPL-SCNC: 4.2 MMOL/L (ref 3.4–5.3)
SODIUM SERPL-SCNC: 135 MMOL/L (ref 136–145)
TSH SERPL DL<=0.005 MIU/L-ACNC: 2.5 UIU/ML (ref 0.3–4.2)

## 2023-02-07 PROCEDURE — 36415 COLL VENOUS BLD VENIPUNCTURE: CPT

## 2023-02-07 PROCEDURE — 84439 ASSAY OF FREE THYROXINE: CPT | Mod: 90

## 2023-02-07 PROCEDURE — 80048 BASIC METABOLIC PNL TOTAL CA: CPT

## 2023-02-07 PROCEDURE — 84443 ASSAY THYROID STIM HORMONE: CPT

## 2023-02-07 PROCEDURE — 99000 SPECIMEN HANDLING OFFICE-LAB: CPT

## 2023-02-12 DIAGNOSIS — E03.9 HYPOTHYROIDISM, UNSPECIFIED TYPE: ICD-10-CM

## 2023-02-12 LAB — T4 FREE SERPL DIALY-MCNC: 2 NG/DL

## 2023-02-12 RX ORDER — LEVOTHYROXINE SODIUM 50 UG/1
50 TABLET ORAL DAILY
Qty: 90 TABLET | Refills: 3 | Status: SHIPPED | OUTPATIENT
Start: 2023-02-12 | End: 2024-01-18

## 2023-02-21 ENCOUNTER — TRANSFERRED RECORDS (OUTPATIENT)
Dept: HEALTH INFORMATION MANAGEMENT | Facility: CLINIC | Age: 78
End: 2023-02-21

## 2023-03-06 DIAGNOSIS — I10 ESSENTIAL HYPERTENSION WITH GOAL BLOOD PRESSURE LESS THAN 140/90: ICD-10-CM

## 2023-03-06 RX ORDER — LOSARTAN POTASSIUM 100 MG/1
TABLET ORAL
Qty: 90 TABLET | Refills: 1 | Status: SHIPPED | OUTPATIENT
Start: 2023-03-06 | End: 2023-06-21

## 2023-03-13 DIAGNOSIS — I10 HYPERTENSION GOAL BP (BLOOD PRESSURE) < 140/90: ICD-10-CM

## 2023-03-13 RX ORDER — HYDROCHLOROTHIAZIDE 12.5 MG/1
12.5 TABLET ORAL DAILY
Qty: 90 TABLET | Refills: 1 | Status: SHIPPED | OUTPATIENT
Start: 2023-03-13 | End: 2023-10-06

## 2023-03-13 NOTE — TELEPHONE ENCOUNTER
Medication Question or Refill    Contacts       Type Contact Phone/Fax    03/13/2023 11:08 AM CDT Phone (Incoming) [facility] AT Portal to [facility] [[email or web address]] (Pharmacy) [phone]          What medication are you calling about (include dose and sig)?: hydrochlorothiazide (HYDRODIURIL) 12.5 MG tablet    Preferred Pharmacy:   [facility] AT Portal to [facility]  [place] [address]  Phone: [phone] Fax: [phone]        Controlled Substance Agreement on file:   CSA -- Patient Level:    CSA: None found at the patient level.       Who prescribed the medication?: Dr. Finney    Do you need a refill? Yes    When did you use the medication last? NA    Patient offered an appointment? No    Do you have any questions or concerns?  No      Could we send this information to you in [facility] or would you prefer to receive a phone call?:   No preference   Okay to leave a detailed message?: Yes at Cell number on file:    Telephone Information:   Mobile [phone]

## 2023-03-14 DIAGNOSIS — I10 HYPERTENSION GOAL BP (BLOOD PRESSURE) < 140/90: ICD-10-CM

## 2023-03-14 RX ORDER — HYDROCHLOROTHIAZIDE 12.5 MG/1
TABLET ORAL
Qty: 90 TABLET | Refills: 1 | OUTPATIENT
Start: 2023-03-14

## 2023-03-21 ENCOUNTER — TRANSFERRED RECORDS (OUTPATIENT)
Dept: HEALTH INFORMATION MANAGEMENT | Facility: CLINIC | Age: 78
End: 2023-03-21

## 2023-04-13 ENCOUNTER — ANCILLARY PROCEDURE (OUTPATIENT)
Dept: CT IMAGING | Facility: CLINIC | Age: 78
End: 2023-04-13
Attending: INTERNAL MEDICINE
Payer: MEDICARE

## 2023-04-13 DIAGNOSIS — C18.5 MALIGNANT NEOPLASM OF SPLENIC FLEXURE (H): ICD-10-CM

## 2023-04-13 LAB
CREAT BLD-MCNC: 0.7 MG/DL (ref 0.5–1)
GFR SERPL CREATININE-BSD FRML MDRD: >60 ML/MIN/1.73M2

## 2023-04-13 PROCEDURE — 74177 CT ABD & PELVIS W/CONTRAST: CPT

## 2023-04-13 PROCEDURE — 82565 ASSAY OF CREATININE: CPT

## 2023-04-13 PROCEDURE — 255N000002 HC RX 255 OP 636: Performed by: INTERNAL MEDICINE

## 2023-04-13 RX ADMIN — IOHEXOL 100 ML: 350 INJECTION, SOLUTION INTRAVENOUS at 15:12

## 2023-04-18 ENCOUNTER — TRANSFERRED RECORDS (OUTPATIENT)
Dept: INTERNAL MEDICINE | Facility: CLINIC | Age: 78
End: 2023-04-18
Payer: MEDICARE

## 2023-04-28 ENCOUNTER — ANCILLARY PROCEDURE (OUTPATIENT)
Dept: MRI IMAGING | Facility: CLINIC | Age: 78
End: 2023-04-28
Attending: INTERNAL MEDICINE
Payer: MEDICARE

## 2023-04-28 DIAGNOSIS — C18.5 MALIGNANT NEOPLASM OF SPLENIC FLEXURE (H): ICD-10-CM

## 2023-04-28 DIAGNOSIS — D48.19 NEOPLASM OF UNCERTAIN BEHAVIOR OF CONNECTIVE AND OTHER SOFT TISSUE: ICD-10-CM

## 2023-04-28 PROCEDURE — A9585 GADOBUTROL INJECTION: HCPCS | Performed by: INTERNAL MEDICINE

## 2023-04-28 PROCEDURE — 72197 MRI PELVIS W/O & W/DYE: CPT

## 2023-04-28 PROCEDURE — 250N000011 HC RX IP 250 OP 636: Performed by: INTERNAL MEDICINE

## 2023-04-28 PROCEDURE — 255N000002 HC RX 255 OP 636: Performed by: INTERNAL MEDICINE

## 2023-04-28 RX ORDER — GADOBUTROL 604.72 MG/ML
6 INJECTION INTRAVENOUS ONCE
Status: COMPLETED | OUTPATIENT
Start: 2023-04-28 | End: 2023-04-28

## 2023-04-28 RX ORDER — HEPARIN SODIUM (PORCINE) LOCK FLUSH IV SOLN 100 UNIT/ML 100 UNIT/ML
5 SOLUTION INTRAVENOUS ONCE
Status: COMPLETED | OUTPATIENT
Start: 2023-04-28 | End: 2023-04-28

## 2023-04-28 RX ADMIN — GADOBUTROL 6 ML: 604.72 INJECTION INTRAVENOUS at 13:32

## 2023-04-28 RX ADMIN — Medication 5 ML: at 13:33

## 2023-06-13 ENCOUNTER — TRANSFERRED RECORDS (OUTPATIENT)
Dept: HEALTH INFORMATION MANAGEMENT | Facility: CLINIC | Age: 78
End: 2023-06-13
Payer: MEDICARE

## 2023-06-27 ENCOUNTER — TRANSFERRED RECORDS (OUTPATIENT)
Dept: HEALTH INFORMATION MANAGEMENT | Facility: CLINIC | Age: 78
End: 2023-06-27
Payer: MEDICARE

## 2023-07-06 ENCOUNTER — ANCILLARY PROCEDURE (OUTPATIENT)
Dept: CT IMAGING | Facility: CLINIC | Age: 78
End: 2023-07-06
Attending: NURSE PRACTITIONER
Payer: MEDICARE

## 2023-07-06 DIAGNOSIS — C18.5 MALIGNANT NEOPLASM OF SPLENIC FLEXURE (H): ICD-10-CM

## 2023-07-06 DIAGNOSIS — C78.02: ICD-10-CM

## 2023-07-06 LAB
CREAT BLD-MCNC: 0.8 MG/DL (ref 0.5–1)
CREAT BLD-MCNC: <0.2 MG/DL (ref 0.5–1)
GFR SERPL CREATININE-BSD FRML MDRD: >60 ML/MIN/1.73M2

## 2023-07-06 PROCEDURE — 250N000009 HC RX 250: Performed by: NURSE PRACTITIONER

## 2023-07-06 PROCEDURE — 250N000011 HC RX IP 250 OP 636: Performed by: NURSE PRACTITIONER

## 2023-07-06 PROCEDURE — 250N000011 HC RX IP 250 OP 636: Mod: JZ | Performed by: NURSE PRACTITIONER

## 2023-07-06 PROCEDURE — 74177 CT ABD & PELVIS W/CONTRAST: CPT

## 2023-07-06 PROCEDURE — 82565 ASSAY OF CREATININE: CPT

## 2023-07-06 RX ORDER — IOPAMIDOL 755 MG/ML
100 INJECTION, SOLUTION INTRAVASCULAR ONCE
Status: COMPLETED | OUTPATIENT
Start: 2023-07-06 | End: 2023-07-06

## 2023-07-06 RX ADMIN — SODIUM CHLORIDE, PRESERVATIVE FREE 5 ML: 5 INJECTION INTRAVENOUS at 15:10

## 2023-07-06 RX ADMIN — IOPAMIDOL 100 ML: 755 INJECTION, SOLUTION INTRAVENOUS at 14:58

## 2023-07-06 RX ADMIN — SODIUM CHLORIDE 90 ML: 9 INJECTION, SOLUTION INTRAVENOUS at 14:59

## 2023-07-11 ENCOUNTER — TRANSFERRED RECORDS (OUTPATIENT)
Dept: HEALTH INFORMATION MANAGEMENT | Facility: CLINIC | Age: 78
End: 2023-07-11
Payer: MEDICARE

## 2023-07-25 ENCOUNTER — APPOINTMENT (OUTPATIENT)
Dept: CT IMAGING | Facility: CLINIC | Age: 78
End: 2023-07-25
Attending: EMERGENCY MEDICINE
Payer: MEDICARE

## 2023-07-25 ENCOUNTER — TRANSFERRED RECORDS (OUTPATIENT)
Dept: HEALTH INFORMATION MANAGEMENT | Facility: CLINIC | Age: 78
End: 2023-07-25

## 2023-07-25 ENCOUNTER — APPOINTMENT (OUTPATIENT)
Dept: GENERAL RADIOLOGY | Facility: CLINIC | Age: 78
End: 2023-07-25
Attending: STUDENT IN AN ORGANIZED HEALTH CARE EDUCATION/TRAINING PROGRAM
Payer: MEDICARE

## 2023-07-25 ENCOUNTER — HOSPITAL ENCOUNTER (EMERGENCY)
Facility: CLINIC | Age: 78
Discharge: HOME | End: 2023-07-25
Admitting: EMERGENCY MEDICINE
Payer: MEDICARE

## 2023-07-25 VITALS
DIASTOLIC BLOOD PRESSURE: 68 MMHG | TEMPERATURE: 97 F | OXYGEN SATURATION: 96 % | HEIGHT: 60 IN | RESPIRATION RATE: 16 BRPM | WEIGHT: 114 LBS | SYSTOLIC BLOOD PRESSURE: 159 MMHG | BODY MASS INDEX: 22.38 KG/M2 | HEART RATE: 77 BPM

## 2023-07-25 LAB
ALBUMIN SERPL BCG-MCNC: 4.3 G/DL (ref 3.5–5.2)
ALP SERPL-CCNC: 65 U/L (ref 35–104)
ALT SERPL W P-5'-P-CCNC: 16 U/L (ref 0–50)
ANION GAP SERPL CALCULATED.3IONS-SCNC: 12 MMOL/L (ref 7–15)
AST SERPL W P-5'-P-CCNC: 28 U/L (ref 0–45)
ATRIAL RATE - MUSE: 82 BPM
BASOPHILS # BLD AUTO: 0 10E3/UL (ref 0–0.2)
BASOPHILS NFR BLD AUTO: 1 %
BILIRUB SERPL-MCNC: 0.5 MG/DL
BUN SERPL-MCNC: 12 MG/DL (ref 8–23)
CALCIUM SERPL-MCNC: 10.1 MG/DL (ref 8.8–10.2)
CHLORIDE SERPL-SCNC: 101 MMOL/L (ref 98–107)
CREAT SERPL-MCNC: 0.81 MG/DL (ref 0.51–0.95)
D DIMER PPP FEU-MCNC: 0.91 UG/ML FEU (ref 0–0.5)
DEPRECATED HCO3 PLAS-SCNC: 25 MMOL/L (ref 22–29)
DIASTOLIC BLOOD PRESSURE - MUSE: NORMAL MMHG
EOSINOPHIL # BLD AUTO: 0.2 10E3/UL (ref 0–0.7)
EOSINOPHIL NFR BLD AUTO: 3 %
ERYTHROCYTE [DISTWIDTH] IN BLOOD BY AUTOMATED COUNT: 20.4 % (ref 10–15)
GFR SERPL CREATININE-BSD FRML MDRD: 74 ML/MIN/1.73M2
GLUCOSE SERPL-MCNC: 92 MG/DL (ref 70–99)
HCT VFR BLD AUTO: 40.1 % (ref 35–47)
HGB BLD-MCNC: 12.6 G/DL (ref 11.7–15.7)
HOLD SPECIMEN: NORMAL
HOLD SPECIMEN: NORMAL
IMM GRANULOCYTES # BLD: 0 10E3/UL
IMM GRANULOCYTES NFR BLD: 0 %
INTERPRETATION ECG - MUSE: NORMAL
LYMPHOCYTES # BLD AUTO: 2.4 10E3/UL (ref 0.8–5.3)
LYMPHOCYTES NFR BLD AUTO: 41 %
MAGNESIUM SERPL-MCNC: 1.6 MG/DL (ref 1.7–2.3)
MCH RBC QN AUTO: 29.3 PG (ref 26.5–33)
MCHC RBC AUTO-ENTMCNC: 31.4 G/DL (ref 31.5–36.5)
MCV RBC AUTO: 93 FL (ref 78–100)
MONOCYTES # BLD AUTO: 0.5 10E3/UL (ref 0–1.3)
MONOCYTES NFR BLD AUTO: 9 %
NEUTROPHILS # BLD AUTO: 2.7 10E3/UL (ref 1.6–8.3)
NEUTROPHILS NFR BLD AUTO: 46 %
NRBC # BLD AUTO: 0 10E3/UL
NRBC BLD AUTO-RTO: 0 /100
P AXIS - MUSE: 76 DEGREES
PLATELET # BLD AUTO: 162 10E3/UL (ref 150–450)
POTASSIUM SERPL-SCNC: 4.2 MMOL/L (ref 3.4–5.3)
PR INTERVAL - MUSE: 166 MS
PROT SERPL-MCNC: 7 G/DL (ref 6.4–8.3)
QRS DURATION - MUSE: 94 MS
QT - MUSE: 392 MS
QTC - MUSE: 457 MS
R AXIS - MUSE: 73 DEGREES
RBC # BLD AUTO: 4.3 10E6/UL (ref 3.8–5.2)
SODIUM SERPL-SCNC: 138 MMOL/L (ref 136–145)
SYSTOLIC BLOOD PRESSURE - MUSE: NORMAL MMHG
T AXIS - MUSE: 21 DEGREES
TROPONIN T SERPL HS-MCNC: 17 NG/L
TSH SERPL DL<=0.005 MIU/L-ACNC: 3.22 UIU/ML (ref 0.3–4.2)
VENTRICULAR RATE- MUSE: 82 BPM
WBC # BLD AUTO: 5.9 10E3/UL (ref 4–11)

## 2023-07-25 PROCEDURE — 71046 X-RAY EXAM CHEST 2 VIEWS: CPT

## 2023-07-25 PROCEDURE — 93005 ELECTROCARDIOGRAM TRACING: CPT

## 2023-07-25 PROCEDURE — 85379 FIBRIN DEGRADATION QUANT: CPT | Performed by: EMERGENCY MEDICINE

## 2023-07-25 PROCEDURE — 250N000009 HC RX 250: Performed by: EMERGENCY MEDICINE

## 2023-07-25 PROCEDURE — 80053 COMPREHEN METABOLIC PANEL: CPT | Performed by: STUDENT IN AN ORGANIZED HEALTH CARE EDUCATION/TRAINING PROGRAM

## 2023-07-25 PROCEDURE — 71275 CT ANGIOGRAPHY CHEST: CPT | Mod: MA

## 2023-07-25 PROCEDURE — 250N000011 HC RX IP 250 OP 636: Performed by: EMERGENCY MEDICINE

## 2023-07-25 PROCEDURE — 85018 HEMOGLOBIN: CPT | Performed by: STUDENT IN AN ORGANIZED HEALTH CARE EDUCATION/TRAINING PROGRAM

## 2023-07-25 PROCEDURE — 83735 ASSAY OF MAGNESIUM: CPT | Performed by: EMERGENCY MEDICINE

## 2023-07-25 PROCEDURE — 250N000013 HC RX MED GY IP 250 OP 250 PS 637: Performed by: EMERGENCY MEDICINE

## 2023-07-25 PROCEDURE — 84484 ASSAY OF TROPONIN QUANT: CPT | Performed by: STUDENT IN AN ORGANIZED HEALTH CARE EDUCATION/TRAINING PROGRAM

## 2023-07-25 PROCEDURE — 36415 COLL VENOUS BLD VENIPUNCTURE: CPT | Performed by: STUDENT IN AN ORGANIZED HEALTH CARE EDUCATION/TRAINING PROGRAM

## 2023-07-25 PROCEDURE — 99285 EMERGENCY DEPT VISIT HI MDM: CPT | Mod: 25

## 2023-07-25 PROCEDURE — 84443 ASSAY THYROID STIM HORMONE: CPT | Performed by: EMERGENCY MEDICINE

## 2023-07-25 RX ORDER — ACETAMINOPHEN 500 MG
1000 TABLET ORAL ONCE
Status: COMPLETED | OUTPATIENT
Start: 2023-07-25 | End: 2023-07-25

## 2023-07-25 RX ORDER — IOPAMIDOL 755 MG/ML
55 INJECTION, SOLUTION INTRAVASCULAR ONCE
Status: COMPLETED | OUTPATIENT
Start: 2023-07-25 | End: 2023-07-25

## 2023-07-25 RX ADMIN — SODIUM CHLORIDE 82 ML: 9 INJECTION, SOLUTION INTRAVENOUS at 17:38

## 2023-07-25 RX ADMIN — ACETAMINOPHEN 1000 MG: 500 TABLET ORAL at 16:09

## 2023-07-25 RX ADMIN — IOPAMIDOL 55 ML: 755 INJECTION, SOLUTION INTRAVENOUS at 17:38

## 2023-07-25 ASSESSMENT — ACTIVITIES OF DAILY LIVING (ADL): ADLS_ACUITY_SCORE: 35

## 2023-07-25 NOTE — ED TRIAGE NOTES
[facility] in [facility] where she began having chest pain at 1500. Patient states it is midsternal and goes from her throat to her stomach. States she has colon cancer and gets chemo every 2 weeks, she was supposed to get it today and didn't d/t the chest pains.      Triage Assessment     Row Name 07/25/23 6086       Triage Assessment (Adult)    Airway WDL WDL       Respiratory WDL    Respiratory WDL WDL       Skin Circulation/Temperature WDL    Skin Circulation/Temperature WDL WDL       Cardiac WDL    Cardiac WDL --  chest pain       Peripheral/Neurovascular WDL    Peripheral Neurovascular WDL WDL       Cognitive/Neuro/Behavioral WDL    Cognitive/Neuro/Behavioral WDL WDL

## 2023-07-25 NOTE — ED NOTES
Tele-PIT/Intake Evaluation      Video-Visit Details    Type of service:  Video Visit    Video Start Time (time video started): 4:02 PM  Video End Time (time video stopped): 4:07 PM   Originating Location (pt. Location):  [facility]  Distant Location (provider location):  [facility]  Mode of Communication:  Video Conference via [employer]  Patient verbally consented to [facility] televisit.    History:  Patient is a 78-year-old female with colon cancer, hypertension, and hyperlipidemia who presents with midsternal chest pain that began at 3 PM this afternoon.  Patient was referred to the emergency department by her oncology clinic.  No personal history of pulmonary embolism.  She denies any prior history of heart disease or heart attack.  She notes that the pain has gotten a little bit better but is still persistent in the mid sternum region.  No associated abdominal pain, nausea, vomiting, or diarrhea.  She denies fevers, and sore throat.  She does have a chronic cough associated with her smoking history.  She denies any lower extremity swelling.  She is currently on a chemo regimen but did not get her chemo today given the chest discomfort symptoms.  No personal history of diabetes.    Exam:  Patient Vitals for the past 24 hrs:   BP Temp Temp src Pulse Resp SpO2 Height Weight   07/25/23 1553 (!) 159/68 97  F (36.1  C) Temporal 77 16 96 % 1.524 m (5') 51.7 kg (114 lb)   General: Resting comfortably on the gurney  Head:  The scalp, face, and head appear normal  Eyes:  The pupils are normal    Conjunctivae and sclera appear normal  ENT:    The nose is normal    Ears/pinnae are normal  Neck:  Normal range of motion  MS:  No deformities  Skin:  No rash or lesions noted.  Neuro:  Speech is normal and fluent  Psych: Awake. Alert.  Normal affect.      Appropriate interactions      Appropriate interventions for symptom management were initiated if applicable.  Appropriate diagnostic tests were  initiated if indicated.    Important information for subsequent clinician:  78-year-old female with a complex past medical history who presents with midsternal chest discomfort since 3 PM this afternoon.  Plan for EKG, laboratory work including D-dimer and imaging based on D-dimer results.  Patient given Tylenol for discomfort at this time.  Awaits available room for further examination and evaluation.    I briefly evaluated the patient and developed an initial plan of care. I discussed this plan and explained that this brief interaction does not constitute a full evaluation. Patient/family understands that they should wait to be fully evaluated and discuss any test results with another clinician prior to leaving the hospital.       Cisco Hightower MD  07/25/23 8510

## 2023-09-06 DIAGNOSIS — E78.5 HYPERLIPIDEMIA LDL GOAL <70: ICD-10-CM

## 2023-09-06 RX ORDER — ATORVASTATIN CALCIUM 40 MG/1
TABLET, FILM COATED ORAL
Qty: 90 TABLET | Refills: 0 | Status: SHIPPED | OUTPATIENT
Start: 2023-09-06 | End: 2023-11-28

## 2023-09-21 ENCOUNTER — ANCILLARY PROCEDURE (OUTPATIENT)
Dept: ULTRASOUND IMAGING | Facility: CLINIC | Age: 78
End: 2023-09-21
Attending: NURSE PRACTITIONER
Payer: MEDICARE

## 2023-09-21 DIAGNOSIS — R60.0 BILATERAL LOWER EXTREMITY EDEMA: ICD-10-CM

## 2023-09-21 DIAGNOSIS — I73.9 PERIPHERAL VASCULAR DISEASE (H): ICD-10-CM

## 2023-09-21 DIAGNOSIS — M79.661 BILATERAL CALF PAIN: ICD-10-CM

## 2023-09-21 DIAGNOSIS — C18.5 MALIGNANT NEOPLASM OF SPLENIC FLEXURE (H): ICD-10-CM

## 2023-09-21 DIAGNOSIS — M79.662 BILATERAL CALF PAIN: ICD-10-CM

## 2023-09-21 PROCEDURE — 93970 EXTREMITY STUDY: CPT

## 2023-10-06 DIAGNOSIS — I10 ESSENTIAL HYPERTENSION WITH GOAL BLOOD PRESSURE LESS THAN 140/90: ICD-10-CM

## 2023-10-06 DIAGNOSIS — I10 HYPERTENSION GOAL BP (BLOOD PRESSURE) < 140/90: ICD-10-CM

## 2023-10-06 RX ORDER — HYDROCHLOROTHIAZIDE 12.5 MG/1
12.5 TABLET ORAL DAILY
Qty: 90 TABLET | Refills: 0 | Status: SHIPPED | OUTPATIENT
Start: 2023-10-06 | End: 2023-12-22

## 2023-10-06 RX ORDER — AMLODIPINE BESYLATE 10 MG/1
10 TABLET ORAL DAILY
Qty: 90 TABLET | Refills: 0 | Status: SHIPPED | OUTPATIENT
Start: 2023-10-06 | End: 2024-03-05

## 2023-10-10 ENCOUNTER — TELEPHONE (OUTPATIENT)
Dept: INTERNAL MEDICINE | Facility: CLINIC | Age: 78
End: 2023-10-10
Payer: MEDICARE

## 2023-10-10 NOTE — TELEPHONE ENCOUNTER
Erica CHO with [facility] calling to request refill of patient's hydrochlorothiazide script as she was informed by patient that it has been hard to contact the clinic. RN relayed to Erica CHO that script was sent in for refill 10/6/2023 to mail-service pharmacy. Erica CHO verbalized understanding and will call patient to update.

## 2023-10-26 ENCOUNTER — ANCILLARY PROCEDURE (OUTPATIENT)
Dept: CT IMAGING | Facility: CLINIC | Age: 78
End: 2023-10-26
Attending: NURSE PRACTITIONER
Payer: MEDICARE

## 2023-10-26 DIAGNOSIS — C78.7 SECONDARY MALIGNANT NEOPLASM OF LIVER (H): ICD-10-CM

## 2023-10-26 DIAGNOSIS — C77.2: ICD-10-CM

## 2023-10-26 DIAGNOSIS — C78.02 METASTATIC SARCOMA TO LUNG, LEFT (H): ICD-10-CM

## 2023-10-26 DIAGNOSIS — C18.5: ICD-10-CM

## 2023-10-26 PROCEDURE — 74177 CT ABD & PELVIS W/CONTRAST: CPT

## 2023-10-26 PROCEDURE — 250N000011 HC RX IP 250 OP 636: Performed by: NURSE PRACTITIONER

## 2023-10-26 PROCEDURE — 250N000009 HC RX 250: Performed by: NURSE PRACTITIONER

## 2023-10-26 RX ORDER — IOPAMIDOL 755 MG/ML
75 INJECTION, SOLUTION INTRAVASCULAR ONCE
Status: COMPLETED | OUTPATIENT
Start: 2023-10-26 | End: 2023-10-26

## 2023-10-26 RX ADMIN — SODIUM CHLORIDE 40 ML: 9 INJECTION, SOLUTION INTRAVENOUS at 15:29

## 2023-10-26 RX ADMIN — HEPARIN SODIUM (PORCINE) LOCK FLUSH IV SOLN 100 UNIT/ML 5 ML: 100 SOLUTION at 15:30

## 2023-10-26 RX ADMIN — IOPAMIDOL 75 ML: 755 INJECTION, SOLUTION INTRAVENOUS at 15:29

## 2023-10-29 ENCOUNTER — MYC MEDICAL ADVICE (OUTPATIENT)
Dept: INTERNAL MEDICINE | Facility: CLINIC | Age: 78
End: 2023-10-29
Payer: MEDICARE

## 2023-10-29 DIAGNOSIS — I10 ESSENTIAL HYPERTENSION WITH GOAL BLOOD PRESSURE LESS THAN 140/90: ICD-10-CM

## 2023-10-30 RX ORDER — LOSARTAN POTASSIUM 100 MG/1
100 TABLET ORAL DAILY
Qty: 90 TABLET | Refills: 0 | Status: SHIPPED | OUTPATIENT
Start: 2023-10-30 | End: 2024-01-26

## 2023-11-21 ENCOUNTER — TRANSFERRED RECORDS (OUTPATIENT)
Dept: HEALTH INFORMATION MANAGEMENT | Facility: CLINIC | Age: 78
End: 2023-11-21
Payer: MEDICARE

## 2023-11-28 DIAGNOSIS — E78.5 HYPERLIPIDEMIA LDL GOAL <70: ICD-10-CM

## 2023-11-28 RX ORDER — ATORVASTATIN CALCIUM 40 MG/1
TABLET, FILM COATED ORAL
Qty: 90 TABLET | Refills: 0 | Status: SHIPPED | OUTPATIENT
Start: 2023-11-28 | End: 2024-04-11

## 2023-11-29 NOTE — TELEPHONE ENCOUNTER
Scheduled for annual wellness on 2/08/2024  Pt is due for lipids-not done since 09/2022    Lab apt scheduled for 02/05/2024 at 2pm   Please order labs   Luz Elena Albrecht [facility]

## 2023-12-22 DIAGNOSIS — I10 HYPERTENSION GOAL BP (BLOOD PRESSURE) < 140/90: ICD-10-CM

## 2023-12-22 RX ORDER — HYDROCHLOROTHIAZIDE 12.5 MG/1
12.5 TABLET ORAL DAILY
Qty: 90 TABLET | Refills: 0 | Status: SHIPPED | OUTPATIENT
Start: 2023-12-22 | End: 2024-03-05

## 2024-01-02 ENCOUNTER — TRANSFERRED RECORDS (OUTPATIENT)
Dept: HEALTH INFORMATION MANAGEMENT | Facility: CLINIC | Age: 79
End: 2024-01-02
Payer: MEDICARE

## 2024-01-06 ENCOUNTER — HEALTH MAINTENANCE LETTER (OUTPATIENT)
Age: 79
End: 2024-01-06

## 2024-01-16 ENCOUNTER — TRANSFERRED RECORDS (OUTPATIENT)
Dept: HEALTH INFORMATION MANAGEMENT | Facility: CLINIC | Age: 79
End: 2024-01-16
Payer: MEDICARE

## 2024-01-18 ENCOUNTER — OFFICE VISIT (OUTPATIENT)
Dept: ENDOCRINOLOGY | Facility: CLINIC | Age: 79
End: 2024-01-18
Payer: MEDICARE

## 2024-01-18 VITALS
SYSTOLIC BLOOD PRESSURE: 129 MMHG | DIASTOLIC BLOOD PRESSURE: 64 MMHG | BODY MASS INDEX: 21.79 KG/M2 | WEIGHT: 111 LBS | HEIGHT: 60 IN | HEART RATE: 73 BPM

## 2024-01-18 DIAGNOSIS — E03.9 HYPOTHYROIDISM, UNSPECIFIED TYPE: Primary | ICD-10-CM

## 2024-01-18 PROCEDURE — 99213 OFFICE O/P EST LOW 20 MIN: CPT | Performed by: INTERNAL MEDICINE

## 2024-01-18 RX ORDER — LEVOTHYROXINE SODIUM 50 UG/1
50 TABLET ORAL DAILY
Qty: 90 TABLET | Refills: 1 | Status: SHIPPED | OUTPATIENT
Start: 2024-01-18 | End: 2024-02-11

## 2024-01-18 NOTE — Clinical Note
1/18/2024         RE: Anna Dyer  [address]  [place] [facility] [place] [facility] [address]        Dear Colleague,    Thank you for referring your patient, Anna Dyer, to the [facility] [place]. Please see a copy of my visit note below.      Recent issues:  Hypothyroidism follow-up evaluation  Chemo IV 2 days every other week routine for her colon cancer management  Feels pretty well, good appetite, wt stable        2017. History of colon cancer- stage 4 metastatic adenocarcinoma with primary tumor originating from splenic flexure,    mets to liver, unresectable presentation per med oncology records  Treatment with chemotherapy, then left hemicolectomy, and subsequent chemotherapy    Lap resection of liver met, limited left thoracotomy and biopsy    History of thrombocytopenia, hypercalcemia, peripheral neuropathy   Medical oncology evaluations with Dr. Patrick Dreyer/[facility]      4/6/22 (medical oncology) labs: TSH 8.39  4/13/22 CT abd/pelvis:   Pulmonary nodule    Thyroid gland unremarkable, per radiologist    Neck radiation treatment: None known   Thyroid med use:  None  Fam Hx thyroid disease: Brother, son- hypothyroidism      7/19/22. Initial endocrinology evaluation with me at [place]  Reviewed health history and thyroid issues  Began levothyroxine medication treatment  Recent [facility] labs include:  Lab Results   Component Value Date    TSH 3.22 07/25/2023    T4 1.51 (H) 09/26/2022    TPO <10 07/27/2022     Current dose:  Levothyroxine 0.05 mg daily      Lives in [place], [place]  Sees Dr. Tello Finney/[facility] for general medicine evaluations.  Also sees Dr. Patrick Dreyer/[facility]    PMH/PSH:  Past Medical History:   Diagnosis Date    Cancer (H)     metastatic colorectal adenocarcinoma    Cataract     Chronic kidney disease     COPD (chronic obstructive pulmonary disease) (H)     Hypertension     No cardiologist    Hypothyroidism     Nicotine dependence     PAD  (peripheral artery disease) (H24)     Peripheral neuropathy     RLS (restless legs syndrome)     Thrombocytopenia (H24)     Ventral hernia     Ventral hernia 06/30/2020     Past Surgical History:   Procedure Laterality Date    BIOPSY      liver    BREAST SURGERY      breast implants    COLONOSCOPY N/A 3/28/2019    Procedure: INTRAOPERATIVE COLONOSCOPY,;  Surgeon: Jesus Caba MD;  Location:  OR    COSMETIC SURGERY      facelift and tummy tuck    GYN SURGERY      tubal    INSERT PORT VASCULAR ACCESS N/A 3/24/2017    Procedure: INSERT PORT VASCULAR ACCESS;  Surgeon: Yemi Ordaz MD;  Location:  OR    IR LOWER EXTREMITY ANGIOGRAM BILATERAL  3/12/2020    LAPAROSCOPIC ASSISTED COLECTOMY LEFT (DESCENDING) N/A 1/25/2018    Procedure: LAPAROSCOPIC ASSISTED COLECTOMY LEFT (DESCENDING);;  Surgeon: Maddie Baron MD;  Location: [facility] OR    LAPAROSCOPIC ASSISTED COLOSTOMY TAKEDOWN N/A 3/28/2019    Procedure: LAPAROSCOPIC ASSISTED COLOSTOMY REVERSAL;  Surgeon: Jesus Caba MD;  Location:  OR    LAPAROSCOPIC HERNIORRHAPHY VENTRAL N/A 8/12/2020    Procedure: COMBINED OPEN AND LAPAROSCOPIC VENTRAL HERNIA REPAIR;  Surgeon: Maddy Gonzales MD;  Location:  OR    LAPAROSCOPY DIAGNOSTIC (GENERAL) N/A 3/28/2019    Procedure: DIAGNOSTIC LAPAROSCOPY;  Surgeon: Jesus Caba MD;  Location:  OR    LAPAROTOMY EXPLORATORY N/A 1/25/2018    Procedure: LAPAROTOMY EXPLORATORY;  exploratory laparoscopy, left hemicolectomy, transverse colostomy;  Surgeon: Maddie Baron MD;  Location: [facility] OR    THORACOSCOPIC WEDGE RESECTION LUNG Right 9/20/2022    Procedure: WEDGE RESECTION RIGHT UPPER LOBE LUNG NODULE;  Surgeon: Yemi Ordaz MD;  Location:  OR    THORACOSCOPY Left 2/23/2021    Procedure: LEFT THORACOTOMY, ANATOMICAL POSTERIOR SEGMENTECTOMY LEFT UPPER LOBE ;  Surgeon: Yemi Ordaz MD;  Location:  OR    THORACOSCOPY Right 9/20/2022    Procedure: RIGHT VIDEO ASSISTED THORACOSCOPY;   Surgeon: Yemi Ordaz MD;  Location:  OR       Family Hx:  Family History   Problem Relation Age of Onset    Family History Negative Father     Family History Negative Mother     Psoriasis Brother         half-siblings    Family History Negative Sister     Hypothyroidism Son          Social Hx:  Social History     Socioeconomic History    Marital status:      Spouse name: Not on file    Number of children: Not on file    Years of education: Not on file    Highest education level: Not on file   Occupational History    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 No   Social History Narrative    Not on file     Social Determinants of Health     Financial Resource Strain: Not on file   Food Insecurity: Not on file   Transportation Needs: Not on file   Physical Activity: Not on file   Stress: Not on file   Social Connections: Not on file   Interpersonal Safety: Not on file   Housing Stability: Not on file          MEDICATIONS:  has a current medication list which includes the following prescription(s): acetaminophen, amlodipine, aspirin, atorvastatin, ergocalciferol, gabapentin, homeopathic products, hydrochlorothiazide, levothyroxine, lidocaine-prilocaine, lorazepam, losartan, and nicotine.    ROS:     ROS: 10 point ROS neg other than the symptoms noted above in the HPI.    GENERAL:  fatigue, mild wt gain?; denies fevers, chills, night sweats.   HEENT: no  dysphagia, odonophagia, diplopia, neck pain  THYROID:  no apparent hyper or hypothyroid symptoms  CV: no chest pain, pressure, palpitations  LUNGS: no SOB, HENRY, cough, wheezing   ABDOMEN: constipation; denies diarrhea, abdominal pain  EXTREMITIES: no rashes, ulcers, edema  NEUROLOGY: no headaches, denies changes in vision, tingling, extremitiy numbness   MSK: no muscle aches or pains, weakness  SKIN: no rashes or lesions  : no menses  PSYCH:  stable mood, no significant anxiety or depression  ENDOCRINE: no heat or cold intolerance      Physical Exam   VS: /64   Pulse 73   Ht 1.524 m (5')   Wt 50.3 kg (111 lb)   LMP  (LMP Unknown)   BMI 21.68 kg/m    GENERAL: AXOX3, NAD, well dressed, answering questions appropriately, appears stated age.  ENT: no nose swelling or nasal discharge, mouth redness or gum changes.  EYES: eyes grossly normal to inspection, conjunctivae and sclerae normal, no exophthalmos or proptosis  THYROID:  no apparent nodules or goiter  LUNGS: no audible wheeze, cough or visible cyanosis, or increased work of breathing  ABDOMEN: abdomen normal size  EXTREMITIES: no edema noted  NEUROLOGY: CN grossly intact, no tremors  MSK: grossly intact  SKIN:  Anterior chest central line with chemo bag; no apparent skin lesions, rash, or edema with visualized skin appearance  PSYCH: mentation appears normal, affect normal/bright, judgement and insight intact,   normal speech and appearance well groomed      LABS:    All pertinent notes, labs, and images personally reviewed by me.     A/P:  Encounter Diagnosis   Name Primary?    Hypothyroidism, unspecified type Yes       Comments:  Reviewed health history and thyroid issues.  Previous 7/2023 TSH level normal  Reviewed and interpreted tests that I previously ordered.   Ordered appropriate tests for the endocrinology disease management.    Management options discussed and implemented after shared medical decision making with the patient.  Hypothyroidism  problem is chronic-stable    Plan:  Reviewed general issues with the hypothyroidism diagnosis and management  Discussed lab tests used to assess patient thyroid hormone levels  Reviewed thyroid medication treatment with levothyroxine medication, dosing.    Recommend:  Continue current levothyroxine 0.05 mg daily dose plan  Updated Rx sent to pharmacy  Plan repeat lab tests at her 2/2024 PCP appt   Testing at [facility]    Lab orders placed  Monitor for symptom changes  No thyroid U/S imaging needed at this time  Will summarize results and recommendations when labs available    Addressed patient questions today     There are no Patient Instructions on file for this visit.    Future labs ordered today:   Orders Placed This Encounter   Procedures    TSH    T4 free     Radiology/Consults ordered today: None    Total time spent on day of encounter:  15 min    Follow-up:  1/2025, Return    GARY Rausch MD, MS  Endocrinology  [facility]    CC: Tello Finney       Again, thank you for allowing me to participate in the care of your patient.        Sincerely,        Aashish Rausch MD

## 2024-01-18 NOTE — PROGRESS NOTES
Recent issues:  Hypothyroidism follow-up evaluation  Chemo IV 2 days every other week routine for her colon cancer management  Feels pretty well, good appetite, wt stable        2017. History of colon cancer- stage 4 metastatic adenocarcinoma with primary tumor originating from splenic flexure,    mets to liver, unresectable presentation per med oncology records  Treatment with chemotherapy, then left hemicolectomy, and subsequent chemotherapy    Lap resection of liver met, limited left thoracotomy and biopsy    History of thrombocytopenia, hypercalcemia, peripheral neuropathy   Medical oncology evaluations with Dr. Patrick Dreyer/[facility]      4/6/22 (medical oncology) labs: TSH 8.39  4/13/22 CT abd/pelvis:   Pulmonary nodule    Thyroid gland unremarkable, per radiologist    Neck radiation treatment: None known   Thyroid med use:  None  Fam Hx thyroid disease: Brother, son- hypothyroidism      7/19/22. Initial endocrinology evaluation with me at [facility]  Reviewed health history and thyroid issues  Began levothyroxine medication treatment  Recent [facility] labs include:  Lab Results   Component Value Date    TSH 3.22 07/25/2023    T4 1.51 (H) 09/26/2022    TPO <10 07/27/2022     Current dose:  Levothyroxine 0.05 mg daily      Lives in [place], [place]  Sees Dr. Tello Finney/[facility] for general medicine evaluations.  Also sees Dr. Patrick Dreyer/[facility]    PMH/PSH:  Past Medical History:   Diagnosis Date    Cancer (H)     metastatic colorectal adenocarcinoma    Cataract     Chronic kidney disease     COPD (chronic obstructive pulmonary disease) (H)     Hypertension     No cardiologist    Hypothyroidism     Nicotine dependence     PAD (peripheral artery disease) (H24)     Peripheral neuropathy     RLS (restless legs syndrome)     Thrombocytopenia (H24)     Ventral hernia     Ventral hernia 06/30/2020     Past Surgical History:   Procedure Laterality Date    BIOPSY      liver    BREAST SURGERY       breast implants    COLONOSCOPY N/A 3/28/2019    Procedure: INTRAOPERATIVE COLONOSCOPY,;  Surgeon: Jesus Caba MD;  Location:  OR    COSMETIC SURGERY      facelift and tummy tuck    GYN SURGERY      tubal    INSERT PORT VASCULAR ACCESS N/A 3/24/2017    Procedure: INSERT PORT VASCULAR ACCESS;  Surgeon: Yemi Ordaz MD;  Location:  OR    IR LOWER EXTREMITY ANGIOGRAM BILATERAL  3/12/2020    LAPAROSCOPIC ASSISTED COLECTOMY LEFT (DESCENDING) N/A 1/25/2018    Procedure: LAPAROSCOPIC ASSISTED COLECTOMY LEFT (DESCENDING);;  Surgeon: Maddie Baron MD;  Location: [facility] OR    LAPAROSCOPIC ASSISTED COLOSTOMY TAKEDOWN N/A 3/28/2019    Procedure: LAPAROSCOPIC ASSISTED COLOSTOMY REVERSAL;  Surgeon: Jesus Caba MD;  Location:  OR    LAPAROSCOPIC HERNIORRHAPHY VENTRAL N/A 8/12/2020    Procedure: COMBINED OPEN AND LAPAROSCOPIC VENTRAL HERNIA REPAIR;  Surgeon: Maddy Gonzales MD;  Location:  OR    LAPAROSCOPY DIAGNOSTIC (GENERAL) N/A 3/28/2019    Procedure: DIAGNOSTIC LAPAROSCOPY;  Surgeon: Jesus Caba MD;  Location:  OR    LAPAROTOMY EXPLORATORY N/A 1/25/2018    Procedure: LAPAROTOMY EXPLORATORY;  exploratory laparoscopy, left hemicolectomy, transverse colostomy;  Surgeon: Maddie Baron MD;  Location:  OR    THORACOSCOPIC WEDGE RESECTION LUNG Right 9/20/2022    Procedure: WEDGE RESECTION RIGHT UPPER LOBE LUNG NODULE;  Surgeon: Yemi Ordaz MD;  Location:  OR    THORACOSCOPY Left 2/23/2021    Procedure: LEFT THORACOTOMY, ANATOMICAL POSTERIOR SEGMENTECTOMY LEFT UPPER LOBE ;  Surgeon: Yemi Ordaz MD;  Location:  OR    THORACOSCOPY Right 9/20/2022    Procedure: RIGHT VIDEO ASSISTED THORACOSCOPY;  Surgeon: Yemi Ordaz MD;  Location:  OR       Family Hx:  Family History   Problem Relation Age of Onset    Family History Negative Father     Family History Negative Mother     Psoriasis Brother         half-siblings    Family History Negative  Sister     Hypothyroidism Son          Social Hx:  Social History     Socioeconomic History    Marital status:      Spouse name: Not on file    Number of children: Not on file    Years of education: Not on file    Highest education level: Not on file   Occupational History    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 No   Social History Narrative    Not on file     Social Determinants of Health     Financial Resource Strain: Not on file   Food Insecurity: Not on file   Transportation Needs: Not on file   Physical Activity: Not on file   Stress: Not on file   Social Connections: Not on file   Interpersonal Safety: Not on file   Housing Stability: Not on file          MEDICATIONS:  has a current medication list which includes the following prescription(s): acetaminophen, amlodipine, aspirin, atorvastatin, ergocalciferol, gabapentin, homeopathic products, hydrochlorothiazide, levothyroxine, lidocaine-prilocaine, lorazepam, losartan, and nicotine.    ROS:     ROS: 10 point ROS neg other than the symptoms noted above in the HPI.    GENERAL:  fatigue, mild wt gain?; denies fevers, chills, night sweats.   HEENT: no dysphagia, odonophagia, diplopia, neck pain  THYROID:  no apparent hyper or hypothyroid symptoms  CV: no chest pain, pressure, palpitations  LUNGS: no SOB, HENRY, cough, wheezing   ABDOMEN: constipation; denies diarrhea, abdominal pain  EXTREMITIES: no rashes,  ulcers, edema  NEUROLOGY: no headaches, denies changes in vision, tingling, extremitiy numbness   MSK: no muscle aches or pains, weakness  SKIN: no rashes or lesions  : no menses  PSYCH:  stable mood, no significant anxiety or depression  ENDOCRINE: no heat or cold intolerance      Physical Exam   VS: /64   Pulse 73   Ht 1.524 m (5')   Wt 50.3 kg (111 lb)   LMP  (LMP Unknown)   BMI 21.68 kg/m    GENERAL: AXOX3, NAD, well dressed, answering questions appropriately, appears stated age.  ENT: no nose swelling or nasal discharge, mouth redness or gum changes.  EYES: eyes grossly normal to inspection, conjunctivae and sclerae normal, no exophthalmos or proptosis  THYROID:  no apparent nodules or goiter  LUNGS: no audible wheeze, cough or visible cyanosis, or increased work of breathing  ABDOMEN: abdomen normal size  EXTREMITIES: no edema noted  NEUROLOGY: CN grossly intact, no tremors  MSK: grossly intact  SKIN:  Anterior chest central line with chemo bag; no apparent skin lesions, rash, or edema with visualized skin appearance  PSYCH: mentation appears normal, affect normal/bright, judgement and insight intact,   normal speech and appearance well groomed      LABS:    All pertinent notes, labs, and images personally reviewed by me.     A/P:  Encounter Diagnosis   Name Primary?    Hypothyroidism, unspecified type Yes       Comments:  Reviewed health history and thyroid issues.  Previous 7/2023 TSH level normal  Reviewed and interpreted tests that I previously ordered.   Ordered appropriate tests for the endocrinology disease management.    Management options discussed and implemented after shared medical decision making with the patient.  Hypothyroidism problem is chronic-stable    Plan:  Reviewed general issues with the hypothyroidism diagnosis and management  Discussed lab tests used to assess patient thyroid hormone levels  Reviewed thyroid medication treatment with levothyroxine medication,  dosing.    Recommend:  Continue current levothyroxine 0.05 mg daily dose plan  Updated Rx sent to pharmacy  Plan repeat lab tests at her 2/2024 PCP appt   Testing at [facility]    Lab orders placed  Monitor for symptom changes  No thyroid U/S imaging needed at this time  Will summarize results and recommendations when labs available    Addressed patient questions today     There are no Patient Instructions on file for this visit.    Future labs ordered today:   Orders Placed This Encounter   Procedures    TSH    T4 free     Radiology/Consults ordered today: None    Total time spent on day of encounter:  20 min    Follow-up:  1/2025, Return    GARY Rausch MD, MS  Endocrinology  [facility]    CC: Tello Finney

## 2024-01-23 ENCOUNTER — ANCILLARY PROCEDURE (OUTPATIENT)
Dept: CT IMAGING | Facility: CLINIC | Age: 79
End: 2024-01-23
Attending: INTERNAL MEDICINE
Payer: MEDICARE

## 2024-01-23 DIAGNOSIS — C18.5 MALIGNANT NEOPLASM OF SPLENIC FLEXURE OF COLON (H): ICD-10-CM

## 2024-01-23 LAB
CREAT BLD-MCNC: 0.3 MG/DL (ref 0.5–1)
EGFRCR SERPLBLD CKD-EPI 2021: >60 ML/MIN/1.73M2

## 2024-01-23 PROCEDURE — 71260 CT THORAX DX C+: CPT

## 2024-01-23 PROCEDURE — 250N000009 HC RX 250: Performed by: INTERNAL MEDICINE

## 2024-01-23 PROCEDURE — 250N000011 HC RX IP 250 OP 636: Performed by: INTERNAL MEDICINE

## 2024-01-23 PROCEDURE — 82565 ASSAY OF CREATININE: CPT

## 2024-01-23 RX ORDER — IOPAMIDOL 755 MG/ML
100 INJECTION, SOLUTION INTRAVASCULAR ONCE
Status: COMPLETED | OUTPATIENT
Start: 2024-01-23 | End: 2024-01-23

## 2024-01-23 RX ADMIN — SODIUM CHLORIDE 40 ML: 9 INJECTION, SOLUTION INTRAVENOUS at 14:36

## 2024-01-23 RX ADMIN — IOPAMIDOL 75 ML: 755 INJECTION, SOLUTION INTRAVENOUS at 14:36

## 2024-01-26 ENCOUNTER — MYC REFILL (OUTPATIENT)
Dept: INTERNAL MEDICINE | Facility: CLINIC | Age: 79
End: 2024-01-26
Payer: MEDICARE

## 2024-01-26 DIAGNOSIS — I10 ESSENTIAL HYPERTENSION WITH GOAL BLOOD PRESSURE LESS THAN 140/90: ICD-10-CM

## 2024-01-27 ENCOUNTER — NURSE TRIAGE (OUTPATIENT)
Dept: NURSING | Facility: CLINIC | Age: 79
End: 2024-01-27
Payer: MEDICARE

## 2024-01-27 NOTE — TELEPHONE ENCOUNTER
Patient calling regarding her Losartan prescription. States she has 8 pills left. Patient advised to call the clinic on Monday to discuss the refill. No triage required.     MUSHTAQ CARDENAS RN    Reason for Disposition   [1] Caller has NON-URGENT medicine question about med that PCP prescribed AND [2] triager unable to answer question    Additional Information   Negative: New-onset or worsening symptoms, see that guideline (e.g., diarrhea, runny nose, sore throat)   Negative: Medicine question not related to refill or renewal   Negative: Caller (e.g., patient or pharmacist) requesting information about a new medicine   Negative: Caller requesting information unrelated to medicine   Negative: [1] Prescription refill request for ESSENTIAL medicine (i.e., likelihood of harm to patient if not taken) AND [2] triager unable to refill per department policy   Negative: [1] Prescription not at pharmacy AND [2] was prescribed by PCP recently  (Exception: Triager has access to EMR and prescription is recorded there. Go to Home Care and confirm for pharmacy.)   Negative: [1] Pharmacy calling with prescription questions AND [2] triager unable to answer question   Negative: Prescription request for new medicine (not a refill)   Negative: Caller requesting a CONTROLLED substance prescription refill (e.g., narcotics, ADHD medicines)   Negative: [1] Prescription refill request for NON-ESSENTIAL medicine (i.e., no harm to patient if med not taken) AND [2] triager unable to refill per department policy    Protocols used: Medication Refill and Renewal Call-A-

## 2024-01-29 NOTE — TELEPHONE ENCOUNTER
Routing to triage to call patient and clarify which pharmacy she would like the medication sent to.     Phone number provided it not pulling up the pharmacy and there are multiple [facility] pharmacies.    Mireya CHACON RN  [facility] Triage Team

## 2024-01-30 DIAGNOSIS — I10 ESSENTIAL HYPERTENSION WITH GOAL BLOOD PRESSURE LESS THAN 140/90: ICD-10-CM

## 2024-01-30 RX ORDER — LOSARTAN POTASSIUM 100 MG/1
100 TABLET ORAL DAILY
Qty: 14 TABLET | Refills: 0 | Status: CANCELLED | OUTPATIENT
Start: 2024-01-30 | End: 2024-02-13

## 2024-01-30 RX ORDER — LOSARTAN POTASSIUM 100 MG/1
100 TABLET ORAL DAILY
Qty: 90 TABLET | Refills: 0 | Status: SHIPPED | OUTPATIENT
Start: 2024-01-30 | End: 2024-04-11

## 2024-01-30 RX ORDER — LOSARTAN POTASSIUM 100 MG/1
100 TABLET ORAL DAILY
Qty: 14 TABLET | Refills: 0 | Status: SHIPPED | OUTPATIENT
Start: 2024-01-30 | End: 2024-04-10

## 2024-01-30 NOTE — TELEPHONE ENCOUNTER
Received a call from the patient stating she is needing a short supply of her Losartan sent to a local pharmacy as she only has about one week left and she will not receive her supply from the mail order pharmacy before she runs out.     Medication pended to the requested pharmacy and will route to the refill team for review.     Janeth Toro RN

## 2024-01-30 NOTE — TELEPHONE ENCOUNTER
Received a call from the patient following up on the refill request below.     Upon asking the patient, about the address of the [facility], patient states she does not know the address but she can give the Triage RN the phone number to call to get the address. Phone Number: [phone].     Triage RN called and spoke to a representative. Representative states [employer] is the insurance but they use [facility] for the Mail Order Pharmacy.     Medication pended to [facility] and will route to the refill pool for review. Patient does also need a short supply sent to [facility] in [place] (separate encounter created).     Janeth Toro, RN

## 2024-02-05 ENCOUNTER — LAB (OUTPATIENT)
Dept: LAB | Facility: CLINIC | Age: 79
End: 2024-02-05
Payer: MEDICARE

## 2024-02-05 DIAGNOSIS — E03.9 HYPOTHYROIDISM, UNSPECIFIED TYPE: ICD-10-CM

## 2024-02-05 LAB
T4 FREE SERPL-MCNC: 1.25 NG/DL (ref 0.9–1.7)
TSH SERPL DL<=0.005 MIU/L-ACNC: 2.72 UIU/ML (ref 0.3–4.2)

## 2024-02-05 PROCEDURE — 84443 ASSAY THYROID STIM HORMONE: CPT

## 2024-02-05 PROCEDURE — 84439 ASSAY OF FREE THYROXINE: CPT

## 2024-02-05 PROCEDURE — 36415 COLL VENOUS BLD VENIPUNCTURE: CPT

## 2024-02-11 DIAGNOSIS — E03.9 HYPOTHYROIDISM, UNSPECIFIED TYPE: ICD-10-CM

## 2024-02-11 RX ORDER — LEVOTHYROXINE SODIUM 50 UG/1
50 TABLET ORAL DAILY
Qty: 90 TABLET | Refills: 3 | Status: SHIPPED | OUTPATIENT
Start: 2024-02-11 | End: 2024-02-26

## 2024-02-21 ENCOUNTER — MYC MEDICAL ADVICE (OUTPATIENT)
Dept: ENDOCRINOLOGY | Facility: CLINIC | Age: 79
End: 2024-02-21
Payer: MEDICARE

## 2024-02-21 ENCOUNTER — TELEPHONE (OUTPATIENT)
Dept: ENDOCRINOLOGY | Facility: CLINIC | Age: 79
End: 2024-02-21
Payer: MEDICARE

## 2024-02-21 DIAGNOSIS — E03.9 HYPOTHYROIDISM, UNSPECIFIED TYPE: ICD-10-CM

## 2024-02-21 NOTE — TELEPHONE ENCOUNTER
Rec'd fax from [facility] that they are no longer administering the mail service Rx benefit plan for this participant, so the levothyroxine rx must be  processed through a new provider.  Dr. Rausch just sent refills on 2/11/24, so will find out from pt where she wants new rx sent to. Isabel Guidry RN

## 2024-02-26 DIAGNOSIS — E03.9 HYPOTHYROIDISM, UNSPECIFIED TYPE: ICD-10-CM

## 2024-02-26 RX ORDER — LEVOTHYROXINE SODIUM 50 UG/1
50 TABLET ORAL DAILY
Qty: 90 TABLET | Refills: 3 | Status: SHIPPED | OUTPATIENT
Start: 2024-02-26

## 2024-03-05 ENCOUNTER — MYC REFILL (OUTPATIENT)
Dept: INTERNAL MEDICINE | Facility: CLINIC | Age: 79
End: 2024-03-05
Payer: MEDICARE

## 2024-03-05 DIAGNOSIS — I10 HYPERTENSION GOAL BP (BLOOD PRESSURE) < 140/90: ICD-10-CM

## 2024-03-05 DIAGNOSIS — I10 ESSENTIAL HYPERTENSION WITH GOAL BLOOD PRESSURE LESS THAN 140/90: ICD-10-CM

## 2024-03-07 RX ORDER — HYDROCHLOROTHIAZIDE 12.5 MG/1
12.5 TABLET ORAL DAILY
Qty: 90 TABLET | Refills: 0 | Status: SHIPPED | OUTPATIENT
Start: 2024-03-07 | End: 2024-04-11

## 2024-03-07 RX ORDER — AMLODIPINE BESYLATE 10 MG/1
10 TABLET ORAL DAILY
Qty: 90 TABLET | Refills: 0 | Status: SHIPPED | OUTPATIENT
Start: 2024-03-07 | End: 2024-04-11

## 2024-04-11 ENCOUNTER — OFFICE VISIT (OUTPATIENT)
Dept: INTERNAL MEDICINE | Facility: CLINIC | Age: 79
End: 2024-04-11
Payer: MEDICARE

## 2024-04-11 VITALS
DIASTOLIC BLOOD PRESSURE: 70 MMHG | HEIGHT: 60 IN | SYSTOLIC BLOOD PRESSURE: 124 MMHG | BODY MASS INDEX: 21.83 KG/M2 | HEART RATE: 88 BPM | OXYGEN SATURATION: 97 % | RESPIRATION RATE: 18 BRPM | WEIGHT: 111.2 LBS | TEMPERATURE: 97.6 F

## 2024-04-11 DIAGNOSIS — M81.0 AGE-RELATED OSTEOPOROSIS WITHOUT CURRENT PATHOLOGICAL FRACTURE: ICD-10-CM

## 2024-04-11 DIAGNOSIS — E78.5 HYPERLIPIDEMIA LDL GOAL <70: ICD-10-CM

## 2024-04-11 DIAGNOSIS — Z00.00 ENCOUNTER FOR MEDICARE ANNUAL WELLNESS EXAM: Primary | ICD-10-CM

## 2024-04-11 DIAGNOSIS — C18.9 COLON CANCER METASTASIZED TO LUNG (H): ICD-10-CM

## 2024-04-11 DIAGNOSIS — C78.00 COLON CANCER METASTASIZED TO LUNG (H): ICD-10-CM

## 2024-04-11 DIAGNOSIS — I10 HYPERTENSION GOAL BP (BLOOD PRESSURE) < 140/90: ICD-10-CM

## 2024-04-11 DIAGNOSIS — I10 ESSENTIAL HYPERTENSION WITH GOAL BLOOD PRESSURE LESS THAN 140/90: ICD-10-CM

## 2024-04-11 DIAGNOSIS — I73.9 PAD (PERIPHERAL ARTERY DISEASE) (H): ICD-10-CM

## 2024-04-11 DIAGNOSIS — J43.9 PULMONARY EMPHYSEMA, UNSPECIFIED EMPHYSEMA TYPE (H): ICD-10-CM

## 2024-04-11 PROBLEM — Z93.3 COLOSTOMY STATUS (H): Status: RESOLVED | Noted: 2019-03-28 | Resolved: 2024-04-11

## 2024-04-11 PROBLEM — R91.1 NODULE OF LOWER LOBE OF RIGHT LUNG: Status: RESOLVED | Noted: 2022-09-20 | Resolved: 2024-04-11

## 2024-04-11 PROBLEM — Z93.3 STATUS POST COLOSTOMY (H): Status: RESOLVED | Noted: 2018-04-06 | Resolved: 2024-04-11

## 2024-04-11 PROBLEM — R91.1 LEFT UPPER LOBE PULMONARY NODULE: Status: RESOLVED | Noted: 2021-02-23 | Resolved: 2024-04-11

## 2024-04-11 LAB
ANION GAP SERPL CALCULATED.3IONS-SCNC: 12 MMOL/L (ref 7–15)
BUN SERPL-MCNC: 14.6 MG/DL (ref 8–23)
CALCIUM SERPL-MCNC: 9.2 MG/DL (ref 8.8–10.2)
CHLORIDE SERPL-SCNC: 103 MMOL/L (ref 98–107)
CHOLEST SERPL-MCNC: 141 MG/DL
CREAT SERPL-MCNC: 0.65 MG/DL (ref 0.51–0.95)
DEPRECATED HCO3 PLAS-SCNC: 25 MMOL/L (ref 22–29)
EGFRCR SERPLBLD CKD-EPI 2021: 90 ML/MIN/1.73M2
FASTING STATUS PATIENT QL REPORTED: YES
GLUCOSE SERPL-MCNC: 95 MG/DL (ref 70–99)
HDLC SERPL-MCNC: 68 MG/DL
LDLC SERPL CALC-MCNC: 53 MG/DL
NONHDLC SERPL-MCNC: 73 MG/DL
POTASSIUM SERPL-SCNC: 4.9 MMOL/L (ref 3.4–5.3)
SODIUM SERPL-SCNC: 140 MMOL/L (ref 135–145)
TRIGL SERPL-MCNC: 99 MG/DL

## 2024-04-11 PROCEDURE — 90480 ADMN SARSCOV2 VAC 1/ONLY CMP: CPT | Performed by: INTERNAL MEDICINE

## 2024-04-11 PROCEDURE — 99214 OFFICE O/P EST MOD 30 MIN: CPT | Mod: 25 | Performed by: INTERNAL MEDICINE

## 2024-04-11 PROCEDURE — 80048 BASIC METABOLIC PNL TOTAL CA: CPT | Performed by: INTERNAL MEDICINE

## 2024-04-11 PROCEDURE — 80061 LIPID PANEL: CPT | Performed by: INTERNAL MEDICINE

## 2024-04-11 PROCEDURE — 91320 SARSCV2 VAC 30MCG TRS-SUC IM: CPT | Performed by: INTERNAL MEDICINE

## 2024-04-11 PROCEDURE — 36415 COLL VENOUS BLD VENIPUNCTURE: CPT | Performed by: INTERNAL MEDICINE

## 2024-04-11 PROCEDURE — G0439 PPPS, SUBSEQ VISIT: HCPCS | Performed by: INTERNAL MEDICINE

## 2024-04-11 RX ORDER — HYDROCHLOROTHIAZIDE 12.5 MG/1
12.5 TABLET ORAL DAILY
Qty: 90 TABLET | Refills: 3 | Status: SHIPPED | OUTPATIENT
Start: 2024-04-11

## 2024-04-11 RX ORDER — LOSARTAN POTASSIUM 100 MG/1
100 TABLET ORAL DAILY
Qty: 90 TABLET | Refills: 3 | Status: SHIPPED | OUTPATIENT
Start: 2024-04-11

## 2024-04-11 RX ORDER — ATORVASTATIN CALCIUM 40 MG/1
40 TABLET, FILM COATED ORAL AT BEDTIME
Qty: 90 TABLET | Refills: 3 | Status: SHIPPED | OUTPATIENT
Start: 2024-04-11

## 2024-04-11 RX ORDER — ALENDRONATE SODIUM 70 MG/1
70 TABLET ORAL
Qty: 12 TABLET | Refills: 3 | Status: SHIPPED | OUTPATIENT
Start: 2024-04-11

## 2024-04-11 RX ORDER — AMLODIPINE BESYLATE 10 MG/1
10 TABLET ORAL DAILY
Qty: 90 TABLET | Refills: 3 | Status: SHIPPED | OUTPATIENT
Start: 2024-04-11

## 2024-04-11 RX ORDER — RESPIRATORY SYNCYTIAL VIRUS VACCINE 120MCG/0.5
0.5 KIT INTRAMUSCULAR ONCE
Qty: 1 EACH | Refills: 0 | Status: CANCELLED | OUTPATIENT
Start: 2024-04-11 | End: 2024-04-11

## 2024-04-11 SDOH — HEALTH STABILITY: PHYSICAL HEALTH: ON AVERAGE, HOW MANY DAYS PER WEEK DO YOU ENGAGE IN MODERATE TO STRENUOUS EXERCISE (LIKE A BRISK WALK)?: 4 DAYS

## 2024-04-11 SDOH — HEALTH STABILITY: PHYSICAL HEALTH: ON AVERAGE, HOW MANY MINUTES DO YOU ENGAGE IN EXERCISE AT THIS LEVEL?: 30 MIN

## 2024-04-11 ASSESSMENT — SOCIAL DETERMINANTS OF HEALTH (SDOH): HOW OFTEN DO YOU GET TOGETHER WITH FRIENDS OR RELATIVES?: ONCE A WEEK

## 2024-04-11 NOTE — PATIENT INSTRUCTIONS
Preventive Care Advice   This is general advice given by our system to help you stay healthy. However, your care team may have specific advice just for you. Please talk to your care team about your preventive care needs.  Nutrition  Eat 5 or more servings of fruits and vegetables each day.  Try wheat bread, brown rice and whole grain pasta (instead of white bread, rice, and pasta).  Get enough calcium and vitamin D. Check the label on foods and aim for 100% of the RDA (recommended daily allowance).  Lifestyle  Exercise at least 150 minutes each week   (30 minutes a day, 5 days a week).  Do muscle strengthening activities 2 days a week. These help control your weight and prevent disease.  No smoking.  Wear sunscreen to prevent skin cancer.  Have a dental exam and cleaning every 6 months.  Yearly exams  See your health care team every year to talk about:  Any changes in your health.  Any medicines your care team has prescribed.  Preventive care, family planning, and ways to prevent chronic diseases.  Shots (vaccines)   HPV shots (up to age 26), if you've never had them before.  Hepatitis B shots (up to age 59), if you've never had them before.  COVID-19 shot: Get this shot when it's due.  Flu shot: Get a flu shot every year.  Tetanus shot: Get a tetanus shot every 10 years.  Pneumococcal, hepatitis A, and RSV shots: Ask your care team if you need these based on your risk.  Shingles shot (for age 50 and up).  General health tests  Diabetes screening:  Starting at age 35, Get screened for diabetes at least every 3 years.  If you are younger than age 35, ask your care team if you should be screened for diabetes.  Cholesterol test: At age 39, start having a cholesterol test every 5 years, or more often if advised.  Bone density scan (DEXA): At age 50, ask your care team if you should have this scan for osteoporosis (brittle bones).  Hepatitis C: Get tested at least once in your life.  STIs (sexually transmitted  infections)  Before age 24: Ask your care team if you should be screened for STIs.  After age 24: Get screened for STIs if you're at risk. You are at risk for STIs (including HIV) if:  You are sexually active with more than one person.  You don't use condoms every time.  You or a partner was diagnosed with a sexually transmitted infection.  If you are at risk for HIV, ask about PrEP medicine to prevent HIV.  Get tested for HIV at least once in your life, whether you are at risk for HIV or not.  Cancer screening tests  Cervical cancer screening: If you have a cervix, begin getting regular cervical cancer screening tests at age 21. Most people who have regular screenings with normal results can stop after age 65. Talk about this with your provider.  Breast cancer scan (mammogram): If you've ever had breasts, begin having regular mammograms starting at age 40. This is a scan to check for breast cancer.  Colon cancer screening: It is important to start screening for colon cancer at age 45.  Have a colonoscopy test every 10 years (or more often if you're at risk) Or, ask your provider about stool tests like a FIT test every year or Cologuard test every 3 years.  To learn more about your testing options, visit: https://www.Fi[employer]/683155.pdf.  For help making a decision, visit: https://bit.ly/py45667.  Prostate cancer screening test: If you have a prostate and are age 55 to 69, ask your provider if you would benefit from a yearly prostate cancer screening test.  Lung cancer screening: If you are a current or former smoker age 50 to 80, ask your care team if ongoing lung cancer screenings are right for you.  For informational purposes only. Not to replace the advice of your health care provider. Copyright   2023 [employer]. All rights reserved. Clinically reviewed by the [facility] Transitions Program. [employer] 137777 - REV 01/24.    Hearing Loss: Care Instructions  Overview     Hearing loss is a  sudden or slow decrease in how well you hear. It can range from slight to profound. Permanent hearing loss can occur with aging. It also can happen when you are exposed long-term to loud noise. Examples include listening to loud music, riding motorcycles, or being around other loud machines.  Hearing loss can affect your work and home life. It can make you feel lonely or depressed. You may feel that you have lost your independence. But hearing aids and other devices can help you hear better and feel connected to others.  Follow-up care is a key part of your treatment and safety. Be sure to make and go to all appointments, and call your doctor if you are having problems. It's also a good idea to know your test results and keep a list of the medicines you take.  How can you care for yourself at home?  Avoid loud noises whenever possible. This helps keep your hearing from getting worse.  Always wear hearing protection around loud noises.  Wear a hearing aid as directed.  A professional can help you pick a hearing aid that will work best for you.  You can also get hearing aids over the counter for mild to moderate hearing loss.  Have hearing tests as your doctor suggests. They can show whether your hearing has changed. Your hearing aid may need to be adjusted.  Use other devices as needed. These may include:  Telephone amplifiers and hearing aids that can connect to a television, stereo, radio, or microphone.  Devices that use lights or vibrations. These alert you to the doorbell, a ringing telephone, or a baby monitor.  Television closed-captioning. This shows the words at the bottom of the screen. Most new TVs can do this.  TTY (text telephone). This lets you type messages back and forth on the telephone instead of talking or listening. These devices are also called TDD. When messages are typed on the keyboard, they are sent over the phone line to a receiving TTY. The message is shown on a monitor.  Use text  "messaging, social media, and email if it is hard for you to communicate by telephone.  Try to learn a listening technique called speechreading. It is not lipreading. You pay attention to people's gestures, expressions, posture, and tone of voice. These clues can help you understand what a person is saying. Face the person you are talking to, and have them face you. Make sure the lighting is good. You need to see the other person's face clearly.  Think about counseling if you need help to adjust to your hearing loss.  When should you call for help?  Watch closely for changes in your health, and be sure to contact your doctor if:    You think your hearing is getting worse.     You have new symptoms, such as dizziness or nausea.   Where can you learn more?  Go to https://www.[employer].net/patiented  Enter R798 in the search box to learn more about \"Hearing Loss: Care Instructions.\"  Current as of: September 27, 2023               Content Version: 14.0    7182-6807 [employer].   Care instructions adapted under license by your healthcare professional. If you have questions about a medical condition or this instruction, always ask your healthcare professional. [employer] disclaims any warranty or liability for your use of this information.      Learning About Sleeping Well  What does sleeping well mean?     Sleeping well means getting enough sleep to feel good and stay healthy. How much sleep is enough varies among people.  The number of hours you sleep and how you feel when you wake up are both important. If you do not feel refreshed, you probably need more sleep. Another sign of not getting enough sleep is feeling tired during the day.  Experts recommend that adults get at least 7 or more hours of sleep per day. Children and older adults need more sleep.  Why is getting enough sleep important?  Getting enough quality sleep is a basic part of good health. When your sleep suffers, your " "physical health, mood, and your thoughts can suffer too. You may find yourself feeling more grumpy or stressed. Not getting enough sleep also can lead to serious problems, including injury, accidents, anxiety, and depression.  What might cause poor sleeping?  Many things can cause sleep problems, including:  Changes to your sleep schedule.  Stress. Stress can be caused by fear about a single event, such as giving a speech. Or you may have ongoing stress, such as worry about work or school.  Depression, anxiety, and other mental or emotional conditions.  Changes in your sleep habits or surroundings. This includes changes that happen where you sleep, such as noise, light, or sleeping in a different bed. It also includes changes in your sleep pattern, such as having jet lag or working a late shift.  Health problems, such as pain, breathing problems, and restless legs syndrome.  Lack of regular exercise.  Using alcohol, nicotine, or caffeine before bed.  How can you help yourself?  Here are some tips that may help you sleep more soundly and wake up feeling more refreshed.  Your sleeping area   Use your bedroom only for sleeping and sex. A bit of light reading may help you fall asleep. But if it doesn't, do your reading elsewhere in the house. Try not to use your TV, computer, smartphone, or tablet while you are in bed.  Be sure your bed is big enough to stretch out comfortably, especially if you have a sleep partner.  Keep your bedroom quiet, dark, and cool. Use curtains, blinds, or a sleep mask to block out light. To block out noise, use earplugs, soothing music, or a \"white noise\" machine.  Your evening and bedtime routine   Create a relaxing bedtime routine. You might want to take a warm shower or bath, or listen to soothing music.  Go to bed at the same time every night. And get up at the same time every morning, even if you feel tired.  What to avoid   Limit caffeine (coffee, tea, caffeinated sodas) during the day, " "and don't have any for at least 6 hours before bedtime.  Avoid drinking alcohol before bedtime. Alcohol can cause you to wake up more often during the night.  Try not to smoke or use tobacco, especially in the evening. Nicotine can keep you awake.  Limit naps during the day, especially close to bedtime.  Avoid lying in bed awake for too long. If you can't fall asleep or if you wake up in the middle of the night and can't get back to sleep within about 20 minutes, get out of bed and go to another room until you feel sleepy.  Avoid taking medicine right before bed that may keep you awake or make you feel hyper or energized. Your doctor can tell you if your medicine may do this and if you can take it earlier in the day.  If you can't sleep   Imagine yourself in a peaceful, pleasant scene. Focus on the details and feelings of being in a place that is relaxing.  Get up and do a quiet or boring activity until you feel sleepy.  Avoid drinking any liquids before going to bed to help prevent waking up often to use the bathroom.  Where can you learn more?  Go to https://www.[employer].net/patiented  Enter J942 in the search box to learn more about \"Learning About Sleeping Well.\"  Current as of: July 10, 2023               Content Version: 14.0    5427-6336 [employer].   Care instructions adapted under license by your healthcare professional. If you have questions about a medical condition or this instruction, always ask your healthcare professional. [employer] disclaims any warranty or liability for your use of this information.      Bladder Training: Care Instructions  Your Care Instructions     Bladder training is used to treat urge incontinence and stress incontinence. Urge incontinence means that the need to urinate comes on so fast that you can't get to a toilet in time. Stress incontinence means that you leak urine because of pressure on your bladder. For example, it may happen when you " laugh, cough, or lift something heavy.  Bladder training can increase how long you can wait before you have to urinate. It can also help your bladder hold more urine. And it can give you better control over the urge to urinate.  It is important to remember that bladder training takes a few weeks to a few months to make a difference. You may not see results right away, but don't give up.  Follow-up care is a key part of your treatment and safety. Be sure to make and go to all appointments, and call your doctor if you are having problems. It's also a good idea to know your test results and keep a list of the medicines you take.  How can you care for yourself at home?  Work with your doctor to come up with a bladder training program that is right for you. You may use one or more of the following methods.  Delayed urination  In the beginning, try to keep from urinating for 5 minutes after you first feel the need to go.  While you wait, take deep, slow breaths to relax. Kegel exercises can also help you delay the need to go to the bathroom.  After some practice, when you can easily wait 5 minutes to urinate, try to wait 10 minutes before you urinate.  Slowly increase the waiting period until you are able to control when you have to urinate.  Scheduled urination  Empty your bladder when you first wake up in the morning.  Schedule times throughout the day when you will urinate.  Start by going to the bathroom every hour, even if you don't need to go.  Slowly increase the time between trips to the bathroom.  When you have found a schedule that works well for you, keep doing it.  If you wake up during the night and have to urinate, do it. Apply your schedule to waking hours only.  Kegel exercises  These tighten and strengthen pelvic muscles, which can help you control the flow of urine. (If doing these exercises causes pain, stop doing them and talk with your doctor.) To do Kegel exercises:  Squeeze your muscles as if you  "were trying not to pass gas. Or squeeze your muscles as if you were stopping the flow of urine. Your belly, legs, and buttocks shouldn't move.  Hold the squeeze for 3 seconds, then relax for 5 to 10 seconds.  Start with 3 seconds, then add 1 second each week until you are able to squeeze for 10 seconds.  Repeat the exercise 10 times a session. Do 3 to 8 sessions a day.  When should you call for help?  Watch closely for changes in your health, and be sure to contact your doctor if:    Your incontinence is getting worse.     You do not get better as expected.   Where can you learn more?  Go to https://www.[employer].net/patiented  Enter V684 in the search box to learn more about \"Bladder Training: Care Instructions.\"  Current as of: November 15, 2023               Content Version: 14.0    5101-3755 [employer].   Care instructions adapted under license by your healthcare professional. If you have questions about a medical condition or this instruction, always ask your healthcare professional. [employer] disclaims any warranty or liability for your use of this information.      "

## 2024-04-11 NOTE — PROGRESS NOTES
Preventive Care Visit  [facility]  Tello Finney MD, Internal Medicine  Apr 11, 2024      Assessment & Plan     Encounter for Medicare annual wellness exam  Updated screening, immunizations, prevention.  Please see health maintenance list, care gaps     Colon cancer metastasized to lung (H)  Per oncology    Pulmonary emphysema, unspecified emphysema type (H)  Mild obstruction on pfts. Still smokes  Sx quite mild- not interested in using an inhaler nor quitting smoking    PAD (peripheral artery disease) (H24)  Mild to moderate claudication with exeftion. Cont asa/statin. No resting pain  - Lipid panel reflex to direct LDL Non-fasting; Future  - Basic metabolic panel  (Ca, Cl, CO2, Creat, Gluc, K, Na, BUN); Future  - Lipid panel reflex to direct LDL Non-fasting  - Basic metabolic panel  (Ca, Cl, CO2, Creat, Gluc, K, Na, BUN)    Hyperlipidemia LDL goal <70  Statin     Age-related osteoporosis without current pathological fracture  Use alendronate to lower her risk of fx   - alendronate (FOSAMAX) 70 MG tablet; Take 1 tablet (70 mg) by mouth every 7 days  - Basic metabolic panel  (Ca, Cl, CO2, Creat, Gluc, K, Na, BUN); Future  - Basic metabolic panel  (Ca, Cl, CO2, Creat, Gluc, K, Na, BUN)              Nicotine/Tobacco Cessation  She reports that she has been smoking cigarettes. She started smoking about 61 years ago. She has a 58 pack-year smoking history. She has never used smokeless tobacco.  Nicotine/Tobacco Cessation Plan  Information offered: Patient not interested at this time      Counseling  Appropriate preventive services were discussed with this patient, including applicable screening as appropriate for fall prevention, nutrition, physical activity, Tobacco-use cessation, weight loss and cognition.  Checklist reviewing preventive services available has been given to the patient.  Reviewed patient's diet, addressing concerns and/or questions.   The patient was instructed to see the  dentist every 6 months.   Discussed possible causes of fatigue. The patient was provided with written information regarding signs of hearing loss.   Information on urinary incontinence and treatment options given to patient.       See Patient Instructions    Vikash Castillo is a 79 year old, presenting for the following:  Wellness Visit        4/11/2024    12:45 PM   Additional Questions   Roomed by Yolie CERNA CMA   Accompanied by self         Health Care Directive  Patient has a Health Care Directive on file  Advance care planning document is on file and is current.    HPI               4/11/2024   General Health   How would you rate your overall physical health? Good   Feel stress (tense, anxious, or unable to sleep) Not at all         4/11/2024   Nutrition   Diet: Regular (no restrictions)         4/11/2024   Exercise   Days per week of moderate/strenous exercise 4 days   Average minutes spent exercising at this level 30 min         4/11/2024   Social Factors   Frequency of gathering with friends or relatives Once a week   Worry food won't last until get money to buy more No   Food not last or not have enough money for food? No   Do you have housing?  Yes   Are you worried about losing your housing? No   Lack of transportation? No   Unable to get utilities (heat,electricity)? No         4/11/2024   Fall Risk   Fallen 2 or more times in the past year? No   Trouble with walking or balance? No          4/11/2024   Activities of Daily Living- Home Safety   Needs help with the following daily activites None of the above   Safety concerns in the home None of the above         4/11/2024   Dental   Dentist two times every year? (!) NO         4/11/2024   Hearing Screening   Hearing concerns? (!) I NEED TO ASK PEOPLE TO SPEAK UP OR REPEAT THEMSELVES.         4/11/2024   Driving Risk Screening   Patient/family members have concerns about driving No         4/11/2024   General Alertness/Fatigue Screening   Have you been  more tired than usual lately? (!) YES         4/11/2024   Urinary Incontinence Screening   Bothered by leaking urine in past 6 months Yes         4/11/2024   TB Screening   Were you born outside of the [place]? No         Today's PHQ-2 Score:       4/11/2024    12:57 PM   PHQ-2 ( 1999 [employer])   Q1: Little interest or pleasure in doing things 0   Q2: Feeling down, depressed or hopeless 0   PHQ-2 Score 0   Q1: Little interest or pleasure in doing things Not at all   Q2: Feeling down, depressed or hopeless Not at all   PHQ-2 Score 0           4/11/2024   Substance Use   If I could quit smoking, I would Completely disagree   I want to quit somking, worry about health affects Completely disagree   Willing to make a plan to quit smoking Completely disagree   Willing to cut down before quitting Completely disagree   Alcohol more than 3/day or more than 7/wk No   Do you have a current opioid prescription? No   How severe/bad is pain from 1 to 10? 0/10 (No Pain)   Do you use any other substances recreationally? No     Social History     Tobacco Use    Smoking status: Every Day     Current packs/day: 0.00     Average packs/day: 1 pack/day for 58.0 years (58.0 ttl pk-yrs)     Types: Cigarettes     Start date: 7/1/1962     Last attempt to quit: 7/1/2020     Years since quitting: 3.7    Smokeless tobacco: Never   Vaping Use    Vaping status: Never Used   Substance Use Topics    Alcohol use: No     Alcohol/week: 0.0 standard drinks of alcohol    Drug use: No              ASCVD Risk   The 10-year ASCVD risk score (Iliana CAM, et al., 2019) is: 36.5%    Values used to calculate the score:      Age: 79 years      Sex: Female      Is Non- : No      Diabetic: No      Tobacco smoker: Yes      Systolic Blood Pressure: 124 mmHg      Is BP treated: Yes      HDL Cholesterol: 79 mg/dL      Total Cholesterol: 156 mg/dL            Reviewed and updated as needed this visit by Provider                      Current  providers sharing in care for this patient include:  Patient Care Team:  eTllo Finney MD as PCP - General (Internal Medicine)  Tello Finney MD as Assigned PCP  Aashish Rausch MD as Assigned Endocrinology Provider    The following health maintenance items are reviewed in Epic and correct as of today:  Health Maintenance   Topic Date Due    COPD ACTION PLAN  Never done    RSV VACCINE (Pregnancy & 60+) (1 - 1-dose 60+ series) Never done    DTAP/TDAP/TD IMMUNIZATION (1 - Tdap) 12/08/2016    INFLUENZA VACCINE (1) 09/01/2023    LIPID  09/16/2023    NICOTINE/TOBACCO CESSATION COUNSELING Q 1 YR  10/04/2023    BMP  07/25/2024    TSH W/FREE T4 REFLEX  02/05/2025    MEDICARE ANNUAL WELLNESS VISIT  04/11/2025    ANNUAL REVIEW OF HM ORDERS  04/11/2025    FALL RISK ASSESSMENT  04/11/2025    GLUCOSE  07/25/2026    ADVANCE CARE PLANNING  10/04/2027    DEXA  01/19/2038    SPIROMETRY  Completed    PHQ-2 (once per calendar year)  Completed    Pneumococcal Vaccine: 65+ Years  Completed    ZOSTER IMMUNIZATION  Completed    COVID-19 Vaccine  Completed    IPV IMMUNIZATION  Aged Out    HPV IMMUNIZATION  Aged Out    MENINGITIS IMMUNIZATION  Aged Out    RSV MONOCLONAL ANTIBODY  Aged Out    HEPATITIS C SCREENING  Discontinued    MAMMO SCREENING  Discontinued    COLORECTAL CANCER SCREENING  Discontinued    LUNG CANCER SCREENING  Discontinued            Objective    Exam  /70   Pulse 88   Temp 97.6  F (36.4  C) (Temporal)   Resp 18   Ht 1.524 m (5')   Wt 50.4 kg (111 lb 3.2 oz)   LMP  (LMP Unknown)   SpO2 97%   BMI 21.72 kg/m     Estimated body mass index is 21.72 kg/m  as calculated from the following:    Height as of this encounter: 1.524 m (5').    Weight as of this encounter: 50.4 kg (111 lb 3.2 oz).    Physical Exam  GENERAL: no distress, frail, elderly, and fatigued  HENT: ear canals and TM's normal, nose and mouth without ulcers or lesions  NECK: no adenopathy, no asymmetry, masses, or scars  RESP: lungs  clear to auscultation - no rales, rhonchi or wheezes  CV: regular rates and rhythm, normal S1 S2, no S3 or S4, and no peripheral edema  SKIN: no suspicious lesions or rashes  NEURO: Normal strength and tone, mentation intact and speech normal         4/11/2024   Mini Cog   Clock Draw Score 2 Normal   3 Item Recall 3 objects recalled   Mini Cog Total Score 5              Signed Electronically by: Tello Finney MD

## 2024-05-07 ENCOUNTER — MYC REFILL (OUTPATIENT)
Dept: INTERNAL MEDICINE | Facility: CLINIC | Age: 79
End: 2024-05-07
Payer: MEDICARE

## 2024-05-07 DIAGNOSIS — I10 ESSENTIAL HYPERTENSION WITH GOAL BLOOD PRESSURE LESS THAN 140/90: ICD-10-CM

## 2024-05-07 RX ORDER — LOSARTAN POTASSIUM 100 MG/1
100 TABLET ORAL DAILY
Qty: 90 TABLET | Refills: 3 | OUTPATIENT
Start: 2024-05-07

## 2024-05-09 ENCOUNTER — TRANSFERRED RECORDS (OUTPATIENT)
Dept: HEALTH INFORMATION MANAGEMENT | Facility: CLINIC | Age: 79
End: 2024-05-09

## 2024-05-09 ENCOUNTER — ANCILLARY PROCEDURE (OUTPATIENT)
Dept: CT IMAGING | Facility: CLINIC | Age: 79
End: 2024-05-09
Attending: INTERNAL MEDICINE
Payer: MEDICARE

## 2024-05-09 DIAGNOSIS — C18.9 MALIGNANT NEOPLASM OF COLON (H): ICD-10-CM

## 2024-05-09 PROCEDURE — 250N000011 HC RX IP 250 OP 636: Performed by: INTERNAL MEDICINE

## 2024-05-09 PROCEDURE — 250N000009 HC RX 250: Performed by: INTERNAL MEDICINE

## 2024-05-09 PROCEDURE — 71260 CT THORAX DX C+: CPT

## 2024-05-09 RX ORDER — HEPARIN SODIUM (PORCINE) LOCK FLUSH IV SOLN 100 UNIT/ML 100 UNIT/ML
5 SOLUTION INTRAVENOUS ONCE
Status: COMPLETED | OUTPATIENT
Start: 2024-05-09 | End: 2024-05-09

## 2024-05-09 RX ORDER — IOPAMIDOL 755 MG/ML
75 INJECTION, SOLUTION INTRAVASCULAR ONCE
Status: COMPLETED | OUTPATIENT
Start: 2024-05-09 | End: 2024-05-09

## 2024-05-09 RX ADMIN — IOPAMIDOL 75 ML: 755 INJECTION, SOLUTION INTRAVENOUS at 13:55

## 2024-05-09 RX ADMIN — HEPARIN SODIUM (PORCINE) LOCK FLUSH IV SOLN 100 UNIT/ML 5 ML: 100 SOLUTION at 13:55

## 2024-05-09 RX ADMIN — SODIUM CHLORIDE 40 ML: 9 INJECTION, SOLUTION INTRAVENOUS at 13:55

## 2024-05-22 ENCOUNTER — LAB REQUISITION (OUTPATIENT)
Dept: LAB | Facility: CLINIC | Age: 79
End: 2024-05-22
Payer: MEDICARE

## 2024-06-03 LAB
BKR LAB AP ADD'L TEST STATUS: NORMAL
BKR PATH ADDL TEST FINAL COMMENTS: NORMAL

## 2024-06-11 ENCOUNTER — TRANSFERRED RECORDS (OUTPATIENT)
Dept: HEALTH INFORMATION MANAGEMENT | Facility: CLINIC | Age: 79
End: 2024-06-11
Payer: MEDICARE

## 2024-07-01 ENCOUNTER — ANCILLARY PROCEDURE (OUTPATIENT)
Dept: CT IMAGING | Facility: CLINIC | Age: 79
End: 2024-07-01
Attending: NURSE PRACTITIONER
Payer: MEDICARE

## 2024-07-01 DIAGNOSIS — C18.5: ICD-10-CM

## 2024-07-01 LAB
CREAT BLD-MCNC: 0.6 MG/DL (ref 0.5–1)
EGFRCR SERPLBLD CKD-EPI 2021: >60 ML/MIN/1.73M2

## 2024-07-01 PROCEDURE — 250N000011 HC RX IP 250 OP 636: Performed by: NURSE PRACTITIONER

## 2024-07-01 PROCEDURE — 250N000009 HC RX 250: Performed by: NURSE PRACTITIONER

## 2024-07-01 PROCEDURE — 82565 ASSAY OF CREATININE: CPT

## 2024-07-01 PROCEDURE — 71260 CT THORAX DX C+: CPT

## 2024-07-01 RX ORDER — IOPAMIDOL 755 MG/ML
75 INJECTION, SOLUTION INTRAVASCULAR ONCE
Status: COMPLETED | OUTPATIENT
Start: 2024-07-01 | End: 2024-07-01

## 2024-07-01 RX ORDER — HEPARIN SODIUM (PORCINE) LOCK FLUSH IV SOLN 100 UNIT/ML 100 UNIT/ML
5 SOLUTION INTRAVENOUS ONCE
Status: COMPLETED | OUTPATIENT
Start: 2024-07-01 | End: 2024-07-01

## 2024-07-01 RX ADMIN — IOPAMIDOL 75 ML: 755 INJECTION, SOLUTION INTRAVENOUS at 15:10

## 2024-07-01 RX ADMIN — SODIUM CHLORIDE 40 ML: 9 INJECTION, SOLUTION INTRAVENOUS at 15:10

## 2024-07-01 RX ADMIN — HEPARIN SODIUM (PORCINE) LOCK FLUSH IV SOLN 100 UNIT/ML 5 ML: 100 SOLUTION at 15:11

## 2024-07-02 ENCOUNTER — TRANSFERRED RECORDS (OUTPATIENT)
Dept: HEALTH INFORMATION MANAGEMENT | Facility: CLINIC | Age: 79
End: 2024-07-02
Payer: MEDICARE

## 2024-07-16 ENCOUNTER — TRANSFERRED RECORDS (OUTPATIENT)
Dept: HEALTH INFORMATION MANAGEMENT | Facility: CLINIC | Age: 79
End: 2024-07-16
Payer: MEDICARE

## 2024-07-25 ENCOUNTER — NURSE TRIAGE (OUTPATIENT)
Dept: INTERNAL MEDICINE | Facility: CLINIC | Age: 79
End: 2024-07-25
Payer: MEDICARE

## 2024-07-25 ENCOUNTER — ANCILLARY PROCEDURE (OUTPATIENT)
Dept: GENERAL RADIOLOGY | Facility: CLINIC | Age: 79
End: 2024-07-25
Attending: PHYSICIAN ASSISTANT
Payer: MEDICARE

## 2024-07-25 ENCOUNTER — OFFICE VISIT (OUTPATIENT)
Dept: URGENT CARE | Facility: URGENT CARE | Age: 79
End: 2024-07-25
Payer: MEDICARE

## 2024-07-25 VITALS
OXYGEN SATURATION: 93 % | TEMPERATURE: 97.7 F | SYSTOLIC BLOOD PRESSURE: 112 MMHG | DIASTOLIC BLOOD PRESSURE: 66 MMHG | HEART RATE: 90 BPM

## 2024-07-25 DIAGNOSIS — J43.9 PULMONARY EMPHYSEMA, UNSPECIFIED EMPHYSEMA TYPE (H): ICD-10-CM

## 2024-07-25 DIAGNOSIS — S29.9XXA TRAUMATIC INJURY OF RIB: Primary | ICD-10-CM

## 2024-07-25 DIAGNOSIS — S22.41XA CLOSED FRACTURE OF MULTIPLE RIBS OF RIGHT SIDE, INITIAL ENCOUNTER: ICD-10-CM

## 2024-07-25 DIAGNOSIS — Z78.9 DRUG INTERACTION: ICD-10-CM

## 2024-07-25 DIAGNOSIS — S29.9XXA TRAUMATIC INJURY OF RIB: ICD-10-CM

## 2024-07-25 PROCEDURE — 71101 X-RAY EXAM UNILAT RIBS/CHEST: CPT | Mod: TC | Performed by: RADIOLOGY

## 2024-07-25 PROCEDURE — 99214 OFFICE O/P EST MOD 30 MIN: CPT | Performed by: PHYSICIAN ASSISTANT

## 2024-07-25 RX ORDER — HYDROCODONE BITARTRATE AND ACETAMINOPHEN 5; 325 MG/1; MG/1
0.5 TABLET ORAL EVERY 6 HOURS PRN
Qty: 12 TABLET | Refills: 0 | Status: SHIPPED | OUTPATIENT
Start: 2024-07-25

## 2024-07-25 NOTE — PROGRESS NOTES
Assessment & Plan     Traumatic injury of rib    Patient had a fall and injured right side ribs    COPD- borderline obstruction on PFTs. long term smoker    Patient has COPD and has chronic wheezing    Closed fracture of multiple ribs of right side, initial encounter    A broken rib is a crack or break in one of the bones of the rib cage. Breathing can be very painful because the muscles used for breathing pull on the rib.  In most cases, a broken rib will heal on its own. You can take pain medicine while the rib mends. Pain relief allows you to take deep breaths. In the past, doctors recommended taping or wrapping broken ribs. This is no longer done because taping makes it hard for you to take deep breaths. Taking deep breaths may help prevent pneumonia or a partial collapse of a lung.    Ice compresses  Vicodin for breakthrough pain  - HYDROcodone-acetaminophen (NORCO) 5-325 MG tablet  Dispense: 12 tablet; Refill: 0    Drug interaction    Patient takes ativan prn at night time  Do not take Ativan with hydrocodone       Rib xray Pos for right side rib fractures Rubens Langley, [facility] PA-C      No follow-ups on file.    Rubens Langley, [facility], PA-C  [facility] HospitalMARY ELLEN Castillo is a 79 year old female who presents to clinic today for the following health issues:  Chief Complaint   Patient presents with    Fall     Yesterday at 3 pm - rib pain on right side       HPI  Review of Systems  Constitutional, HEENT, cardiovascular, pulmonary, gi and gu systems are negative, except as otherwise noted.      Objective    /66   Pulse 90   Temp 97.7  F (36.5  C) (Tympanic)   LMP  (LMP Unknown)   SpO2 93%   Physical Exam   GENERAL: alert and no distress  EYES: Eyes grossly normal to inspection, PERRL and conjunctivae and sclerae normal  HENT: ear canals and TM's normal, nose and mouth without ulcers or lesions  NECK: no adenopathy, no asymmetry, masses, or scars  RESP: lungs clear to  auscultation - no rales, rhonchi or wheezes  CV: regular rate and rhythm, normal S1 S2, no S3 or S4, no murmur, click or rub, no peripheral edema  ABDOMEN: soft, nontender, no hepatosplenomegaly, no masses and bowel sounds normal  MS: pos for right side rib pain, tenderness  SKIN: no suspicious lesions or rashes  NEURO: Normal strength and tone, mentation intact and speech normal  PSYCH: mentation appears normal, affect normal/bright    Rib xray pos for 8th and 9th rib fractures Rubens Langley, [facility] PA-C

## 2024-07-25 NOTE — TELEPHONE ENCOUNTER
Reason for Disposition   MODERATE pain (e.g., interferes with normal activities) and high-risk adult (e.g., age > 60 years, osteoporosis, chronic steroid use)    Additional Information   Negative: Dangerous mechanism of injury (e.g., MVA, contact sports, trampoline, diving, fall > 10 feet or 3 meters)  (Exception: Back pain began > 1 hour after injury.)   Negative: Weakness (i.e., paralysis, loss of muscle strength) of the leg(s) or foot and sudden onset after back injury   Negative: Numbness (i.e., loss of sensation) of the leg(s) or foot and sudden onset after back injury   Negative: Major bleeding (actively dripping or spurting) that can't be stopped   Negative: Bullet, knife or other serious penetrating wound   Negative: Shock suspected (e.g., cold/pale/clammy skin, too weak to stand, low BP, rapid pulse)   Negative: Sounds like a life-threatening emergency to the triager   Negative: Back pain from overuse (work, exercise, gardening) OR from twisting, lifting, or bending injury   Negative: Back pain not from an injury   Negative: SEVERE pain in kidney area (flank) that follows a direct blow to that site   Negative: Blood in urine (red, pink, or tea-colored)   Negative: Unable to urinate (or only a few drops) > 4 hours and bladder feels very full (e.g., palpable bladder or strong urge to urinate)   Negative: Loss of bladder or bowel control (urine or bowel incontinence; wetting self, leaking stool) of new-onset   Negative: Numbness (loss of sensation) in groin or rectal area   Negative: Skin is split open or gaping (length > 1/2 inch or 12 mm)   Negative: Puncture wound of back   Negative: Bleeding won't stop after 10 minutes of direct pressure (using correct technique)   Negative: Sounds like a serious injury to the triager   Negative: Weakness of a leg or foot (e.g., unable to bear weight, dragging foot)   Negative: Numbness of a leg or foot (i.e., loss of sensation)   Negative: SEVERE back pain (e.g.,  "excruciating, unable to do any normal activities) and not improved after pain medicine and CARE ADVICE   Negative: Pain radiates into the thigh or further down the leg now   Negative: Landed hard on feet or buttocks and pain over spine   Negative: Large swelling or bruise and size > palm of person's hand   Negative: No prior tetanus shots (or is not fully vaccinated) and any wound (e.g., cut or scrape)   Negative: HIV positive or severe immunodeficiency (severely weak immune system) and DIRTY cut or scrape   Negative: Patient is confused or is an unreliable provider of information (e.g., dementia, severe intellectual disability, alcohol intoxication)   Negative: Patient wants to be seen   Negative: Last tetanus shot > 5 years ago and DIRTY cut or scrape   Negative: Last tetanus shot > 10 years ago and CLEAN cut or scrape    Answer Assessment - Initial Assessment Questions  1. MECHANISM: \"How did the injury happen?\" (Consider the possibility of domestic violence or elder abuse)      When she's on chemo, she gets diarrhea. When she was in the bathroom, tripped on a rug that was flipped up  2. ONSET: \"When did the injury happen?\" (Minutes or hours ago)      7/24/24 last evening  3. LOCATION: \"What part of the back is injured?\"      Right side of back, starts under right armpit  4. SEVERITY: \"Can you move the back normally?\"      Girdle helps. Not able to move normally  5. PAIN: \"Is there any pain?\" If Yes, ask: \"How bad is the pain?\"   (Scale 1-10; or mild, moderate, severe)      Sits still, no pain. Moves around and it increases to 7/10  6. CORD SYMPTOMS: Any weakness or numbness of the arms or legs?\"      Denies weakness/ numbness of extremities  7. SIZE: For cuts, bruises, or swelling, ask: \"How large is it?\" (e.g., inches or centimeters)      Right arm laceration, small. Stopped bleeding, put band aid on.  8. TETANUS: For any breaks in the skin, ask: \"When was the last tetanus booster?\"      2016  9. OTHER " "SYMPTOMS: \"Do you have any other symptoms?\" (e.g., abdomen pain, blood in urine)      No hematuria, abdominal pain, visible blood in urine  10. PREGNANCY: \"Is there any chance you are pregnant?\" \"When was your last menstrual period?\"        NA    Protocols used: Back Injury-A-[place]    Cheri BARRIENTOSN, RN    "

## 2024-07-30 ENCOUNTER — TRANSFERRED RECORDS (OUTPATIENT)
Dept: HEALTH INFORMATION MANAGEMENT | Facility: CLINIC | Age: 79
End: 2024-07-30
Payer: MEDICARE

## 2024-08-13 ENCOUNTER — TRANSFERRED RECORDS (OUTPATIENT)
Dept: HEALTH INFORMATION MANAGEMENT | Facility: CLINIC | Age: 79
End: 2024-08-13
Payer: MEDICARE

## 2024-08-27 ENCOUNTER — TRANSFERRED RECORDS (OUTPATIENT)
Dept: HEALTH INFORMATION MANAGEMENT | Facility: CLINIC | Age: 79
End: 2024-08-27
Payer: MEDICARE

## 2024-08-29 ENCOUNTER — ANCILLARY PROCEDURE (OUTPATIENT)
Dept: CT IMAGING | Facility: CLINIC | Age: 79
End: 2024-08-29
Attending: NURSE PRACTITIONER
Payer: MEDICARE

## 2024-08-29 DIAGNOSIS — C78.7 SECONDARY MALIGNANT NEOPLASM OF LIVER (H): ICD-10-CM

## 2024-08-29 DIAGNOSIS — C18.5: ICD-10-CM

## 2024-08-29 DIAGNOSIS — D48.7: ICD-10-CM

## 2024-08-29 PROCEDURE — 250N000011 HC RX IP 250 OP 636: Performed by: NURSE PRACTITIONER

## 2024-08-29 RX ORDER — HEPARIN SODIUM (PORCINE) LOCK FLUSH IV SOLN 100 UNIT/ML 100 UNIT/ML
5 SOLUTION INTRAVENOUS ONCE
Status: COMPLETED | OUTPATIENT
Start: 2024-08-29 | End: 2024-08-29

## 2024-08-29 RX ORDER — IOPAMIDOL 755 MG/ML
70 INJECTION, SOLUTION INTRAVASCULAR ONCE
Status: COMPLETED | OUTPATIENT
Start: 2024-08-29 | End: 2024-08-29

## 2024-08-29 RX ADMIN — HEPARIN SODIUM (PORCINE) LOCK FLUSH IV SOLN 100 UNIT/ML 5 ML: 100 SOLUTION at 16:09

## 2024-09-13 ENCOUNTER — ANCILLARY PROCEDURE (OUTPATIENT)
Dept: CT IMAGING | Facility: CLINIC | Age: 79
End: 2024-09-13
Attending: NURSE PRACTITIONER
Payer: MEDICARE

## 2024-09-13 LAB
CREAT BLD-MCNC: 0.7 MG/DL (ref 0.5–1)
EGFRCR SERPLBLD CKD-EPI 2021: >60 ML/MIN/1.73M2

## 2024-09-13 PROCEDURE — 82565 ASSAY OF CREATININE: CPT

## 2024-09-13 PROCEDURE — 250N000011 HC RX IP 250 OP 636: Performed by: NURSE PRACTITIONER

## 2024-09-13 PROCEDURE — 250N000009 HC RX 250: Performed by: NURSE PRACTITIONER

## 2024-09-13 PROCEDURE — 71260 CT THORAX DX C+: CPT

## 2024-09-13 RX ORDER — HEPARIN SODIUM (PORCINE) LOCK FLUSH IV SOLN 100 UNIT/ML 100 UNIT/ML
5 SOLUTION INTRAVENOUS ONCE
Status: COMPLETED | OUTPATIENT
Start: 2024-09-13 | End: 2024-09-13

## 2024-09-13 RX ORDER — IOPAMIDOL 755 MG/ML
81 INJECTION, SOLUTION INTRAVASCULAR ONCE
Status: COMPLETED | OUTPATIENT
Start: 2024-09-13 | End: 2024-09-13

## 2024-09-13 RX ADMIN — SODIUM CHLORIDE 63 ML: 9 INJECTION, SOLUTION INTRAVENOUS at 14:57

## 2024-09-13 RX ADMIN — HEPARIN SODIUM (PORCINE) LOCK FLUSH IV SOLN 100 UNIT/ML 5 ML: 100 SOLUTION at 14:57

## 2024-09-13 RX ADMIN — IOPAMIDOL 81 ML: 755 INJECTION, SOLUTION INTRAVENOUS at 14:57

## 2024-09-20 ENCOUNTER — TRANSFERRED RECORDS (OUTPATIENT)
Dept: HEALTH INFORMATION MANAGEMENT | Facility: CLINIC | Age: 79
End: 2024-09-20
Payer: MEDICARE

## 2024-10-04 ENCOUNTER — TRANSFERRED RECORDS (OUTPATIENT)
Dept: HEALTH INFORMATION MANAGEMENT | Facility: CLINIC | Age: 79
End: 2024-10-04
Payer: MEDICARE

## 2024-10-09 ENCOUNTER — HOSPITAL ENCOUNTER (EMERGENCY)
Facility: CLINIC | Age: 79
Discharge: HOME | End: 2024-10-09
Attending: EMERGENCY MEDICINE | Admitting: EMERGENCY MEDICINE
Payer: MEDICARE

## 2024-10-09 VITALS
SYSTOLIC BLOOD PRESSURE: 137 MMHG | HEART RATE: 68 BPM | RESPIRATION RATE: 25 BRPM | TEMPERATURE: 97.4 F | DIASTOLIC BLOOD PRESSURE: 66 MMHG | OXYGEN SATURATION: 97 %

## 2024-10-09 DIAGNOSIS — C18.9 METASTATIC COLON CANCER TO LIVER (H): ICD-10-CM

## 2024-10-09 DIAGNOSIS — D64.9 ANEMIA, UNSPECIFIED TYPE: ICD-10-CM

## 2024-10-09 DIAGNOSIS — C78.7 METASTATIC COLON CANCER TO LIVER (H): ICD-10-CM

## 2024-10-09 LAB
ABO/RH(D): NORMAL
ANTIBODY SCREEN: NEGATIVE
BASOPHILS # BLD AUTO: 0 10E3/UL (ref 0–0.2)
BASOPHILS NFR BLD AUTO: 1 %
BLD PROD TYP BPU: NORMAL
BLOOD COMPONENT TYPE: NORMAL
CODING SYSTEM: NORMAL
CROSSMATCH: NORMAL
EOSINOPHIL # BLD AUTO: 0.1 10E3/UL (ref 0–0.7)
EOSINOPHIL NFR BLD AUTO: 2 %
ERYTHROCYTE [DISTWIDTH] IN BLOOD BY AUTOMATED COUNT: 29.2 % (ref 10–15)
HCT VFR BLD AUTO: 25.9 % (ref 35–47)
HGB BLD-MCNC: 7.2 G/DL (ref 11.7–15.7)
IMM GRANULOCYTES # BLD: 0 10E3/UL
IMM GRANULOCYTES NFR BLD: 1 %
ISSUE DATE AND TIME: NORMAL
LYMPHOCYTES # BLD AUTO: 1 10E3/UL (ref 0.8–5.3)
LYMPHOCYTES NFR BLD AUTO: 32 %
MCH RBC QN AUTO: 26.2 PG (ref 26.5–33)
MCHC RBC AUTO-ENTMCNC: 27.8 G/DL (ref 31.5–36.5)
MCV RBC AUTO: 94 FL (ref 78–100)
MONOCYTES # BLD AUTO: 0.3 10E3/UL (ref 0–1.3)
MONOCYTES NFR BLD AUTO: 10 %
NEUTROPHILS # BLD AUTO: 1.8 10E3/UL (ref 1.6–8.3)
NEUTROPHILS NFR BLD AUTO: 55 %
NRBC # BLD AUTO: 0 10E3/UL
NRBC BLD AUTO-RTO: 0 /100
PLATELET # BLD AUTO: 104 10E3/UL (ref 150–450)
RBC # BLD AUTO: 2.75 10E6/UL (ref 3.8–5.2)
SPECIMEN EXPIRATION DATE: NORMAL
UNIT ABO/RH: NORMAL
UNIT NUMBER: NORMAL
UNIT STATUS: NORMAL
UNIT TYPE ISBT: 5100
WBC # BLD AUTO: 3.2 10E3/UL (ref 4–11)

## 2024-10-09 PROCEDURE — 36415 COLL VENOUS BLD VENIPUNCTURE: CPT | Performed by: EMERGENCY MEDICINE

## 2024-10-09 PROCEDURE — 99285 EMERGENCY DEPT VISIT HI MDM: CPT | Mod: 25

## 2024-10-09 PROCEDURE — 85014 HEMATOCRIT: CPT | Performed by: EMERGENCY MEDICINE

## 2024-10-09 PROCEDURE — P9016 RBC LEUKOCYTES REDUCED: HCPCS | Performed by: EMERGENCY MEDICINE

## 2024-10-09 PROCEDURE — 36430 TRANSFUSION BLD/BLD COMPNT: CPT

## 2024-10-09 PROCEDURE — 86901 BLOOD TYPING SEROLOGIC RH(D): CPT | Performed by: EMERGENCY MEDICINE

## 2024-10-09 PROCEDURE — 86923 COMPATIBILITY TEST ELECTRIC: CPT | Performed by: EMERGENCY MEDICINE

## 2024-10-09 PROCEDURE — 86900 BLOOD TYPING SEROLOGIC ABO: CPT | Performed by: EMERGENCY MEDICINE

## 2024-10-09 PROCEDURE — 250N000011 HC RX IP 250 OP 636: Performed by: EMERGENCY MEDICINE

## 2024-10-09 PROCEDURE — 85004 AUTOMATED DIFF WBC COUNT: CPT | Performed by: EMERGENCY MEDICINE

## 2024-10-09 RX ORDER — HEPARIN SODIUM (PORCINE) LOCK FLUSH IV SOLN 100 UNIT/ML 100 UNIT/ML
5-10 SOLUTION INTRAVENOUS
Status: DISCONTINUED | OUTPATIENT
Start: 2024-10-09 | End: 2024-10-09 | Stop reason: HOSPADM

## 2024-10-09 RX ADMIN — HEPARIN SODIUM (PORCINE) LOCK FLUSH IV SOLN 100 UNIT/ML 5 ML: 100 SOLUTION at 15:34

## 2024-10-09 ASSESSMENT — COLUMBIA-SUICIDE SEVERITY RATING SCALE - C-SSRS
6. HAVE YOU EVER DONE ANYTHING, STARTED TO DO ANYTHING, OR PREPARED TO DO ANYTHING TO END YOUR LIFE?: NO
2. HAVE YOU ACTUALLY HAD ANY THOUGHTS OF KILLING YOURSELF IN THE PAST MONTH?: NO
1. IN THE PAST MONTH, HAVE YOU WISHED YOU WERE DEAD OR WISHED YOU COULD GO TO SLEEP AND NOT WAKE UP?: NO

## 2024-10-09 ASSESSMENT — ACTIVITIES OF DAILY LIVING (ADL)
ADLS_ACUITY_SCORE: 38

## 2024-10-09 NOTE — ED PROVIDER NOTES
Emergency Department Note      History of Present Illness     Chief Complaint   Abnormal Labs      HPI   Anna Dyer is a 79 year old female with history of stage 4 colon cancer metastasized to the liver and lungs, COPD, hyperlipidemia, hypertension, peripheral artery disease and hypothyroidism who presents with abnormal labs. The patient reports that she had bloodwork done 5 days ago at [facility] who found that she had dangerously low hemoglobin, so she was scheduled to get a transfusion at the infusion center. She explains that she got a call today from the infusion center stating that they are not taking new patients because they are low on IV fluids, so she was instructed to present to the ED for a transfusion. She has never had a transfusion before. The patient is in stage 4 colon cancer that she is undergoing chemo for, last round was Friday 3 weeks ago. She notes that she missed her last treatment last Friday due to her low hemoglobin. The patient complains of increased pain in her bilateral ankles and calves when she tries to walk that she attributes to her worsening peripheral disease. She denies black/bloody stools.    Independent Historian   None    Review of External Notes   I reviewed the patient's oncology office visit note from 10/4/24    Past Medical History     Medical History and Problem List   Anemia  Thrombocytopenia  Drug-induced neutropenia  Drug-related alopecia  Malignant tumor of colon  Inflammatory disease of mucous membrane  Obstruction of colon  Colon cancer metastasized to liver and lung  Hypertension  Peripheral artery disease  Left upper lobe pulmonary nodule  Ventral incisional hernia  Restless legs  Parastomal hernia  Osteoporosis  COPD  Hyperlipidemia  Hypothyroidism    Medications   Norvasc  Aspirin 81 mg  Lipitor  Fosamax  Neurontin  Hydrochlorothiazide  Norco  Levothyroxine  Ativan  Cozaar    Surgical History   Liver biopsy  Breast implants  Facelift and tummy  kristy  Colonoscopy  Tubal ligation  Insert port vascular access  IR lower extremity angiogram (B)  Colectomy  Colostomy takedown  Ventral herniorrhaphy  Thoracoscopy x2  Wedge resection lung (R)  Exploratory laparotomy    Physical Exam     Patient Vitals for the past 24 hrs:   BP Temp Temp src Pulse Resp SpO2   10/09/24 1052 (!) 149/41 97.3  F (36.3  C) Temporal 91 20 96 %     Physical Exam  Constitutional: Elderly white female supine in no respiratory distress  HENT: No signs of trauma.   Eyes: EOM are normal. Pupils are equal, round, and reactive to light.   Neck: Normal range of motion. No JVD present. No cervical adenopathy.  Cardiovascular: Regular rhythm.  Exam reveals no gallop and no friction rub.    No murmur heard.  Pulmonary/Chest: Bilateral breath sounds normal. No wheezes, rhonchi or rales. Port left side.  Abdominal: Soft. No tenderness. No rebound or guarding.   Musculoskeletal: 1+ edema bilaterally lower extremities. No tenderness.   Lymphadenopathy: No lymphadenopathy.   Neurological: Alert and oriented to person, place, and time. Normal strength. Coordination normal.   Skin: Skin is warm and dry. No rash noted. No erythema.     Diagnostics     Lab Results   Labs Ordered and Resulted from Time of ED Arrival to Time of ED Departure   CBC WITH PLATELETS AND DIFFERENTIAL - Abnormal       Result Value    WBC Count 3.2 (*)     RBC Count 2.75 (*)     Hemoglobin 7.2 (*)     Hematocrit 25.9 (*)     MCV 94      MCH 26.2 (*)     MCHC 27.8 (*)     RDW 29.2 (*)     Platelet Count 104 (*)     % Neutrophils 55      % Lymphocytes 32      % Monocytes 10      % Eosinophils 2      % Basophils 1      % Immature Granulocytes 1      NRBCs per 100 WBC 0      Absolute Neutrophils 1.8      Absolute Lymphocytes 1.0      Absolute Monocytes 0.3      Absolute Eosinophils 0.1      Absolute Basophils 0.0      Absolute Immature Granulocytes 0.0      Absolute NRBCs 0.0     TYPE AND SCREEN, ADULT    ABO/RH(D) O POS      Antibody  Screen Negative      SPECIMEN EXPIRATION DATE [id number]     PREPARE RED BLOOD CELLS (UNIT)    Blood Component Type Red Blood Cells      Product Code [id number]      Unit Status Issued      Unit Number [id number]      CROSSMATCH Compatible      CODING SYSTEM [id number]      ISSUE DATE AND TIME [id number]      UNIT ABO/RH O+      UNIT TYPE ISBT 5100     PREPARE RED BLOOD CELLS (UNIT)   TRANSFUSE RED BLOOD CELLS (UNIT)   ABO/RH TYPE AND SCREEN       Imaging   No orders to display       Independent Interpretation   None    ED Course      Medications Administered   Medications - No data to display  1 unit pRBCs    Discussion of Management   None    ED Course   ED Course as of 10/09/24 1243   Wed Oct 09, 2024   1104 I obtained history and examined the patient as noted above   1226 I rechecked the patient and explained findings.       Additional Documentation  None    Medical Decision Making / Diagnosis     CMS Diagnoses: None    MIPS       None    [facility]   Anna Dyer is a 79 year old female who unfortunately has metastatic colon cancer to her liver and lungs. She was in the office Friday to have her labs checked and her hemoglobin was only 6.4. She was told to come over to the infusion center on Monday and get blood but has not come over until today. Because of the current IV fluid shortage she was told to go to the ED instead. On arrival here she states that she is a little more tired than usual and the peripheral artery disease in her legs seems to be worse with the lower hemoglobin. We rechecked her labs and her hemoglobin is 7.2 but based on her worsening symptoms and the fact that she had been 6.4 just a few days ago, I elected to give her one unit of pack cells. Patient will be discharged after that and will follow with her oncologist as scheduled.    Disposition   The patient was discharged.     Diagnosis     ICD-10-CM    1. Anemia, unspecified type  D64.9       2. Metastatic colon cancer to liver (H)   C18.9     C78.7              Scribe Disclosure:  I, Russell Varun, am serving as a scribe at 11:02 AM on 10/9/2024 to document services personally performed by Joni Meraz MD based on my observations and the provider's statements to me.        Joni Meraz MD  10/09/24 1257

## 2024-10-09 NOTE — ED TRIAGE NOTES
Pt states sent to ED from infusion center for blood transfusion.      Triage Assessment (Adult)       Row Name 10/09/24 1050          Triage Assessment    Airway WDL WDL        Respiratory WDL    Respiratory WDL WDL        Skin Circulation/Temperature WDL    Skin Circulation/Temperature WDL WDL        Cardiac WDL    Cardiac WDL WDL        Peripheral/Neurovascular WDL    Peripheral Neurovascular WDL WDL        Cognitive/Neuro/Behavioral WDL    Cognitive/Neuro/Behavioral WDL WDL

## 2024-10-11 ENCOUNTER — PATIENT OUTREACH (OUTPATIENT)
Dept: INTERNAL MEDICINE | Facility: CLINIC | Age: 79
End: 2024-10-11
Payer: MEDICARE

## 2024-10-14 NOTE — TELEPHONE ENCOUNTER
Transitions of Care Outreach  Chief Complaint   Patient presents with    Hospital F/U       Most Recent Admission Date: 10/9/2024   Most Recent Admission Diagnosis:      Most Recent Discharge Date: 10/9/2024   Most Recent Discharge Diagnosis: Anemia, unspecified type - D64.9  Metastatic colon cancer to liver (H) - C18.9, C78.7     Transitions of Care Assessment    Discharge Assessment  How are you doing now that you are home?: Feeling well - wasn't that bad  How are your symptoms? (Red Flag symptoms escalate to triage hotline per guidelines): Improved  Do you know how to contact your clinic care team if you have future questions or changes to your health status? : Yes  Does the patient have their discharge instructions? : Yes  Does the patient have questions regarding their discharge instructions? : No  Were you started on any new medications or were there changes to any of your previous medications? : No  Does the patient have all of their medications?: Yes  Do you have questions regarding any of your medications? : No  Do you have all of your needed medical supplies or equipment (DME)?  (i.e. oxygen tank, CPAP, cane, etc.): Yes    Follow up Plan     Discharge Follow-Up  Discharge follow up appointment scheduled in alignment with recommended follow up timeframe or Transitions of Risk Category? (Low = within 30 days; Moderate= within 14 days; High= within 7 days): No  Patient's follow up appointment not scheduled: Patient declined scheduling support. Education on the importance of transitions of care follow up. Provided scheduling phone number.    Future Appointments   Date Time Provider Department Center   1/13/2025 10:30 AM Aashish Rausch MD [facility]        Outpatient Plan as outlined on AVS reviewed with patient.    For any urgent concerns, please contact our 24 hour nurse triage line: [phone] ([phone])       Stephania Siddiqui RN

## 2024-10-18 ENCOUNTER — TRANSFERRED RECORDS (OUTPATIENT)
Dept: HEALTH INFORMATION MANAGEMENT | Facility: CLINIC | Age: 79
End: 2024-10-18
Payer: MEDICARE

## 2024-11-14 LAB
ABO/RH(D): NORMAL
ANTIBODY SCREEN: NEGATIVE
SPECIMEN EXPIRATION DATE: NORMAL

## 2024-11-15 ENCOUNTER — TRANSFERRED RECORDS (OUTPATIENT)
Dept: HEALTH INFORMATION MANAGEMENT | Facility: CLINIC | Age: 79
End: 2024-11-15

## 2024-11-15 ENCOUNTER — LAB (OUTPATIENT)
Dept: LAB | Facility: CLINIC | Age: 79
End: 2024-11-15
Payer: MEDICARE

## 2024-11-15 DIAGNOSIS — C18.9 MALIGNANT NEOPLASM OF COLON (H): Primary | ICD-10-CM

## 2024-11-15 DIAGNOSIS — C18.9 MALIGNANT NEOPLASM OF COLON (H): ICD-10-CM

## 2024-11-15 PROCEDURE — 86850 RBC ANTIBODY SCREEN: CPT

## 2024-11-15 PROCEDURE — 86900 BLOOD TYPING SEROLOGIC ABO: CPT

## 2024-11-15 PROCEDURE — 36415 COLL VENOUS BLD VENIPUNCTURE: CPT

## 2024-11-18 ENCOUNTER — HOSPITAL ENCOUNTER (OUTPATIENT)
Facility: CLINIC | Age: 79
Discharge: HOME | End: 2024-11-18
Admitting: INTERNAL MEDICINE
Payer: MEDICARE

## 2024-11-18 VITALS
DIASTOLIC BLOOD PRESSURE: 50 MMHG | OXYGEN SATURATION: 98 % | HEART RATE: 75 BPM | TEMPERATURE: 98.1 F | SYSTOLIC BLOOD PRESSURE: 133 MMHG | RESPIRATION RATE: 16 BRPM

## 2024-11-18 LAB
BLD PROD TYP BPU: NORMAL
BLOOD COMPONENT TYPE: NORMAL
CODING SYSTEM: NORMAL
CROSSMATCH: NORMAL
ISSUE DATE AND TIME: NORMAL
UNIT ABO/RH: NORMAL
UNIT NUMBER: NORMAL
UNIT STATUS: NORMAL
UNIT TYPE ISBT: 5100

## 2024-11-18 PROCEDURE — 86923 COMPATIBILITY TEST ELECTRIC: CPT | Performed by: INTERNAL MEDICINE

## 2024-11-18 PROCEDURE — 250N000011 HC RX IP 250 OP 636: Performed by: PHYSICIAN ASSISTANT

## 2024-11-18 PROCEDURE — 36430 TRANSFUSION BLD/BLD COMPNT: CPT

## 2024-11-18 PROCEDURE — 999N000087 HC STATISTIC IV OP-OVERFLOW

## 2024-11-18 PROCEDURE — P9016 RBC LEUKOCYTES REDUCED: HCPCS | Performed by: INTERNAL MEDICINE

## 2024-11-18 RX ORDER — HEPARIN SODIUM (PORCINE) LOCK FLUSH IV SOLN 100 UNIT/ML 100 UNIT/ML
5-10 SOLUTION INTRAVENOUS
Status: DISCONTINUED | OUTPATIENT
Start: 2024-11-18 | End: 2024-11-18 | Stop reason: HOSPADM

## 2024-11-18 RX ORDER — HEPARIN SODIUM,PORCINE 10 UNIT/ML
5-10 VIAL (ML) INTRAVENOUS
Status: DISCONTINUED | OUTPATIENT
Start: 2024-11-18 | End: 2024-11-18 | Stop reason: HOSPADM

## 2024-11-18 RX ORDER — HEPARIN SODIUM,PORCINE 10 UNIT/ML
5-10 VIAL (ML) INTRAVENOUS EVERY 24 HOURS
Status: DISCONTINUED | OUTPATIENT
Start: 2024-11-18 | End: 2024-11-18 | Stop reason: HOSPADM

## 2024-11-18 RX ADMIN — HEPARIN SODIUM (PORCINE) LOCK FLUSH IV SOLN 100 UNIT/ML 5 ML: 100 SOLUTION at 13:44

## 2024-11-18 ASSESSMENT — ACTIVITIES OF DAILY LIVING (ADL)
ADLS_ACUITY_SCORE: 0

## 2024-11-18 NOTE — PROGRESS NOTES
Care Suites Discharge Nursing Note    Patient Information  Name: Anna Dyer  Age: 79 year old    Discharge Education:  Discharge instructions reviewed: Yes  Additional education/resources provided: n/a  Patient/patient representative verbalizes understanding: Yes  Patient discharging on new medications: No  Medication education completed: Yes    Discharge Plans:   Discharge location: home  Discharge ride contacted: N/A, driving self home  Approximate discharge time: 1400    Discharge Criteria:  Discharge criteria met and vital signs stable: Yes, ambulatory for short distances, voided in BR, good appetite, denies any c/o, appears to have tolerated blood transfusion well, VSS.    Patient Belongs:  Patient belongings returned to patient: Yes    Nasima Boyd RN

## 2024-11-18 NOTE — PROGRESS NOTES
1 unit packed red blood cells infusing through port, started at 1043, denies any c/o, eating krista lunch now, VSS.

## 2024-12-03 ENCOUNTER — ANCILLARY PROCEDURE (OUTPATIENT)
Dept: CT IMAGING | Facility: CLINIC | Age: 79
End: 2024-12-03
Attending: INTERNAL MEDICINE
Payer: MEDICARE

## 2024-12-03 DIAGNOSIS — C18.5 MALIGNANT NEOPLASM OF SPLENIC FLEXURE OF COLON (H): ICD-10-CM

## 2024-12-03 LAB
CREAT BLD-MCNC: 0.5 MG/DL (ref 0.5–1)
EGFRCR SERPLBLD CKD-EPI 2021: >60 ML/MIN/1.73M2

## 2024-12-03 PROCEDURE — 250N000009 HC RX 250: Performed by: INTERNAL MEDICINE

## 2024-12-03 PROCEDURE — 82565 ASSAY OF CREATININE: CPT

## 2024-12-03 PROCEDURE — 71260 CT THORAX DX C+: CPT

## 2024-12-03 PROCEDURE — 250N000011 HC RX IP 250 OP 636: Performed by: INTERNAL MEDICINE

## 2024-12-03 PROCEDURE — 74177 CT ABD & PELVIS W/CONTRAST: CPT

## 2024-12-03 RX ORDER — IOPAMIDOL 755 MG/ML
81 INJECTION, SOLUTION INTRAVASCULAR ONCE
Status: COMPLETED | OUTPATIENT
Start: 2024-12-03 | End: 2024-12-03

## 2024-12-03 RX ORDER — HEPARIN SODIUM (PORCINE) LOCK FLUSH IV SOLN 100 UNIT/ML 100 UNIT/ML
5 SOLUTION INTRAVENOUS ONCE
Status: COMPLETED | OUTPATIENT
Start: 2024-12-03 | End: 2024-12-03

## 2024-12-03 RX ORDER — IOPAMIDOL 755 MG/ML
81 INJECTION, SOLUTION INTRAVASCULAR ONCE
OUTPATIENT
Start: 2024-12-03 | End: 2024-12-03

## 2024-12-03 RX ADMIN — SODIUM CHLORIDE 40 ML: 9 INJECTION, SOLUTION INTRAVENOUS at 16:08

## 2024-12-03 RX ADMIN — HEPARIN SODIUM (PORCINE) LOCK FLUSH IV SOLN 100 UNIT/ML 5 ML: 100 SOLUTION at 16:08

## 2024-12-03 RX ADMIN — IOPAMIDOL 81 ML: 755 INJECTION, SOLUTION INTRAVENOUS at 16:08

## 2024-12-13 ENCOUNTER — TRANSFERRED RECORDS (OUTPATIENT)
Dept: HEALTH INFORMATION MANAGEMENT | Facility: CLINIC | Age: 79
End: 2024-12-13
Payer: MEDICARE

## 2025-01-10 ENCOUNTER — TRANSFERRED RECORDS (OUTPATIENT)
Dept: HEALTH INFORMATION MANAGEMENT | Facility: CLINIC | Age: 80
End: 2025-01-10
Payer: MEDICARE

## 2025-01-24 ENCOUNTER — TRANSFERRED RECORDS (OUTPATIENT)
Dept: HEALTH INFORMATION MANAGEMENT | Facility: CLINIC | Age: 80
End: 2025-01-24
Payer: MEDICARE

## 2025-02-11 ENCOUNTER — TRANSFERRED RECORDS (OUTPATIENT)
Dept: HEALTH INFORMATION MANAGEMENT | Facility: CLINIC | Age: 80
End: 2025-02-11
Payer: MEDICARE

## 2025-03-12 ENCOUNTER — ANCILLARY PROCEDURE (OUTPATIENT)
Dept: CT IMAGING | Facility: CLINIC | Age: 80
End: 2025-03-12
Attending: INTERNAL MEDICINE
Payer: MEDICARE

## 2025-03-12 DIAGNOSIS — C18.5 MALIGNANT NEOPLASM OF SPLENIC FLEXURE (H): ICD-10-CM

## 2025-03-12 LAB
CREAT BLD-MCNC: 0.7 MG/DL (ref 0.5–1)
EGFRCR SERPLBLD CKD-EPI 2021: >60 ML/MIN/1.73M2

## 2025-03-12 PROCEDURE — 74177 CT ABD & PELVIS W/CONTRAST: CPT

## 2025-03-12 PROCEDURE — 82565 ASSAY OF CREATININE: CPT

## 2025-03-12 PROCEDURE — 250N000011 HC RX IP 250 OP 636: Performed by: INTERNAL MEDICINE

## 2025-03-12 PROCEDURE — 71260 CT THORAX DX C+: CPT

## 2025-03-12 PROCEDURE — 250N000009 HC RX 250: Performed by: INTERNAL MEDICINE

## 2025-03-12 RX ORDER — HEPARIN SODIUM (PORCINE) LOCK FLUSH IV SOLN 100 UNIT/ML 100 UNIT/ML
5 SOLUTION INTRAVENOUS ONCE
Status: COMPLETED | OUTPATIENT
Start: 2025-03-12 | End: 2025-03-12

## 2025-03-12 RX ORDER — IOPAMIDOL 755 MG/ML
81 INJECTION, SOLUTION INTRAVASCULAR ONCE
Status: COMPLETED | OUTPATIENT
Start: 2025-03-12 | End: 2025-03-12

## 2025-03-12 RX ADMIN — SODIUM CHLORIDE 40 ML: 9 INJECTION, SOLUTION INTRAVENOUS at 15:49

## 2025-03-12 RX ADMIN — HEPARIN SODIUM (PORCINE) LOCK FLUSH IV SOLN 100 UNIT/ML 5 ML: 100 SOLUTION at 15:49

## 2025-03-12 RX ADMIN — IOPAMIDOL 81 ML: 755 INJECTION, SOLUTION INTRAVENOUS at 15:49

## 2025-03-14 ENCOUNTER — TRANSFERRED RECORDS (OUTPATIENT)
Dept: HEALTH INFORMATION MANAGEMENT | Facility: CLINIC | Age: 80
End: 2025-03-14
Payer: MEDICARE

## 2025-03-18 ENCOUNTER — VIRTUAL VISIT (OUTPATIENT)
Dept: ENDOCRINOLOGY | Facility: CLINIC | Age: 80
End: 2025-03-18
Payer: MEDICARE

## 2025-03-18 DIAGNOSIS — E03.9 HYPOTHYROIDISM, UNSPECIFIED TYPE: Primary | ICD-10-CM

## 2025-03-18 PROCEDURE — G2211 COMPLEX E/M VISIT ADD ON: HCPCS | Performed by: INTERNAL MEDICINE

## 2025-03-18 PROCEDURE — 98006 SYNCH AUDIO-VIDEO EST MOD 30: CPT | Performed by: INTERNAL MEDICINE

## 2025-03-18 RX ORDER — PANTOPRAZOLE SODIUM 40 MG/1
40 TABLET, DELAYED RELEASE ORAL DAILY
COMMUNITY

## 2025-03-18 RX ORDER — LEVOTHYROXINE SODIUM 50 UG/1
50 TABLET ORAL DAILY
Qty: 90 TABLET | Refills: 0 | Status: SHIPPED | OUTPATIENT
Start: 2025-03-18

## 2025-03-18 NOTE — PROGRESS NOTES
"Virtual Visit Details    Type of service:  Video Visit   Video Start Time:  3:05 PM  Video End Time: 3:18 PM    Originating Location (pt. Location): Home  Distant Location (provider location):  Off-site  Platform used for Video Visit: [place]        Recent issues:  Hypothyroidism follow-up evaluation  Still active with her metastatic colon cancer management, expects to try a new \"pill\" medication soon also  Some fatigue, but appetite OK  No recent medical oncology records available        2017. History of colon cancer- stage 4 metastatic adenocarcinoma with primary tumor originating from splenic flexure,    mets to liver, unresectable presentation per med oncology records  Treatment with chemotherapy, then left hemicolectomy, and subsequent chemotherapy    Lap resection of liver met, limited left thoracotomy and biopsy    History of thrombocytopenia, hypercalcemia, peripheral neuropathy   Medical oncology evaluations with Dr. Patrick Dreyer/[facility]  4/6/22 (medical oncology) labs: TSH 8.39  4/13/22 CT abd/pelvis:   Pulmonary nodule    Thyroid gland unremarkable, per radiologist  Neck radiation treatment: None known   Thyroid med use:  None  Fam Hx thyroid disease: Brother, son- hypothyroidism      7/19/22. Initial endocrinology evaluation with me at [facility]  Reviewed health history and thyroid issues  Began levothyroxine medication treatment  Recent [facility] labs include:  Lab Results   Component Value Date    TSH 2.72 02/05/2024    T4 1.25 02/05/2024    TPO <10 07/27/2022     Current dose:  Levothyroxine 0.05 mg daily      Lives in [place], [place]  Sees Dr. Tello Finney/[facility] for general medicine evaluations.  Also sees Dr. Patrick Dreyer/[facility]    PMH/PSH:  Past Medical History:   Diagnosis Date    Cancer (H)     metastatic colorectal adenocarcinoma    Cataract     Chronic kidney disease     COPD (chronic obstructive pulmonary disease) (H)     Hypertension     No cardiologist    " Hypothyroidism     Nicotine dependence     PAD (peripheral artery disease)     Peripheral neuropathy     RLS (restless legs syndrome)     Thrombocytopenia     Ventral hernia     Ventral hernia 06/30/2020     Past Surgical History:   Procedure Laterality Date    BIOPSY      liver    BREAST SURGERY      breast implants    COLONOSCOPY N/A 3/28/2019    Procedure: INTRAOPERATIVE COLONOSCOPY,;  Surgeon: Jesus Caba MD;  Location:  OR    COSMETIC SURGERY      facelift and tummy tuck    GYN SURGERY      tubal    INSERT PORT VASCULAR ACCESS N/A 3/24/2017    Procedure: INSERT PORT VASCULAR ACCESS;  Surgeon: Yemi Ordaz MD;  Location:  OR    IR LOWER EXTREMITY ANGIOGRAM BILATERAL  3/12/2020    LAPAROSCOPIC ASSISTED COLECTOMY LEFT (DESCENDING) N/A 1/25/2018    Procedure: LAPAROSCOPIC ASSISTED COLECTOMY LEFT (DESCENDING);;  Surgeon: Maddie Baron MD;  Location: [facility] OR    LAPAROSCOPIC ASSISTED COLOSTOMY TAKEDOWN N/A 3/28/2019    Procedure: LAPAROSCOPIC ASSISTED COLOSTOMY REVERSAL;  Surgeon: Jesus Caba MD;  Location:  OR    LAPAROSCOPIC HERNIORRHAPHY VENTRAL N/A 8/12/2020    Procedure: COMBINED OPEN AND LAPAROSCOPIC VENTRAL HERNIA REPAIR;  Surgeon: Maddy Gonzales MD;  Location:  OR    LAPAROSCOPY DIAGNOSTIC (GENERAL) N/A 3/28/2019    Procedure: DIAGNOSTIC LAPAROSCOPY;  Surgeon: Jesus Caba MD;  Location:  OR    LAPAROTOMY EXPLORATORY N/A 1/25/2018    Procedure: LAPAROTOMY EXPLORATORY;  exploratory laparoscopy, left hemicolectomy, transverse colostomy;  Surgeon: Maddie Baron MD;  Location: [facility] OR    THORACOSCOPIC WEDGE RESECTION LUNG Right 9/20/2022    Procedure: WEDGE RESECTION RIGHT UPPER LOBE LUNG NODULE;  Surgeon: Yemi Ordaz MD;  Location:  OR    THORACOSCOPY Left 2/23/2021    Procedure: LEFT THORACOTOMY, ANATOMICAL POSTERIOR SEGMENTECTOMY LEFT UPPER LOBE ;  Surgeon: Yemi Ordaz MD;  Location: [facility] OR    THORACOSCOPY Right 9/20/2022    Procedure:  RIGHT VIDEO ASSISTED THORACOSCOPY;  Surgeon: Yemi Ordaz MD;  Location: [facility] OR       Family Hx:  Family History   Problem Relation Age of Onset    Family History Negative Father     Family History Negative Mother     Psoriasis Brother         half-siblings    Family History Negative Sister     Hypothyroidism Son          Social Hx:  Social History     Socioeconomic History    Marital status:      Spouse name: Not on file    Number of children: Not on file    Years of education: Not on file    Highest education level: Not on file   Occupational History    Occupation:      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 No   Social History Narrative    Not on file     Social Drivers of Health     Financial Resource Strain: Low Risk  (2024)    Financial Resource Strain     Within the past 12 months, have you or your family members you live with been unable to get utilities (heat, electricity) when it was really needed?: No   Food Insecurity: Low Risk  (2024)    Food Insecurity     Within the past 12 months, did you worry that your food would run out before you got money to buy more?: No     Within the past 12 months, did the food you bought just not last and you didn t have money to get more?: No   Transportation Needs: Low Risk  (2024)    Transportation Needs     Within the past 12 months, has lack of transportation kept you from medical  appointments, getting your medicines, non-medical meetings or appointments, work, or from getting things that you need?: No   Physical Activity: Insufficiently Active (4/11/2024)    Exercise Vital Sign     Days of Exercise per Week: 4 days     Minutes of Exercise per Session: 30 min   Stress: No Stress Concern Present (4/11/2024)    [place] [employer] - Occupational Stress Questionnaire     Feeling of Stress : Not at all   Social Connections: Unknown (4/11/2024)    Social Connection and Isolation Panel [NHANES]     Frequency of Communication with Friends and Family: Not on file     Frequency of Social Gatherings with Friends and Family: Once a week     Attends Baptism Services: Not on file     Active Member of Clubs or Organizations: Not on file     Attends Club or Organization Meetings: Not on file     Marital Status: Not on file   Interpersonal Safety: Low Risk  (11/18/2024)    Interpersonal Safety     Do you feel physically and emotionally safe where you currently live?: Yes     Within the past 12 months, have you been hit, slapped, kicked or otherwise physically hurt by someone?: No     Within the past 12 months, have you been humiliated or emotionally abused in other ways by your partner or ex-partner?: No   Housing Stability: Low Risk  (4/11/2024)    Housing Stability     Do you have housing? : Yes     Are you worried about losing your housing?: No          MEDICATIONS:  has a current medication list which includes the following prescription(s): acetaminophen, amlodipine, aspirin, atorvastatin, ergocalciferol, gabapentin, homeopathic products, hydrochlorothiazide, hydrocodone-acetaminophen, levothyroxine, lidocaine-prilocaine, lorazepam, losartan, nicotine, pantoprazole, and alendronate.    ROS:     ROS: 10 point ROS neg other than the symptoms noted above in the HPI.    GENERAL:  fatigue, mild wt gain?; denies fevers, chills, night sweats.   HEENT: no dysphagia, odonophagia, diplopia,  neck pain  THYROID:  no apparent hyper or hypothyroid symptoms  CV: no chest pain, pressure, palpitations  LUNGS: no SOB, HENRY, cough, wheezing   ABDOMEN: constipation; denies diarrhea, abdominal pain  EXTREMITIES: no rashes, ulcers, edema  NEUROLOGY: no headaches, denies changes in vision, tingling, extremitiy numbness   MSK: no muscle aches or pains, weakness  SKIN: no rashes or lesions  : no menses  PSYCH:  stable mood, no significant anxiety or depression  ENDOCRINE: no heat or cold intolerance      Physical Exam (visual exam)  VS:  no vital signs taken for video visit  CONSTITUTIONAL: healthy, alert and NAD, well dressed, answering questions appropriately  ENT: no nose swelling or nasal discharge, mouth redness or gum changes.  EYES: eyes grossly normal to inspection, conjunctivae and sclerae normal, no exophthalmos or proptosis  THYROID:  no apparent nodules or goiter  LUNGS: no audible wheeze, cough or visible cyanosis, no visible retractions or increased work of breathing  ABDOMEN: abdomen not evaluated  EXTREMITIES: no hand tremors, limited exam  NEUROLOGY: CN grossly intact, mentation intact and speech normal   SKIN:  no apparent skin lesions, rash, or edema with visualized skin appearance  PSYCH: mentation appears normal, affect normal/bright, judgement and insight intact,   normal speech and appearance well groomed      LABS:    All pertinent notes, labs, and images personally reviewed by me.     A/P:  Encounter Diagnosis   Name Primary?    Hypothyroidism, unspecified type Yes       Comments:  Reviewed health history and thyroid issues.  Health management complicated by her colon cancer and anemia problems, limited records available  Reviewed and interpreted tests that I previously ordered.   Ordered appropriate tests for the endocrinology disease management.    Management options discussed and implemented after shared medical decision making with the patient.  Hypothyroidism problem is  chronic-stable    Plan:  Reviewed general issues with the hypothyroidism diagnosis and management  Discussed lab tests used to assess patient thyroid hormone levels  Reviewed thyroid medication treatment with levothyroxine medication, dosing.    Recommend:  Continue current levothyroxine 0.05 mg daily dose plan  Updated (limited) Rx sent to pharmacy  Plan repeat thyroid lab tests soon or at upcoming PCP appt   Testing at [facility]    Lab orders placed  Monitor for symptom changes  No thyroid U/S imaging needed at this time  Will summarize results and recommendations when labs available    Keep regular follow-up with medical oncologist, and PCP  Addressed patient questions today     The longitudinal plan of care for the endocrine problem(s) were addressed during this visit.  Due to added complexity of care,   we will continue to support the patient and the subsequent management of this condition with ongoing continuity of care.    There are no Patient Instructions on file for this visit.    Future labs ordered today:   Orders Placed This Encounter   Procedures    TSH    T4 free     Radiology/Consults ordered today: None    Total time spent on day of encounter:      Follow-up:  3/2026 Return    GARY Rausch MD, MS  Endocrinology  [facility]    CC: NEL Finney

## 2025-03-24 DIAGNOSIS — E78.5 HYPERLIPIDEMIA LDL GOAL <70: ICD-10-CM

## 2025-03-25 RX ORDER — ATORVASTATIN CALCIUM 40 MG/1
40 TABLET, FILM COATED ORAL AT BEDTIME
Qty: 90 TABLET | Refills: 0 | Status: SHIPPED | OUTPATIENT
Start: 2025-03-25

## 2025-04-02 ENCOUNTER — OFFICE VISIT (OUTPATIENT)
Dept: INTERNAL MEDICINE | Facility: CLINIC | Age: 80
End: 2025-04-02
Payer: MEDICARE

## 2025-04-02 VITALS
WEIGHT: 113.2 LBS | BODY MASS INDEX: 22.23 KG/M2 | OXYGEN SATURATION: 99 % | HEIGHT: 60 IN | TEMPERATURE: 98.1 F | SYSTOLIC BLOOD PRESSURE: 130 MMHG | HEART RATE: 77 BPM | DIASTOLIC BLOOD PRESSURE: 60 MMHG | RESPIRATION RATE: 18 BRPM

## 2025-04-02 DIAGNOSIS — I85.10 SECONDARY ESOPHAGEAL VARICES WITHOUT BLEEDING (H): ICD-10-CM

## 2025-04-02 DIAGNOSIS — E78.5 HYPERLIPIDEMIA LDL GOAL <70: ICD-10-CM

## 2025-04-02 DIAGNOSIS — M81.0 AGE-RELATED OSTEOPOROSIS WITHOUT CURRENT PATHOLOGICAL FRACTURE: ICD-10-CM

## 2025-04-02 DIAGNOSIS — J43.9 PULMONARY EMPHYSEMA, UNSPECIFIED EMPHYSEMA TYPE (H): ICD-10-CM

## 2025-04-02 DIAGNOSIS — C78.7 COLON CANCER METASTASIZED TO LIVER (H): ICD-10-CM

## 2025-04-02 DIAGNOSIS — C18.9 COLON CANCER METASTASIZED TO LIVER (H): ICD-10-CM

## 2025-04-02 DIAGNOSIS — Z00.00 ENCOUNTER FOR MEDICARE ANNUAL WELLNESS EXAM: Primary | ICD-10-CM

## 2025-04-02 DIAGNOSIS — C18.9 COLON CANCER METASTASIZED TO LUNG (H): ICD-10-CM

## 2025-04-02 DIAGNOSIS — Z87.11 HX OF GASTRIC ULCER: ICD-10-CM

## 2025-04-02 DIAGNOSIS — K08.409 S/P TOOTH EXTRACTION: ICD-10-CM

## 2025-04-02 DIAGNOSIS — E03.9 HYPOTHYROIDISM, UNSPECIFIED TYPE: ICD-10-CM

## 2025-04-02 DIAGNOSIS — I10 BENIGN ESSENTIAL HYPERTENSION: ICD-10-CM

## 2025-04-02 DIAGNOSIS — I73.9 PAD (PERIPHERAL ARTERY DISEASE): ICD-10-CM

## 2025-04-02 DIAGNOSIS — C78.00 COLON CANCER METASTASIZED TO LUNG (H): ICD-10-CM

## 2025-04-02 LAB
ANION GAP SERPL CALCULATED.3IONS-SCNC: 10 MMOL/L (ref 7–15)
BUN SERPL-MCNC: 22.6 MG/DL (ref 8–23)
CALCIUM SERPL-MCNC: 9.4 MG/DL (ref 8.8–10.4)
CHLORIDE SERPL-SCNC: 102 MMOL/L (ref 98–107)
CHOLEST SERPL-MCNC: 152 MG/DL
CREAT SERPL-MCNC: 0.67 MG/DL (ref 0.51–0.95)
EGFRCR SERPLBLD CKD-EPI 2021: 89 ML/MIN/1.73M2
FASTING STATUS PATIENT QL REPORTED: YES
FASTING STATUS PATIENT QL REPORTED: YES
GLUCOSE SERPL-MCNC: 93 MG/DL (ref 70–99)
HCO3 SERPL-SCNC: 25 MMOL/L (ref 22–29)
HDLC SERPL-MCNC: 75 MG/DL
LDLC SERPL CALC-MCNC: 64 MG/DL
NONHDLC SERPL-MCNC: 77 MG/DL
POTASSIUM SERPL-SCNC: 4.2 MMOL/L (ref 3.4–5.3)
SODIUM SERPL-SCNC: 137 MMOL/L (ref 135–145)
T4 FREE SERPL-MCNC: 1.24 NG/DL (ref 0.9–1.7)
TRIGL SERPL-MCNC: 66 MG/DL
TSH SERPL DL<=0.005 MIU/L-ACNC: 3.97 UIU/ML (ref 0.3–4.2)

## 2025-04-02 PROCEDURE — 3075F SYST BP GE 130 - 139MM HG: CPT | Performed by: INTERNAL MEDICINE

## 2025-04-02 PROCEDURE — 3078F DIAST BP <80 MM HG: CPT | Performed by: INTERNAL MEDICINE

## 2025-04-02 PROCEDURE — 80061 LIPID PANEL: CPT | Performed by: INTERNAL MEDICINE

## 2025-04-02 PROCEDURE — 36415 COLL VENOUS BLD VENIPUNCTURE: CPT | Performed by: INTERNAL MEDICINE

## 2025-04-02 PROCEDURE — 84439 ASSAY OF FREE THYROXINE: CPT | Performed by: INTERNAL MEDICINE

## 2025-04-02 PROCEDURE — 99214 OFFICE O/P EST MOD 30 MIN: CPT | Mod: 25 | Performed by: INTERNAL MEDICINE

## 2025-04-02 PROCEDURE — G0439 PPPS, SUBSEQ VISIT: HCPCS | Performed by: INTERNAL MEDICINE

## 2025-04-02 PROCEDURE — G2211 COMPLEX E/M VISIT ADD ON: HCPCS | Performed by: INTERNAL MEDICINE

## 2025-04-02 PROCEDURE — 80048 BASIC METABOLIC PNL TOTAL CA: CPT | Performed by: INTERNAL MEDICINE

## 2025-04-02 PROCEDURE — 84443 ASSAY THYROID STIM HORMONE: CPT | Performed by: INTERNAL MEDICINE

## 2025-04-02 RX ORDER — METHYLPREDNISOLONE SODIUM SUCCINATE 40 MG/ML
40 INJECTION INTRAMUSCULAR; INTRAVENOUS
Start: 2025-05-07

## 2025-04-02 RX ORDER — AMLODIPINE BESYLATE 5 MG/1
5 TABLET ORAL DAILY
Qty: 90 TABLET | Refills: 3 | Status: SHIPPED | OUTPATIENT
Start: 2025-04-02

## 2025-04-02 RX ORDER — ALBUTEROL SULFATE 0.83 MG/ML
2.5 SOLUTION RESPIRATORY (INHALATION)
OUTPATIENT
Start: 2025-05-07

## 2025-04-02 RX ORDER — ZOLEDRONIC ACID 0.05 MG/ML
5 INJECTION, SOLUTION INTRAVENOUS ONCE
Start: 2025-05-07

## 2025-04-02 RX ORDER — DIPHENHYDRAMINE HYDROCHLORIDE 50 MG/ML
50 INJECTION, SOLUTION INTRAMUSCULAR; INTRAVENOUS
Start: 2025-05-07

## 2025-04-02 RX ORDER — EPINEPHRINE 1 MG/ML
0.3 INJECTION, SOLUTION, CONCENTRATE INTRAVENOUS EVERY 5 MIN PRN
OUTPATIENT
Start: 2025-05-07

## 2025-04-02 RX ORDER — DIPHENHYDRAMINE HYDROCHLORIDE 50 MG/ML
25 INJECTION, SOLUTION INTRAMUSCULAR; INTRAVENOUS
Start: 2025-05-07

## 2025-04-02 RX ORDER — HYDROCHLOROTHIAZIDE 25 MG/1
25 TABLET ORAL DAILY
Qty: 90 TABLET | Refills: 3 | Status: SHIPPED | OUTPATIENT
Start: 2025-04-02

## 2025-04-02 RX ORDER — LOSARTAN POTASSIUM 100 MG/1
100 TABLET ORAL DAILY
Qty: 90 TABLET | Refills: 3 | Status: SHIPPED | OUTPATIENT
Start: 2025-04-02

## 2025-04-02 RX ORDER — ACETAMINOPHEN 325 MG/1
650 TABLET ORAL
OUTPATIENT
Start: 2025-05-07

## 2025-04-02 RX ORDER — ALBUTEROL SULFATE 90 UG/1
1-2 INHALANT RESPIRATORY (INHALATION)
Start: 2025-05-07

## 2025-04-02 RX ORDER — ATORVASTATIN CALCIUM 40 MG/1
40 TABLET, FILM COATED ORAL AT BEDTIME
Qty: 90 TABLET | Refills: 3 | Status: SHIPPED | OUTPATIENT
Start: 2025-04-02

## 2025-04-02 RX ORDER — HEPARIN SODIUM,PORCINE 10 UNIT/ML
5-20 VIAL (ML) INTRAVENOUS DAILY PRN
OUTPATIENT
Start: 2025-05-07

## 2025-04-02 RX ORDER — MEPERIDINE HYDROCHLORIDE 25 MG/ML
25 INJECTION INTRAMUSCULAR; INTRAVENOUS; SUBCUTANEOUS
OUTPATIENT
Start: 2025-05-07

## 2025-04-02 RX ORDER — HEPARIN SODIUM (PORCINE) LOCK FLUSH IV SOLN 100 UNIT/ML 100 UNIT/ML
5 SOLUTION INTRAVENOUS
OUTPATIENT
Start: 2025-05-07

## 2025-04-02 SDOH — HEALTH STABILITY: PHYSICAL HEALTH: ON AVERAGE, HOW MANY DAYS PER WEEK DO YOU ENGAGE IN MODERATE TO STRENUOUS EXERCISE (LIKE A BRISK WALK)?: 2 DAYS

## 2025-04-02 SDOH — HEALTH STABILITY: PHYSICAL HEALTH: ON AVERAGE, HOW MANY MINUTES DO YOU ENGAGE IN EXERCISE AT THIS LEVEL?: 10 MIN

## 2025-04-02 ASSESSMENT — SOCIAL DETERMINANTS OF HEALTH (SDOH): HOW OFTEN DO YOU GET TOGETHER WITH FRIENDS OR RELATIVES?: ONCE A WEEK

## 2025-04-02 NOTE — PATIENT INSTRUCTIONS
Patient Education     [facility] will call you to coordinate your infusion therapy as prescribed by your Provider.  If you don't hear from a representative within 2 business days, please call: Eduardo [phone] or Lissa [phone] to schedule your appointment.  Call member services at your health plan with any benefit or coverage questions.      Preventive Care Advice   This is general advice given by our system to help you stay healthy. However, your care team may have specific advice just for you. Please talk to your care team about your preventive care needs.  Nutrition  Eat 5 or more servings of fruits and vegetables each day.  Try wheat bread, brown rice and whole grain pasta (instead of white bread, rice, and pasta).  Get enough calcium and vitamin D. Check the label on foods and aim for 100% of the RDA (recommended daily allowance).  Lifestyle  Exercise at least 150 minutes each week  (30 minutes a day, 5 days a week).  Do muscle strengthening activities 2 days a week. These help control your weight and prevent disease.  No smoking.  Wear sunscreen to prevent skin cancer.  Have a dental exam and cleaning every 6 months.  Yearly exams  See your health care team every year to talk about:  Any changes in your health.  Any medicines your care team has prescribed.  Preventive care, family planning, and ways to prevent chronic diseases.  Shots (vaccines)   HPV shots (up to age 26), if you've never had them before.  Hepatitis B shots (up to age 59), if you've never had them before.  COVID-19 shot: Get this shot when it's due.  Flu shot: Get a flu shot every year.  Tetanus shot: Get a tetanus shot every 10 years.  Pneumococcal, hepatitis A, and RSV shots: Ask your care team if you need these based on your risk.  Shingles shot (for age 50 and up)  General health tests  Diabetes screening:  Starting at age 35, Get screened for diabetes at least every 3 years.  If you are younger than age 35, ask your  care team if you should be screened for diabetes.  Cholesterol test: At age 39, start having a cholesterol test every 5 years, or more often if advised.  Bone density scan (DEXA): At age 50, ask your care team if you should have this scan for osteoporosis (brittle bones).  Hepatitis C: Get tested at least once in your life.  STIs (sexually transmitted infections)  Before age 24: Ask your care team if you should be screened for STIs.  After age 24: Get screened for STIs if you're at risk. You are at risk for STIs (including HIV) if:  You are sexually active with more than one person.  You don't use condoms every time.  You or a partner was diagnosed with a sexually transmitted infection.  If you are at risk for HIV, ask about PrEP medicine to prevent HIV.  Get tested for HIV at least once in your life, whether you are at risk for HIV or not.  Cancer screening tests  Cervical cancer screening: If you have a cervix, begin getting regular cervical cancer screening tests starting at age 21.  Breast cancer scan (mammogram): If you've ever had breasts, begin having regular mammograms starting at age 40. This is a scan to check for breast cancer.  Colon cancer screening: It is important to start screening for colon cancer at age 45.  Have a colonoscopy test every 10 years (or more often if you're at risk) Or, ask your provider about stool tests like a FIT test every year or Cologuard test every 3 years.  To learn more about your testing options, visit:   .  For help making a decision, visit:   https://bit.ly/io43714.  Prostate cancer screening test: If you have a prostate, ask your care team if a prostate cancer screening test (PSA) at age 55 is right for you.  Lung cancer screening: If you are a current or former smoker ages 50 to 80, ask your care team if ongoing lung cancer screenings are right for you.  For informational purposes only. Not to replace the advice of your health care provider. Copyright   2023 [facility]. All rights reserved. Clinically reviewed by the [facility]. [employer] 810668 - REV 01/24.  Preventing Falls: Care Instructions  Injuries and health problems such as trouble walking or poor eyesight can increase your risk of falling. So can some medicines. But there are things you can do to help prevent falls. You can exercise to get stronger. You can also arrange your home to make it safer.    Talk to your doctor about the medicines you take. Ask if any of them increase the risk of falls and whether they can be changed or stopped.   Try to exercise regularly. It can help improve your strength and balance. This can help lower your risk of falling.         Practice fall safety and prevention.   Wear low-heeled shoes that fit well and give your feet good support. Talk to your doctor if you have foot problems that make this hard.  Carry a cellphone or wear a medical alert device that you can use to call for help.  Use stepladders instead of chairs to reach high objects. Don't climb if you're at risk for falls. Ask for help, if needed.  Wear the correct eyeglasses, if you need them.        Make your home safer.   Remove rugs, cords, clutter, and furniture from walkways.  Keep your house well lit. Use night-lights in hallways and bathrooms.  Install and use sturdy handrails on stairways.  Wear nonskid footwear, even inside. Don't walk barefoot or in socks without shoes.        Be safe outside.   Use handrails, curb cuts, and ramps whenever possible.  Keep your hands free by using a shoulder bag or backpack.  Try to walk in well-lit areas. Watch out for uneven ground, changes in pavement, and debris.  Be careful in the winter. Walk on the grass or gravel when sidewalks are slippery. Use de-icer on steps and walkways. Add non-slip devices to shoes.    Put grab bars and nonskid mats in your shower or tub and near the toilet. Try to use a shower chair or bath bench when bathing.  "  Get into a tub or shower by putting in your weaker leg first. Get out with your strong side first. Have a phone or medical alert device in the bathroom with you.   Where can you learn more?  Go to https://www.JamStar.net/patiented  Enter G117 in the search box to learn more about \"Preventing Falls: Care Instructions.\"  Current as of: July 31, 2024  Content Version: 14.4    2332-3594 [employer].   Care instructions adapted under license by your healthcare professional. If you have questions about a medical condition or this instruction, always ask your healthcare professional. [employer] disclaims any warranty or liability for your use of this information.    Bladder Training: Care Instructions  Your Care Instructions     Bladder training is used to treat urge incontinence and stress incontinence. Urge incontinence means that the need to urinate comes on so fast that you can't get to a toilet in time. Stress incontinence means that you leak urine because of pressure on your bladder. For example, it may happen when you laugh, cough, or lift something heavy.  Bladder training can increase how long you can wait before you have to urinate. It can also help your bladder hold more urine. And it can give you better control over the urge to urinate.  It is important to remember that bladder training takes a few weeks to a few months to make a difference. You may not see results right away, but don't give up.  Follow-up care is a key part of your treatment and safety. Be sure to make and go to all appointments, and call your doctor if you are having problems. It's also a good idea to know your test results and keep a list of the medicines you take.  How can you care for yourself at home?  Work with your doctor to come up with a bladder training program that is right for you. You may use one or more of the following methods.  Delayed urination  In the beginning, try to keep from urinating for 5 " "minutes after you first feel the need to go.  While you wait, take deep, slow breaths to relax. Kegel exercises can also help you delay the need to go to the bathroom.  After some practice, when you can easily wait 5 minutes to urinate, try to wait 10 minutes before you urinate.  Slowly increase the waiting period until you are able to control when you have to urinate.  Scheduled urination  Empty your bladder when you first wake up in the morning.  Schedule times throughout the day when you will urinate.  Start by going to the bathroom every hour, even if you don't need to go.  Slowly increase the time between trips to the bathroom.  When you have found a schedule that works well for you, keep doing it.  If you wake up during the night and have to urinate, do it. Apply your schedule to waking hours only.  Kegel exercises  These tighten and strengthen pelvic muscles, which can help you control the flow of urine. (If doing these exercises causes pain, stop doing them and talk with your doctor.) To do Kegel exercises:  Squeeze your muscles as if you were trying not to pass gas. Or squeeze your muscles as if you were stopping the flow of urine. Your belly, legs, and buttocks shouldn't move.  Hold the squeeze for 3 seconds, then relax for 5 to 10 seconds.  Start with 3 seconds, then add 1 second each week until you are able to squeeze for 10 seconds.  Repeat the exercise 10 times a session. Do 3 to 8 sessions a day.  When should you call for help?  Watch closely for changes in your health, and be sure to contact your doctor if:    Your incontinence is getting worse.     You do not get better as expected.   Where can you learn more?  Go to https://www.[facility].net/patiented  Enter V684 in the search box to learn more about \"Bladder Training: Care Instructions.\"  Current as of: April 30, 2024  Content Version: 14.4    7410-7994 [employer].   Care instructions adapted under license by [facility] " professional. If you have questions about a medical condition or this instruction, always ask your healthcare professional. [employer] disclaims any warranty or liability for your use of this information.

## 2025-04-02 NOTE — PROGRESS NOTES
Preventive Care Visit  [facility]  Tello Finney MD, Internal Medicine  Apr 2, 2025      Assessment & Plan     Encounter for Medicare annual wellness exam  Updated screening, immunizations, prevention.  Please see health maintenance list, care gaps     Hypertension goal BP (blood pressure) < 140/90  Well controlled- with her pedal/ankle edema- try decreasing her amlodipine and increasing the hydrochlorothiazide to  manage this a bit better  - BASIC METABOLIC PANEL; Future  - hydrochlorothiazide (HYDRODIURIL) 25 MG tablet; Take 1 tablet (25 mg) by mouth daily.  - BASIC METABOLIC PANEL  - amLODIPine (NORVASC) 5 MG tablet; Take 1 tablet (5 mg) by mouth daily.  - losartan (COZAAR) 100 MG tablet; Take 1 tablet (100 mg) by mouth daily.    Colon cancer metastasized to lung (H)  Per oncology. Most recnet CT shows increase in disease burden - switch in chemo coming up.     COPD- borderline obstruction on PFTs. long term smoker  Discontinue tobacco . otherwise symptoms relatively mild and not interested in inhalers    Hyperlipidemia LDL goal <70  Cont statin   - Lipid panel reflex to direct LDL Non-fasting; Future  - Lipid panel reflex to direct LDL Non-fasting    Age-related osteoporosis without current pathological fracture  Had s/e with alendronate. Will get her reclast IV rx yearly.  With fall last yr she did sustain a rib fx (noted on CT)    PAD (peripheral artery disease)  Cont secondary prevention w/asa, statin- no nefw sx    S/P tooth extraction  Having implants placed     Hypothyroidism, unspecified type  Recheck TSH. Sees endo and prefers to cont to.   - TSH  - T4 free    Secondary esophageal varices  Colon cancer with metastases to liver  History of gastric ulcer  -Noted on EGD.  Finishing course of PPI.   Underlying liver disease related to her colon mets probable    Patient has been advised of split billing requirements and indicates understanding: Yes        Nicotine/Tobacco  Cessation  She reports that she has been smoking cigarettes. She started smoking about 62 years ago. She has a 58 pack-year smoking history. She has never used smokeless tobacco.  Nicotine/Tobacco Cessation Plan  Information offered: Patient not interested at this time      Counseling  Appropriate preventive services were addressed with this patient via screening, questionnaire, or discussion as appropriate for fall prevention, nutrition, physical activity, Tobacco-use cessation, social engagement, weight loss and cognition.  Checklist reviewing preventive services available has been given to the patient.  Reviewed patient's diet, addressing concerns and/or questions.   She is at risk for lack of exercise and has been provided with information to increase physical activity for the benefit of her well-being.   The patient was instructed to see the dentist every 6 months.   Information on urinary incontinence and treatment options given to patient.       See Patient Instructions    The longitudinal plan of care for the diagnosis(es)/condition(s) as documented were addressed during this visit. Due to the added complexity in care, I will continue to support Anna in the subsequent management and with ongoing continuity of care.    Subjective   Anna is a 79 year old, presenting for the following:  Wellness Visit  Pt is fasting today of labs needed, pt has labs pending from another provider and would like all labs done today when in lab if possible         HPI  Overall she says she is managing fairly well.  She does not have any complaints acutely.  She continues to see her oncologist on her most recent scans do show an increase in her lung metastases.  Should be making a change in her chemo regimen.  She just finished having 2 endoscopies the most recent one which she states was normal.  I do not have copies of these but we will obtain these.  She will be coming off of course of PPI here in the next month with tapering  instructions provided by ALICIA.          Advance Care Planning  Patient has a Health Care Directive on file  Advance care planning document is on file and is current.      4/2/2025   General Health   How would you rate your overall physical health? (!) POOR   Feel stress (tense, anxious, or unable to sleep) Not at all         4/2/2025   Nutrition   Diet: Regular (no restrictions)         4/2/2025   Exercise   Days per week of moderate/strenous exercise 2 days   Average minutes spent exercising at this level 10 min   (!) EXERCISE CONCERN      4/2/2025   Social Factors   Frequency of gathering with friends or relatives Once a week   Worry food won't last until get money to buy more No   Food not last or not have enough money for food? No   Do you have housing? (Housing is defined as stable permanent housing and does not include staying ouside in a car, in a tent, in an abandoned building, in an overnight shelter, or couch-surfing.) Yes   Are you worried about losing your housing? No   Lack of transportation? No   Unable to get utilities (heat,electricity)? No         4/2/2025   Fall Risk   Fallen 2 or more times in the past year? Yes   Trouble with walking or balance? No   Gait Speed Test (Document in seconds) 4.79   Gait Speed Test Interpretation Less than or equal to 5.00 seconds - PASS          4/2/2025   Activities of Daily Living- Home Safety   Needs help with the following daily activites None of the above   Safety concerns in the home None of the above         4/2/2025   Dental   Dentist two times every year? (!) NO         4/2/2025   Hearing Screening   Hearing concerns? None of the above         4/2/2025   Driving Risk Screening   Patient/family members have concerns about driving No         4/2/2025   General Alertness/Fatigue Screening   Have you been more tired than usual lately? No         4/2/2025   Urinary Incontinence Screening   Bothered by leaking urine in past 6 months Yes           4/11/2024   TB  Screening   Were you born outside of the [place]? No           Today's PHQ-2 Score:       2025    11:16 AM   PHQ-2 (  [employer])   Q1: Little interest or pleasure in doing things 0   Q2: Feeling down, depressed or hopeless 0   PHQ-2 Score 0    Q1: Little interest or pleasure in doing things Not at all   Q2: Feeling down, depressed or hopeless Not at all   PHQ-2 Score 0       Patient-reported           2025   Substance Use   If I could quit smoking, I would Completely disagree   I want to quit somking, worry about health affects Completely disagree   Willing to make a plan to quit smoking Completely disagree   Willing to cut down before quitting Completely disagree   Alcohol more than 3/day or more than 7/wk Not Applicable   Do you have a current opioid prescription? No   How severe/bad is pain from 1 to 10? 5/10   Do you use any other substances recreationally? No     Social History     Tobacco Use    Smoking status: Every Day     Current packs/day: 0.00     Average packs/day: 1 pack/day for 58.0 years (58.0 ttl pk-yrs)     Types: Cigarettes     Start date: 1962     Last attempt to quit: 2020     Years since quittin.7    Smokeless tobacco: Never   Vaping Use    Vaping status: Never Used   Substance Use Topics    Alcohol use: No     Alcohol/week: 0.0 standard drinks of alcohol    Drug use: No          Mammogram Screening - After age 74- determine frequency with patient based on health status, life expectancy and patient goals    ASCVD Risk   The 10-year ASCVD risk score (Iliana CAM, et al., 2019) is: 38.8%    Values used to calculate the score:      Age: 79 years      Sex: Female      Is Non- : No      Diabetic: No      Tobacco smoker: Yes      Systolic Blood Pressure: 130 mmHg      Is BP treated: Yes      HDL Cholesterol: 68 mg/dL      Total Cholesterol: 141 mg/dL            Reviewed and updated as needed this visit by Provider      Problems                 Current  providers sharing in care for this patient include:  Patient Care Team:  Tello Finney MD as PCP - General (Internal Medicine)  Tello Finney MD as Assigned PCP  Aashish Rausch MD as Assigned Endocrinology Provider    The following health maintenance items are reviewed in Epic and correct as of today:  Health Maintenance   Topic Date Due    COPD ACTION PLAN  Never done    DTAP/TDAP/TD IMMUNIZATION (1 - Tdap) 12/08/2016    TSH W/FREE T4 REFLEX  02/05/2025    NICOTINE/TOBACCO CESSATION COUNSELING Q 1 YR  04/11/2025    BMP  04/11/2025    LIPID  04/11/2025    COVID-19 Vaccine (9 - [employer] risk 2024-25 season) 05/21/2025    MEDICARE ANNUAL WELLNESS VISIT  04/02/2026    ANNUAL REVIEW OF HM ORDERS  04/02/2026    FALL RISK ASSESSMENT  04/02/2026    DIABETES SCREENING  04/11/2027    ADVANCE CARE PLANNING  04/11/2029    DEXA  01/19/2038    SPIROMETRY  Completed    PHQ-2 (once per calendar year)  Completed    INFLUENZA VACCINE  Completed    Pneumococcal Vaccine: 50+ Years  Completed    ZOSTER IMMUNIZATION  Completed    RSV VACCINE  Completed    HPV IMMUNIZATION  Aged Out    MENINGITIS IMMUNIZATION  Aged Out    HEPATITIS C SCREENING  Discontinued    MAMMO SCREENING  Discontinued    COLORECTAL CANCER SCREENING  Discontinued    LUNG CANCER SCREENING  Discontinued            Objective    Exam  /60   Pulse 77   Temp 98.1  F (36.7  C) (Temporal)   Resp 18   Ht 1.524 m (5')   Wt 51.3 kg (113 lb 3.2 oz)   LMP  (LMP Unknown)   SpO2 99%   BMI 22.11 kg/m     Estimated body mass index is 22.11 kg/m  as calculated from the following:    Height as of this encounter: 1.524 m (5').    Weight as of this encounter: 51.3 kg (113 lb 3.2 oz).    Physical Exam  GENERAL: alert, frail, elderly, and fatigued  EYES: Eyes grossly normal to inspection, PERRL and conjunctivae and sclerae normal  HENT: normal cephalic/atraumatic and oral mucosa noted 4 sutures the lower jaw edentulous.   NECK: no adenopathy, no asymmetry,  masses, or scars  RESP: lungs clear to auscultation - no rales, rhonchi or wheezes  CV: regular rate and rhythm, normal S1 S2, no S3 or S4, no murmur, click or rub, +1 peripheral edema both ankles/lower leg   ABDOMEN: soft, nontender, no hepatosplenomegaly, no masses and bowel sounds normal  MS: extremities normal- no gross deformities noted  SKIN: no suspicious lesions or rashes  NEURO: Normal strength and tone, mentation intact and speech normal  PSYCH: mentation appears normal, affect normal/bright  LYMPH: no cervical adenopathy         4/2/2025   Mini Cog   Clock Draw Score 0 Abnormal   3 Item Recall 3 objects recalled   Mini Cog Total Score 3              Signed Electronically by: Tello Finney MD

## 2025-04-03 ENCOUNTER — TELEPHONE (OUTPATIENT)
Dept: INTERNAL MEDICINE | Facility: CLINIC | Age: 80
End: 2025-04-03
Payer: MEDICARE

## 2025-04-03 NOTE — TELEPHONE ENCOUNTER
Dr. Sheppard's office would not release the 2 EGD reports to Dr. Finney because their office does not have Dr. Frank listed as her PCP.    Called patient and asked her to call [facility] office and ask for the reports to be sent to us.           [facility] ([place])  [address] [address]  Phone: [phone]  Fax: [phone]

## 2025-04-06 DIAGNOSIS — E03.9 HYPOTHYROIDISM, UNSPECIFIED TYPE: ICD-10-CM

## 2025-04-06 RX ORDER — LEVOTHYROXINE SODIUM 50 UG/1
50 TABLET ORAL DAILY
Qty: 90 TABLET | Refills: 3 | Status: SHIPPED | OUTPATIENT
Start: 2025-04-06

## 2025-04-08 ENCOUNTER — TRANSFERRED RECORDS (OUTPATIENT)
Dept: HEALTH INFORMATION MANAGEMENT | Facility: CLINIC | Age: 80
End: 2025-04-08
Payer: MEDICARE

## 2025-04-13 ENCOUNTER — TRANSFERRED RECORDS (OUTPATIENT)
Dept: HEALTH INFORMATION MANAGEMENT | Facility: CLINIC | Age: 80
End: 2025-04-13

## 2025-04-15 ENCOUNTER — TRANSFERRED RECORDS (OUTPATIENT)
Dept: HEALTH INFORMATION MANAGEMENT | Facility: CLINIC | Age: 80
End: 2025-04-15
Payer: MEDICARE

## 2025-05-13 ENCOUNTER — TRANSFERRED RECORDS (OUTPATIENT)
Dept: HEALTH INFORMATION MANAGEMENT | Facility: CLINIC | Age: 80
End: 2025-05-13
Payer: MEDICARE

## 2025-05-19 NOTE — PATIENT INSTRUCTIONS
The following individual(s) verbally consented to be recorded using ambient AI listening technology and understand that they can each withdraw their consent to this listening technology at any point by asking the clinician to turn off or pause the recording:    Patient name: Vitalii Gerasymenko  Additional names:  darian - wife    "  Preparing for Your Surgery  Getting started  A surgery nurse will call you to review your health history and instructions. They will give you an arrival time based on your scheduled surgery time.  Please be ready to share the following:    Your doctor's clinic name and phone number    Your medical, surgical and anesthesia history    A list of allergies and sensitivities    A list of medicines, including herbal treatments and over-the-counter drugs    Whether the patient has a legal guardian (ask how to send us the papers in advance)  If your child is having surgery, please ask for a copy of Preparing for Your Child's Surgery.    Preparing for surgery    Within 30 days of surgery: Have an exam at your family clinic (primary care clinic), or go to a pre-operative clinic. This exam is called a \"History and Physical,\" or H&P.    At your H&P exam, talk to your care team about all medicines you take. If you need to stop any medicines before surgery, ask when to start taking them again.  ? We do this for your safety. Many medicines can make you bleed too much during surgery. Some change how well surgery (anesthesia) drugs work.    Call your insurance company to see what it will and won't pay for. Ask if they need to pre-approve the surgery. (If no insurance, call [phone].)    Call your surgeon's clinic if there's any change in your health. This includes signs of a cold or flu (sore throat, runny nose, cough, rash, fever). It also includes a scrape or scratch near the surgery site.    If you have questions on the day of surgery, call your surgery center.  Eating and drinking guidelines  For your safety: Unless your surgeon tells you otherwise, follow the guidelines below.    Eat and drink as usual until 8 hours before surgery. After that, no food or milk.    Drink clear liquids until 2 hours before surgery. These are liquids you can see through, like water, Gatorade and Propel Water. You may also have black coffee " and tea (no cream or milk).    Nothing by mouth within 2 hours of surgery. This includes gum, candy and breath mints.    Stop alcohol the midnight before surgery.    If your family clinic tells you to take medicine on the morning of surgery, it's okay to take it with a sip of water.  Preventing infection    Shower or bathe the night before and morning of your surgery. Follow the instructions your clinic gave you. (If no instructions, use regular soap.)    Don't shave or clip hair near your surgery site. This can lead to skin infection.    Don't smoke the morning of surgery. Smoking increases the risk of infection. You may chew nicotine gum up to 2 hours before surgery. A nicotine patch is okay.  ? Note: Some surgeries require you to completely quit smoking and nicotine. Check with your surgeon.    Your care team will make every effort to keep you safe from infection. We will:  ? Clean our hands often with soap and water (or an alcohol-based hand rub).  ? Clean the skin at your surgery site with a special soap that kills germs. We'll also remove hair from the site as needed.  ? Wear special hair covers, masks, gowns and gloves during surgery.  ? Give antibiotic medicine, if prescribed. Not all surgeries need antibiotics.  What to bring on the day of surgery    Photo ID and insurance card    Copy of your health care directive, if you have one    Glasses and hearing aides (bring cases)  ? You can't wear contacts during surgery    Inhaler and eye drops, if you use them (tell us about these when you arrive)    CPAP machine or breathing device, if you use them    A few personal items, if spending the night    If you have . . .  ? A pacemaker or ICD (cardiac defibrillator): Bring the ID card.  ? An implanted stimulator: Bring the remote control.  ? A legal guardian: Bring a copy of the certified (court-stamped) guardianship papers.  Please remove any jewelry, including body piercings. Leave jewelry and other valuables at  home.  If you're going home the day of surgery  Important: If you don't follow the rules below, we must cancel your surgery.     Arrange for someone to drive you home after surgery. You may not drive, take a taxi or take public transportation by yourself (unless you'll have local anesthesia only).    Arrange for a responsible adult to stay with you overnight. If you don't, we may keep you in the hospital overnight, and you may need to pay the costs yourself.  Questions?   If you have any questions for your care team, list them here: _________________________________________________________________________________________________________________________________________________________________________________________________________________________________________________________________________________________________________________________  For informational purposes only. Not to replace the advice of your health care provider. Copyright   0334-6441 [employer]. All rights reserved. Clinically reviewed by Estelle Gomes MD. [employer] 363145 - REV 07/19.    How to Take Your Medication Before Surgery  - HOLD (do not take) your HYDROCHLOROTHIAZIDE on the morning of surgery.   - STOP taking your ASPIRIN 7 days before surgery.

## 2025-06-10 ENCOUNTER — TRANSFERRED RECORDS (OUTPATIENT)
Dept: HEALTH INFORMATION MANAGEMENT | Facility: CLINIC | Age: 80
End: 2025-06-10
Payer: MEDICARE

## 2025-06-17 ENCOUNTER — TRANSFERRED RECORDS (OUTPATIENT)
Dept: HEALTH INFORMATION MANAGEMENT | Facility: CLINIC | Age: 80
End: 2025-06-17
Payer: MEDICARE

## 2025-06-25 ENCOUNTER — TELEPHONE (OUTPATIENT)
Dept: ENDOCRINOLOGY | Facility: CLINIC | Age: 80
End: 2025-06-25
Payer: MEDICARE

## 2025-06-25 NOTE — TELEPHONE ENCOUNTER
[facility] Call Center    Phone Message    May a detailed message be left on voicemail: yes     Reason for Call: Other: Patient requesting letter be sent again stating Dr Rausch is leaving she lost hers.     Action Taken: Message routed to:  [facility] endo    Travel Screening: Not Applicable     Date of Service:

## 2025-06-30 ENCOUNTER — TRANSFERRED RECORDS (OUTPATIENT)
Dept: HEALTH INFORMATION MANAGEMENT | Facility: CLINIC | Age: 80
End: 2025-06-30
Payer: MEDICARE

## 2025-07-24 ENCOUNTER — ANCILLARY PROCEDURE (OUTPATIENT)
Dept: CT IMAGING | Facility: CLINIC | Age: 80
End: 2025-07-24
Attending: INTERNAL MEDICINE
Payer: MEDICARE

## 2025-07-24 DIAGNOSIS — C18.9 PRIMARY MALIGNANT NEOPLASM OF COLON (H): ICD-10-CM

## 2025-07-24 LAB
CREAT BLD-MCNC: 0.8 MG/DL (ref 0.5–1)
EGFRCR SERPLBLD CKD-EPI 2021: >60 ML/MIN/1.73M2

## 2025-07-24 PROCEDURE — 71260 CT THORAX DX C+: CPT

## 2025-07-24 PROCEDURE — 250N000011 HC RX IP 250 OP 636: Performed by: INTERNAL MEDICINE

## 2025-07-24 PROCEDURE — 250N000009 HC RX 250: Performed by: INTERNAL MEDICINE

## 2025-07-24 PROCEDURE — 82565 ASSAY OF CREATININE: CPT

## 2025-07-24 RX ORDER — IOPAMIDOL 755 MG/ML
81 INJECTION, SOLUTION INTRAVASCULAR ONCE
Status: COMPLETED | OUTPATIENT
Start: 2025-07-24 | End: 2025-07-24

## 2025-07-24 RX ORDER — HEPARIN SODIUM (PORCINE) LOCK FLUSH IV SOLN 100 UNIT/ML 100 UNIT/ML
5 SOLUTION INTRAVENOUS ONCE
Status: COMPLETED | OUTPATIENT
Start: 2025-07-24 | End: 2025-07-24

## 2025-07-24 RX ADMIN — HEPARIN SODIUM (PORCINE) LOCK FLUSH IV SOLN 100 UNIT/ML 5 ML: 100 SOLUTION at 13:35

## 2025-07-24 RX ADMIN — SODIUM CHLORIDE 50 ML: 9 INJECTION, SOLUTION INTRAVENOUS at 13:34

## 2025-07-24 RX ADMIN — IOPAMIDOL 81 ML: 755 INJECTION, SOLUTION INTRAVENOUS at 13:34

## 2025-08-12 ENCOUNTER — TRANSFERRED RECORDS (OUTPATIENT)
Dept: HEALTH INFORMATION MANAGEMENT | Facility: CLINIC | Age: 80
End: 2025-08-12
Payer: MEDICARE

## (undated) DEVICE — SU VICRYL 0 CT-1 27" J340H

## (undated) DEVICE — SU VICRYL 3-0 SH 27" J316H

## (undated) DEVICE — TUBING SUCTION MEDI-VAC SOFT 3/16"X20' N520A

## (undated) DEVICE — ESU ELEC BLADE 6" COATED E1450-6

## (undated) DEVICE — DRSG KERLIX 4 1/2"X4YDS ROLL 6715

## (undated) DEVICE — ESU LIGASURE ATLAS 20CM  LS1020

## (undated) DEVICE — ANTIFOG SOLUTION W/FOAM PAD 31142527

## (undated) DEVICE — ENDO TROCAR SLEEVE KII Z-THREADED 05X100MM CTS02

## (undated) DEVICE — DRAPE IOBAN INCISE 23X17" 6650EZ

## (undated) DEVICE — STPL ENDO ARTICULATING 60MM EC60A

## (undated) DEVICE — Device

## (undated) DEVICE — ESU HOLDER LAP INST DISP PURPLE LONG 330MM H-PRO-330

## (undated) DEVICE — TAPE DRSG UNIVERSAL CLOTH 3" WHITE LATEX 881-3

## (undated) DEVICE — ESU GROUND PAD UNIVERSAL W/O CORD

## (undated) DEVICE — ESU PENCIL W/HOLSTER E2350H

## (undated) DEVICE — PREP CHLORAPREP 26ML TINTED ORANGE  260815

## (undated) DEVICE — GLOVE PROTEXIS W/NEU-THERA 6.5  2D73TE65

## (undated) DEVICE — SUCTION CANISTER STRAW 65652-008

## (undated) DEVICE — SU DERMABOND ADVANCED .7ML DNX12

## (undated) DEVICE — GOWN XLG DISP 9545

## (undated) DEVICE — GOWN IMPERVIOUS ZONED LG

## (undated) DEVICE — SU VICRYL 1 CT 36" J959H

## (undated) DEVICE — PAD CHUX UNDERPAD 30X30"

## (undated) DEVICE — LINEN HALF SHEET 5512

## (undated) DEVICE — DECANTER BAG 2002S

## (undated) DEVICE — LINEN POUCH DBL 5427

## (undated) DEVICE — NDL 22GA 1.5"

## (undated) DEVICE — LINEN TOWEL PACK X5 5464

## (undated) DEVICE — SU DERMABOND MINI DHVM12

## (undated) DEVICE — SU PDS II 0 CTX 60" Z990G

## (undated) DEVICE — BNDG ABDOMINAL BINDER 9X30-45" 79-89070

## (undated) DEVICE — DRSG GAUZE 4X4" 3033

## (undated) DEVICE — PACK MAJOR SBA15MAFSI

## (undated) DEVICE — NDL INSUFFLATION 13GA 120MM C2201

## (undated) DEVICE — GLOVE PROTEXIS W/NEU-THERA 7.5  2D73TE75

## (undated) DEVICE — BLADE KNIFE SURG 10 371110

## (undated) DEVICE — DRAIN CHEST TUBE 28FR STR 8028

## (undated) DEVICE — ENDO TROCAR FIRST ENTRY KII FIOS Z-THRD 05X100MM CTF03

## (undated) DEVICE — SOL WATER IRRIG 1000ML BOTTLE 2F7114

## (undated) DEVICE — GLOVE PROTEXIS BLUE W/NEU-THERA 6.5  2D73EB65

## (undated) DEVICE — CATH TRAY FOLEY SURESTEP 16FR DRAIN BAG STATOCK A899916

## (undated) DEVICE — KIT PATIENT POSITIONING PIGAZZI LATEX FREE 40580

## (undated) DEVICE — GLOVE ESTEEM POWDER FREE SMT 7.0  2D72PT70

## (undated) DEVICE — SU NDL CUT REV MED 3/8 209014

## (undated) DEVICE — SU VICRYL 2-0 CT-2 27" J333H

## (undated) DEVICE — CLIP APPLIER 11" MED LIGACLIP MCM20

## (undated) DEVICE — SUCTION DRY CHEST DRAIN OASIS 3600-100

## (undated) DEVICE — PREP CHLORAPREP 26ML TINTED HI-LITE ORANGE 930815

## (undated) DEVICE — SUCTION CANISTER MEDIVAC LINER 3000ML W/LID 65651-530

## (undated) DEVICE — PACK MINOR SBA15MIFSE

## (undated) DEVICE — DRAPE LEGGINGS 8421

## (undated) DEVICE — STPL POWERED ECHELON VASC 35MM PVE35A

## (undated) DEVICE — SU VICRYL 4-0 PS-2 18" UND J496H

## (undated) DEVICE — SU VICRYL 1 CTX CR 8X18" J765D

## (undated) DEVICE — SU MONOCRYL 4-0 PS-2 27" UND Y426H

## (undated) DEVICE — SOL NACL 0.9% INJ 250ML BAG 2B1322Q

## (undated) DEVICE — SOL NACL 0.9% IRRIG 1000ML BOTTLE 07138-09

## (undated) DEVICE — STPL SKIN SUBCUTICULAR INSORB  2030

## (undated) DEVICE — ESU LIGASURE MARYLAND LAPAROSCOPIC SLR/DVDR 5MMX37CM LF1937

## (undated) DEVICE — DRAPE MAYO STAND 23X54 8337

## (undated) DEVICE — SUCTION TIP POOLE K770

## (undated) DEVICE — GLOVE PROTEXIS W/NEU-THERA 7.0  2D73TE70

## (undated) DEVICE — SU PROLENE 3-0 V-7DA 36" 8976H

## (undated) DEVICE — CATH GUIDE MAPCATH SENSOR STYLET 1.4FR

## (undated) DEVICE — ENDO POUCH UNIV RETRIEVAL SYSTEM INZII 10MM CD001

## (undated) DEVICE — SU VICRYL 3-0 SH CR 8X18" J774

## (undated) DEVICE — BLADE KNIFE SURG 15 371115

## (undated) DEVICE — SYSTEM CLEARIFY VISUALIZATION 21-345

## (undated) DEVICE — SU VICRYL 1 CT-2 27" J335H

## (undated) DEVICE — ESU LIGASURE LAPAROSCOPIC BLUNT TIP SEALER 5MMX37CM LF1637

## (undated) DEVICE — SPONGE LAP 18X18" X8435

## (undated) DEVICE — LINEN FULL SHEET 5511

## (undated) DEVICE — SOL NACL 0.9% IRRIG 1000ML BOTTLE 2F7124

## (undated) DEVICE — DRAPE BREAST/CHEST 29420

## (undated) DEVICE — LINEN GOWN OVERSIZE 5408

## (undated) DEVICE — STPL RELOAD REG/THK TISSUE ECHELON 60 X 3.8MM GOLD GST60D

## (undated) DEVICE — ESU CORD MONOPOLAR 10'  E0510

## (undated) DEVICE — DRAPE POUCH INSTRUMENT 1018

## (undated) DEVICE — SURGICEL HEMOSTAT 3X4" NUKNIT 1943

## (undated) DEVICE — EVAC SYSTEM CLEAR FLOW SC082500

## (undated) DEVICE — SU VICRYL 2-0 TIE 12X18" J905T

## (undated) DEVICE — PROTECTOR ARM ONE-STEP TRENDELENBURG 40418

## (undated) DEVICE — SYR BULB IRRIG 50ML LATEX FREE 0035280

## (undated) DEVICE — SUCTION IRR STRYKERFLOW II W/TIP 250-070-520

## (undated) DEVICE — GOWN IMPERVIOUS BREATHABLE SMART LG 89015

## (undated) DEVICE — ESU GROUND PAD ADULT W/CORD E7507

## (undated) DEVICE — BAG CLEAR TRASH 1.3M 39X33" P4040C

## (undated) DEVICE — PACK LAP CHOLE SLC15LCFSD

## (undated) DEVICE — STPL LINEAR CUT 75MM TLC75

## (undated) DEVICE — KIT SIGMOIDOSCOPE W/OBTURATOR 18FR SUCTION KI521/10

## (undated) DEVICE — ENDO TROCAR FIRST ENTRY KII FIOS Z-THRD 11X100MM CTF33

## (undated) DEVICE — ENDO TROCAR THORACIC 10/12MM TT012

## (undated) DEVICE — SYR 10ML SLIP TIP W/O NDL

## (undated) DEVICE — STPL ENDO HANDLE GIA ULTRA UNIVERSAL STD EGIAUSTND

## (undated) DEVICE — SU SILK 2 REEL 60" SA8H

## (undated) DEVICE — PREP CHLORAPREP W/ORANGE TINT 10.5ML 260715

## (undated) DEVICE — CATH ON-Q PAIN SILVER SKR 2.5" PM010-A

## (undated) DEVICE — DEVICE TACKER ENDO RELIATACK ARTIC HANDLE RELTACK4XDPT

## (undated) DEVICE — BAG RED BIOHAZARD

## (undated) DEVICE — SU VICRYL 0 UR-6 27" J603H

## (undated) DEVICE — ENDO TROCAR BLUNT 100MM W/THRD ANCHOR BLUNTPORT BPT12STS

## (undated) DEVICE — NDL 19GA 1.5"

## (undated) DEVICE — COVER FOOTSWITCH URO

## (undated) DEVICE — DRAPE LAP TRANSVERSE 29421

## (undated) DEVICE — ENDO TROCAR 05MM VERSAONE BLADELESS W/STD FIX CAN NONB5STF

## (undated) DEVICE — ENDO TROCAR OPTICAL ACCESS KII Z-THRD 05X100MM CTR03

## (undated) DEVICE — GLOVE PROTEXIS MICRO 6.0  2D73PM60

## (undated) DEVICE — STPL ENDO RELOAD GIA 60X3.5MM ROTIC 030458

## (undated) DEVICE — ESU ELEC BLADE 6" COATED/INSULATED E1455-6

## (undated) DEVICE — GLOVE PROTEXIS POWDER FREE SMT 7.0  2D72PT70X

## (undated) DEVICE — SUCTION TIP YANKAUER W/O VENT K86

## (undated) DEVICE — SU MONOCRYL 4-0 PS-2 18" UND Y496G

## (undated) DEVICE — SU VICRYL 2-0 SH 27" J317H

## (undated) DEVICE — ESU ELEC BLADE 2.75" COATED/INSULATED E1455

## (undated) DEVICE — JELLY LUBRICATING SURGILUBE 4OZ TUBE

## (undated) DEVICE — SYSTEM LAPAROVUE VISIBILITY LAPVUE10

## (undated) DEVICE — TAPE CLOTH ADHESIVE 3" LATEX FREE

## (undated) DEVICE — DEVICE SUTURE GRASPER TROCAR CLOSURE 14GA PMITCSG

## (undated) DEVICE — DRSG TELFA ISLAND 4X10"

## (undated) DEVICE — GOWN IMPERVIOUS SPECIALTY XL/XLONG 39049

## (undated) DEVICE — LINEN TOWEL PACK X10 5473

## (undated) DEVICE — LINEN DRAPE 54X72" 5467

## (undated) DEVICE — SU PROLENE 1 CTX 30" 8435H

## (undated) DEVICE — COVER CAMERA ENDOVIDEO

## (undated) DEVICE — SU VICRYL 0 TIE 12X18" J906G

## (undated) DEVICE — PREP POVIDONE IODINE SOLUTION 10% 120ML

## (undated) DEVICE — GLOVE PROTEXIS BLUE W/NEU-THERA 7.5  2D73EB75

## (undated) RX ORDER — FENTANYL CITRATE 50 UG/ML
INJECTION, SOLUTION INTRAMUSCULAR; INTRAVENOUS
Status: DISPENSED
Start: 2019-03-28

## (undated) RX ORDER — HYDROMORPHONE HYDROCHLORIDE 1 MG/ML
INJECTION, SOLUTION INTRAMUSCULAR; INTRAVENOUS; SUBCUTANEOUS
Status: DISPENSED
Start: 2019-03-28

## (undated) RX ORDER — LIDOCAINE HYDROCHLORIDE 10 MG/ML
INJECTION, SOLUTION INFILTRATION; PERINEURAL
Status: DISPENSED
Start: 2017-03-24

## (undated) RX ORDER — HEPARIN SODIUM (PORCINE) LOCK FLUSH IV SOLN 100 UNIT/ML 100 UNIT/ML
SOLUTION INTRAVENOUS
Status: DISPENSED
Start: 2019-07-17

## (undated) RX ORDER — HEPARIN SODIUM (PORCINE) LOCK FLUSH IV SOLN 100 UNIT/ML 100 UNIT/ML
SOLUTION INTRAVENOUS
Status: DISPENSED
Start: 2023-10-26

## (undated) RX ORDER — HEPARIN SODIUM 5000 [USP'U]/.5ML
INJECTION, SOLUTION INTRAVENOUS; SUBCUTANEOUS
Status: DISPENSED
Start: 2019-03-28

## (undated) RX ORDER — GLYCOPYRROLATE 0.2 MG/ML
INJECTION INTRAMUSCULAR; INTRAVENOUS
Status: DISPENSED
Start: 2018-01-25

## (undated) RX ORDER — LIDOCAINE HYDROCHLORIDE 10 MG/ML
INJECTION, SOLUTION EPIDURAL; INFILTRATION; INTRACAUDAL; PERINEURAL
Status: DISPENSED
Start: 2018-01-25

## (undated) RX ORDER — HYDROMORPHONE HYDROCHLORIDE 1 MG/ML
INJECTION, SOLUTION INTRAMUSCULAR; INTRAVENOUS; SUBCUTANEOUS
Status: DISPENSED
Start: 2021-02-23

## (undated) RX ORDER — FENTANYL CITRATE 50 UG/ML
INJECTION, SOLUTION INTRAMUSCULAR; INTRAVENOUS
Status: DISPENSED
Start: 2017-03-08

## (undated) RX ORDER — CEFAZOLIN SODIUM 2 G/100ML
INJECTION, SOLUTION INTRAVENOUS
Status: DISPENSED
Start: 2020-08-12

## (undated) RX ORDER — CEFAZOLIN SODIUM 1 G/3ML
INJECTION, POWDER, FOR SOLUTION INTRAMUSCULAR; INTRAVENOUS
Status: DISPENSED
Start: 2017-03-24

## (undated) RX ORDER — FENTANYL CITRATE 50 UG/ML
INJECTION, SOLUTION INTRAMUSCULAR; INTRAVENOUS
Status: DISPENSED
Start: 2018-01-25

## (undated) RX ORDER — HEPARIN SODIUM (PORCINE) LOCK FLUSH IV SOLN 100 UNIT/ML 100 UNIT/ML
SOLUTION INTRAVENOUS
Status: DISPENSED
Start: 2024-08-29

## (undated) RX ORDER — PROPOFOL 10 MG/ML
INJECTION, EMULSION INTRAVENOUS
Status: DISPENSED
Start: 2021-02-23

## (undated) RX ORDER — ALVIMOPAN 12 MG/1
CAPSULE ORAL
Status: DISPENSED
Start: 2019-03-28

## (undated) RX ORDER — ONDANSETRON 2 MG/ML
INJECTION INTRAMUSCULAR; INTRAVENOUS
Status: DISPENSED
Start: 2019-03-28

## (undated) RX ORDER — FENTANYL CITRATE 50 UG/ML
INJECTION, SOLUTION INTRAMUSCULAR; INTRAVENOUS
Status: DISPENSED
Start: 2020-08-12

## (undated) RX ORDER — FLUMAZENIL 0.1 MG/ML
INJECTION, SOLUTION INTRAVENOUS
Status: DISPENSED
Start: 2017-03-08

## (undated) RX ORDER — FENTANYL CITRATE 50 UG/ML
INJECTION, SOLUTION INTRAMUSCULAR; INTRAVENOUS
Status: DISPENSED
Start: 2021-02-23

## (undated) RX ORDER — CIPROFLOXACIN 2 MG/ML
INJECTION, SOLUTION INTRAVENOUS
Status: DISPENSED
Start: 2019-03-28

## (undated) RX ORDER — BUPIVACAINE HYDROCHLORIDE 5 MG/ML
INJECTION, SOLUTION EPIDURAL; INTRACAUDAL
Status: DISPENSED
Start: 2022-09-20

## (undated) RX ORDER — CEFAZOLIN SODIUM/WATER 2 G/20 ML
SYRINGE (ML) INTRAVENOUS
Status: DISPENSED
Start: 2022-09-20

## (undated) RX ORDER — PROPOFOL 10 MG/ML
INJECTION, EMULSION INTRAVENOUS
Status: DISPENSED
Start: 2018-01-25

## (undated) RX ORDER — EPHEDRINE SULFATE 50 MG/ML
INJECTION, SOLUTION INTRAMUSCULAR; INTRAVENOUS; SUBCUTANEOUS
Status: DISPENSED
Start: 2018-01-25

## (undated) RX ORDER — HEPARIN SODIUM (PORCINE) LOCK FLUSH IV SOLN 100 UNIT/ML 100 UNIT/ML
SOLUTION INTRAVENOUS
Status: DISPENSED
Start: 2024-11-18

## (undated) RX ORDER — PROPOFOL 10 MG/ML
INJECTION, EMULSION INTRAVENOUS
Status: DISPENSED
Start: 2020-08-12

## (undated) RX ORDER — PROPOFOL 10 MG/ML
INJECTION, EMULSION INTRAVENOUS
Status: DISPENSED
Start: 2017-03-24

## (undated) RX ORDER — ONDANSETRON 2 MG/ML
INJECTION INTRAMUSCULAR; INTRAVENOUS
Status: DISPENSED
Start: 2021-02-23

## (undated) RX ORDER — HEPARIN SODIUM (PORCINE) LOCK FLUSH IV SOLN 100 UNIT/ML 100 UNIT/ML
SOLUTION INTRAVENOUS
Status: DISPENSED
Start: 2024-12-03

## (undated) RX ORDER — ACETAMINOPHEN 325 MG/1
TABLET ORAL
Status: DISPENSED
Start: 2019-03-28

## (undated) RX ORDER — ALBUTEROL SULFATE 90 UG/1
AEROSOL, METERED RESPIRATORY (INHALATION)
Status: DISPENSED
Start: 2019-03-28

## (undated) RX ORDER — HYDROCODONE BITARTRATE AND ACETAMINOPHEN 5; 325 MG/1; MG/1
TABLET ORAL
Status: DISPENSED
Start: 2017-03-24

## (undated) RX ORDER — ALBUTEROL SULFATE 0.83 MG/ML
SOLUTION RESPIRATORY (INHALATION)
Status: DISPENSED
Start: 2019-03-28

## (undated) RX ORDER — SCOLOPAMINE TRANSDERMAL SYSTEM 1 MG/1
PATCH, EXTENDED RELEASE TRANSDERMAL
Status: DISPENSED
Start: 2019-03-28

## (undated) RX ORDER — LIDOCAINE HYDROCHLORIDE 20 MG/ML
INJECTION, SOLUTION EPIDURAL; INFILTRATION; INTRACAUDAL; PERINEURAL
Status: DISPENSED
Start: 2022-09-20

## (undated) RX ORDER — HEPARIN SODIUM 1000 [USP'U]/ML
INJECTION, SOLUTION INTRAVENOUS; SUBCUTANEOUS
Status: DISPENSED
Start: 2017-03-24

## (undated) RX ORDER — DEXAMETHASONE SODIUM PHOSPHATE 4 MG/ML
INJECTION, SOLUTION INTRA-ARTICULAR; INTRALESIONAL; INTRAMUSCULAR; INTRAVENOUS; SOFT TISSUE
Status: DISPENSED
Start: 2020-08-12

## (undated) RX ORDER — HEPARIN SODIUM (PORCINE) LOCK FLUSH IV SOLN 100 UNIT/ML 100 UNIT/ML
SOLUTION INTRAVENOUS
Status: DISPENSED
Start: 2024-01-23

## (undated) RX ORDER — ONDANSETRON 2 MG/ML
INJECTION INTRAMUSCULAR; INTRAVENOUS
Status: DISPENSED
Start: 2017-03-24

## (undated) RX ORDER — HEPARIN SODIUM (PORCINE) LOCK FLUSH IV SOLN 100 UNIT/ML 100 UNIT/ML
SOLUTION INTRAVENOUS
Status: DISPENSED
Start: 2017-10-02

## (undated) RX ORDER — HEPARIN SODIUM (PORCINE) LOCK FLUSH IV SOLN 100 UNIT/ML 100 UNIT/ML
SOLUTION INTRAVENOUS
Status: DISPENSED
Start: 2024-05-09

## (undated) RX ORDER — DEXAMETHASONE SODIUM PHOSPHATE 4 MG/ML
INJECTION, SOLUTION INTRA-ARTICULAR; INTRALESIONAL; INTRAMUSCULAR; INTRAVENOUS; SOFT TISSUE
Status: DISPENSED
Start: 2022-09-20

## (undated) RX ORDER — LIDOCAINE HYDROCHLORIDE 20 MG/ML
INJECTION, SOLUTION EPIDURAL; INFILTRATION; INTRACAUDAL; PERINEURAL
Status: DISPENSED
Start: 2017-03-24

## (undated) RX ORDER — HEPARIN SODIUM (PORCINE) LOCK FLUSH IV SOLN 100 UNIT/ML 100 UNIT/ML
SOLUTION INTRAVENOUS
Status: DISPENSED
Start: 2024-09-13

## (undated) RX ORDER — FENTANYL CITRATE 0.05 MG/ML
INJECTION, SOLUTION INTRAMUSCULAR; INTRAVENOUS
Status: DISPENSED
Start: 2020-08-12

## (undated) RX ORDER — ONDANSETRON 2 MG/ML
INJECTION INTRAMUSCULAR; INTRAVENOUS
Status: DISPENSED
Start: 2018-01-25

## (undated) RX ORDER — HYDROMORPHONE HYDROCHLORIDE 1 MG/ML
INJECTION, SOLUTION INTRAMUSCULAR; INTRAVENOUS; SUBCUTANEOUS
Status: DISPENSED
Start: 2020-08-12

## (undated) RX ORDER — DEXAMETHASONE SODIUM PHOSPHATE 4 MG/ML
INJECTION, SOLUTION INTRA-ARTICULAR; INTRALESIONAL; INTRAMUSCULAR; INTRAVENOUS; SOFT TISSUE
Status: DISPENSED
Start: 2021-02-23

## (undated) RX ORDER — NEOSTIGMINE METHYLSULFATE 1 MG/ML
VIAL (ML) INJECTION
Status: DISPENSED
Start: 2021-02-23

## (undated) RX ORDER — KETOROLAC TROMETHAMINE 30 MG/ML
INJECTION, SOLUTION INTRAMUSCULAR; INTRAVENOUS
Status: DISPENSED
Start: 2020-08-12

## (undated) RX ORDER — HYDROMORPHONE HYDROCHLORIDE 1 MG/ML
INJECTION, SOLUTION INTRAMUSCULAR; INTRAVENOUS; SUBCUTANEOUS
Status: DISPENSED
Start: 2022-09-20

## (undated) RX ORDER — HEPARIN SODIUM (PORCINE) LOCK FLUSH IV SOLN 100 UNIT/ML 100 UNIT/ML
SOLUTION INTRAVENOUS
Status: DISPENSED
Start: 2025-03-12

## (undated) RX ORDER — PROPOFOL 10 MG/ML
INJECTION, EMULSION INTRAVENOUS
Status: DISPENSED
Start: 2022-09-20

## (undated) RX ORDER — GLYCOPYRROLATE 0.2 MG/ML
INJECTION, SOLUTION INTRAMUSCULAR; INTRAVENOUS
Status: DISPENSED
Start: 2021-02-23

## (undated) RX ORDER — FENTANYL CITRATE 50 UG/ML
INJECTION, SOLUTION INTRAMUSCULAR; INTRAVENOUS
Status: DISPENSED
Start: 2022-09-20

## (undated) RX ORDER — ONDANSETRON 2 MG/ML
INJECTION INTRAMUSCULAR; INTRAVENOUS
Status: DISPENSED
Start: 2020-08-12

## (undated) RX ORDER — PROPOFOL 10 MG/ML
INJECTION, EMULSION INTRAVENOUS
Status: DISPENSED
Start: 2019-03-28

## (undated) RX ORDER — FENTANYL CITRATE 50 UG/ML
INJECTION, SOLUTION INTRAMUSCULAR; INTRAVENOUS
Status: DISPENSED
Start: 2017-03-24

## (undated) RX ORDER — NEOSTIGMINE METHYLSULFATE 1 MG/ML
VIAL (ML) INJECTION
Status: DISPENSED
Start: 2018-01-25

## (undated) RX ORDER — NALOXONE HYDROCHLORIDE 0.4 MG/ML
INJECTION, SOLUTION INTRAMUSCULAR; INTRAVENOUS; SUBCUTANEOUS
Status: DISPENSED
Start: 2017-03-08

## (undated) RX ORDER — HEPARIN SODIUM (PORCINE) LOCK FLUSH IV SOLN 100 UNIT/ML 100 UNIT/ML
SOLUTION INTRAVENOUS
Status: DISPENSED
Start: 2024-07-01

## (undated) RX ORDER — ONDANSETRON 2 MG/ML
INJECTION INTRAMUSCULAR; INTRAVENOUS
Status: DISPENSED
Start: 2022-09-20

## (undated) RX ORDER — HEPARIN SODIUM 5000 [USP'U]/.5ML
INJECTION, SOLUTION INTRAVENOUS; SUBCUTANEOUS
Status: DISPENSED
Start: 2018-01-25

## (undated) RX ORDER — HEPARIN SODIUM (PORCINE) LOCK FLUSH IV SOLN 100 UNIT/ML 100 UNIT/ML
SOLUTION INTRAVENOUS
Status: DISPENSED
Start: 2025-07-24

## (undated) RX ORDER — CEFAZOLIN SODIUM 2 G/100ML
INJECTION, SOLUTION INTRAVENOUS
Status: DISPENSED
Start: 2021-02-23

## (undated) RX ORDER — LIDOCAINE HYDROCHLORIDE 20 MG/ML
INJECTION, SOLUTION EPIDURAL; INFILTRATION; INTRACAUDAL; PERINEURAL
Status: DISPENSED
Start: 2020-08-12

## (undated) RX ORDER — DEXAMETHASONE SODIUM PHOSPHATE 4 MG/ML
INJECTION, SOLUTION INTRA-ARTICULAR; INTRALESIONAL; INTRAMUSCULAR; INTRAVENOUS; SOFT TISSUE
Status: DISPENSED
Start: 2018-01-25